# Patient Record
Sex: FEMALE | Race: BLACK OR AFRICAN AMERICAN | NOT HISPANIC OR LATINO | Employment: OTHER | URBAN - METROPOLITAN AREA
[De-identification: names, ages, dates, MRNs, and addresses within clinical notes are randomized per-mention and may not be internally consistent; named-entity substitution may affect disease eponyms.]

---

## 2023-06-07 DIAGNOSIS — M25.562 PAIN IN BOTH KNEES, UNSPECIFIED CHRONICITY: Primary | ICD-10-CM

## 2023-06-07 DIAGNOSIS — M25.561 PAIN IN BOTH KNEES, UNSPECIFIED CHRONICITY: Primary | ICD-10-CM

## 2023-06-12 ENCOUNTER — OFFICE VISIT (OUTPATIENT)
Dept: SPORTS MEDICINE | Facility: CLINIC | Age: 78
End: 2023-06-12
Payer: COMMERCIAL

## 2023-06-12 ENCOUNTER — HOSPITAL ENCOUNTER (OUTPATIENT)
Dept: RADIOLOGY | Facility: HOSPITAL | Age: 78
Discharge: HOME | End: 2023-06-12
Attending: PHYSICIAN ASSISTANT
Payer: COMMERCIAL

## 2023-06-12 VITALS
BODY MASS INDEX: 34.78 KG/M2 | WEIGHT: 184.06 LBS | SYSTOLIC BLOOD PRESSURE: 176 MMHG | DIASTOLIC BLOOD PRESSURE: 81 MMHG | HEART RATE: 57 BPM

## 2023-06-12 DIAGNOSIS — M25.561 CHRONIC PAIN OF BOTH KNEES: Primary | ICD-10-CM

## 2023-06-12 DIAGNOSIS — M25.562 CHRONIC PAIN OF BOTH KNEES: Primary | ICD-10-CM

## 2023-06-12 DIAGNOSIS — M17.12 PRIMARY OSTEOARTHRITIS OF LEFT KNEE: ICD-10-CM

## 2023-06-12 DIAGNOSIS — M17.11 PRIMARY OSTEOARTHRITIS OF RIGHT KNEE: ICD-10-CM

## 2023-06-12 DIAGNOSIS — G89.29 CHRONIC PAIN OF BOTH KNEES: Primary | ICD-10-CM

## 2023-06-12 DIAGNOSIS — M25.562 PAIN IN BOTH KNEES, UNSPECIFIED CHRONICITY: ICD-10-CM

## 2023-06-12 DIAGNOSIS — M25.561 PAIN IN BOTH KNEES, UNSPECIFIED CHRONICITY: ICD-10-CM

## 2023-06-12 PROCEDURE — 3079F DIAST BP 80-89 MM HG: CPT | Mod: CPTII,S$GLB,, | Performed by: PHYSICIAN ASSISTANT

## 2023-06-12 PROCEDURE — 1126F PR PAIN SEVERITY QUANTIFIED, NO PAIN PRESENT: ICD-10-PCS | Mod: CPTII,S$GLB,, | Performed by: PHYSICIAN ASSISTANT

## 2023-06-12 PROCEDURE — 3288F PR FALLS RISK ASSESSMENT DOCUMENTED: ICD-10-PCS | Mod: CPTII,S$GLB,, | Performed by: PHYSICIAN ASSISTANT

## 2023-06-12 PROCEDURE — 99999 PR PBB SHADOW E&M-EST. PATIENT-LVL III: CPT | Mod: PBBFAC,,, | Performed by: PHYSICIAN ASSISTANT

## 2023-06-12 PROCEDURE — 20610 LARGE JOINT ASPIRATION/INJECTION: BILATERAL KNEE: ICD-10-PCS | Mod: 50,S$GLB,, | Performed by: PHYSICIAN ASSISTANT

## 2023-06-12 PROCEDURE — 1101F PR PT FALLS ASSESS DOC 0-1 FALLS W/OUT INJ PAST YR: ICD-10-PCS | Mod: CPTII,S$GLB,, | Performed by: PHYSICIAN ASSISTANT

## 2023-06-12 PROCEDURE — 99204 PR OFFICE/OUTPT VISIT, NEW, LEVL IV, 45-59 MIN: ICD-10-PCS | Mod: 25,S$GLB,, | Performed by: PHYSICIAN ASSISTANT

## 2023-06-12 PROCEDURE — 1159F PR MEDICATION LIST DOCUMENTED IN MEDICAL RECORD: ICD-10-PCS | Mod: CPTII,S$GLB,, | Performed by: PHYSICIAN ASSISTANT

## 2023-06-12 PROCEDURE — 99204 OFFICE O/P NEW MOD 45 MIN: CPT | Mod: 25,S$GLB,, | Performed by: PHYSICIAN ASSISTANT

## 2023-06-12 PROCEDURE — 20610 DRAIN/INJ JOINT/BURSA W/O US: CPT | Mod: 50,S$GLB,, | Performed by: PHYSICIAN ASSISTANT

## 2023-06-12 PROCEDURE — 1159F MED LIST DOCD IN RCRD: CPT | Mod: CPTII,S$GLB,, | Performed by: PHYSICIAN ASSISTANT

## 2023-06-12 PROCEDURE — 3079F PR MOST RECENT DIASTOLIC BLOOD PRESSURE 80-89 MM HG: ICD-10-PCS | Mod: CPTII,S$GLB,, | Performed by: PHYSICIAN ASSISTANT

## 2023-06-12 PROCEDURE — 3077F PR MOST RECENT SYSTOLIC BLOOD PRESSURE >= 140 MM HG: ICD-10-PCS | Mod: CPTII,S$GLB,, | Performed by: PHYSICIAN ASSISTANT

## 2023-06-12 PROCEDURE — 99999 PR PBB SHADOW E&M-EST. PATIENT-LVL III: ICD-10-PCS | Mod: PBBFAC,,, | Performed by: PHYSICIAN ASSISTANT

## 2023-06-12 PROCEDURE — 3288F FALL RISK ASSESSMENT DOCD: CPT | Mod: CPTII,S$GLB,, | Performed by: PHYSICIAN ASSISTANT

## 2023-06-12 PROCEDURE — 73564 X-RAY EXAM KNEE 4 OR MORE: CPT | Mod: TC,50,PN

## 2023-06-12 PROCEDURE — 1101F PT FALLS ASSESS-DOCD LE1/YR: CPT | Mod: CPTII,S$GLB,, | Performed by: PHYSICIAN ASSISTANT

## 2023-06-12 PROCEDURE — 1126F AMNT PAIN NOTED NONE PRSNT: CPT | Mod: CPTII,S$GLB,, | Performed by: PHYSICIAN ASSISTANT

## 2023-06-12 PROCEDURE — 73564 XR KNEE ORTHO BILAT WITH FLEXION: ICD-10-PCS | Mod: 26,,, | Performed by: RADIOLOGY

## 2023-06-12 PROCEDURE — 73564 X-RAY EXAM KNEE 4 OR MORE: CPT | Mod: 26,,, | Performed by: RADIOLOGY

## 2023-06-12 PROCEDURE — 3077F SYST BP >= 140 MM HG: CPT | Mod: CPTII,S$GLB,, | Performed by: PHYSICIAN ASSISTANT

## 2023-06-12 RX ORDER — CARVEDILOL 6.25 MG/1
6.25 TABLET ORAL 2 TIMES DAILY WITH MEALS
COMMUNITY

## 2023-06-12 RX ORDER — TRIAMCINOLONE ACETONIDE 40 MG/ML
40 INJECTION, SUSPENSION INTRA-ARTICULAR; INTRAMUSCULAR
Status: DISCONTINUED | OUTPATIENT
Start: 2023-06-12 | End: 2023-06-12 | Stop reason: HOSPADM

## 2023-06-12 RX ORDER — LISINOPRIL 40 MG/1
40 TABLET ORAL DAILY
COMMUNITY

## 2023-06-12 RX ORDER — ATORVASTATIN CALCIUM 40 MG/1
40 TABLET, FILM COATED ORAL DAILY
COMMUNITY

## 2023-06-12 RX ADMIN — TRIAMCINOLONE ACETONIDE 40 MG: 40 INJECTION, SUSPENSION INTRA-ARTICULAR; INTRAMUSCULAR at 09:06

## 2023-06-12 NOTE — PROGRESS NOTES
CC: Bilateral knee pain    Patient is a 77-year-old female who presents today for initial evaluation of bilateral knee pain.    *** mechanical symptoms, *** instability    Is affecting ADLs.  Pain is ***/10 at it's worst.    ***pain for >50 years, + pain medial, stairs, pop/grind, occasional cane use, rest helps. ICE, heat, biofreeze, tylenol    REVIEW OF SYSTEMS:   Constitution: Negative. Negative for chills, fever and night sweats.   HENT: Negative for congestion and headaches.    Eyes: Negative for blurred vision, left vision loss and right vision loss.   Cardiovascular: Negative for chest pain and syncope.   Respiratory: Negative for cough and shortness of breath.    Endocrine: Negative for polydipsia, polyphagia and polyuria.   Hematologic/Lymphatic: Negative for bleeding problem. Does not bruise/bleed easily.   Skin: Negative for dry skin, itching and rash.   Musculoskeletal: Negative for falls. Positive for bilateral knee pain and  muscle weakness.   Gastrointestinal: Negative for abdominal pain and bowel incontinence.   Genitourinary: Negative for bladder incontinence and nocturia.   Neurological: Negative for disturbances in coordination, loss of balance and seizures.   Psychiatric/Behavioral: Negative for depression. The patient does not have insomnia.    Allergic/Immunologic: Negative for hives and persistent infections.     PAST MEDICAL HISTORY:   Past Medical History:   Diagnosis Date    Diabetes mellitus     Hyperlipidemia     Hypertension        PAST SURGICAL HISTORY:   Past Surgical History:   Procedure Laterality Date     SECTION         FAMILY HISTORY:   No family history on file.    SOCIAL HISTORY:   Social History     Socioeconomic History    Marital status: Single   Tobacco Use    Smoking status: Every Day     Types: Cigarettes   Substance and Sexual Activity    Alcohol use: Yes     Comment: rarely    Drug use: No       MEDICATIONS:     Current Outpatient Medications:     aspirin  (ECOTRIN) 81 MG EC tablet, Take 81 mg by mouth once daily., Disp: , Rfl:     furosemide (LASIX) 20 MG tablet, Take 20 mg by mouth 2 (two) times daily., Disp: , Rfl:     lisinopril-hydrochlorothiazide (PRINZIDE,ZESTORETIC) 20-12.5 mg per tablet, Take 1 tablet by mouth once daily., Disp: , Rfl:     lovastatin (MEVACOR) 20 MG tablet, Take 20 mg by mouth every evening., Disp: , Rfl:     metformin (GLUCOPHAGE) 500 MG tablet, Take 500 mg by mouth 2 (two) times daily with meals., Disp: , Rfl:     permethrin (ELIMITE) 5 % cream, Massage cream into entire body avoiding mouth, eyes and nose.  Wash off after 12 hours., Disp: 60 g, Rfl: 0    ALLERGIES:   Review of patient's allergies indicates:  No Known Allergies    VITAL SIGNS:   There were no vitals taken for this visit.     PHYSICAL EXAMINATION:  General:  The patient is alert and oriented x 3.  Mood is pleasant.  Observation of ears, eyes and nose reveal no gross abnormalities.  No labored breathing observed.    BILATERAL KNEE EXAMINATION     OBSERVATION / INSPECTION   Gait:   Nonantalgic   Alignment:  Neutral   Scars:   None   Muscle atrophy: Mild  Effusion:  None   Warmth:  None   Discoloration:   none     TENDERNESS / CREPITUS (T / C):          T / C      T / C   Patella   - / -   Lateral joint line   - / -   Peripatellar medial  -  Medial joint line    - / -   Peripatellar lateral -  Medial plica   - / -   Patellar tendon -   Popliteal fossa   - / -   Quad tendon   -   Gastrocnemius   -   Prepatellar Bursa - / -   Quadricep   -   Tibial tubercle  -  Thigh/hamstring  -   Pes anserine/HS -  Fibula    -   ITB   - / -  Tibia     -   Tib/fib joint  - / -  LCL    -     MFC   - / -   MCL: Proximal  -    LFC   - / -    Distal   -          ROM: (* = pain)  PASSIVE   ACTIVE    Left :   5 / 0 / 100   5 / 0 / 100    Right :    5 / 0 / 100   5 / 0 / 100    Patellofemoral examination:  See above noted areas of tenderness.   Patella position    Subluxation / dislocation: Laterally  subluxed          Sup. / Inf;   Normal   Crepitus (PF):    +  Patellar Mobility:       Medial-lateral:   Normal    Superior-inferior:  Normal    Inferior tilt   Normal    Patellar tendon:  Normal   Lateral tilt:    Normal   J-sign:     None   Patellofemoral grind:   No pain       MENISCAL SIGNS:     Pain on terminal extension:  -  Pain on terminal flexion:  -  Andreis maneuver:  - (for ***)  Squat     - (for ***)    LIGAMENT EXAMINATION:  ACL / Lachman:  normal (-1 to 2mm)    PCL-Post.  drawer: normal 0 to 2mm  MCL- Valgus:  normal 0 to 2mm  LCL- Varus:  normal 0 to 2mm  Pivot shift: normal (Equal)   Dial Test: difference c/w other side   At 30° flexion: normal (< 5°)    At 90° flexion: normal (< 5°)   Reverse Pivot Shift:   normal (Equal)     STRENGTH: (* = with pain) PAINFUL SIDE   Quadricep   5/5   Hamstrin/5    EXTREMITY NEURO-VASCULAR EXAMINATION:   Sensation:  Grossly intact to light touch all dermatomal regions.   Motor Function:  Fully intact motor function at hip, knee, foot and ankle    DTRs;  quadriceps and  achilles 2+.  No clonus and downgoing Babinski.    Vascular status:  DP and PT pulses 2+, brisk capillary refill, symmetric.     OTHER FINDINGS:  ***    X-rays bilateral knees (2023):  FINDINGS:  Right knee:     No acute fracture.  Severe narrowing of medial compartment without significant genu varum.  Preserved lateral compartment.  Significant narrowing of the patellofemoral compartment, particularly laterally and question mild lateral patella tilt.  Periarticular spurring about tibiofemoral and patellofemoral articulations.  No definite suprapatellar bursal effusion or prepatellar soft tissue swelling.     Left knee:     No acute fracture.  Severe narrowing of medial compartment without significant genu varum.  Preserved lateral compartment.  Significant narrowing of the patellofemoral compartment, particularly laterally and question mild lateral patella tilt.  Periarticular  spurring about tibiofemoral and patellofemoral compartments.  No suprapatellar bursal effusion or prepatellar soft tissue swelling.        ASSESSMENT:    Bilateral knee pain    PLAN:     I made the decision to obtain old records of the patient including previous notes and imaging. New imaging was ordered today of the extremity or extremities evaluated. I independently reviewed and interpreted the radiographs and/or MRIs today as well as prior imaging, if available.    We discussed at length different treatment options including conservative vs surgical management. These include anti-inflammatories, acetaminophen, rest, ice, heat, formal physical therapy including strengthening and stretching exercises, home exercise programs, dry needling, corticosteroid versus visco supplementation injections, and finally surgical intervention.      ***      All questions were answered, pt will contact us for questions or concerns in the interim.      Medical Dictation software was used during the dictation of portions or the entirety of this medical record.  Phonetic or grammatic errors may exist due to the use of this software. For clarification, refer to the author of the document.

## 2023-06-12 NOTE — PROCEDURES
Large Joint Aspiration/Injection: bilateral knee    Date/Time: 6/12/2023 9:00 AM  Performed by: Cholo Campbell PA-C  Authorized by: Cholo Campbell PA-C     Consent Done?:  Yes (Verbal)  Indications:  Pain and arthritis  Site marked: the procedure site was marked    Timeout: prior to procedure the correct patient, procedure, and site was verified    Prep: patient was prepped and draped in usual sterile fashion    Local anesthesia used?: No      Details:  Needle Size:  22 G  Ultrasonic Guidance for needle placement?: No    Approach:  Superior  Location:  Knee  Laterality:  Bilateral  Site:  Bilateral knee  Medications (Right):  40 mg triamcinolone acetonide 40 mg/mL  Medications (Left):  40 mg triamcinolone acetonide 40 mg/mL  Patient tolerance:  Patient tolerated the procedure well with no immediate complications    Injection Procedure  A time out was performed, including verification of patient ID, procedure, site and side, availability of information and equipment, review of safety issues, and agreement with consent, the procedure site was marked.    After time out was performed, the patient was prepped aseptically with povidone-iodine swabsticks. A diagnostic and therapeutic injection of 1:3cc Kenalog/Marcaine was given under sterile technique using a 22g x 1.5 needle from the Superolateral  aspect of the bilateral Knee Joint in the supine position.      Bhavna Lim had no adverse reactions to the medication. Pain decreased. She was instructed to apply ice to the joint for 20 minutes and avoid strenuous activities for 24-36 hours following the injection. She was warned of possible blood sugar and/or blood pressure changes during that time. Following that time, she can resume regular activities.    She was reminded to call the clinic immediately for any adverse side effects as explained in clinic today.

## 2023-06-12 NOTE — PROGRESS NOTES
"CC: Bilateral knee pain    Patient is a 77-year-old female who presents today for initial evaluation of bilateral knee pain. She reports medial joint space pain that has been presents for >50 years. She states the pain occurs when walking up and down stairs, and standing for prolonged periods of time. She has been using ice, heat, biofreeze, and tylenol with only mild temporary relief. She states that she can hear and feel grinding, popping, and clicking with movement, and the occasional episode of knee unsteadiness and feelings of "giving out". She uses a cane to walk. She states the left knee is slightly worse than the right knee.  She denies any recent falls, injuries, or trauma to the right or left knee.      + mechanical symptoms, - instability    Is affecting ADLs.  Pain is 3/10 at it's worst.        REVIEW OF SYSTEMS:   Constitution: Negative. Negative for chills, fever and night sweats.   HENT: Negative for congestion and headaches.    Eyes: Negative for blurred vision, left vision loss and right vision loss.   Cardiovascular: Negative for chest pain and syncope.   Respiratory: Negative for cough and shortness of breath.    Endocrine: Negative for polydipsia, polyphagia and polyuria.   Hematologic/Lymphatic: Negative for bleeding problem. Does not bruise/bleed easily.   Skin: Negative for dry skin, itching and rash.   Musculoskeletal: Negative for falls. Positive for bilateral knee pain and  muscle weakness.   Gastrointestinal: Negative for abdominal pain and bowel incontinence.   Genitourinary: Negative for bladder incontinence and nocturia.   Neurological: Negative for disturbances in coordination, loss of balance and seizures.   Psychiatric/Behavioral: Negative for depression. The patient does not have insomnia.    Allergic/Immunologic: Negative for hives and persistent infections.     PAST MEDICAL HISTORY:   Past Medical History:   Diagnosis Date    Diabetes mellitus     Hyperlipidemia     Hypertension  "       PAST SURGICAL HISTORY:   Past Surgical History:   Procedure Laterality Date     SECTION         FAMILY HISTORY:   History reviewed. No pertinent family history.    SOCIAL HISTORY:   Social History     Socioeconomic History    Marital status: Single   Tobacco Use    Smoking status: Every Day     Types: Cigarettes   Substance and Sexual Activity    Alcohol use: Yes     Comment: rarely    Drug use: No       MEDICATIONS:     Current Outpatient Medications:     aspirin (ECOTRIN) 81 MG EC tablet, Take 81 mg by mouth once daily., Disp: , Rfl:     atorvastatin (LIPITOR) 40 MG tablet, Take 40 mg by mouth once daily., Disp: , Rfl:     carvediloL (COREG) 6.25 MG tablet, Take 6.25 mg by mouth 2 (two) times daily with meals., Disp: , Rfl:     furosemide (LASIX) 20 MG tablet, Take 20 mg by mouth 2 (two) times daily., Disp: , Rfl:     lisinopriL (PRINIVIL,ZESTRIL) 40 MG tablet, Take 40 mg by mouth once daily., Disp: , Rfl:     lovastatin (MEVACOR) 20 MG tablet, Take 20 mg by mouth every evening., Disp: , Rfl:     metformin (GLUCOPHAGE) 500 MG tablet, Take 500 mg by mouth 2 (two) times daily with meals., Disp: , Rfl:     lisinopril-hydrochlorothiazide (PRINZIDE,ZESTORETIC) 20-12.5 mg per tablet, Take 1 tablet by mouth once daily., Disp: , Rfl:     permethrin (ELIMITE) 5 % cream, Massage cream into entire body avoiding mouth, eyes and nose.  Wash off after 12 hours. (Patient not taking: Reported on 2023), Disp: 60 g, Rfl: 0    ALLERGIES:   Review of patient's allergies indicates:  No Known Allergies    VITAL SIGNS:   BP (!) 176/81   Pulse (!) 57   Wt 83.5 kg (184 lb 1.4 oz)   BMI 34.78 kg/m²      PHYSICAL EXAMINATION:  General:  The patient is alert and oriented x 3.  Mood is pleasant.  Observation of ears, eyes and nose reveal no gross abnormalities.  No labored breathing observed.    BILATERAL KNEE EXAMINATION     OBSERVATION / INSPECTION   Gait:   Nonantalgic   Alignment:  Neutral   Scars:   None   Muscle  atrophy: Mild  Effusion:  None   Warmth:  None   Discoloration:   none     TENDERNESS / CREPITUS (T / C):          T / C      T / C   Patella   + / -   Lateral joint line   +/ -   Peripatellar medial  -  Medial joint line    + / -   Peripatellar lateral -  Medial plica   - / -   Patellar tendon -   Popliteal fossa   - / -   Quad tendon   -   Gastrocnemius   -   Prepatellar Bursa - / -   Quadricep   -   Tibial tubercle  -  Thigh/hamstring  -   Pes anserine/HS -  Fibula    -   ITB   - / -  Tibia     -   Tib/fib joint  - / -  LCL    -     MFC   - / -   MCL: Proximal  -    LFC   - / -    Distal   -          ROM: (* = pain)  PASSIVE   ACTIVE    Left :   5 / 0 / 100*   5 / 0 / 100*    Right :    5 / 0 / 100*   5 / 0 / 100*    Patellofemoral examination:  See above noted areas of tenderness.   Patella position    Subluxation / dislocation: Laterally subluxed          Sup. / Inf;   Normal   Crepitus (PF):    +  Patellar Mobility:       Medial-lateral:   Normal    Superior-inferior:  Normal    Inferior tilt   Normal    Patellar tendon:  Normal   Lateral tilt:    Normal   J-sign:     None   Patellofemoral grind:   No pain       MENISCAL SIGNS:     Pain on terminal extension:  -  Pain on terminal flexion:  +  Andreis maneuver:  + (for pain)  Squat     deferred    LIGAMENT EXAMINATION:  ACL / Lachman:  normal (-1 to 2mm)    PCL-Post.  drawer: normal 0 to 2mm  MCL- Valgus:  normal 0 to 2mm  LCL- Varus:  normal 0 to 2mm  Pivot shift: normal (Equal)   Dial Test: difference c/w other side   At 30° flexion: normal (< 5°)    At 90° flexion: normal (< 5°)   Reverse Pivot Shift:   normal (Equal)     STRENGTH: (* = with pain) PAINFUL SIDE   Quadricep   5/5   Hamstrin/5    EXTREMITY NEURO-VASCULAR EXAMINATION:   Sensation:  Grossly intact to light touch all dermatomal regions.   Motor Function:  Fully intact motor function at hip, knee, foot and ankle    DTRs;  quadriceps and  achilles 2+.  No clonus and downgoing Babinski.     Vascular status:  DP and PT pulses 2+, brisk capillary refill, symmetric.     OTHER FINDINGS:  N/A    X-rays bilateral knees (6/12/2023):  FINDINGS:  Right knee:     No acute fracture.  Severe narrowing of medial compartment without significant genu varum.  Preserved lateral compartment.  Significant narrowing of the patellofemoral compartment, particularly laterally and question mild lateral patella tilt.  Periarticular spurring about tibiofemoral and patellofemoral articulations.  No definite suprapatellar bursal effusion or prepatellar soft tissue swelling.     Left knee:     No acute fracture.  Severe narrowing of medial compartment without significant genu varum.  Preserved lateral compartment.  Significant narrowing of the patellofemoral compartment, particularly laterally and question mild lateral patella tilt.  Periarticular spurring about tibiofemoral and patellofemoral compartments.  No suprapatellar bursal effusion or prepatellar soft tissue swelling.        ASSESSMENT:    Bilateral knee pain  Primary osteoarthritis of both knees    PLAN:     I made the decision to obtain old records of the patient including previous notes and imaging. New imaging was ordered today of the extremity or extremities evaluated. I independently reviewed and interpreted the radiographs and/or MRIs today as well as prior imaging, if available.    We discussed at length different treatment options including conservative vs surgical management. These include anti-inflammatories, acetaminophen, rest, ice, heat, formal physical therapy including strengthening and stretching exercises, home exercise programs, dry needling, corticosteroid versus visco supplementation injections, and finally surgical intervention.      Bilateral knee corticosteroid injections performed today.  See procedure note for details.    Recommend she continue to rest, ice, and elevate the bilateral knees and take over-the-counter acetaminophen and use  topical analgesics as needed for pain.    Follow-up as needed.      All questions were answered, pt will contact us for questions or concerns in the interim.      Medical Dictation software was used during the dictation of portions or the entirety of this medical record.  Phonetic or grammatic errors may exist due to the use of this software. For clarification, refer to the author of the document.

## 2024-11-18 NOTE — PROGRESS NOTES
Gastroenterology Clinic Consultation Note    Reason for Visit:  The primary encounter diagnosis was Unintentional weight loss. Diagnoses of Early satiety, Benign essential hypertension, and Hyperlipidemia, unspecified hyperlipidemia type were also pertinent to this visit.    PCP:   Suzi Green   No address on file      Initial HPI   This is a 79 y.o. female presenting for unintentional weight loss and early satiety. Symptoms started in August where her appetite was decreased and started noticing the weight loss. OCtober and November, her symptoms have gotten severe where she is barely able to eat anything. Can only tolerate very small amounts of food. Only can eat avocado and brocolli. She did have and episode of vomiting when she tried to eat soup and reports that she overate. Denies constipation, abdominal pain, odynophagia, dysphagia. She has lost ~60lbs in the last 4 months. Denies changes in her bowel movements. They are smaller in size, but she attributes that to her decreased PO intake.      Patient saw Dr. Katerin Rosales for these symptoms and scopes were ordered, however, it was discovered that she was out of network for her insurance.     Patient denies FH of colon cancer or other GI malignancy. She has never had a colonoscopy or done noninvasive colon cancer screening. She is a current smoker as she reports that she started back smoking recently. Has had cardiac workup in the past through her cardiologist at [facility], however, due to cost, she was not able to complete some of the recommended tests. She denies any recent chest pain, shortness of breath.     ROS:  Review of Systems   Constitutional:  Positive for weight loss. Negative for chills, fever and malaise/fatigue.   HENT:  Negative for sore throat.    Eyes:  Negative for redness.   Gastrointestinal:  Negative for abdominal pain, blood in stool, constipation, diarrhea,  "heartburn, melena, nausea and vomiting.   Skin:  Negative for rash.   Neurological:  Positive for weakness. Negative for dizziness and loss of consciousness.        Medical History:  has a past medical history of Diabetes mellitus, Hyperlipidemia, and Hypertension.    Surgical History:  has a past surgical history that includes  section.    Family History: family history is not on file..       Review of patient's allergies indicates:  No Known Allergies    Current Outpatient Medications on File Prior to Visit   Medication Sig Dispense Refill    aspirin (ECOTRIN) 81 MG EC tablet Take 81 mg by mouth once daily.      atorvastatin (LIPITOR) 40 MG tablet Take 40 mg by mouth once daily.      lisinopriL (PRINIVIL,ZESTRIL) 40 MG tablet Take 40 mg by mouth once daily.      NIFEdipine (ADALAT CC) 60 MG TbSR Take 30 mg by mouth once daily.      carvediloL (COREG) 6.25 MG tablet Take 6.25 mg by mouth 2 (two) times daily with meals.      furosemide (LASIX) 20 MG tablet Take 20 mg by mouth 2 (two) times daily.      lisinopriL (PRINIVIL,ZESTRIL) 40 MG tablet Take 40 mg by mouth once daily.      lisinopril-hydrochlorothiazide (PRINZIDE,ZESTORETIC) 20-12.5 mg per tablet Take 1 tablet by mouth once daily.      lovastatin (MEVACOR) 20 MG tablet Take 20 mg by mouth every evening.      metformin (GLUCOPHAGE) 500 MG tablet Take 500 mg by mouth 2 (two) times daily with meals.      permethrin (ELIMITE) 5 % cream Massage cream into entire body avoiding mouth, eyes and nose.  Wash off after 12 hours. 60 g 0     No current facility-administered medications on file prior to visit.         Objective Findings:    Vital Signs:  /67   Pulse (!) 59   Ht 5' 1" (1.549 m)   Wt 60.5 kg (133 lb 6.1 oz)   BMI 25.20 kg/m²   Body mass index is 25.2 kg/m².    Physical Exam:  Physical Exam  Vitals and nursing note reviewed.   Constitutional:       Appearance: She is not ill-appearing.   HENT:      Mouth/Throat:      Mouth: Mucous membranes " are moist.      Pharynx: Oropharynx is clear.   Eyes:      General: No scleral icterus.  Abdominal:      General: Abdomen is flat. Bowel sounds are normal. There is no distension.      Palpations: Abdomen is soft. There is no mass.      Tenderness: There is no abdominal tenderness.      Hernia: No hernia is present.   Skin:     General: Skin is warm and dry.      Capillary Refill: Capillary refill takes less than 2 seconds.      Coloration: Skin is not jaundiced or pale.   Neurological:      Mental Status: She is alert and oriented to person, place, and time. Mental status is at baseline.             Labs:  Lab Results   Component Value Date    WBC 0-5 06/15/2022    CHOL 120 10/11/2024    TRIG 110 10/11/2024    HDL 41 10/11/2024    LIPASE 12 06/15/2022     (H) 06/17/2022     (H) 06/17/2022     06/17/2022    K 3.7 06/17/2022    CREATININE 0.84 06/17/2022    BUN 15.0 06/17/2022    CO2 28 06/17/2022    INR 1.0 06/15/2022    HGBA1C 5.5 10/11/2024       Imaging reviewed: No pertinent imaging available for review      Endoscopy reviewed: No prior endoscopy performed.       Assessment:  1. Unintentional weight loss    2. Early satiety    3. Benign essential hypertension    4. Hyperlipidemia, unspecified hyperlipidemia type      Orders Placed This Encounter    CT Abdomen Pelvis W Wo Contrast    COMPREHENSIVE METABOLIC PANEL    CBC Auto Differential    E-Consult to General Cardiology         Plan:  Referral for CT A/P for further evaluation.  URGENT EGD/Colonoscopy for further evaluation on GI issues to her early satiety and rapid weight loss. Pending results, can consider GI dietician if warranted for nutritional support pending findings of above tests. Labwork today as well.  E-consult for cardiology for surgical clearance and to evaluate for additional workup necessary.   Above  RTC post procedure for followup if indicated.    Thank you for allowing me to participate in this patient's  care.    Sincerely,     Jocelin Torres NP  Gastroenterology Department  [facility]

## 2024-11-19 ENCOUNTER — OFFICE VISIT (OUTPATIENT)
Dept: GASTROENTEROLOGY | Facility: CLINIC | Age: 79
End: 2024-11-19
Payer: COMMERCIAL

## 2024-11-19 ENCOUNTER — TELEPHONE (OUTPATIENT)
Dept: ENDOSCOPY | Facility: HOSPITAL | Age: 79
End: 2024-11-19
Payer: COMMERCIAL

## 2024-11-19 ENCOUNTER — LAB VISIT (OUTPATIENT)
Dept: LAB | Facility: HOSPITAL | Age: 79
End: 2024-11-19
Payer: COMMERCIAL

## 2024-11-19 ENCOUNTER — E-CONSULT (OUTPATIENT)
Dept: CARDIOLOGY | Facility: CLINIC | Age: 79
End: 2024-11-19
Payer: COMMERCIAL

## 2024-11-19 VITALS
HEART RATE: 59 BPM | HEIGHT: 61 IN | BODY MASS INDEX: 25.18 KG/M2 | SYSTOLIC BLOOD PRESSURE: 100 MMHG | DIASTOLIC BLOOD PRESSURE: 67 MMHG | WEIGHT: 133.38 LBS

## 2024-11-19 DIAGNOSIS — Z01.818 PREOPERATIVE EVALUATION OF A MEDICAL CONDITION TO RULE OUT SURGICAL CONTRAINDICATIONS (TAR REQUIRED): Primary | ICD-10-CM

## 2024-11-19 DIAGNOSIS — R68.81 EARLY SATIETY: ICD-10-CM

## 2024-11-19 DIAGNOSIS — R63.4 WEIGHT LOSS: Primary | ICD-10-CM

## 2024-11-19 DIAGNOSIS — I10 BENIGN ESSENTIAL HYPERTENSION: ICD-10-CM

## 2024-11-19 DIAGNOSIS — R63.4 UNINTENTIONAL WEIGHT LOSS: Primary | ICD-10-CM

## 2024-11-19 DIAGNOSIS — E78.5 HYPERLIPIDEMIA, UNSPECIFIED HYPERLIPIDEMIA TYPE: Chronic | ICD-10-CM

## 2024-11-19 PROBLEM — R07.9 CHEST PAIN: Status: ACTIVE | Noted: 2022-06-16

## 2024-11-19 PROBLEM — H26.9 CATARACT: Status: ACTIVE | Noted: 2024-11-19

## 2024-11-19 LAB
ALBUMIN SERPL BCP-MCNC: 2.8 G/DL (ref 3.5–5.2)
ALP SERPL-CCNC: 148 U/L (ref 40–150)
ALT SERPL W/O P-5'-P-CCNC: 18 U/L (ref 10–44)
ANION GAP SERPL CALC-SCNC: 8 MMOL/L (ref 8–16)
AST SERPL-CCNC: 25 U/L (ref 10–40)
BASOPHILS # BLD AUTO: 0.08 K/UL (ref 0–0.2)
BASOPHILS NFR BLD: 0.7 % (ref 0–1.9)
BILIRUB SERPL-MCNC: 0.9 MG/DL (ref 0.1–1)
BUN SERPL-MCNC: 11 MG/DL (ref 8–23)
CALCIUM SERPL-MCNC: 9.2 MG/DL (ref 8.7–10.5)
CHLORIDE SERPL-SCNC: 104 MMOL/L (ref 95–110)
CO2 SERPL-SCNC: 28 MMOL/L (ref 23–29)
CREAT SERPL-MCNC: 1 MG/DL (ref 0.5–1.4)
DIFFERENTIAL METHOD BLD: ABNORMAL
EOSINOPHIL # BLD AUTO: 0.2 K/UL (ref 0–0.5)
EOSINOPHIL NFR BLD: 1.5 % (ref 0–8)
ERYTHROCYTE [DISTWIDTH] IN BLOOD BY AUTOMATED COUNT: 18.4 % (ref 11.5–14.5)
EST. GFR  (NO RACE VARIABLE): 58 ML/MIN/1.73 M^2
GLUCOSE SERPL-MCNC: 110 MG/DL (ref 70–110)
HCT VFR BLD AUTO: 44.5 % (ref 37–48.5)
HGB BLD-MCNC: 14.7 G/DL (ref 12–16)
IMM GRANULOCYTES # BLD AUTO: 0.04 K/UL (ref 0–0.04)
IMM GRANULOCYTES NFR BLD AUTO: 0.3 % (ref 0–0.5)
LYMPHOCYTES # BLD AUTO: 2 K/UL (ref 1–4.8)
LYMPHOCYTES NFR BLD: 16.2 % (ref 18–48)
MCH RBC QN AUTO: 28.4 PG (ref 27–31)
MCHC RBC AUTO-ENTMCNC: 33 G/DL (ref 32–36)
MCV RBC AUTO: 86 FL (ref 82–98)
MONOCYTES # BLD AUTO: 0.9 K/UL (ref 0.3–1)
MONOCYTES NFR BLD: 7.3 % (ref 4–15)
NEUTROPHILS # BLD AUTO: 9 K/UL (ref 1.8–7.7)
NEUTROPHILS NFR BLD: 74 % (ref 38–73)
NRBC BLD-RTO: 0 /100 WBC
PLATELET # BLD AUTO: 339 K/UL (ref 150–450)
PMV BLD AUTO: 11.4 FL (ref 9.2–12.9)
POTASSIUM SERPL-SCNC: 3.6 MMOL/L (ref 3.5–5.1)
PROT SERPL-MCNC: 7 G/DL (ref 6–8.4)
RBC # BLD AUTO: 5.17 M/UL (ref 4–5.4)
SODIUM SERPL-SCNC: 140 MMOL/L (ref 136–145)
WBC # BLD AUTO: 12.13 K/UL (ref 3.9–12.7)

## 2024-11-19 PROCEDURE — 3078F DIAST BP <80 MM HG: CPT | Mod: CPTII,S$GLB,,

## 2024-11-19 PROCEDURE — 99214 OFFICE O/P EST MOD 30 MIN: CPT | Mod: S$GLB,,,

## 2024-11-19 PROCEDURE — 1159F MED LIST DOCD IN RCRD: CPT | Mod: CPTII,S$GLB,,

## 2024-11-19 PROCEDURE — 3074F SYST BP LT 130 MM HG: CPT | Mod: CPTII,S$GLB,,

## 2024-11-19 PROCEDURE — 1101F PT FALLS ASSESS-DOCD LE1/YR: CPT | Mod: CPTII,S$GLB,,

## 2024-11-19 PROCEDURE — 85025 COMPLETE CBC W/AUTO DIFF WBC: CPT

## 2024-11-19 PROCEDURE — 36415 COLL VENOUS BLD VENIPUNCTURE: CPT

## 2024-11-19 PROCEDURE — 99999 PR PBB SHADOW E&M-EST. PATIENT-LVL III: CPT | Mod: PBBFAC,,,

## 2024-11-19 PROCEDURE — 80053 COMPREHEN METABOLIC PANEL: CPT

## 2024-11-19 PROCEDURE — 1126F AMNT PAIN NOTED NONE PRSNT: CPT | Mod: CPTII,S$GLB,,

## 2024-11-19 PROCEDURE — 3288F FALL RISK ASSESSMENT DOCD: CPT | Mod: CPTII,S$GLB,,

## 2024-11-19 RX ORDER — LISINOPRIL 40 MG/1
40 TABLET ORAL DAILY
COMMUNITY

## 2024-11-19 RX ORDER — NIFEDIPINE 60 MG/1
30 TABLET, EXTENDED RELEASE ORAL DAILY
COMMUNITY

## 2024-11-19 NOTE — CONSULTS
[facility] ([facility])  Response for E-Consult     Patient Name: Bhavna Lim  MRN: [id number]  Primary Care Provider: Suzi Green MD   Requesting Provider: Jocelin Torres NP  Consults    Recommendation:  79-year-old female with a history of nonobstructive CAD, hypertension, hyperlipidemia, tobacco use, diabetes, obesity with anticipated EGD/colonoscopy for abdominal symptoms and unintentional weight loss.    On records review she had a normal ECG in September 2024 and had a normal troponin and BNP in October 2024.    TTE and cardiac catheterization from 2022 at [facility] reports reviewed.  Last cardiology note from September 2024 at [facility] reviewed.    GI notes suggest an urgent GI issue requiring endoscopy.  I see no symptoms in the chart at this time that suggest a cardiovascular issue.  If the clinical provider feels that her symptoms are GI in nature, I see no reason for further cardiovascular workup prior to anticipated endoscopy.    Contingency that warrants a repeat eConsult or referral:  Cardiovascular symptoms.    Total time of Consultation: 5 minute    I did not speak to the requesting provider verbally about this.     *This eConsult is based on the clinical data available to me and is furnished without benefit of a physical examination. The eConsult will need to be interpreted in light of any clinical issues or changes in patient status not available to me at the time of filing this eConsults. Significant changes in patient condition or level of acuity should result in immediate formal consultation and reevaluation. Please alert me if you have further questions.    Thank you for this eConsult referral.     Rc Pena MD  [facility] ([facility])

## 2024-11-19 NOTE — TELEPHONE ENCOUNTER
"Jocelin Torres NP  [facility] Endoscopist Clinic Patients  Caller: Unspecified (Today,  9:24 AM)  Procedure: EGD/Colonoscopy    Diagnosis: Screening colonoscopy, Weight loss, and early satiety    Procedure Timing: Within 4 weeks (Urgent)    *If within 4 weeks selected, please anthony as high priority*    *If greater than 12 weeks, please select "5-12 weeks" and delay sending until 3 months prior to requested date*    Location: Hospital Based ([address], [facility])    Additional Scheduling Information: No scheduling concerns    Prep Specifications:Standard prep    Is the patient taking a GLP-1 Agonist:no    Have you attached a patient to this message: yes  "

## 2024-11-19 NOTE — TELEPHONE ENCOUNTER
Referral for procedure from Telephone call - direct access patient      Spoke to patient to schedule procedure(s) Colonoscopy/EGD       Physician to perform procedure(s) Dr. BACILIO Montalvo  Date of Procedure (s) 12/17/24  Arrival Time 12:00 PM  Time of Procedure(s) 13:00 PM   Location of Procedure(s) [facility]   Type of Rx Prep sent to patient: Suprep  Instructions provided to patient via Copy in hand    Patient was informed on the following information and verbalized understanding. Screening questionnaire reviewed with patient and complete. If procedure requires anesthesia, a responsible adult needs to be present to accompany the patient home, patient cannot drive after receiving anesthesia. Appointment details are tentative, especially check-in time. Patient will receive a prep-op call 7 days prior to confirm check-in time for procedure. If applicable the patient should contact their pharmacy to verify Rx for procedure prep is ready for pick-up. Patient was advised to call the scheduling department at [phone] if pharmacy states no Rx is available. Patient was advised to call the endoscopy scheduling department if any questions or concerns arise.       Endoscopy Scheduling Department    Charlene Patel, RN  P [facility] Endoscopy Scheduling Anticoag/Clearances  The patient is currently under an internal cardiologist Dr. Rc metz and requires a clearance Cardiology for their upcoming scheduled Colonoscopy/EGD on 12/17/24.

## 2024-11-20 ENCOUNTER — TELEPHONE (OUTPATIENT)
Dept: ENDOSCOPY | Facility: HOSPITAL | Age: 79
End: 2024-11-20
Payer: COMMERCIAL

## 2024-11-20 NOTE — TELEPHONE ENCOUNTER
----- Message from SHYLA Chang sent at 2024 10:53 AM CST -----  Regardin/17 CL  The patient is currently under an internal cardiologist Dr. Rc metz and requires a clearance Cardiology for their upcoming scheduled Colonoscopy/EGD on 24.

## 2024-11-21 ENCOUNTER — PATIENT MESSAGE (OUTPATIENT)
Dept: GASTROENTEROLOGY | Facility: CLINIC | Age: 79
End: 2024-11-21
Payer: COMMERCIAL

## 2024-11-22 ENCOUNTER — HOSPITAL ENCOUNTER (OUTPATIENT)
Dept: RADIOLOGY | Facility: HOSPITAL | Age: 79
Discharge: HOME | End: 2024-11-22
Payer: COMMERCIAL

## 2024-11-22 DIAGNOSIS — R63.4 UNINTENTIONAL WEIGHT LOSS: ICD-10-CM

## 2024-11-22 DIAGNOSIS — R68.81 EARLY SATIETY: ICD-10-CM

## 2024-11-22 PROCEDURE — 74177 CT ABD & PELVIS W/CONTRAST: CPT | Mod: TC

## 2024-11-22 PROCEDURE — 74177 CT ABD & PELVIS W/CONTRAST: CPT | Mod: 26,,, | Performed by: RADIOLOGY

## 2024-11-22 PROCEDURE — 25500020 PHARM REV CODE 255

## 2024-11-22 PROCEDURE — A9698 NON-RAD CONTRAST MATERIALNOC: HCPCS

## 2024-11-22 RX ADMIN — IOHEXOL 75 ML: 350 INJECTION, SOLUTION INTRAVENOUS at 05:11

## 2024-11-22 RX ADMIN — BARIUM SULFATE 450 ML: 20 SUSPENSION ORAL at 05:11

## 2024-11-28 ENCOUNTER — PATIENT MESSAGE (OUTPATIENT)
Dept: GASTROENTEROLOGY | Facility: CLINIC | Age: 79
End: 2024-11-28
Payer: COMMERCIAL

## 2024-11-28 DIAGNOSIS — K80.20 CALCULUS OF GALLBLADDER WITHOUT CHOLECYSTITIS WITHOUT OBSTRUCTION: Primary | ICD-10-CM

## 2024-12-04 ENCOUNTER — HOSPITAL ENCOUNTER (OUTPATIENT)
Dept: RADIOLOGY | Facility: HOSPITAL | Age: 79
Discharge: HOME | End: 2024-12-04
Payer: COMMERCIAL

## 2024-12-04 ENCOUNTER — TELEPHONE (OUTPATIENT)
Dept: ENDOSCOPY | Facility: HOSPITAL | Age: 79
End: 2024-12-04
Payer: COMMERCIAL

## 2024-12-04 DIAGNOSIS — K80.20 CALCULUS OF GALLBLADDER WITHOUT CHOLECYSTITIS WITHOUT OBSTRUCTION: ICD-10-CM

## 2024-12-04 PROCEDURE — 25500020 PHARM REV CODE 255

## 2024-12-04 PROCEDURE — A9585 GADOBUTROL INJECTION: HCPCS

## 2024-12-04 PROCEDURE — 74183 MRI ABD W/O CNTR FLWD CNTR: CPT | Mod: TC

## 2024-12-04 RX ORDER — GADOBUTROL 604.72 MG/ML
10 INJECTION INTRAVENOUS
Status: COMPLETED | OUTPATIENT
Start: 2024-12-04 | End: 2024-12-04

## 2024-12-04 RX ADMIN — GADOBUTROL 10 ML: 604.72 INJECTION INTRAVENOUS at 11:12

## 2024-12-04 NOTE — TELEPHONE ENCOUNTER
Return call and spoke with patient. Inform patient time of arrival for her egd/colonoscopy procedure on 12/17/2024 is for 12 AM. Patient ask if she is able to take her Blood pressure medication.  inform patient to take her blood pressure medication as scheduled. Patient verbalized understanding.

## 2024-12-04 NOTE — TELEPHONE ENCOUNTER
"----- Message from Tami sent at 12/4/2024  8:11 AM CST -----  Regarding: FW: pt would like to know her arrival time for her proc on 12/17  Contact: [phone]    ----- Message -----  From: Kristi Martin MA  Sent: 12/3/2024   2:14 PM CST  To: [facility] Endoscopist Clinic Patients  Subject: pt would like to know her arrival time for h#      ----- Message -----  From: Annia Rojas  Sent: 12/3/2024   1:54 PM CST  To: [facility] Staff  Subject: pt adivce                                        .Name Of Caller: Self     Contact Preference?:[phone]     What is the nature of the call?: in reference to needing time for procedure 12/17/24, psl call      Additional Notes:  "Thank you for all that you do for our patients"  "

## 2024-12-12 ENCOUNTER — TELEPHONE (OUTPATIENT)
Dept: ENDOSCOPY | Facility: HOSPITAL | Age: 79
End: 2024-12-12
Payer: COMMERCIAL

## 2024-12-12 NOTE — TELEPHONE ENCOUNTER
Returned call to patient's sister, Noemy.  Confirmed that arrival time for EGD/colonoscopy on 12/17/24 has not been changed, and is 12:00 PM.

## 2024-12-12 NOTE — TELEPHONE ENCOUNTER
----- Message from Tami sent at 12/12/2024  1:23 PM CST -----  Regarding: FW: Arrival Time Questions    ----- Message -----  From: Zelda Zapata  Sent: 12/12/2024  11:02 AM CST  To: [facility] Endoscopy Schedulers  Subject: Arrival Time Questions                                 Name Of Caller:   Noemy (Pt's Sister)      Contact Preference:   [phone]      Nature of call:   Ms. Salguero states someone offered the pt in a text msg an earlier arrival time for their upcoming procedure at 8 am. She would like to decline offer and keep original time at 12 pm.

## 2024-12-16 ENCOUNTER — ANESTHESIA EVENT (OUTPATIENT)
Dept: ENDOSCOPY | Facility: HOSPITAL | Age: 79
End: 2024-12-16
Payer: COMMERCIAL

## 2024-12-16 RX ORDER — SODIUM CHLORIDE 9 MG/ML
INJECTION, SOLUTION INTRAVENOUS CONTINUOUS
OUTPATIENT
Start: 2024-12-16

## 2024-12-16 RX ORDER — LIDOCAINE HYDROCHLORIDE 10 MG/ML
1 INJECTION, SOLUTION EPIDURAL; INFILTRATION; INTRACAUDAL; PERINEURAL ONCE
OUTPATIENT
Start: 2024-12-16 | End: 2024-12-16

## 2024-12-17 ENCOUNTER — HOSPITAL ENCOUNTER (OUTPATIENT)
Facility: HOSPITAL | Age: 79
Discharge: HOME | End: 2024-12-17
Attending: STUDENT IN AN ORGANIZED HEALTH CARE EDUCATION/TRAINING PROGRAM | Admitting: STUDENT IN AN ORGANIZED HEALTH CARE EDUCATION/TRAINING PROGRAM
Payer: COMMERCIAL

## 2024-12-17 ENCOUNTER — TELEPHONE (OUTPATIENT)
Dept: GASTROENTEROLOGY | Facility: CLINIC | Age: 79
End: 2024-12-17
Payer: COMMERCIAL

## 2024-12-17 ENCOUNTER — TELEPHONE (OUTPATIENT)
Dept: SURGERY | Facility: CLINIC | Age: 79
End: 2024-12-17
Payer: COMMERCIAL

## 2024-12-17 ENCOUNTER — ANESTHESIA (OUTPATIENT)
Dept: ENDOSCOPY | Facility: HOSPITAL | Age: 79
End: 2024-12-17
Payer: COMMERCIAL

## 2024-12-17 VITALS
RESPIRATION RATE: 17 BRPM | TEMPERATURE: 97 F | DIASTOLIC BLOOD PRESSURE: 56 MMHG | HEART RATE: 74 BPM | SYSTOLIC BLOOD PRESSURE: 112 MMHG | OXYGEN SATURATION: 98 %

## 2024-12-17 DIAGNOSIS — R63.4 WEIGHT LOSS: ICD-10-CM

## 2024-12-17 PROCEDURE — 27201089 HC SNARE, DISP (ANY): Performed by: STUDENT IN AN ORGANIZED HEALTH CARE EDUCATION/TRAINING PROGRAM

## 2024-12-17 PROCEDURE — 45385 COLONOSCOPY W/LESION REMOVAL: CPT | Mod: ,,, | Performed by: STUDENT IN AN ORGANIZED HEALTH CARE EDUCATION/TRAINING PROGRAM

## 2024-12-17 PROCEDURE — 88305 TISSUE EXAM BY PATHOLOGIST: CPT | Performed by: PATHOLOGY

## 2024-12-17 PROCEDURE — 63600175 PHARM REV CODE 636 W HCPCS: Performed by: STUDENT IN AN ORGANIZED HEALTH CARE EDUCATION/TRAINING PROGRAM

## 2024-12-17 PROCEDURE — 37000008 HC ANESTHESIA 1ST 15 MINUTES: Performed by: STUDENT IN AN ORGANIZED HEALTH CARE EDUCATION/TRAINING PROGRAM

## 2024-12-17 PROCEDURE — 27201012 HC FORCEPS, HOT/COLD, DISP: Performed by: STUDENT IN AN ORGANIZED HEALTH CARE EDUCATION/TRAINING PROGRAM

## 2024-12-17 PROCEDURE — 37000009 HC ANESTHESIA EA ADD 15 MINS: Performed by: STUDENT IN AN ORGANIZED HEALTH CARE EDUCATION/TRAINING PROGRAM

## 2024-12-17 PROCEDURE — 45380 COLONOSCOPY AND BIOPSY: CPT | Performed by: STUDENT IN AN ORGANIZED HEALTH CARE EDUCATION/TRAINING PROGRAM

## 2024-12-17 PROCEDURE — 43239 EGD BIOPSY SINGLE/MULTIPLE: CPT | Performed by: STUDENT IN AN ORGANIZED HEALTH CARE EDUCATION/TRAINING PROGRAM

## 2024-12-17 PROCEDURE — 43239 EGD BIOPSY SINGLE/MULTIPLE: CPT | Mod: ,,, | Performed by: STUDENT IN AN ORGANIZED HEALTH CARE EDUCATION/TRAINING PROGRAM

## 2024-12-17 PROCEDURE — 88305 TISSUE EXAM BY PATHOLOGIST: CPT | Mod: 26,,, | Performed by: PATHOLOGY

## 2024-12-17 PROCEDURE — 25000003 PHARM REV CODE 250: Performed by: STUDENT IN AN ORGANIZED HEALTH CARE EDUCATION/TRAINING PROGRAM

## 2024-12-17 RX ORDER — PROPOFOL 10 MG/ML
VIAL (ML) INTRAVENOUS
Status: DISCONTINUED
Start: 2024-12-17 | End: 2024-12-17 | Stop reason: HOSPADM

## 2024-12-17 RX ORDER — LIDOCAINE HYDROCHLORIDE 20 MG/ML
INJECTION, SOLUTION EPIDURAL; INFILTRATION; INTRACAUDAL; PERINEURAL
Status: DISCONTINUED
Start: 2024-12-17 | End: 2024-12-17 | Stop reason: HOSPADM

## 2024-12-17 RX ORDER — PHENYLEPHRINE HYDROCHLORIDE 10 MG/ML
INJECTION INTRAVENOUS
Status: DISCONTINUED | OUTPATIENT
Start: 2024-12-17 | End: 2024-12-17

## 2024-12-17 RX ORDER — PHENYLEPHRINE HCL IN 0.9% NACL 1 MG/10 ML
SYRINGE (ML) INTRAVENOUS
Status: DISCONTINUED
Start: 2024-12-17 | End: 2024-12-17 | Stop reason: HOSPADM

## 2024-12-17 RX ORDER — GLYCOPYRROLATE 0.2 MG/ML
INJECTION INTRAMUSCULAR; INTRAVENOUS
Status: DISCONTINUED
Start: 2024-12-17 | End: 2024-12-17 | Stop reason: HOSPADM

## 2024-12-17 RX ORDER — LIDOCAINE HYDROCHLORIDE 20 MG/ML
INJECTION INTRAVENOUS
Status: DISCONTINUED | OUTPATIENT
Start: 2024-12-17 | End: 2024-12-17

## 2024-12-17 RX ORDER — PROPOFOL 10 MG/ML
VIAL (ML) INTRAVENOUS
Status: DISCONTINUED | OUTPATIENT
Start: 2024-12-17 | End: 2024-12-17

## 2024-12-17 RX ADMIN — PROPOFOL 30 MG: 10 INJECTION, EMULSION INTRAVENOUS at 01:12

## 2024-12-17 RX ADMIN — PHENYLEPHRINE HYDROCHLORIDE 100 MCG: 10 INJECTION INTRAVENOUS at 01:12

## 2024-12-17 RX ADMIN — PROPOFOL 20 MG: 10 INJECTION, EMULSION INTRAVENOUS at 01:12

## 2024-12-17 RX ADMIN — LIDOCAINE HYDROCHLORIDE 100 MG: 20 INJECTION, SOLUTION INTRAVENOUS at 01:12

## 2024-12-17 RX ADMIN — PHENYLEPHRINE HYDROCHLORIDE 150 MCG: 10 INJECTION INTRAVENOUS at 01:12

## 2024-12-17 RX ADMIN — PROPOFOL 10 MG: 10 INJECTION, EMULSION INTRAVENOUS at 01:12

## 2024-12-17 RX ADMIN — SODIUM CHLORIDE: 0.9 INJECTION, SOLUTION INTRAVENOUS at 12:12

## 2024-12-17 RX ADMIN — PHENYLEPHRINE HYDROCHLORIDE 50 MCG: 10 INJECTION INTRAVENOUS at 01:12

## 2024-12-17 RX ADMIN — PROPOFOL 70 MG: 10 INJECTION, EMULSION INTRAVENOUS at 01:12

## 2024-12-17 RX ADMIN — GLYCOPYRROLATE 0.2 MG: 0.2 INJECTION, SOLUTION INTRAMUSCULAR; INTRAVITREAL at 12:12

## 2024-12-17 NOTE — PROVATION PATIENT INSTRUCTIONS
Discharge Summary/Instructions after an Endoscopic Procedure  Patient Name: Bhavna Lim  Patient MRN: [id number]  Patient YOB: 1945 Tuesday, December 17, 2024  Arturo Montalvo MD  Dear patient,  As a result of recent federal legislation (The Federal Cures Act), you may   receive lab or pathology results from your procedure in your [facility]   account before your physician is able to contact you. Your physician or   their representative will relay the results to you with their   recommendations at their soonest availability.  Thank you,  RESTRICTIONS:  During your procedure today, you received medications for sedation.  These   medications may affect your judgment, balance and coordination.  Therefore,   for 24 hours, you have the following restrictions:   - DO NOT drive a car, operate machinery, make legal/financial decisions,   sign important papers or drink alcohol.    ACTIVITY:  Today: no heavy lifting, straining or running due to procedural   sedation/anesthesia.  The following day: return to full activity including work.  DIET:  Eat and drink normally unless instructed otherwise.     TREATMENT FOR COMMON SIDE EFFECTS:  - Mild abdominal pain, nausea, belching, bloating or excessive gas:  rest,   eat lightly and use a heating pad.  - Sore Throat: treat with throat lozenges and/or gargle with warm salt   water.  - Because air was used during the procedure, expelling large amounts of air   from your rectum or belching is normal.  - If a bowel prep was taken, you may not have a bowel movement for 1-3 days.    This is normal.  SYMPTOMS TO WATCH FOR AND REPORT TO YOUR PHYSICIAN:  1. Abdominal pain or bloating, other than gas cramps.  2. Chest pain.  3. Back pain.  4. Signs of infection such as: chills or fever occurring within 24 hours   after the procedure.  5. Rectal bleeding, which would show as bright red, maroon, or black stools.   (A tablespoon of blood from the rectum is not serious, especially if    hemorrhoids are present.)  6. Vomiting.  7. Weakness or dizziness.  GO DIRECTLY TO THE NEAREST EMERGENCY ROOM IF YOU HAVE ANY OF THE FOLLOWING:      Difficulty breathing              Chills and/or fever over 101 F   Persistent vomiting and/or vomiting blood   Severe abdominal pain   Severe chest pain   Black, tarry stools   Bleeding- more than one tablespoon   Any other symptom or condition that you feel may need urgent attention  Your doctor recommends these additional instructions:  If any biopsies were taken, your doctors clinic will contact you in 1 to 2   weeks with any results.  - Discharge patient to home (ambulatory).   - Patient has a contact number available for emergencies.  The signs and   symptoms of potential delayed complications were discussed with the   patient.  Return to normal activities tomorrow.  Written discharge   instructions were provided to the patient.   - Resume previous diet.   - Continue present medications.   - Return to primary care physician as previously scheduled.   - Repeat colonoscopy for surveillance based on pathology results.  For questions, problems or results please call your physician - Arturo Montalvo MD at Work:  [phone].  [facility] can be reached at [phone]     IF A COMPLICATION OR EMERGENCY SITUATION ARISES AND YOU ARE UNABLE TO REACH   YOUR PHYSICIAN - GO DIRECTLY TO THE EMERGENCY ROOM.  MD Arturo Ceja MD  12/17/2024 2:00:50 PM  This report has been verified and signed electronically.  Dear patient,  As a result of recent federal legislation (The Federal Cures Act), you may   receive lab or pathology results from your procedure in your [facility]   account before your physician is able to contact you. Your physician or   their representative will relay the results to you with their   recommendations at their soonest availability.  Thank you,  PROVATION

## 2024-12-17 NOTE — TELEPHONE ENCOUNTER
----- Message from Perez sent at 12/17/2024  9:25 AM CST -----  Contact: [phone]  Type:  Sooner Apoointment Request    Caller is requesting a sooner appointment.  Caller declined first available appointment listed below.  Caller will not accept being placed on the waitlist and is requesting a message be sent to doctor.    Name of Caller:Pt sister    When is the first available appointment?01/2025    Symptoms:Pt not eating losing  weight     Would the patient rather a call back or a response via [facility]? Call back     Best Call Back Number:[phone]    Additional Information: PT sister asking for a call back as soon as possible to get her sister seen

## 2024-12-17 NOTE — ANESTHESIA POSTPROCEDURE EVALUATION
Anesthesia Post Evaluation    Patient: Bhavna Lim    Procedure(s) Performed: Procedure(s) (LRB):  EGD (ESOPHAGOGASTRODUODENOSCOPY) (N/A)  COLONOSCOPY (N/A)    Final Anesthesia Type: general      Patient location during evaluation: GI PACU  Patient participation: Yes- Able to Participate  Level of consciousness: awake and alert  Post-procedure vital signs: reviewed and stable  Airway patency: patent    PONV status at discharge: No PONV  Anesthetic complications: no      Cardiovascular status: blood pressure returned to baseline and hemodynamically stable  Respiratory status: unassisted, spontaneous ventilation and room air  Hydration status: euvolemic  Follow-up not needed.              Vitals Value Taken Time   /62 12/17/24 1353   Temp 36.2 °C (97.1 °F) 12/17/24 1353   Pulse 76 12/17/24 1353   Resp 11 12/17/24 1353   SpO2 97 % 12/17/24 1353         No case tracking events are documented in the log.      Pain/Lionel Score: Lionel Score: 9 (12/17/2024  1:53 PM)

## 2024-12-17 NOTE — H&P
Short Stay Endoscopy History and Physical    PCP - Suzi Green MD    Procedure - Colonoscopy + EGD  ASA - per anesthesia  Mallampati - per anesthesia  History of Anesthesia problems - no  Family history Anesthesia problems -  no   Plan of anesthesia - General    HPI:  This is a 79 y.o. female here for evaluation of : Weight loss, early satiety      Medical History:  has a past medical history of Diabetes mellitus, Hyperlipidemia, and Hypertension.    Surgical History:  has a past surgical history that includes  section.    Family History: family history is not on file.. Otherwise no colon cancer, inflammatory bowel disease, or GI malignancies.    Social History:  reports that she has been smoking cigarettes. She does not have any smokeless tobacco history on file. She reports current alcohol use. She reports that she does not use drugs.    Review of patient's allergies indicates:  No Known Allergies    Medications:   Medications Prior to Admission   Medication Sig Dispense Refill Last Dose/Taking    atorvastatin (LIPITOR) 40 MG tablet Take 40 mg by mouth once daily.   2024    lisinopriL (PRINIVIL,ZESTRIL) 40 MG tablet Take 40 mg by mouth once daily.   2024    NIFEdipine (ADALAT CC) 60 MG TbSR Take 30 mg by mouth once daily.   2024    aspirin (ECOTRIN) 81 MG EC tablet Take 81 mg by mouth once daily.   Unknown         Physical Exam:    Vital Signs:   Vitals:    24 1229   BP: 125/60   Pulse: 62   Resp: 18   Temp: 97.6 °F (36.4 °C)       General Appearance: Well appearing in no acute distress  Head: Normocephalic, without obvious abnormality   Lungs: Non-labored breathing  Abdomen: Soft, non tender, non distended     Labs:  Lab Results   Component Value Date    WBC 12.13 2024    HGB 14.7 2024    HCT 44.5 2024     2024    CHOL 120 10/11/2024    TRIG 110 10/11/2024    HDL 41 10/11/2024    ALT 18 2024    AST 25 2024     2024     K 3.6 11/19/2024     11/19/2024    CREATININE 1.0 11/19/2024    BUN 11 11/19/2024    CO2 28 11/19/2024    INR 1.0 06/15/2022    HGBA1C 5.5 10/11/2024       I have explained the risks and benefits of endoscopy procedures to the patient including but not limited to bleeding, perforation, infection, and death.  The patient was asked if they understand and allowed to ask any further questions to their satisfaction.    Arturo Montalvo MD

## 2024-12-17 NOTE — PROVATION PATIENT INSTRUCTIONS
Discharge Summary/Instructions after an Endoscopic Procedure  Patient Name: Bhavna Lim  Patient MRN: [id number]  Patient YOB: 1945 Tuesday, December 17, 2024  Arturo Montalvo MD  Dear patient,  As a result of recent federal legislation (The Federal Cures Act), you may   receive lab or pathology results from your procedure in your [facility]   account before your physician is able to contact you. Your physician or   their representative will relay the results to you with their   recommendations at their soonest availability.  Thank you,  RESTRICTIONS:  During your procedure today, you received medications for sedation.  These   medications may affect your judgment, balance and coordination.  Therefore,   for 24 hours, you have the following restrictions:   - DO NOT drive a car, operate machinery, make legal/financial decisions,   sign important papers or drink alcohol.    ACTIVITY:  Today: no heavy lifting, straining or running due to procedural   sedation/anesthesia.  The following day: return to full activity including work.  DIET:  Eat and drink normally unless instructed otherwise.     TREATMENT FOR COMMON SIDE EFFECTS:  - Mild abdominal pain, nausea, belching, bloating or excessive gas:  rest,   eat lightly and use a heating pad.  - Sore Throat: treat with throat lozenges and/or gargle with warm salt   water.  - Because air was used during the procedure, expelling large amounts of air   from your rectum or belching is normal.  - If a bowel prep was taken, you may not have a bowel movement for 1-3 days.    This is normal.  SYMPTOMS TO WATCH FOR AND REPORT TO YOUR PHYSICIAN:  1. Abdominal pain or bloating, other than gas cramps.  2. Chest pain.  3. Back pain.  4. Signs of infection such as: chills or fever occurring within 24 hours   after the procedure.  5. Rectal bleeding, which would show as bright red, maroon, or black stools.   (A tablespoon of blood from the rectum is not serious, especially if    hemorrhoids are present.)  6. Vomiting.  7. Weakness or dizziness.  GO DIRECTLY TO THE NEAREST EMERGENCY ROOM IF YOU HAVE ANY OF THE FOLLOWING:      Difficulty breathing              Chills and/or fever over 101 F   Persistent vomiting and/or vomiting blood   Severe abdominal pain   Severe chest pain   Black, tarry stools   Bleeding- more than one tablespoon   Any other symptom or condition that you feel may need urgent attention  Your doctor recommends these additional instructions:  If any biopsies were taken, your doctors clinic will contact you in 1 to 2   weeks with any results.  - Await pathology results.   - Perform a colonoscopy today.  For questions, problems or results please call your physician - Arturo Montalvo MD at Work:  [phone].  [facility] can be reached at [phone]     IF A COMPLICATION OR EMERGENCY SITUATION ARISES AND YOU ARE UNABLE TO REACH   YOUR PHYSICIAN - GO DIRECTLY TO THE EMERGENCY ROOM.  MD Arturo Ceja MD  12/17/2024 1:55:48 PM  This report has been verified and signed electronically.  Dear patient,  As a result of recent federal legislation (The Federal Cures Act), you may   receive lab or pathology results from your procedure in your [facility]   account before your physician is able to contact you. Your physician or   their representative will relay the results to you with their   recommendations at their soonest availability.  Thank you,  PROVATION

## 2024-12-17 NOTE — TRANSFER OF CARE
Anesthesia Transfer of Care Note    Patient: Bhavna Lim    Procedure(s) Performed: Procedure(s) (LRB):  EGD (ESOPHAGOGASTRODUODENOSCOPY) (N/A)  COLONOSCOPY (N/A)    Patient location: GI    Anesthesia Type: general    Transport from OR: Transported from OR on room air with adequate spontaneous ventilation    Post pain: adequate analgesia    Post assessment: no apparent anesthetic complications and tolerated procedure well    Post vital signs: stable    Level of consciousness: sedated and responds to stimulation    Nausea/Vomiting: no nausea/vomiting    Complications: none    Transfer of care protocol was followed      Last vitals: Visit Vitals  /62 (BP Location: Left arm, Patient Position: Lying)   Pulse 76   Temp 36.2 °C (97.1 °F)   Resp 11   SpO2 97%   Breastfeeding No

## 2024-12-17 NOTE — TELEPHONE ENCOUNTER
I called and left a phone message for the Patient.  I was calling in response to an In Basket request for an earlier appointment.  I explained that we can see her tomorrow at 10:15.  I left a direct phone number for a return phone call.

## 2024-12-17 NOTE — PLAN OF CARE
Procedure and recovery complete. Pt awake and alert. MD and sister at bedside, procedure findings and suggestions discussed. Discharge instructions given, pt verbalized understanding of instruction. Gait steady, able to ambulate without assistance. Pt wheeled out accompanied by sister.

## 2024-12-18 ENCOUNTER — OFFICE VISIT (OUTPATIENT)
Dept: SURGERY | Facility: CLINIC | Age: 79
End: 2024-12-18
Payer: COMMERCIAL

## 2024-12-18 ENCOUNTER — TELEPHONE (OUTPATIENT)
Dept: SURGERY | Facility: CLINIC | Age: 79
End: 2024-12-18
Payer: COMMERCIAL

## 2024-12-18 VITALS
BODY MASS INDEX: 22.84 KG/M2 | SYSTOLIC BLOOD PRESSURE: 136 MMHG | DIASTOLIC BLOOD PRESSURE: 80 MMHG | HEART RATE: 61 BPM | HEIGHT: 61 IN | WEIGHT: 121 LBS

## 2024-12-18 DIAGNOSIS — R68.81 EARLY SATIETY: ICD-10-CM

## 2024-12-18 DIAGNOSIS — R63.4 UNINTENTIONAL WEIGHT LOSS: Primary | ICD-10-CM

## 2024-12-18 DIAGNOSIS — K80.20 CALCULUS OF GALLBLADDER WITHOUT CHOLECYSTITIS WITHOUT OBSTRUCTION: ICD-10-CM

## 2024-12-18 PROBLEM — E66.9 OBESITY (BMI 30-39.9): Status: RESOLVED | Noted: 2024-12-18 | Resolved: 2024-12-18

## 2024-12-18 PROCEDURE — 1101F PT FALLS ASSESS-DOCD LE1/YR: CPT | Mod: CPTII,S$GLB,, | Performed by: SURGERY

## 2024-12-18 PROCEDURE — 99205 OFFICE O/P NEW HI 60 MIN: CPT | Mod: S$GLB,,, | Performed by: SURGERY

## 2024-12-18 PROCEDURE — 1160F RVW MEDS BY RX/DR IN RCRD: CPT | Mod: CPTII,S$GLB,, | Performed by: SURGERY

## 2024-12-18 PROCEDURE — 1159F MED LIST DOCD IN RCRD: CPT | Mod: CPTII,S$GLB,, | Performed by: SURGERY

## 2024-12-18 PROCEDURE — 99999 PR PBB SHADOW E&M-EST. PATIENT-LVL IV: CPT | Mod: PBBFAC,,, | Performed by: SURGERY

## 2024-12-18 PROCEDURE — 1126F AMNT PAIN NOTED NONE PRSNT: CPT | Mod: CPTII,S$GLB,, | Performed by: SURGERY

## 2024-12-18 PROCEDURE — 3079F DIAST BP 80-89 MM HG: CPT | Mod: CPTII,S$GLB,, | Performed by: SURGERY

## 2024-12-18 PROCEDURE — 3288F FALL RISK ASSESSMENT DOCD: CPT | Mod: CPTII,S$GLB,, | Performed by: SURGERY

## 2024-12-18 PROCEDURE — 3075F SYST BP GE 130 - 139MM HG: CPT | Mod: CPTII,S$GLB,, | Performed by: SURGERY

## 2024-12-18 RX ORDER — FUROSEMIDE 40 MG/1
40 TABLET ORAL DAILY
Status: ON HOLD | COMMUNITY

## 2024-12-18 RX ORDER — TRIAMCINOLONE ACETONIDE 1 MG/G
1 CREAM TOPICAL 2 TIMES DAILY
Status: ON HOLD | COMMUNITY
Start: 2024-03-13

## 2024-12-18 NOTE — PROGRESS NOTES
General Surgery Office Visit   History and Physical    Patient Name: Bhavna Lim  YOB: 1945 (79 y.o.)  MRN: [id number]  Today's Date: 2024    Referring Md:   Self, Aaareferral  No address on file    SUBJECTIVE:     Chief Complaint: cholelithiasis    History of Present Illness:  Bhavna Lim is a 79 y.o. female with PMHx of HTN, HLD who presents to the clinic today for cholelithiasis evaluation. She reports recent weight loss and early satiety for which a CT abdomen/pelvis was obtained-- this showed cholelithiasis along with intra- and extrahepatic biliary ductal dilation. Follow up MRCP obtained redemonstrated the same intra- and extrahepatic ductal dilation with no filling defect or obstructing mass. Patient denies fever, chills, n/v/d, hematochezia, dysuria, hematuria, CP, SOB; endorses unintentional weight loss and constipation. She has 1 bowel movement every 1-2 weeks. She is here in a wheelchair today accompanied by her sister.    Patient denies personal history of MI, CVA, lung disease, DM  Previous abdominal surgeries include: C section  Denies alcohol, and elicit drug use; current cigarette smoker, has smoker all her life per sister  Not currently on any anticoagulants    Current Outpatient Medications   Medication Sig Dispense Refill    aspirin (ECOTRIN) 81 MG EC tablet Take 81 mg by mouth once daily.      atorvastatin (LIPITOR) 40 MG tablet Take 40 mg by mouth once daily.      lisinopriL (PRINIVIL,ZESTRIL) 40 MG tablet Take 40 mg by mouth once daily.      NIFEdipine (ADALAT CC) 60 MG TbSR Take 30 mg by mouth once daily.       No current facility-administered medications for this visit.     Review of patient's allergies indicates:  No Known Allergies  Past Medical History:   Diagnosis Date    Diabetes mellitus     Hyperlipidemia     Hypertension      Past Surgical History:   Procedure Laterality Date     SECTION       No family history on file.  Social History  "    Tobacco Use    Smoking status: Some Days     Types: Cigarettes   Substance Use Topics    Alcohol use: Yes     Comment: rarely    Drug use: No        Review of Systems:  ROS    OBJECTIVE:     Vital Signs (Most Recent)  /80   Pulse 61   Ht 5' 1" (1.549 m)   Wt 54.9 kg (121 lb)   BMI 22.86 kg/m²     Physical Exam      Labs:     Lab Results   Component Value Date    WBC 12.13 11/19/2024    HGB 14.7 11/19/2024    HCT 44.5 11/19/2024    MCV 86 11/19/2024     11/19/2024         CMP  Sodium   Date Value Ref Range Status   11/19/2024 140 136 - 145 mmol/L Final     Potassium   Date Value Ref Range Status   11/19/2024 3.6 3.5 - 5.1 mmol/L Final     Chloride   Date Value Ref Range Status   11/19/2024 104 95 - 110 mmol/L Final     CO2   Date Value Ref Range Status   11/19/2024 28 23 - 29 mmol/L Final     Glucose   Date Value Ref Range Status   11/19/2024 110 70 - 110 mg/dL Final     BUN   Date Value Ref Range Status   11/19/2024 11 8 - 23 mg/dL Final     Creatinine   Date Value Ref Range Status   11/19/2024 1.0 0.5 - 1.4 mg/dL Final     Calcium   Date Value Ref Range Status   11/19/2024 9.2 8.7 - 10.5 mg/dL Final     Total Protein   Date Value Ref Range Status   11/19/2024 7.0 6.0 - 8.4 g/dL Final     Albumin   Date Value Ref Range Status   11/19/2024 2.8 (L) 3.5 - 5.2 g/dL Final     Total Bilirubin   Date Value Ref Range Status   11/19/2024 0.9 0.1 - 1.0 mg/dL Final     Comment:     For infants and newborns, interpretation of results should be based  on gestational age, weight and in agreement with clinical  observations.    Premature Infant recommended reference ranges:  Up to 24 hours.............<8.0 mg/dL  Up to 48 hours............<12.0 mg/dL  3-5 days..................<15.0 mg/dL  6-29 days.................<15.0 mg/dL       Alkaline Phosphatase   Date Value Ref Range Status   11/19/2024 148 40 - 150 U/L Final     AST   Date Value Ref Range Status   11/19/2024 25 10 - 40 U/L Final     ALT   Date Value " Ref Range Status   11/19/2024 18 10 - 44 U/L Final     Anion Gap   Date Value Ref Range Status   11/19/2024 8 8 - 16 mmol/L Final     eGFR    Date Value Ref Range Status   06/17/2022 79 (L) >89 mL/min Final         Imaging: see HPI      ASSESSMENT/PLAN:     There are no diagnoses linked to this encounter.    Bhavna Lim is a 79 y.o. female with PMHx of HTN, HLD here with cholelithiasis and early satiety with significant weight loss in last few months. Her symptoms do not seem to be related to gallstones, low likelihood of cholecystectomy to improve her symptoms.      - Obtain gastric emptying study  - Consider EUS with GI to evaluate for occult malignancy  - Follow up with PCP to consider appetite stimulant  - Patient instructed to call clinic with any questions, concerns, or new symptoms  - Patient understands and in agreement with plan; all questions answered        Maged Finch M.D.  General Surgery PGY-III  [facility]

## 2024-12-18 NOTE — TELEPHONE ENCOUNTER
I received a return phone call from the Patient's Sister - Karla.  Stated that she is concerned because her Sister is not able to eat and has lost 30 pounds over the past year.  Denies any difficulty swallowing.  Stated that she gets full after a few bites.  Is able to drink liquids.  She has had several tests completed.  Her Sister stated that no one is telling them a plan.  She is asking to be seen today.  I scheduled an appointment for 2:15 today with Dr. Mamie Leija and cancelled the appointment with Dr. CONSTANTINE Mcleod tomorrow.  Directions given to the Clinic.  She did not have any questions at present.

## 2024-12-19 LAB
FINAL PATHOLOGIC DIAGNOSIS: NORMAL
GROSS: NORMAL
Lab: NORMAL

## 2024-12-26 ENCOUNTER — HOSPITAL ENCOUNTER (INPATIENT)
Facility: HOSPITAL | Age: 79
LOS: 46 days | Discharge: HOSPICE | DRG: 640 | End: 2025-02-13
Attending: EMERGENCY MEDICINE | Admitting: STUDENT IN AN ORGANIZED HEALTH CARE EDUCATION/TRAINING PROGRAM
Payer: COMMERCIAL

## 2024-12-26 DIAGNOSIS — Z71.89 ACP (ADVANCE CARE PLANNING): ICD-10-CM

## 2024-12-26 DIAGNOSIS — R53.1 WEAKNESS: Primary | ICD-10-CM

## 2024-12-26 DIAGNOSIS — R94.31 QT PROLONGATION: ICD-10-CM

## 2024-12-26 DIAGNOSIS — R53.81 DEBILITY: ICD-10-CM

## 2024-12-26 DIAGNOSIS — E44.1 MALNUTRITION OF MILD DEGREE: ICD-10-CM

## 2024-12-26 DIAGNOSIS — R00.1 BRADYCARDIA: ICD-10-CM

## 2024-12-26 DIAGNOSIS — R07.9 CHEST PAIN: ICD-10-CM

## 2024-12-26 DIAGNOSIS — Z51.5 PALLIATIVE CARE ENCOUNTER: ICD-10-CM

## 2024-12-26 DIAGNOSIS — Z91.89 AT RISK FOR LONG QT SYNDROME: ICD-10-CM

## 2024-12-26 DIAGNOSIS — E87.6 HYPOKALEMIA: ICD-10-CM

## 2024-12-26 PROBLEM — R62.7 FAILURE TO THRIVE IN ADULT: Status: ACTIVE | Noted: 2024-12-26

## 2024-12-26 LAB
ALBUMIN SERPL BCP-MCNC: 2.4 G/DL (ref 3.5–5.2)
ALP SERPL-CCNC: 95 U/L (ref 40–150)
ALT SERPL W/O P-5'-P-CCNC: 50 U/L (ref 10–44)
ANION GAP SERPL CALC-SCNC: 14 MMOL/L (ref 8–16)
AST SERPL-CCNC: 40 U/L (ref 10–40)
BASOPHILS # BLD AUTO: 0.06 K/UL (ref 0–0.2)
BASOPHILS NFR BLD: 0.6 % (ref 0–1.9)
BILIRUB SERPL-MCNC: 1 MG/DL (ref 0.1–1)
BUN SERPL-MCNC: 12 MG/DL (ref 8–23)
CALCIUM SERPL-MCNC: 8.6 MG/DL (ref 8.7–10.5)
CHLORIDE SERPL-SCNC: 106 MMOL/L (ref 95–110)
CO2 SERPL-SCNC: 20 MMOL/L (ref 23–29)
CREAT SERPL-MCNC: 0.7 MG/DL (ref 0.5–1.4)
DIFFERENTIAL METHOD BLD: ABNORMAL
EOSINOPHIL # BLD AUTO: 0.1 K/UL (ref 0–0.5)
EOSINOPHIL NFR BLD: 1.1 % (ref 0–8)
ERYTHROCYTE [DISTWIDTH] IN BLOOD BY AUTOMATED COUNT: 19.9 % (ref 11.5–14.5)
EST. GFR  (NO RACE VARIABLE): >60 ML/MIN/1.73 M^2
GLUCOSE SERPL-MCNC: 71 MG/DL (ref 70–110)
HCT VFR BLD AUTO: 41.1 % (ref 37–48.5)
HCV AB SERPL QL IA: NORMAL
HGB BLD-MCNC: 14.2 G/DL (ref 12–16)
HIV 1+2 AB+HIV1 P24 AG SERPL QL IA: NORMAL
IMM GRANULOCYTES # BLD AUTO: 0.05 K/UL (ref 0–0.04)
IMM GRANULOCYTES NFR BLD AUTO: 0.5 % (ref 0–0.5)
LIPASE SERPL-CCNC: 5 U/L (ref 4–60)
LYMPHOCYTES # BLD AUTO: 1.4 K/UL (ref 1–4.8)
LYMPHOCYTES NFR BLD: 13.9 % (ref 18–48)
MAGNESIUM SERPL-MCNC: 1.7 MG/DL (ref 1.6–2.6)
MCH RBC QN AUTO: 28.6 PG (ref 27–31)
MCHC RBC AUTO-ENTMCNC: 34.5 G/DL (ref 32–36)
MCV RBC AUTO: 83 FL (ref 82–98)
MONOCYTES # BLD AUTO: 0.7 K/UL (ref 0.3–1)
MONOCYTES NFR BLD: 6.8 % (ref 4–15)
NEUTROPHILS # BLD AUTO: 7.8 K/UL (ref 1.8–7.7)
NEUTROPHILS NFR BLD: 77.1 % (ref 38–73)
NRBC BLD-RTO: 0 /100 WBC
PLATELET # BLD AUTO: 293 K/UL (ref 150–450)
PMV BLD AUTO: 11.8 FL (ref 9.2–12.9)
POCT GLUCOSE: 79 MG/DL (ref 70–110)
POTASSIUM SERPL-SCNC: 3 MMOL/L (ref 3.5–5.1)
PROT SERPL-MCNC: 5.8 G/DL (ref 6–8.4)
RBC # BLD AUTO: 4.97 M/UL (ref 4–5.4)
SODIUM SERPL-SCNC: 140 MMOL/L (ref 136–145)
WBC # BLD AUTO: 10.09 K/UL (ref 3.9–12.7)

## 2024-12-26 PROCEDURE — 80053 COMPREHEN METABOLIC PANEL: CPT | Performed by: EMERGENCY MEDICINE

## 2024-12-26 PROCEDURE — 83690 ASSAY OF LIPASE: CPT | Performed by: EMERGENCY MEDICINE

## 2024-12-26 PROCEDURE — 87389 HIV-1 AG W/HIV-1&-2 AB AG IA: CPT | Performed by: PHYSICIAN ASSISTANT

## 2024-12-26 PROCEDURE — 85025 COMPLETE CBC W/AUTO DIFF WBC: CPT | Performed by: NURSE PRACTITIONER

## 2024-12-26 PROCEDURE — 99285 EMERGENCY DEPT VISIT HI MDM: CPT | Mod: 25

## 2024-12-26 PROCEDURE — G0378 HOSPITAL OBSERVATION PER HR: HCPCS

## 2024-12-26 PROCEDURE — 96372 THER/PROPH/DIAG INJ SC/IM: CPT | Performed by: STUDENT IN AN ORGANIZED HEALTH CARE EDUCATION/TRAINING PROGRAM

## 2024-12-26 PROCEDURE — 82962 GLUCOSE BLOOD TEST: CPT

## 2024-12-26 PROCEDURE — 25000003 PHARM REV CODE 250: Performed by: EMERGENCY MEDICINE

## 2024-12-26 PROCEDURE — 63600175 PHARM REV CODE 636 W HCPCS: Performed by: STUDENT IN AN ORGANIZED HEALTH CARE EDUCATION/TRAINING PROGRAM

## 2024-12-26 PROCEDURE — 83735 ASSAY OF MAGNESIUM: CPT | Performed by: EMERGENCY MEDICINE

## 2024-12-26 PROCEDURE — 86803 HEPATITIS C AB TEST: CPT | Performed by: PHYSICIAN ASSISTANT

## 2024-12-26 RX ORDER — ONDANSETRON HYDROCHLORIDE 2 MG/ML
4 INJECTION, SOLUTION INTRAVENOUS EVERY 8 HOURS PRN
Status: DISCONTINUED | OUTPATIENT
Start: 2024-12-26 | End: 2025-02-13 | Stop reason: HOSPADM

## 2024-12-26 RX ORDER — ACETAMINOPHEN 325 MG/1
650 TABLET ORAL EVERY 4 HOURS PRN
Status: DISCONTINUED | OUTPATIENT
Start: 2024-12-26 | End: 2025-01-07

## 2024-12-26 RX ORDER — ENOXAPARIN SODIUM 100 MG/ML
40 INJECTION SUBCUTANEOUS EVERY 24 HOURS
Status: DISCONTINUED | OUTPATIENT
Start: 2024-12-26 | End: 2025-01-31

## 2024-12-26 RX ORDER — MIRTAZAPINE 7.5 MG/1
7.5 TABLET, FILM COATED ORAL NIGHTLY
Status: DISCONTINUED | OUTPATIENT
Start: 2024-12-26 | End: 2024-12-29

## 2024-12-26 RX ORDER — TALC
6 POWDER (GRAM) TOPICAL NIGHTLY PRN
Status: DISCONTINUED | OUTPATIENT
Start: 2024-12-26 | End: 2025-01-20

## 2024-12-26 RX ORDER — ENOXAPARIN SODIUM 100 MG/ML
30 INJECTION SUBCUTANEOUS EVERY 24 HOURS
Status: DISCONTINUED | OUTPATIENT
Start: 2024-12-26 | End: 2024-12-26

## 2024-12-26 RX ORDER — PROMETHAZINE HYDROCHLORIDE 12.5 MG/1
25 TABLET ORAL EVERY 6 HOURS PRN
Status: DISCONTINUED | OUTPATIENT
Start: 2024-12-26 | End: 2025-02-13 | Stop reason: HOSPADM

## 2024-12-26 RX ORDER — NIFEDIPINE 30 MG/1
30 TABLET, EXTENDED RELEASE ORAL DAILY
Status: DISCONTINUED | OUTPATIENT
Start: 2024-12-27 | End: 2025-02-02

## 2024-12-26 RX ORDER — GLUCAGON 1 MG
1 KIT INJECTION
Status: DISCONTINUED | OUTPATIENT
Start: 2024-12-26 | End: 2025-02-13 | Stop reason: HOSPADM

## 2024-12-26 RX ORDER — ATORVASTATIN CALCIUM 40 MG/1
40 TABLET, FILM COATED ORAL DAILY
Status: DISCONTINUED | OUTPATIENT
Start: 2024-12-27 | End: 2025-01-31

## 2024-12-26 RX ORDER — ASPIRIN 81 MG/1
81 TABLET ORAL DAILY
Status: DISCONTINUED | OUTPATIENT
Start: 2024-12-27 | End: 2025-02-02

## 2024-12-26 RX ORDER — SODIUM CHLORIDE 0.9 % (FLUSH) 0.9 %
10 SYRINGE (ML) INJECTION EVERY 12 HOURS PRN
Status: DISCONTINUED | OUTPATIENT
Start: 2024-12-26 | End: 2025-02-13 | Stop reason: HOSPADM

## 2024-12-26 RX ORDER — LISINOPRIL 10 MG/1
10 TABLET ORAL DAILY
Status: DISCONTINUED | OUTPATIENT
Start: 2024-12-27 | End: 2025-02-02

## 2024-12-26 RX ORDER — IBUPROFEN 200 MG
16 TABLET ORAL
Status: DISCONTINUED | OUTPATIENT
Start: 2024-12-26 | End: 2025-02-13 | Stop reason: HOSPADM

## 2024-12-26 RX ORDER — ALUMINUM HYDROXIDE, MAGNESIUM HYDROXIDE, AND SIMETHICONE 1200; 120; 1200 MG/30ML; MG/30ML; MG/30ML
30 SUSPENSION ORAL 4 TIMES DAILY PRN
Status: DISCONTINUED | OUTPATIENT
Start: 2024-12-26 | End: 2025-02-13 | Stop reason: HOSPADM

## 2024-12-26 RX ORDER — ACETAMINOPHEN 325 MG/1
650 TABLET ORAL EVERY 8 HOURS PRN
Status: DISCONTINUED | OUTPATIENT
Start: 2024-12-26 | End: 2025-02-13 | Stop reason: HOSPADM

## 2024-12-26 RX ORDER — SIMETHICONE 80 MG
1 TABLET,CHEWABLE ORAL 4 TIMES DAILY PRN
Status: DISCONTINUED | OUTPATIENT
Start: 2024-12-26 | End: 2025-02-13 | Stop reason: HOSPADM

## 2024-12-26 RX ORDER — IBUPROFEN 200 MG
24 TABLET ORAL
Status: DISCONTINUED | OUTPATIENT
Start: 2024-12-26 | End: 2025-02-13 | Stop reason: HOSPADM

## 2024-12-26 RX ORDER — NALOXONE HCL 0.4 MG/ML
0.02 VIAL (ML) INJECTION
Status: DISCONTINUED | OUTPATIENT
Start: 2024-12-26 | End: 2025-02-13 | Stop reason: HOSPADM

## 2024-12-26 RX ADMIN — POTASSIUM BICARBONATE 25 MEQ: 978 TABLET, EFFERVESCENT ORAL at 05:12

## 2024-12-26 RX ADMIN — ENOXAPARIN SODIUM 40 MG: 40 INJECTION SUBCUTANEOUS at 07:12

## 2024-12-26 NOTE — ASSESSMENT & PLAN NOTE
Patient's most recent potassium results are listed below.   Recent Labs     12/26/24  1441   K 3.0*     Plan  - Replete potassium per protocol  - Monitor potassium Daily  - Patient's hypokalemia is stable

## 2024-12-26 NOTE — ASSESSMENT & PLAN NOTE
Nutrition consulted. Most recent weight and BMI monitored-     Measurements:  Wt Readings from Last 1 Encounters:   12/26/24 54.9 kg (121 lb)   Body mass index is 22.86 kg/m².    RD consultation   Extensive outpatient evaluation obtained by GI and General surgery   Will add supplementation to meals

## 2024-12-26 NOTE — PROGRESS NOTES
[facility] - Emergency Dept  [facility] Medicine  Progress Note    Patient Name: Bhavna Lim  MRN: [id number]  Patient Class: OP- Observation   Admission Date: 12/26/2024  Length of Stay: 0 days  Attending Physician: Yarely Walters MD  Primary Care Provider: Suzi Green MD        Subjective     Principal Problem:Failure to thrive in adult        HPI:  Mrs. Bhavna Lim is a 79 year old F with a history of HTN and HLD who presents to the ED for weakness and fatigue. She has been having weight loss and poor po intake over the last few months. She has been seen by GI and evaluated with EGD and Colonoscopy as well as MRCP which showed cholelithiasis with gallbladder distention and intrahepatic and extrahepatic biliary dilation. No significant abnormalities on EGD and coloscopy. She was referred to surgery who recommended NM study and possible EUS with GI. She has been unable to tolerate any solid foods. She sometimes takes a little bit of soup and has tried protein shakes. She feels full immediately. She does endorse some pain in her epigastric region ever since having her EGD performed. She occasionally has vomiting. Denies regurgitation of food. She continues to have worsening weakness and falls at home. She had two falls yesterday due to weakness. She denies significant dizziness. She is unable to walk due to her weakness and sister is concerned this is related to her poor nutrition.      In the ED VSS. Labs notable for Potassium of 3.0 which was repleted in the ED. No focal signs concerning for stroke. She was admitted to medicine for further management.      Overview/Hospital Course:  No notes on file    Past Medical History:   Diagnosis Date    Diabetes mellitus     Hyperlipidemia     Hypertension     Ingrown nail 01/09/2015    Bilateral great toe nail. No edema; tenderness or drainage.      Nail fungus 01/09/2015    To bilateral toenails X 10.      Obesity (BMI 30-39.9) 12/18/2024    Open wound  of knee, leg, and ankle 2015       Past Surgical History:   Procedure Laterality Date     SECTION      COLONOSCOPY N/A 2024    Procedure: COLONOSCOPY;  Surgeon: Arturo Montalvo MD;  Location: [facility] ENDO;  Service: Endoscopy;  Laterality: N/A;   ref by Jocelin Torres NP, [place], instructions given to pt in ofc and portal, cardiac clearance sent. edmund    ESOPHAGOGASTRODUODENOSCOPY N/A 2024    Procedure: EGD (ESOPHAGOGASTRODUODENOSCOPY);  Surgeon: Arturo Montalvo MD;  Location: [facility] ENDO;  Service: Endoscopy;  Laterality: N/A;  cleared by cardiology Dr Pena-teodora E consult dated 24-GT  12/10-[facility] for pre call-tb       Review of patient's allergies indicates:  No Known Allergies    No current facility-administered medications on file prior to encounter.     Current Outpatient Medications on File Prior to Encounter   Medication Sig    aspirin (ECOTRIN) 81 MG EC tablet Take 81 mg by mouth once daily.    atorvastatin (LIPITOR) 40 MG tablet Take 40 mg by mouth once daily.    furosemide (LASIX) 40 MG tablet Take 40 mg by mouth once daily.    lisinopriL (PRINIVIL,ZESTRIL) 40 MG tablet Take 40 mg by mouth once daily.    NIFEdipine (ADALAT CC) 60 MG TbSR Take 30 mg by mouth once daily.    triamcinolone acetonide 0.1% (KENALOG) 0.1 % cream Apply 1 Application topically 2 (two) times daily.     Family History    None       Tobacco Use    Smoking status: Some Days     Types: Cigarettes    Smokeless tobacco: Not on file   Substance and Sexual Activity    Alcohol use: Yes     Comment: rarely    Drug use: No    Sexual activity: Not on file     Objective:     Vital Signs (Most Recent):  Temp: 97.5 °F (36.4 °C) (24 1415)  Pulse: 75 (24 1400)  Resp: 19 (24 1400)  BP: 117/65 (24 1400)  SpO2: 98 % (24 1400) Vital Signs (24h Range):  Temp:  [97.5 °F (36.4 °C)-98.3 °F (36.8 °C)] 97.5 °F (36.4 °C)  Pulse:  [75-78] 75  Resp:  [18-19] 19  SpO2:  [97 %-98 %] 98 %  BP: (117-132)/(65-78)  117/65     Weight: 54.9 kg (121 lb)  Body mass index is 22.86 kg/m².     Physical Exam  Constitutional:       Appearance: Normal appearance.   HENT:      Head: Normocephalic and atraumatic.      Nose: Nose normal.      Mouth/Throat:      Mouth: Mucous membranes are moist.   Eyes:      Extraocular Movements: Extraocular movements intact.      Pupils: Pupils are equal, round, and reactive to light.   Cardiovascular:      Rate and Rhythm: Normal rate and regular rhythm.      Heart sounds: No murmur heard.     No gallop.   Pulmonary:      Effort: Pulmonary effort is normal.      Breath sounds: Normal breath sounds. No wheezing or rales.   Abdominal:      General: Abdomen is flat. Bowel sounds are normal. There is no distension.      Palpations: Abdomen is soft.      Tenderness: There is no abdominal tenderness.   Musculoskeletal:         General: No swelling or tenderness. Normal range of motion.      Cervical back: Normal range of motion and neck supple.      Right lower leg: No edema.      Left lower leg: No edema.   Skin:     General: Skin is warm and dry.      Capillary Refill: Capillary refill takes less than 2 seconds.   Neurological:      General: No focal deficit present.      Mental Status: She is alert. Mental status is at baseline.      Motor: Weakness present.   Psychiatric:         Mood and Affect: Mood normal.              CRANIAL NERVES     CN III, IV, VI   Pupils are equal, round, and reactive to light.       Significant Labs: All pertinent labs within the past 24 hours have been reviewed.    Significant Imaging: I have reviewed all pertinent imaging results/findings within the past 24 hours.      Assessment and Plan     * Failure to thrive in adult  -history of weight loss and poor appetite  -worked up outpatient by GI team with MRCP, EGD, Colonoscopy and General Surgery follow up   -MRCP with intra and extrahepatic ductal dilation with no filling defect or obstructing mass  -planning for NM gastric  emptying studying outpatient and possible EUS with GI   -given progressive weakness and falls will consult PT/OT   -nutrition consult   -will trial appetite stimulation medication   -GI consult pending clinical course, possibly benefit from upper GI vs ordering gastric emptying study inpatient             Unintentional weight loss  Nutrition consulted. Most recent weight and BMI monitored-     Measurements:  Wt Readings from Last 1 Encounters:   12/26/24 54.9 kg (121 lb)   Body mass index is 22.86 kg/m².    RD consultation   Extensive outpatient evaluation obtained by GI and General surgery   Will add supplementation to meals              Debility  Patient with Acute debility due to age-related physical debility and other reduced mobility. The patient's latest AMPAC (Activity Measure for Post Acute Care) Score is listed below.    AM-PAC Score - How much help does the patient need for each activity listed       Plan  - Progressive mobility protocol initated  - PT/OT consulted  - Fall precautions in place        Diabetes mellitus  Patient's FSGs are controlled on current medication regimen.  Last A1c reviewed-   Lab Results   Component Value Date    HGBA1C 5.5 10/11/2024     Most recent fingerstick glucose reviewed-   Recent Labs   Lab 12/26/24  1259   POCTGLUCOSE 79     Current correctional scale   hold on correction scale give A1C is 5.5  Antihyperglycemics (From admission, onward)      None          Hold Oral hypoglycemics while patient is in the hospital.    Benign essential hypertension  Patient's blood pressure range in the last 24 hours was: BP  Min: 117/65  Max: 132/78.The patient's inpatient anti-hypertensive regimen is listed below:  Current Antihypertensives       Plan  - BP is controlled, no changes needed to their regimen  - will continue home regimen, decrease lisinopril dose     Hypokalemia  Patient's most recent potassium results are listed below.   Recent Labs     12/26/24  1441   K 3.0*     Plan  -  Replete potassium per protocol  - Monitor potassium Daily  - Patient's hypokalemia is stable      VTE Risk Mitigation (From admission, onward)           Ordered     enoxaparin injection 40 mg  Daily         12/26/24 1700     IP VTE HIGH RISK PATIENT  Once         12/26/24 1547     Place sequential compression device  Until discontinued         12/26/24 1547                    Discharge Planning   RITA:      Code Status: Full Code   Medical Readiness for Discharge Date:                   Yarely Walters MD  Department of Hospital Medicine   [facility] - Emergency Dept

## 2024-12-26 NOTE — ASSESSMENT & PLAN NOTE
Patient with Acute debility due to age-related physical debility and other reduced mobility. The patient's latest AMPAC (Activity Measure for Post Acute Care) Score is listed below.    AM-PAC Score - How much help does the patient need for each activity listed       Plan  - Progressive mobility protocol initated  - PT/OT consulted  - Fall precautions in place

## 2024-12-26 NOTE — FIRST PROVIDER EVALUATION
Emergency Department TeleTriage Encounter Note      CHIEF COMPLAINT    Chief Complaint   Patient presents with    Fatigue     Increased weakness/fatigue and loss of appetite x2 months. No longer able to ambulate/frequent falls.       VITAL SIGNS   Initial Vitals [12/26/24 1122]   BP Pulse Resp Temp SpO2   132/78 78 18 98.3 °F (36.8 °C) 97 %      MAP       --            ALLERGIES    Review of patient's allergies indicates:  No Known Allergies    PROVIDER TRIAGE NOTE  79 year old female presents ot the ER with complaints of worsening generalized weakness and fatigue x 1-2 months. Has seen her GI doctor multiple times over the past 1 month. Reports this week her appetite decreased and only able to tollerate small bites of pureed food andsmall sips of liquid. Yesterday legs were too weak and she slid down the wall to the ground. Denies unilateral weakness. Denies hitting head or injuring her body during these two falls. Also reports nausea and intermittent vomiting since onset of sx.     AAOx3, respirations even and non- labored, stable vitals, normal coloration of skin, sitting upright in triage chair, appears in no acute distress.          ORDERS  Labs Reviewed   HEPATITIS C ANTIBODY   HIV 1 / 2 ANTIBODY       ED Orders (720h ago, onward)      Start Ordered     Status Ordering Provider    12/26/24 1123 12/26/24 1123  Hepatitis C Antibody  STAT         Ordered DEEPALI REYNOSO    12/26/24 1123 12/26/24 1123  HIV 1/2 Ag/Ab (4th Gen)  STAT         Ordered DEEPALI REYNOSO              Virtual Visit Note: The provider triage portion of this emergency department evaluation and documentation was performed via [employer], a HIPAA-compliant telemedicine application, in concert with a tele-presenter in the room. A face to face patient evaluation with one of my colleagues will occur once the patient is placed in an emergency department room.      DISCLAIMER: This note was prepared with M*Modal voice recognition  transcription software. Garbled syntax, mangled pronouns, and other bizarre constructions may be attributed to that software system.

## 2024-12-26 NOTE — PROVIDER PROGRESS NOTES - EMERGENCY DEPT.
Encounter Date: 12/26/2024    ED Physician Progress Notes        Physician Note:   Received patient at sign-out  CMP with hypokalemia of 3.0.  Oral replacement ordered.  Will proceed with obs placement as planned.

## 2024-12-26 NOTE — ASSESSMENT & PLAN NOTE
Patient's FSGs are controlled on current medication regimen.  Last A1c reviewed-   Lab Results   Component Value Date    HGBA1C 5.5 10/11/2024     Most recent fingerstick glucose reviewed-   Recent Labs   Lab 12/26/24  1259   POCTGLUCOSE 79     Current correctional scale   hold on correction scale give A1C is 5.5  Antihyperglycemics (From admission, onward)      None          Hold Oral hypoglycemics while patient is in the hospital.

## 2024-12-26 NOTE — ASSESSMENT & PLAN NOTE
Patient's blood pressure range in the last 24 hours was: BP  Min: 117/65  Max: 132/78.The patient's inpatient anti-hypertensive regimen is listed below:  Current Antihypertensives       Plan  - BP is controlled, no changes needed to their regimen  - will continue home regimen, decrease lisinopril dose

## 2024-12-26 NOTE — HPI
Mrs. Bhavna Lim is a 79 year old F with a history of HTN and HLD who presents to the ED for weakness and fatigue. She has been having weight loss and poor po intake over the last few months. She has been seen by GI and evaluated with EGD and Colonoscopy as well as MRCP which showed cholelithiasis with gallbladder distention and intrahepatic and extrahepatic biliary dilation. No significant abnormalities on EGD and coloscopy. She was referred to surgery who recommended NM study and possible EUS with GI. She has been unable to tolerate any solid foods. She sometimes takes a little bit of soup and has tried protein shakes. She feels full immediately. She does endorse some pain in her epigastric region ever since having her EGD performed. She occasionally has vomiting. Denies regurgitation of food. She continues to have worsening weakness and falls at home. She had two falls yesterday due to weakness. She denies significant dizziness. She is unable to walk due to her weakness and sister is concerned this is related to her poor nutrition.      In the ED VSS. Labs notable for Potassium of 3.0 which was repleted in the ED. No focal signs concerning for stroke. She was admitted to medicine for further management.

## 2024-12-26 NOTE — ED TRIAGE NOTES
Bhavna Lim, a 79 y.o. female presents to the ED w/ complaint of loss of appetite that started after a colonoscopy on Dec 17     Triage note:  Chief Complaint   Patient presents with    Fatigue     Increased weakness/fatigue and loss of appetite x2 months. No longer able to ambulate/frequent falls.     Review of patient's allergies indicates:  No Known Allergies  Past Medical History:   Diagnosis Date    Diabetes mellitus     Hyperlipidemia     Hypertension     Ingrown nail 01/09/2015    Bilateral great toe nail. No edema; tenderness or drainage.      Nail fungus 01/09/2015    To bilateral toenails X 10.      Obesity (BMI 30-39.9) 12/18/2024    Open wound of knee, leg, and ankle 02/09/2015

## 2024-12-26 NOTE — PROGRESS NOTES
Pharmacist Renal Dose Adjustment Note    Bhavna Lim is a 79 y.o. female being treated with enoxaparin for DVT prophylaxis.     Patient Data:    Vital Signs (Most Recent):  Temp: 97.5 °F (36.4 °C) (12/26/24 1415)  Pulse: 75 (12/26/24 1400)  Resp: 19 (12/26/24 1400)  BP: 117/65 (12/26/24 1400)  SpO2: 98 % (12/26/24 1400) Vital Signs (72h Range):  Temp:  [97.5 °F (36.4 °C)-98.3 °F (36.8 °C)]   Pulse:  [75-78]   Resp:  [18-19]   BP: (117-132)/(65-78)   SpO2:  [97 %-98 %]      Recent Labs   Lab 12/26/24  1441   CREATININE 0.7     Serum creatinine:  0.7 mg/dL 12/26/24 1441  Estimated creatinine clearance:  50.8 mL/min    Enoxaparin 30 mg subcutaneous every 24 hours will be changed to enoxaparin 40 mg subcutaneous every 24 hours.    Pharmacist's Name:  Elif Magana  Pharmacist's Extension:  [phone]

## 2024-12-26 NOTE — SUBJECTIVE & OBJECTIVE
Past Medical History:   Diagnosis Date    Diabetes mellitus     Hyperlipidemia     Hypertension     Ingrown nail 2015    Bilateral great toe nail. No edema; tenderness or drainage.      Nail fungus 2015    To bilateral toenails X 10.      Obesity (BMI 30-39.9) 2024    Open wound of knee, leg, and ankle 2015       Past Surgical History:   Procedure Laterality Date     SECTION      COLONOSCOPY N/A 2024    Procedure: COLONOSCOPY;  Surgeon: Arturo Montalvo MD;  Location: [facility] ENDO;  Service: Endoscopy;  Laterality: N/A;   ref by Jocelin Torres NP, Sutab, instructions given to pt in ofc and portal, cardiac clearance sent. edmund    ESOPHAGOGASTRODUODENOSCOPY N/A 2024    Procedure: EGD (ESOPHAGOGASTRODUODENOSCOPY);  Surgeon: Artuor Montalvo MD;  Location: [facility] ENDO;  Service: Endoscopy;  Laterality: N/A;  cleared by cardiology Dr Pena-teodora E consult dated 24-GT  12/10-[facility] for pre call-tb       Review of patient's allergies indicates:  No Known Allergies    No current facility-administered medications on file prior to encounter.     Current Outpatient Medications on File Prior to Encounter   Medication Sig    aspirin (ECOTRIN) 81 MG EC tablet Take 81 mg by mouth once daily.    atorvastatin (LIPITOR) 40 MG tablet Take 40 mg by mouth once daily.    furosemide (LASIX) 40 MG tablet Take 40 mg by mouth once daily.    lisinopriL (PRINIVIL,ZESTRIL) 40 MG tablet Take 40 mg by mouth once daily.    NIFEdipine (ADALAT CC) 60 MG TbSR Take 30 mg by mouth once daily.    triamcinolone acetonide 0.1% (KENALOG) 0.1 % cream Apply 1 Application topically 2 (two) times daily.     Family History    None       Tobacco Use    Smoking status: Some Days     Types: Cigarettes    Smokeless tobacco: Not on file   Substance and Sexual Activity    Alcohol use: Yes     Comment: rarely    Drug use: No    Sexual activity: Not on file     Objective:     Vital Signs (Most Recent):  Temp: 97.5 °F (36.4 °C)  (12/26/24 1415)  Pulse: 75 (12/26/24 1400)  Resp: 19 (12/26/24 1400)  BP: 117/65 (12/26/24 1400)  SpO2: 98 % (12/26/24 1400) Vital Signs (24h Range):  Temp:  [97.5 °F (36.4 °C)-98.3 °F (36.8 °C)] 97.5 °F (36.4 °C)  Pulse:  [75-78] 75  Resp:  [18-19] 19  SpO2:  [97 %-98 %] 98 %  BP: (117-132)/(65-78) 117/65     Weight: 54.9 kg (121 lb)  Body mass index is 22.86 kg/m².     Physical Exam  Constitutional:       Appearance: Normal appearance.   HENT:      Head: Normocephalic and atraumatic.      Nose: Nose normal.      Mouth/Throat:      Mouth: Mucous membranes are moist.   Eyes:      Extraocular Movements: Extraocular movements intact.      Pupils: Pupils are equal, round, and reactive to light.   Cardiovascular:      Rate and Rhythm: Normal rate and regular rhythm.      Heart sounds: No murmur heard.     No gallop.   Pulmonary:      Effort: Pulmonary effort is normal.      Breath sounds: Normal breath sounds. No wheezing or rales.   Abdominal:      General: Abdomen is flat. Bowel sounds are normal. There is no distension.      Palpations: Abdomen is soft.      Tenderness: There is no abdominal tenderness.   Musculoskeletal:         General: No swelling or tenderness. Normal range of motion.      Cervical back: Normal range of motion and neck supple.      Right lower leg: No edema.      Left lower leg: No edema.   Skin:     General: Skin is warm and dry.      Capillary Refill: Capillary refill takes less than 2 seconds.   Neurological:      General: No focal deficit present.      Mental Status: She is alert. Mental status is at baseline.      Motor: Weakness present.   Psychiatric:         Mood and Affect: Mood normal.              CRANIAL NERVES     CN III, IV, VI   Pupils are equal, round, and reactive to light.       Significant Labs: All pertinent labs within the past 24 hours have been reviewed.    Significant Imaging: I have reviewed all pertinent imaging results/findings within the past 24 hours.

## 2024-12-26 NOTE — ASSESSMENT & PLAN NOTE
-history of weight loss and poor appetite  -worked up outpatient by GI team with MRCP, EGD, Colonoscopy and General Surgery follow up   -MRCP with intra and extrahepatic ductal dilation with no filling defect or obstructing mass  -planning for NM gastric emptying studying outpatient and possible EUS with GI   -given progressive weakness and falls will consult PT/OT   -nutrition consult   -will trial appetite stimulation medication   -GI consult pending clinical course, possibly benefit from upper GI vs ordering gastric emptying study inpatient

## 2024-12-26 NOTE — ED PROVIDER NOTES
Encounter Date: 2024       History     Chief Complaint   Patient presents with    Fatigue     Increased weakness/fatigue and loss of appetite x2 months. No longer able to ambulate/frequent falls.     79-year-old female, history of diabetes, hypertension, obesity, brought in by daughter given worsening weakness and debility.  Patient has had significant anorexia with weight loss, early satiety over the last few months with a 50 lb weight loss.  This has been worked up extensively in the outpatient setting including and MRCP, EGD, colonoscopy but no etiology of these symptoms has been identified.  In the meantime she has become progressively more weak.  Patient does live independently but over the last week, daughter says that she is no longer able to ambulate.  Yesterday she actually had 2 falls at home.  Patient did not sustain any injuries from these falls.    The history is provided by the patient and a relative.     Review of patient's allergies indicates:  No Known Allergies  Past Medical History:   Diagnosis Date    Diabetes mellitus     Hyperlipidemia     Hypertension     Ingrown nail 2015    Bilateral great toe nail. No edema; tenderness or drainage.      Nail fungus 2015    To bilateral toenails X 10.      Obesity (BMI 30-39.9) 2024    Open wound of knee, leg, and ankle 2015     Past Surgical History:   Procedure Laterality Date     SECTION      COLONOSCOPY N/A 2024    Procedure: COLONOSCOPY;  Surgeon: Arturo Montalvo MD;  Location: [facility];  Service: Endoscopy;  Laterality: N/A;   ref by Jocelin Torres NP, Sutab, instructions given to pt in ofc and portal, cardiac clearance sent. edmund    ESOPHAGOGASTRODUODENOSCOPY N/A 2024    Procedure: EGD (ESOPHAGOGASTRODUODENOSCOPY);  Surgeon: Arturo Montalvo MD;  Location: [facility] ENDO;  Service: Endoscopy;  Laterality: N/A;  cleared by cardiology Dr Pena-teodora E consult dated 24-GT  12/10-[facility] for pre call-tb     No  family history on file.  Social History     Tobacco Use    Smoking status: Some Days     Types: Cigarettes   Substance Use Topics    Alcohol use: Yes     Comment: rarely    Drug use: No     Review of Systems    Physical Exam     Initial Vitals [12/26/24 1122]   BP Pulse Resp Temp SpO2   132/78 78 18 98.3 °F (36.8 °C) 97 %      MAP       --         Physical Exam    Nursing note and vitals reviewed.  Constitutional: She appears well-developed and well-nourished. She is not diaphoretic. No distress.   Eyes: Conjunctivae are normal.   Neck: Neck supple.   Cardiovascular:  Normal rate.           Pulmonary/Chest: No respiratory distress.   Abdominal: Abdomen is soft. She exhibits no distension. There is no abdominal tenderness. There is no rebound and no guarding.   Musculoskeletal:         General: No tenderness or edema. Normal range of motion.      Cervical back: Neck supple.     Neurological: She is alert and oriented to person, place, and time. No cranial nerve deficit or sensory deficit. GCS score is 15. GCS eye subscore is 4. GCS verbal subscore is 5. GCS motor subscore is 6.   Patient is generally weak.  She is alert though and can follow commands well and moves all of her extremities but She has difficulty lifting her legs off the bed.   Skin: Skin is warm and dry. No pallor.   Psychiatric: She has a normal mood and affect. Thought content normal.         ED Course   Procedures  Labs Reviewed   CBC W/ AUTO DIFFERENTIAL - Abnormal       Result Value    WBC 10.09      RBC 4.97      Hemoglobin 14.2      Hematocrit 41.1      MCV 83      MCH 28.6      MCHC 34.5      RDW 19.9 (*)     Platelets 293      MPV 11.8      Immature Granulocytes 0.5      Gran # (ANC) 7.8 (*)     Immature Grans (Abs) 0.05 (*)     Lymph # 1.4      Mono # 0.7      Eos # 0.1      Baso # 0.06      nRBC 0      Gran % 77.1 (*)     Lymph % 13.9 (*)     Mono % 6.8      Eosinophil % 1.1      Basophil % 0.6      Differential Method Automated       Narrative:     Release to patient->Immediate   COMPREHENSIVE METABOLIC PANEL - Abnormal    Sodium 140      Potassium 3.0 (*)     Chloride 106      CO2 20 (*)     Glucose 71      BUN 12      Creatinine 0.7      Calcium 8.6 (*)     Total Protein 5.8 (*)     Albumin 2.4 (*)     Total Bilirubin 1.0      Alkaline Phosphatase 95      AST 40      ALT 50 (*)     eGFR >60.0      Anion Gap 14     HEPATITIS C ANTIBODY    Hepatitis C Ab Non-reactive      Narrative:     Release to patient->Immediate    add on [id number] per Jocelin Foreman MD  12/26/2024  12:25   Piero   HIV 1 / 2 ANTIBODY    HIV 1/2 Ag/Ab Non-reactive      Narrative:     Release to patient->Immediate    add on [id number] per Jocelin Foreman MD  12/26/2024  12:25   Piero   MAGNESIUM   MAGNESIUM    Magnesium 1.7     LIPASE    Lipase 5     URINALYSIS, REFLEX TO URINE CULTURE   POCT GLUCOSE    POCT Glucose 79     POCT GLUCOSE MONITORING CONTINUOUS          Imaging Results    None          Medications   sodium chloride 0.9% flush 10 mL (has no administration in time range)   naloxone 0.4 mg/mL injection 0.02 mg (has no administration in time range)   glucose chewable tablet 16 g (16 g Oral Given 12/27/24 0733)   glucose chewable tablet 24 g (has no administration in time range)   dextrose 50% injection 12.5 g (has no administration in time range)   dextrose 50% injection 25 g (has no administration in time range)   glucagon (human recombinant) injection 1 mg (has no administration in time range)   acetaminophen tablet 650 mg (has no administration in time range)   ondansetron injection 4 mg (has no administration in time range)   promethazine tablet 25 mg (has no administration in time range)   acetaminophen tablet 650 mg (has no administration in time range)   melatonin tablet 6 mg (has no administration in time range)   simethicone chewable tablet 80 mg (has no administration in time range)   aluminum-magnesium hydroxide-simethicone 200-200-20 mg/5 mL  suspension 30 mL (has no administration in time range)   lisinopriL tablet 10 mg (10 mg Oral Not Given 12/27/24 0900)   NIFEdipine 24 hr tablet 30 mg (30 mg Oral Not Given 12/27/24 0900)   atorvastatin tablet 40 mg (40 mg Oral Not Given 12/27/24 0900)   aspirin EC tablet 81 mg (81 mg Oral Not Given 12/27/24 0900)   enoxaparin injection 40 mg (40 mg Subcutaneous Given 12/26/24 1917)   mirtazapine tablet 7.5 mg (7.5 mg Oral Not Given 12/26/24 2100)   potassium bicarbonate disintegrating tablet 40 mEq (40 mEq Oral Given 12/27/24 1040)   potassium bicarbonate disintegrating tablet 25 mEq (25 mEq Oral Given 12/26/24 1734)   k phos di & mono-sod phos mono 250 mg tablet 1 tablet (1 tablet Oral Given 12/27/24 1040)   metoclopramide HCl tablet 10 mg (10 mg Oral Given 12/27/24 1038)     Medical Decision Making  The DDx for generalized, non-focal weakness would be broad and would include, but not be limited to, such diagnoses as infection, dehydration, electrolyte disturbance, anemia, hypothyroidism, malignancy, polypharmacy, deconditioning, depression and neuromuscular disease.    -The neurologic exam does not reveal any focal deficits that would be suggestive of an acute stroke.  -The patient is hemodynamically stable and clinically well-appearing  -Emergent lab work is indicated at this time to work-up for generalized weakness.  -Labs: CBC, CMP, UA  -Imaging is not indicated today  -Medications/Intravenous fluids: none at this time  -Disposition: admission      Amount and/or Complexity of Data Reviewed  External Data Reviewed: labs, radiology and notes.  Labs: ordered. Decision-making details documented in ED Course.    Risk  Prescription drug management.                                      Clinical Impression:  Final diagnoses:  [R53.1] Weakness (Primary)  [E87.6] Hypokalemia          ED Disposition Condition    Observation Stable                Jcoelin Foreman MD  12/27/24 1420

## 2024-12-27 PROBLEM — E43 SEVERE PROTEIN-CALORIE MALNUTRITION: Status: ACTIVE | Noted: 2024-12-27

## 2024-12-27 LAB
ANION GAP SERPL CALC-SCNC: 12 MMOL/L (ref 8–16)
BASOPHILS # BLD AUTO: 0.05 K/UL (ref 0–0.2)
BASOPHILS NFR BLD: 0.5 % (ref 0–1.9)
BUN SERPL-MCNC: 12 MG/DL (ref 8–23)
CALCIUM SERPL-MCNC: 8.2 MG/DL (ref 8.7–10.5)
CHLORIDE SERPL-SCNC: 105 MMOL/L (ref 95–110)
CO2 SERPL-SCNC: 23 MMOL/L (ref 23–29)
CREAT SERPL-MCNC: 0.6 MG/DL (ref 0.5–1.4)
DIFFERENTIAL METHOD BLD: ABNORMAL
EOSINOPHIL # BLD AUTO: 0.2 K/UL (ref 0–0.5)
EOSINOPHIL NFR BLD: 1.7 % (ref 0–8)
ERYTHROCYTE [DISTWIDTH] IN BLOOD BY AUTOMATED COUNT: 17.7 % (ref 11.5–14.5)
EST. GFR  (NO RACE VARIABLE): >60 ML/MIN/1.73 M^2
GLUCOSE SERPL-MCNC: 55 MG/DL (ref 70–110)
HCT VFR BLD AUTO: 35.4 % (ref 37–48.5)
HGB BLD-MCNC: 12 G/DL (ref 12–16)
IMM GRANULOCYTES # BLD AUTO: 0.05 K/UL (ref 0–0.04)
IMM GRANULOCYTES NFR BLD AUTO: 0.5 % (ref 0–0.5)
LYMPHOCYTES # BLD AUTO: 2.1 K/UL (ref 1–4.8)
LYMPHOCYTES NFR BLD: 20.2 % (ref 18–48)
MAGNESIUM SERPL-MCNC: 1.7 MG/DL (ref 1.6–2.6)
MCH RBC QN AUTO: 27.5 PG (ref 27–31)
MCHC RBC AUTO-ENTMCNC: 33.9 G/DL (ref 32–36)
MCV RBC AUTO: 81 FL (ref 82–98)
MONOCYTES # BLD AUTO: 0.8 K/UL (ref 0.3–1)
MONOCYTES NFR BLD: 7.8 % (ref 4–15)
NEUTROPHILS # BLD AUTO: 7.3 K/UL (ref 1.8–7.7)
NEUTROPHILS NFR BLD: 69.3 % (ref 38–73)
NRBC BLD-RTO: 0 /100 WBC
PHOSPHATE SERPL-MCNC: 2.5 MG/DL (ref 2.7–4.5)
PLATELET # BLD AUTO: 237 K/UL (ref 150–450)
PMV BLD AUTO: 11.8 FL (ref 9.2–12.9)
POCT GLUCOSE: 111 MG/DL (ref 70–110)
POCT GLUCOSE: 133 MG/DL (ref 70–110)
POCT GLUCOSE: 66 MG/DL (ref 70–110)
POTASSIUM SERPL-SCNC: 2.7 MMOL/L (ref 3.5–5.1)
RBC # BLD AUTO: 4.37 M/UL (ref 4–5.4)
SODIUM SERPL-SCNC: 140 MMOL/L (ref 136–145)
WBC # BLD AUTO: 10.46 K/UL (ref 3.9–12.7)

## 2024-12-27 PROCEDURE — 80048 BASIC METABOLIC PNL TOTAL CA: CPT | Performed by: STUDENT IN AN ORGANIZED HEALTH CARE EDUCATION/TRAINING PROGRAM

## 2024-12-27 PROCEDURE — 97165 OT EVAL LOW COMPLEX 30 MIN: CPT

## 2024-12-27 PROCEDURE — 83735 ASSAY OF MAGNESIUM: CPT | Performed by: STUDENT IN AN ORGANIZED HEALTH CARE EDUCATION/TRAINING PROGRAM

## 2024-12-27 PROCEDURE — 84100 ASSAY OF PHOSPHORUS: CPT | Performed by: STUDENT IN AN ORGANIZED HEALTH CARE EDUCATION/TRAINING PROGRAM

## 2024-12-27 PROCEDURE — 85025 COMPLETE CBC W/AUTO DIFF WBC: CPT | Performed by: STUDENT IN AN ORGANIZED HEALTH CARE EDUCATION/TRAINING PROGRAM

## 2024-12-27 PROCEDURE — 63600175 PHARM REV CODE 636 W HCPCS: Performed by: STUDENT IN AN ORGANIZED HEALTH CARE EDUCATION/TRAINING PROGRAM

## 2024-12-27 PROCEDURE — 25000003 PHARM REV CODE 250: Performed by: STUDENT IN AN ORGANIZED HEALTH CARE EDUCATION/TRAINING PROGRAM

## 2024-12-27 PROCEDURE — 97535 SELF CARE MNGMENT TRAINING: CPT

## 2024-12-27 PROCEDURE — 96372 THER/PROPH/DIAG INJ SC/IM: CPT | Performed by: STUDENT IN AN ORGANIZED HEALTH CARE EDUCATION/TRAINING PROGRAM

## 2024-12-27 PROCEDURE — 36415 COLL VENOUS BLD VENIPUNCTURE: CPT | Performed by: STUDENT IN AN ORGANIZED HEALTH CARE EDUCATION/TRAINING PROGRAM

## 2024-12-27 PROCEDURE — G0378 HOSPITAL OBSERVATION PER HR: HCPCS

## 2024-12-27 PROCEDURE — 97162 PT EVAL MOD COMPLEX 30 MIN: CPT

## 2024-12-27 PROCEDURE — 97116 GAIT TRAINING THERAPY: CPT

## 2024-12-27 RX ORDER — METOCLOPRAMIDE 5 MG/1
10 TABLET ORAL ONCE
Status: COMPLETED | OUTPATIENT
Start: 2024-12-27 | End: 2024-12-27

## 2024-12-27 RX ADMIN — DIBASIC SODIUM PHOSPHATE, MONOBASIC POTASSIUM PHOSPHATE AND MONOBASIC SODIUM PHOSPHATE 1 TABLET: 852; 155; 130 TABLET ORAL at 10:12

## 2024-12-27 RX ADMIN — ENOXAPARIN SODIUM 40 MG: 40 INJECTION SUBCUTANEOUS at 06:12

## 2024-12-27 RX ADMIN — POTASSIUM BICARBONATE 40 MEQ: 391 TABLET, EFFERVESCENT ORAL at 02:12

## 2024-12-27 RX ADMIN — METOCLOPRAMIDE 10 MG: 5 TABLET ORAL at 10:12

## 2024-12-27 RX ADMIN — Medication 16 G: at 07:12

## 2024-12-27 RX ADMIN — POTASSIUM BICARBONATE 40 MEQ: 391 TABLET, EFFERVESCENT ORAL at 10:12

## 2024-12-27 RX ADMIN — MIRTAZAPINE 7.5 MG: 7.5 TABLET, FILM COATED ORAL at 08:12

## 2024-12-27 NOTE — CONSULTS
[facility] Internal Medicine Telemetry  Adult Nutrition  Consult Note    SUMMARY     Recommendations  1. Continue texture modified diet- encourage intake as needed   2. Continue Boost supplements   3. RD following    Goals: Meet % of EEN/EPN by RD f/u date  Nutrition Goal Status: goal not met  Communication of RD Recs: POC    Assessment and Plan    Endocrine  Severe protein-calorie malnutrition  Malnutrition Type:  Context: chronic illness  Level: severe    Related to (etiology):   Decreased ability to consume sufficient energy 2/2 poor intake PTA    Signs and Symptoms (as evidenced by):   Malnutrition Characteristic Summary:  Weight Loss (Malnutrition): greater than 5% in 1 month  Energy Intake (Malnutrition): less than 75% for greater than or equal to 1 month  Subcutaneous Fat (Malnutrition): severe depletion  Muscle Mass (Malnutrition): severe depletion    Interventions (treatment strategy):  1. Continue texture modified diet- encourage intake as needed   2. Continue Boost supplements   3. RD following    Nutrition Diagnosis Status:   New          Malnutrition Assessment  Malnutrition Context: chronic illness  Malnutrition Level: severe          Weight Loss (Malnutrition): greater than 5% in 1 month  Energy Intake (Malnutrition): less than 75% for greater than or equal to 1 month  Subcutaneous Fat (Malnutrition): severe depletion  Muscle Mass (Malnutrition): severe depletion   Orbital Region (Subcutaneous Fat Loss): severe depletion  Upper Arm Region (Subcutaneous Fat Loss): severe depletion   Yarsani Region (Muscle Loss): severe depletion  Clavicle Bone Region (Muscle Loss): severe depletion       Reason for Assessment    Reason For Assessment: consult  Diagnosis: other (see comments) (FTT)  General Information Comments: RD consulted for FTT. Pt unable to tolerate solid foods at this time. Poor po intake > 1 month. UBW unknown. Per chart review noted, 9% significant weight loss x 1 month. NFPE complete,  "12/27, pt meets criteria for severe protein calorie malnutrition in the context of chronic illness per ASPEN guidelines. RD following.  Nutrition Discharge Planning: adequate nutritional intake  Nutrition Related Social Determinants of Health: SDOH: Unable to assess at this time.       Nutrition/Diet History    Food Allergies: NKFA    Anthropometrics    Temp: 97.5 °F (36.4 °C)  Height Method: Measured  Height: 5' 1" (154.9 cm)  Height (inches): 61 in  Weight Method: Bed Scale  Weight: 54.9 kg (121 lb 0.5 oz)  Weight (lb): 121.03 lb  Ideal Body Weight (IBW), Female: 105 lb  % Ideal Body Weight, Female (lb): 115.27 %  BMI (Calculated): 22.9  BMI Grade: 18.5-24.9 - normal       Lab/Procedures/Meds    Pertinent Labs Reviewed: reviewed  Pertinent Labs Comments: Potassium 2.7, GFR 68  Pertinent Medications Reviewed: reviewed  Pertinent Medications Comments: mitazipine    Estimated/Assessed Needs    Weight Used For Calorie Calculations: 54.9 kg (121 lb 0.5 oz)  Energy Calorie Requirements (kcal): 1647  Energy Need Method: Kcal/kg (30 kcal/kg)  Protein Requirements: 66-82 g (1.2-1.5 g/kg)  Weight Used For Protein Calculations: 54.9 kg (121 lb 0.5 oz)  RDA Method (mL): 1647       Nutrition Prescription Ordered    Current Diet Order: soft and bite sized diet    Evaluation of Received Nutrient/Fluid Intake    I/O: -  Energy Calories Required: not meeting needs  Protein Required: not meeting needs  Fluid Required: not meeting needs  Total Fluid Intake (mL/kg): 1 ml or fluid per MD  Tolerance: tolerating  % Intake of Estimated Energy Needs: 100%  % Meal Intake: 100%    Nutrition Risk    Level of Risk/Frequency of Follow-up: low ((1-2x/week))     Monitor and Evaluation    Food and Nutrient Intake: energy intake, food and beverage intake  Food and Nutrient Adminstration: diet order  Knowledge/Beliefs/Attitudes: food and nutrition knowledge/skill, beliefs and attitudes  Physical Activity and Function: nutrition-related ADLs and " IADLs, factors affecting access to physical activity  Anthropometric Measurements: height/length, weight, weight change, body mass index, growth pattern indices/percentile ranks  Biochemical Data, Medical Tests and Procedures: electrolyte and renal panel, gastrointestinal profile, glucose/endocrine profile, inflammatory profile, lipid profile  Nutrition-Focused Physical Findings: overall appearance, extremities, muscles and bones, head and eyes, skin       Nutrition Follow-Up    RD Follow-up?: Yes

## 2024-12-27 NOTE — ED NOTES
Assumed care of patient at this time. Patient is resting comfortably in bed in lowest, locked position with bed rails raised x3 in NAD. Pt changed into hospital gown, belongings collected and labeled in patient belongings bag. Call light is within reach of patient. Patient denies further needs at this time.

## 2024-12-27 NOTE — PT/OT/SLP EVAL
Physical Therapy Evaluation  Co-evaluation with OT due to acuity of condition, level of skilled assist needed for assessment of safety with mobility.   Patient Name:  Bhavna Lim   MRN:  [id number]    Recommendations:     Discharge Recommendations: Moderate Intensity Therapy   Discharge Equipment Recommendations: walker, rolling, wheelchair, bedside commode, bath bench   Barriers to discharge: Inaccessible home and Decreased caregiver support    The patient is safe and appropriate to mobilize with RN staff outside of therapy sessions: The patient is safe to ambulate with RN assist using RW, RW and BSC ordered for room. RN and PCT alerted.       Assessment:     Bhavna Lim is a 79 y.o. female admitted with a medical diagnosis of Failure to thrive in adult.  She presents with the following impairments/functional limitations: weakness, impaired endurance, impaired self care skills, impaired functional mobility, gait instability, decreased coordination, decreased upper extremity function, decreased lower extremity function, impaired coordination, decreased safety awareness, impaired balance, impaired cardiopulmonary response to activity. The patient reports new onset of UE numbness with impaired coordination with functional tasks, impaired coordination of LE- ataxic foot placement with gait, poor safety awareness and gait instability- ambulating too far from RW in spite of verbal and manual cues. She required moderate assistance of 2 people for gait 16' using RW to bathroom. After toileting, patient ambulated to chair, she became nauseous and vomited- RN and MD alerted.  Patient with poor awareness of deficits and level of assist she needs for safety with mobility. She is not safe to return home due to fall risk. Patient currently demonstrates a need for moderate intensity therapy on a daily basis post acute secondary to a decline in functional status due to illness     Rehab Prognosis: Good; patient would  "benefit from acute skilled PT services to address these deficits and reach maximum level of function.    Recent Surgery: * No surgery found *      Plan:     During this hospitalization, patient to be seen 4 x/week to address the identified rehab impairments via gait training, therapeutic exercises, therapeutic activities, neuromuscular re-education and progress toward the following goals:    Plan of Care Expires:  01/26/25    Subjective     Chief Complaint: "I can get around, I need to use the bathroom"  Patient/Family Comments/goals: return to PLOF  Pain/Comfort:  Pain Rating 1: 0/10    Patients cultural, spiritual, Jewish conflicts given the current situation: no    Living Environment:  The patient lives alone in [facility], [address] with ASHLY HR, T/S GB. Drives.   Prior to admission, patients level of function was modified independent with SPC.  Equipment used at home: cane, straight, grab bar.  DME owned (not currently used): none.  Upon discharge, patient will have limited assist from family.    Objective:     Communicated with RN prior to session.  Patient found HOB elevated with telemetry, PureWick, bed alarm  upon PT entry to room. Sister at bedside.     General Precautions: Standard, fall  Orthopedic Precautions:N/A   Braces: N/A  Respiratory Status: Room air    Exams:    Cognitive Exam  Patient is A&O x4 and follows 100% of one -step commands    Fine Motor Coordination   -       Impaired UE coordination     Postural Exam Patient presented with the following abnormalities:    -       Rounded shoulders  -       Forward head  -       Kyphosis  -       Posterior pelvic tilt  -       Weight shift posterior   Sensation    -       Light touch intact ASHLY LE   Skin Integrity/Edema     -       Skin integrity: visibly intact  -       Edema: NA   R LE ROM WFL   R LE Strength 2/5 hip flexion, 3-/5 knee ext/flex, and ankle DF/PF   L LE ROM WFL   L LE Strength  2+/5 hip flexion,3+/5 knee ext/flex, and ankle DF/PF       Balance "   Static Sitting stand by assistance    Dynamic Sitting contact guard assist    Static Standing minimum assistance with RW   Dynamic Standing       moderate assistance x2 RW        Functional Mobility:    Bed Mobility  Supine to Sit on the R side:  stand by assistance      Transfers Sit to Stand:  minimum assistance x2 with RW from bed, moderate assistance x2 with RW from toilet   Gait  Gait Distance: 16 + 6 ft with RW  Assistance Level: moderate assistance x2  Description: ambulating outside RW CITLALLI, difficulty maintaining UE on RW handles, impaired weight shifting, ataxic uneven steps, short shuffling steps, difficulty progressing RW and maintaining straight path          AM-PAC 6 CLICK MOBILITY  Total Score:14       Treatment & Education:  Patient and sister educated on:  -role of therapy  -goals of session  -PT POC  -benefits of out of bed mobility and consequences of immobility  -calling for staff assist to mobilize safely  Patient agreeable to mobilize with therapy.      Gait training: facilitation for weight shift, assist for RW management, cued to maintain hands of RW handles, cued for upright posture, reciprocal strides, cued to ambulate inside RW CITLALLI, pacing for energy conservation     Patient encouraged to ambulate, sit up in chair 3x/day to prevent deconditioning during hospitalization. Patient verbalized understanding and agreement to mobilize only with RN assist for safety.     Patient encouraged to ambulate, sit up in chair 3x/day to prevent deconditioning during hospitalization. Patient verbalized understanding and agreement to mobilize only with RN assist for safety.     Patient left up in chair with all lines intact, call button in reach, RN and PCT notified, and sister present and supervising patient.     GOALS:   Multidisciplinary Problems       Physical Therapy Goals          Problem: Physical Therapy    Goal Priority Disciplines Outcome Interventions   Physical Therapy Goal     PT, PT/OT  Progressing    Description: Goals to be met by: 1/10     Patient will increase functional independence with mobility by performin. Supine to sit with Modified [place]  2. Sit to supine with Modified [place]  3. Sit to stand transfer with Stand-by Assistance  4. Gait  x 100 feet with Contact Guard Assistance using LRAD.   5. Ascend/descend 6 stair with bilateral Handrails Minimal Assistance using LRAD.     DME Justifcation  Patient demonstrates a mobility limitation that significantly impairs their ability to participate in one or more mobility related activities of daily living. Patient's mobility limitation cannot be sufficiently resolved with the use of a cane, but can be sufficiently resolved with the use of a rolling walker.The use of a rolling walker will considerably improve their ability to participate in MRADLs. Patient will use the walker on a regular basis at home.     Patient has a mobility limitation that significantly impairs their ability to participate in one or more mobility related activities of daily living in customary locations in the home. The mobility limitation cannot be sufficiently resolved by the use of a cane or walker. The use of a manual wheelchair will greatly improve the patient's ability to participate in MRADLs. The patient will use the wheelchair on a regular basis at home. They have expressed their willingness to use a manual wheelchair in the home, and have a caregiver who is available and willing to assist with the wheelchair if needed.                         History:     Past Medical History:   Diagnosis Date    Diabetes mellitus     Hyperlipidemia     Hypertension     Ingrown nail 2015    Bilateral great toe nail. No edema; tenderness or drainage.      Nail fungus 2015    To bilateral toenails X 10.      Obesity (BMI 30-39.9) 2024    Open wound of knee, leg, and ankle 2015       Past Surgical History:   Procedure Laterality Date      SECTION      COLONOSCOPY N/A 2024    Procedure: COLONOSCOPY;  Surgeon: Arturo Montalvo MD;  Location: [facility];  Service: Endoscopy;  Laterality: N/A;   ref by Jocelin Torres NP, Sutab, instructions given to pt in ofc and portal, cardiac clearance sent. edmund    ESOPHAGOGASTRODUODENOSCOPY N/A 2024    Procedure: EGD (ESOPHAGOGASTRODUODENOSCOPY);  Surgeon: Arturo Montalvo MD;  Location: [facility];  Service: Endoscopy;  Laterality: N/A;  cleared by cardiology Dr Shepard E consult dated 24-GT  12/10-[facility] for pre call-tb       Time Tracking:     PT Received On: 24  PT Start Time: 0954     PT Stop Time: 1020  PT Total Time (min): 26 min     Billable Minutes: Evaluation 11 and Gait Training 15      2024

## 2024-12-27 NOTE — PLAN OF CARE
[facility] - Emergency Dept  Initial Discharge Assessment       Primary Care Provider: Suzi Green MD    Admission Diagnosis: Weakness [R53.1]    Admission Date: 12/26/2024  Expected Discharge Date: 12/30/2024    Transition of Care Barriers: (P) Does not adhere to care plan    Payor: BLUE CROSS BLUE SHIELD / Plan: BLUE CONNECT / Product Type: HMO /     Extended Emergency Contact Information  Primary Emergency Contact: Janet Barrera  Mobile Phone: [phone]  Relation: Sister   needed? No  Secondary Emergency Contact: Bruno Ricardo   [facility]  Home Phone: [phone]  Relation: Son    Discharge Plan A: (P) Home Health  Discharge Plan B: (P) Rehab      [facility] [address] [place] [address] [address] [facility]  [address] [facility] [address]  Phone: [phone] Fax: [phone]      Initial Assessment (most recent)       Adult Discharge Assessment - 12/26/24 1859          Discharge Assessment    Assessment Type Discharge Planning Assessment (P)      Confirmed/corrected address, phone number and insurance Yes (P)      Confirmed Demographics Correct on Facesheet (P)      Source of Information patient;family;health record (P)      Does patient/caregiver understand observation status Yes (P)      Communicated RITA with patient/caregiver Yes (P)      People in Home alone (P)      Do you expect to return to your current living situation? Other (see comments) (P)    on going    Prior to hospitilization cognitive status: Alert/Oriented (P)      Current cognitive status: Alert/Oriented (P)      Equipment Currently Used at Home cane, straight (P)      Readmission within 30 days? No (P)      Patient currently being followed by outpatient case management? No (P)      Do you currently have service(s) that help you manage your care at home? No (P)      Do you take prescription medications? Yes (P)      Do you have prescription coverage? Yes (P)      Do you have any  problems affording any of your prescribed medications? No (P)      Is the patient taking medications as prescribed? yes (P)      How do you get to doctors appointments? family or friend will provide (P)      Are you on dialysis? No (P)      Discharge Plan A Home Health (P)      Discharge Plan B Rehab (P)      DME Needed Upon Discharge  none (P)      Discharge Plan discussed with: Sibling;Patient (P)      Transition of Care Barriers Does not adhere to care plan (P)         Physical Activity    On average, how many days per week do you engage in moderate to strenuous exercise (like a brisk walk)? 0 days (P)         Financial Resource Strain    How hard is it for you to pay for the very basics like food, housing, medical care, and heating? Somewhat hard (P)         Housing Stability    In the last 12 months, was there a time when you were not able to pay the mortgage or rent on time? No (P)         Transportation Needs    Has the lack of transportation kept you from medical appointments, meetings, work or from getting things needed for daily living? No (P)         Food Insecurity    Within the past 12 months, you worried that your food would run out before you got the money to buy more. Never true (P)         Stress    Do you feel stress - tense, restless, nervous, or anxious, or unable to sleep at night because your mind is troubled all the time - these days? To some extent (P)         Social Isolation    How often do you feel lonely or isolated from those around you?  Sometimes (P)         Alcohol Use    Q1: How often do you have a drink containing alcohol? Patient declined (P)         Utilities    In the past 12 months has the electric, gas, oil, or water company threatened to shut off services in your home? No (P)         Health Literacy    How often do you need to have someone help you when you read instructions, pamphlets, or other written material from your doctor or pharmacy? Rarely (P)

## 2024-12-27 NOTE — ASSESSMENT & PLAN NOTE
Nutrition consulted. Most recent weight and BMI monitored-     Measurements:  Wt Readings from Last 1 Encounters:   12/27/24 54.9 kg (121 lb 0.5 oz)   Body mass index is 22.87 kg/m².    RD consultation   Extensive outpatient evaluation obtained by GI and General surgery   Will add supplementation to meals       1. Continue texture modified diet- encourage intake as needed 2. Continue Boost supplements 3. RD following

## 2024-12-27 NOTE — NURSING
Blood Glucose re-check 133.  Day nurse notified.   When discharged, take only medications prescribed as instructed by your hospital provider    Do not stop or change any medications until you discuss changes with your own prescriber    Do not take any other medications, including left over medications from before your admission, over the counter medications or herbal supplements, unless you discuss with your own provider

## 2024-12-27 NOTE — PROGRESS NOTES
[facility] Internal Medicine [facility]  Progress Note    Patient Name: Bhavna Lim  MRN: [id number]  Patient Class: OP- Observation   Admission Date: 12/26/2024  Length of Stay: 0 days  Attending Physician: Yarely Walters MD  Primary Care Provider: Suzi Green MD        Subjective     Principal Problem:Failure to thrive in adult        HPI:  Mrs. Bhavna Lim is a 79 year old F with a history of HTN and HLD who presents to the ED for weakness and fatigue. She has been having weight loss and poor po intake over the last few months. She has been seen by GI and evaluated with EGD and Colonoscopy as well as MRCP which showed cholelithiasis with gallbladder distention and intrahepatic and extrahepatic biliary dilation. No significant abnormalities on EGD and coloscopy. She was referred to surgery who recommended NM study and possible EUS with GI. She has been unable to tolerate any solid foods. She sometimes takes a little bit of soup and has tried protein shakes. She feels full immediately. She does endorse some pain in her epigastric region ever since having her EGD performed. She occasionally has vomiting. Denies regurgitation of food. She continues to have worsening weakness and falls at home. She had two falls yesterday due to weakness. She denies significant dizziness. She is unable to walk due to her weakness and sister is concerned this is related to her poor nutrition.      In the ED VSS. Labs notable for Potassium of 3.0 which was repleted in the ED. No focal signs concerning for stroke. She was admitted to medicine for further management.      Overview/Hospital Course:  No notes on file    Interval History: Pt seen and examined this morning on william. NESTOR. She was able to drink her ensure but had vomiting after working with PT/OT. She has significant weakness. Discussed plan of care with family.        Objective:     Vital Signs (Most Recent):  Temp: 97.7 °F (36.5 °C)  (12/27/24 1100)  Pulse: (!) 59 (12/27/24 1100)  Resp: 17 (12/27/24 1100)  BP: 93/61 (12/27/24 1100)  SpO2: 100 % (12/27/24 1100) Vital Signs (24h Range):  Temp:  [97.5 °F (36.4 °C)-97.8 °F (36.6 °C)] 97.7 °F (36.5 °C)  Pulse:  [] 59  Resp:  [17-19] 17  SpO2:  [93 %-100 %] 100 %  BP: ()/(55-72) 93/61     Weight: 54.9 kg (121 lb 0.5 oz)  Body mass index is 22.87 kg/m².    Intake/Output Summary (Last 24 hours) at 12/27/2024 1343  Last data filed at 12/27/2024 0541  Gross per 24 hour   Intake 120 ml   Output --   Net 120 ml         Physical Exam  Constitutional:       Appearance: Normal appearance.   HENT:      Head: Normocephalic and atraumatic.      Nose: Nose normal.      Mouth/Throat:      Mouth: Mucous membranes are moist.   Eyes:      Extraocular Movements: Extraocular movements intact.      Pupils: Pupils are equal, round, and reactive to light.   Cardiovascular:      Rate and Rhythm: Normal rate and regular rhythm.      Heart sounds: No murmur heard.     No gallop.   Pulmonary:      Effort: Pulmonary effort is normal.      Breath sounds: Normal breath sounds. No wheezing or rales.   Abdominal:      General: Abdomen is flat. Bowel sounds are normal. There is no distension.      Palpations: Abdomen is soft.      Tenderness: There is no abdominal tenderness.   Musculoskeletal:         General: No swelling or tenderness. Normal range of motion.      Cervical back: Normal range of motion and neck supple.      Right lower leg: No edema.      Left lower leg: No edema.   Skin:     General: Skin is warm and dry.      Capillary Refill: Capillary refill takes less than 2 seconds.   Neurological:      General: No focal deficit present.      Mental Status: She is alert. Mental status is at baseline.      Motor: Weakness present.   Psychiatric:         Mood and Affect: Mood normal.             Significant Labs: All pertinent labs within the past 24 hours have been reviewed.    Significant Imaging: I have reviewed  all pertinent imaging results/findings within the past 24 hours.    Assessment and Plan     * Failure to thrive in adult  -history of weight loss and poor appetite  -worked up outpatient by GI team with MRCP, EGD, Colonoscopy and General Surgery follow up   -MRCP with intra and extrahepatic ductal dilation with no filling defect or obstructing mass  -planning for NM gastric emptying studying outpatient and possible EUS with GI   -given progressive weakness and falls, PT/OT consulted. Significant weakness for which she is not safe for dispo home.   -nutrition consult   -will trial appetite stimulation medication   -discussed with GI limited inpatient studies for evaluation, would proceed with outpatient follow up and imaging scheduled   -can trial reglan prior to meal to see if this helps if possible component of gastroparesis given emesis after eating             Unintentional weight loss  Nutrition consulted. Most recent weight and BMI monitored-     Measurements:  Wt Readings from Last 1 Encounters:   12/27/24 54.9 kg (121 lb 0.5 oz)   Body mass index is 22.87 kg/m².    RD consultation   Extensive outpatient evaluation obtained by GI and General surgery   Will add supplementation to meals       1. Continue texture modified diet- encourage intake as needed 2. Continue Boost supplements 3. RD following      Debility  Patient with Acute debility due to age-related physical debility and other reduced mobility. The patient's latest AMPAC (Activity Measure for Post Acute Care) Score is listed below.    AM-PAC Score - How much help does the patient need for each activity listed  Basic Mobility Total Score: 14  Turning over in bed (including adjusting bedclothes, sheets and blankets)?: A little  Sitting down on and standing up from a chair with arms (e.g., wheelchair, bedside commode, etc.): A little  Moving from lying on back to sitting on the side of the bed?: A little  Moving to and from a bed to a chair (including a  wheelchair)?: A lot  Need to walk in hospital room?: A lot  Climbing 3-5 steps with a railing?: Unable    Plan  - Progressive mobility protocol initated  - PT/OT consulted  - Fall precautions in place  -pt would benefit from SNF prior to discharge home       Diabetes mellitus  Patient's FSGs are controlled on current medication regimen.  Last A1c reviewed-   Lab Results   Component Value Date    HGBA1C 5.5 10/11/2024     Most recent fingerstick glucose reviewed-   Recent Labs   Lab 12/27/24  0726 12/27/24  0823   POCTGLUCOSE 66* 133*     Current correctional scale   hold on correction scale give A1C is 5.5  Antihyperglycemics (From admission, onward)      None          Hold Oral hypoglycemics while patient is in the hospital.    Benign essential hypertension  Patient's blood pressure range in the last 24 hours was: BP  Min: 117/65  Max: 132/78.The patient's inpatient anti-hypertensive regimen is listed below:  Current Antihypertensives       Plan  - BP is controlled, no changes needed to their regimen  - will continue home regimen, decrease lisinopril dose     Severe protein-calorie malnutrition  Nutrition consulted. Most recent weight and BMI monitored-     Measurements:  Wt Readings from Last 1 Encounters:   12/27/24 54.9 kg (121 lb 0.5 oz)   Body mass index is 22.87 kg/m².    Patient has been screened and assessed by RD.    Malnutrition Type:  Context: chronic illness  Level: severe    Malnutrition Characteristic Summary:  Weight Loss (Malnutrition): greater than 5% in 1 month  Energy Intake (Malnutrition): less than 75% for greater than or equal to 1 month  Subcutaneous Fat (Malnutrition): severe depletion  Muscle Mass (Malnutrition): severe depletion    Interventions/Recommendations (treatment strategy):  1. Continue texture modified diet- encourage intake as needed 2. Continue Boost supplements 3. RD following      Hypokalemia  Patient's most recent potassium results are listed below.   Recent Labs      12/26/24  1441 12/27/24  0249   K 3.0* 2.7*       Plan  - Replete potassium per protocol  - Monitor potassium Daily  - Patient's hypokalemia is worsening, continue with repletion       VTE Risk Mitigation (From admission, onward)           Ordered     enoxaparin injection 40 mg  Daily         12/26/24 1700     IP VTE HIGH RISK PATIENT  Once         12/26/24 1547     Place sequential compression device  Until discontinued         12/26/24 1547                    Discharge Planning   RITA: 12/30/2024     Code Status: Full Code   Medical Readiness for Discharge Date:   Discharge Plan A: Home Health                Please place Justification for DME        Yarely Walters MD  Department of Hospital Medicine   [facility] - Internal Medicine Telemetry

## 2024-12-27 NOTE — ASSESSMENT & PLAN NOTE
Patient's most recent potassium results are listed below.   Recent Labs     12/26/24  1441 12/27/24  0249   K 3.0* 2.7*       Plan  - Replete potassium per protocol  - Monitor potassium Daily  - Patient's hypokalemia is worsening, continue with repletion

## 2024-12-27 NOTE — NURSING
2.7 critical potassium reported to on call.  Dr. Walters ordered replacement.  Also ordered Accu-Checks as  Blood Glucose was 66 this am.  Tablets and orange juice given this morning.  Found patient taking her own home meds this morning.  Team notified.   Patient instructed to NOT take her own home meds.  (Reported to day nurse also.)

## 2024-12-27 NOTE — PT/OT/SLP EVAL
Occupational Therapy   Co-Evaluation  Co-treatment performed due to patient's multiple deficits requiring two skilled therapists to appropriately and safely assess patient's strength and endurance while facilitating functional tasks in addition to accommodating for patient's activity tolerance.      Name: Bhavna Lim  MRN: [id number]  Admitting Diagnosis: Failure to thrive in adult  Recent Surgery: * No surgery found *      Recommendations:     Discharge Recommendations: Moderate Intensity Therapy  Discharge Equipment Recommendations:  walker, rolling  Barriers to discharge:  Decreased caregiver support, Other (Comment) (increased skilled (A )required)    Assessment:     Bhavna Lim is a 79 y.o. female with a medical diagnosis of Failure to thrive in adult.  She presents with the following performance deficits affecting function: weakness, impaired endurance, impaired sensation, impaired self care skills, impaired functional mobility, gait instability, impaired balance, decreased coordination, decreased upper extremity function, decreased lower extremity function, decreased safety awareness, impaired coordination, decreased ROM, impaired fine motor, impaired cardiopulmonary response to activity. Pt has a history of multiple falls PTA and required significant (A) x2 people for all functional mobility and self care tasks completed this day. x1 episode of N/V, RN notified. Pt would continue to benefit from skilled OT services to maximize functional independence with ADLs and functional mobility, reduce caregiver burden, and facilitate safe discharge in the least restrictive environment.  Patient continues to demonstrate the need for moderate intensity therapy on a daily basis post acute exhibited by decreased independence with self-care and functional mobility     Rehab Prognosis: Good; patient would benefit from acute skilled OT services to address these deficits and reach maximum level of function.        Plan:     Patient to be seen 4 x/week to address the above listed problems via self-care/home management, therapeutic activities, therapeutic exercises, neuromuscular re-education  Plan of Care Expires: 01/26/25  Plan of Care Reviewed with: patient, sibling    Subjective     Chief Complaint: decreased sensation in (B) hands  Patient/Family Comments/goals: to walk to the bathroom    Occupational Profile:  Living Environment: Pt lives alone in a H with no steps. T/s combo with grab bar  Previous level of function: Mod (I) with ADLs and using a SPC for fx'l mobility  Roles and Routines: (+) drives; hx of 3 falls PTA  Equipment Used at Home: cane, straight, grab bar  Assistance upon Discharge: limited assistance from family    Pain/Comfort:  Pain Rating 1: 0/10  Pain Rating Post-Intervention 1: 0/10    Patients cultural, spiritual, Anabaptism conflicts given the current situation: no    Objective:   Additional staff present:  JUSTICE Pabon    Communicated with: RN prior to session.  Patient found HOB elevated with PureWick, telemetry upon OT entry to room.    General Precautions: Standard, fall  Orthopedic Precautions: N/A  Braces: N/A  Respiratory Status: Room air    Occupational Performance:    Bed Mobility:    Patient completed Scooting/Bridging with stand by assistance  Patient completed Supine to Sit with stand by assistance    Functional Mobility/Transfers:  Patient completed Sit <> Stand Transfer with minimum assistance, moderate assistance, and of 2 persons  with  rolling walker   Patient completed Toilet Transfer Step Transfer technique with moderate assistance and of 2 persons with  rolling walker  Functional Mobility: Pt engaged in functional mobility to simulate household/community distances 10 ft + 5 ft with Mod x2 and RW in order to maximize functional endurance and standing balance required for engagement in occupations of choice   No overt LOB, unsteadiness noted throughout  No SOB  Verbal cues for  "upright posture and manual (A) for RW management    Activities of Daily Living:  Grooming: stand by assistance to wash hands seated  Upper Body Dressing: moderate assistance donning hospital gown over back EOB  Toileting: moderate assistance for managing underwear above/below hips in standing; pt completed seated pericare on toilet    Cognitive/Visual Perceptual:  Cognitive/Psychosocial Skills:     -       Oriented to: Person, Place, Time, and Situation   -       Follows Commands/attention:Follows two-step commands  -       Safety awareness/insight to disability: impaired   -       Mood/Affect/Coping skills/emotional control: Cooperative    Physical Exam:  Sensation:    -       Impaired  light/touch with pt reporting "pins and needles" sensation in (B) hands  Upper Extremity Range of Motion:     -       Right Upper Extremity: WFL  -       Left Upper Extremity: WFL  Upper Extremity Strength:    -       Right Upper Extremity: WFL  -       Left Upper Extremity: grossly 4/5  Fine Motor Coordination:    -       Impaired  Left hand, manipulation of objects   and Right hand, manipulation of objects d/t decreased sensation    AMPAC 6 Click ADL:  AMPAC Total Score: 14    Treatment & Education:  -Education on energy conservation and task modification to maximize safety and (I) during ADLs and mobility  -Education on importance of OOB activity to improve overall activity tolerance and promote recovery  -Pt educated to call for assistance and to transfer with hospital staff only  -Provided education regarding role of OT, POC, & discharge recommendations with pt and sister verbalizing understanding.  Pt had no further questions & when asked whether there were any concerns pt reported none.     Patient left up in chair with all lines intact, call button in reach, RN notified, and sister present    GOALS:   Multidisciplinary Problems       Occupational Therapy Goals          Problem: Occupational Therapy    Goal Priority " Disciplines Outcome Interventions   Occupational Therapy Goal     OT, PT/OT Progressing    Description: Goals to be met by: 25     Patient will increase functional independence with ADLs by performing:    UE Dressing with Modified [place].  LE Dressing with Modified [place].  Grooming while standing at sink with Modified [place].  Toileting from toilet/bedside commode with Modified [place] for hygiene and clothing management.   Supine to sit with Modified [place].  Toilet transfer to toilet/bedside commode with Stand-by Assistance.    Patient demonstrates a mobility limitation that significantly impairs their ability to participate in one or more mobility related activities of daily living. Patient's mobility limitation cannot be sufficiently resolved with the use of a cane, but can be sufficiently resolved with the use of a rolling walker.The use of a rolling walker will considerably improve their ability to participate in MRADLs. Patient will use the walker on a regular basis at home.                           History:     Past Medical History:   Diagnosis Date    Diabetes mellitus     Hyperlipidemia     Hypertension     Ingrown nail 2015    Bilateral great toe nail. No edema; tenderness or drainage.      Nail fungus 2015    To bilateral toenails X 10.      Obesity (BMI 30-39.9) 2024    Open wound of knee, leg, and ankle 2015         Past Surgical History:   Procedure Laterality Date     SECTION      COLONOSCOPY N/A 2024    Procedure: COLONOSCOPY;  Surgeon: Arturo Montalvo MD;  Location: [facility];  Service: Endoscopy;  Laterality: N/A;   ref by Jocelin Torres NP, Sut[place], instructions given to pt in ofc and portal, cardiac clearance sent. edmund    ESOPHAGOGASTRODUODENOSCOPY N/A 2024    Procedure: EGD (ESOPHAGOGASTRODUODENOSCOPY);  Surgeon: Arturo Montalvo MD;  Location: [facility];  Service: Endoscopy;  Laterality: N/A;  cleared by cardiology   Becky-see E consult dated 11/19/24-GT  12/10-lvm for pre call-tb       Time Tracking:     OT Date of Treatment: 12/27/24  OT Start Time: 0954  OT Stop Time: 1020  OT Total Time (min): 26 min    Billable Minutes:Evaluation 10  Self Care/Home Management 16    12/27/2024

## 2024-12-27 NOTE — PLAN OF CARE
Discharge Planning Assessment:    Patient admitted on: 12-26-24  Chart reviewed today  Care plan discussed with ER treatment team,   attending Dr Romeo CORBETT at home: cane  Current Dispo: on going  Does not have medicare, only has [facility] medicaid managed care plan.  Rehab vs Snf vs Home Health with therapy  Await work up tomorrow and PT and OT to eval  Case management to follow

## 2024-12-27 NOTE — PLAN OF CARE
OT eval complete. OT POC and goals established.   Problem: Occupational Therapy  Goal: Occupational Therapy Goal  Description: Goals to be met by: 1/26/25     Patient will increase functional independence with ADLs by performing:    UE Dressing with Modified [place].  LE Dressing with Modified [place].  Grooming while standing at sink with Modified [place].  Toileting from toilet/bedside commode with Modified [place] for hygiene and clothing management.   Supine to sit with Modified [place].  Toilet transfer to toilet/bedside commode with Stand-by Assistance.    Patient demonstrates a mobility limitation that significantly impairs their ability to participate in one or more mobility related activities of daily living. Patient's mobility limitation cannot be sufficiently resolved with the use of a cane, but can be sufficiently resolved with the use of a rolling walker.The use of a rolling walker will considerably improve their ability to participate in MRADLs. Patient will use the walker on a regular basis at home.      Outcome: Progressing

## 2024-12-27 NOTE — PLAN OF CARE
Recommendations  1. Continue texture modified diet- encourage intake as needed   2. Continue Boost supplements   3. RD following     Goals: Meet % of EEN/EPN by RD f/u date  Nutrition Goal Status: goal not met  Communication of RD Recs: POC

## 2024-12-27 NOTE — H&P
Patient Name: Bhavna Lim  MRN: [id number]  Patient Class: OP- Observation          Admission Date: 12/26/2024  Length of Stay: 0 days  Attending Physician: Yarely Walters MD  Primary Care Provider: Suzi Green MD              Subjective  Principal Problem:Failure to thrive in adult           HPI:  Mrs. Bhavna Lim is a 79 year old F with a history of HTN and HLD who presents to the ED for weakness and fatigue. She has been having weight loss and poor po intake over the last few months. She has been seen by GI and evaluated with EGD and Colonoscopy as well as MRCP which showed cholelithiasis with gallbladder distention and intrahepatic and extrahepatic biliary dilation. No significant abnormalities on EGD and coloscopy. She was referred to surgery who recommended NM study and possible EUS with GI. She has been unable to tolerate any solid foods. She sometimes takes a little bit of soup and has tried protein shakes. She feels full immediately. She does endorse some pain in her epigastric region ever since having her EGD performed. She occasionally has vomiting. Denies regurgitation of food. She continues to have worsening weakness and falls at home. She had two falls yesterday due to weakness. She denies significant dizziness. She is unable to walk due to her weakness and sister is concerned this is related to her poor nutrition.       In the ED VSS. Labs notable for Potassium of 3.0 which was repleted in the ED. No focal signs concerning for stroke. She was admitted to medicine for further management.       Overview/Hospital Course:  No notes on file          Past Medical History:   Diagnosis Date    Diabetes mellitus      Hyperlipidemia      Hypertension      Ingrown nail 01/09/2015     Bilateral great toe nail. No edema; tenderness or drainage.      Nail fungus 01/09/2015     To bilateral toenails X 10.      Obesity (BMI 30-39.9) 12/18/2024    Open wound of knee, leg, and ankle 02/09/2015                Past Surgical History:   Procedure Laterality Date     SECTION        COLONOSCOPY N/A 2024     Procedure: COLONOSCOPY;  Surgeon: Arturo Montalvo MD;  Location: [facility];  Service: Endoscopy;  Laterality: N/A;   ref by Jocelin Torres NP, [place]ut[place], instructions given to pt in ofc and portal, cardiac clearance sent. edmund    ESOPHAGOGASTRODUODENOSCOPY N/A 2024     Procedure: EGD (ESOPHAGOGASTRODUODENOSCOPY);  Surgeon: Arturo Montalvo MD;  Location: [facility];  Service: Endoscopy;  Laterality: N/A;  cleared by cardiology Dr Pena-teodora E consult dated 24-GT  12/10-[facility] for pre call-tb         Review of patient's allergies indicates:  No Known Allergies     No current facility-administered medications on file prior to encounter.           Current Outpatient Medications on File Prior to Encounter   Medication Sig    aspirin (ECOTRIN) 81 MG EC tablet Take 81 mg by mouth once daily.    atorvastatin (LIPITOR) 40 MG tablet Take 40 mg by mouth once daily.    furosemide (LASIX) 40 MG tablet Take 40 mg by mouth once daily.    lisinopriL (PRINIVIL,ZESTRIL) 40 MG tablet Take 40 mg by mouth once daily.    NIFEdipine (ADALAT CC) 60 MG TbSR Take 30 mg by mouth once daily.    triamcinolone acetonide 0.1% (KENALOG) 0.1 % cream Apply 1 Application topically 2 (two) times daily.      Family History    None               Tobacco Use    Smoking status: Some Days       Types: Cigarettes    Smokeless tobacco: Not on file   Substance and Sexual Activity    Alcohol use: Yes       Comment: rarely    Drug use: No    Sexual activity: Not on file      Objective:      Vital Signs (Most Recent):  Temp: 97.5 °F (36.4 °C) (24 1415)  Pulse: 75 (24 1400)  Resp: 19 (24 1400)  BP: 117/65 (24 1400)  SpO2: 98 % (24 1400) Vital Signs (24h Range):  Temp:  [97.5 °F (36.4 °C)-98.3 °F (36.8 °C)] 97.5 °F (36.4 °C)  Pulse:  [75-78] 75  Resp:  [18-19] 19  SpO2:  [97 %-98 %] 98 %  BP: (117-132)/(65-78)  117/65      Weight: 54.9 kg (121 lb)  Body mass index is 22.86 kg/m².     Physical Exam  Constitutional:       Appearance: Normal appearance.   HENT:      Head: Normocephalic and atraumatic.      Nose: Nose normal.      Mouth/Throat:      Mouth: Mucous membranes are moist.   Eyes:      Extraocular Movements: Extraocular movements intact.      Pupils: Pupils are equal, round, and reactive to light.   Cardiovascular:      Rate and Rhythm: Normal rate and regular rhythm.      Heart sounds: No murmur heard.     No gallop.   Pulmonary:      Effort: Pulmonary effort is normal.      Breath sounds: Normal breath sounds. No wheezing or rales.   Abdominal:      General: Abdomen is flat. Bowel sounds are normal. There is no distension.      Palpations: Abdomen is soft.      Tenderness: There is no abdominal tenderness.   Musculoskeletal:         General: No swelling or tenderness. Normal range of motion.      Cervical back: Normal range of motion and neck supple.      Right lower leg: No edema.      Left lower leg: No edema.   Skin:     General: Skin is warm and dry.      Capillary Refill: Capillary refill takes less than 2 seconds.   Neurological:      General: No focal deficit present.      Mental Status: She is alert. Mental status is at baseline.      Motor: Weakness present.   Psychiatric:         Mood and Affect: Mood normal.                  CRANIAL NERVES      CN III, IV, VI   Pupils are equal, round, and reactive to light.        Significant Labs: All pertinent labs within the past 24 hours have been reviewed.     Significant Imaging: I have reviewed all pertinent imaging results/findings within the past 24 hours.           Assessment and Plan  * Failure to thrive in adult  -history of weight loss and poor appetite  -worked up outpatient by GI team with MRCP, EGD, Colonoscopy and General Surgery follow up   -MRCP with intra and extrahepatic ductal dilation with no filling defect or obstructing mass  -planning for NM  gastric emptying studying outpatient and possible EUS with GI   -given progressive weakness and falls will consult PT/OT   -nutrition consult   -will trial appetite stimulation medication   -GI consult pending clinical course, possibly benefit from upper GI vs ordering gastric emptying study inpatient                  Unintentional weight loss  Nutrition consulted. Most recent weight and BMI monitored-      Measurements:      Wt Readings from Last 1 Encounters:   12/26/24 54.9 kg (121 lb)   Body mass index is 22.86 kg/m².     RD consultation   Extensive outpatient evaluation obtained by GI and General surgery   Will add supplementation to meals                  Debility  Patient with Acute debility due to age-related physical debility and other reduced mobility. The patient's latest AMPAC (Activity Measure for Post Acute Care) Score is listed below.     AM-PAC Score - How much help does the patient need for each activity listed        Plan  - Progressive mobility protocol initated  - PT/OT consulted  - Fall precautions in place           Diabetes mellitus  Patient's FSGs are controlled on current medication regimen.  Last A1c reviewed-         Lab Results   Component Value Date     HGBA1C 5.5 10/11/2024      Most recent fingerstick glucose reviewed-       Recent Labs   Lab 12/26/24  1259   POCTGLUCOSE 79      Current correctional scale   hold on correction scale give A1C is 5.5  Antihyperglycemics (From admission, onward)        None             Hold Oral hypoglycemics while patient is in the hospital.     Benign essential hypertension  Patient's blood pressure range in the last 24 hours was: BP  Min: 117/65  Max: 132/78.The patient's inpatient anti-hypertensive regimen is listed below:  Current Antihypertensives        Plan  - BP is controlled, no changes needed to their regimen  - will continue home regimen, decrease lisinopril dose      Hypokalemia  Patient's most recent potassium results are listed below.        Recent Labs     12/26/24  1441   K 3.0*      Plan  - Replete potassium per protocol  - Monitor potassium Daily  - Patient's hypokalemia is stable        VTE Risk Mitigation (From admission, onward)              Ordered       enoxaparin injection 40 mg  Daily         12/26/24 1700       IP VTE HIGH RISK PATIENT  Once         12/26/24 1547       Place sequential compression device  Until discontinued         12/26/24 1547                          Discharge Planning   RITA:      Code Status: Full Code   Medical Readiness for Discharge Date:                   Yarely Walters MD  Department of Hospital Medicine   [facility] - Emergency Dept

## 2024-12-27 NOTE — ASSESSMENT & PLAN NOTE
Patient's FSGs are controlled on current medication regimen.  Last A1c reviewed-   Lab Results   Component Value Date    HGBA1C 5.5 10/11/2024     Most recent fingerstick glucose reviewed-   Recent Labs   Lab 12/27/24  0726 12/27/24  0823   POCTGLUCOSE 66* 133*     Current correctional scale   hold on correction scale give A1C is 5.5  Antihyperglycemics (From admission, onward)      None          Hold Oral hypoglycemics while patient is in the hospital.

## 2024-12-27 NOTE — ASSESSMENT & PLAN NOTE
-history of weight loss and poor appetite  -worked up outpatient by GI team with MRCP, EGD, Colonoscopy and General Surgery follow up   -MRCP with intra and extrahepatic ductal dilation with no filling defect or obstructing mass  -planning for NM gastric emptying studying outpatient and possible EUS with GI   -given progressive weakness and falls, PT/OT consulted. Significant weakness for which she is not safe for dispo home.   -nutrition consult   -will trial appetite stimulation medication   -discussed with GI limited inpatient studies for evaluation, would proceed with outpatient follow up and imaging scheduled   -can trial reglan prior to meal to see if this helps if possible component of gastroparesis given emesis after eating

## 2024-12-27 NOTE — ASSESSMENT & PLAN NOTE
Malnutrition Type:  Context: chronic illness  Level: severe    Related to (etiology):   Decreased ability to consume sufficient energy 2/2 poor intake PTA    Signs and Symptoms (as evidenced by):   Malnutrition Characteristic Summary:  Weight Loss (Malnutrition): greater than 5% in 1 month  Energy Intake (Malnutrition): less than 75% for greater than or equal to 1 month  Subcutaneous Fat (Malnutrition): severe depletion  Muscle Mass (Malnutrition): severe depletion    Interventions (treatment strategy):  1. Continue texture modified diet- encourage intake as needed   2. Continue Boost supplements   3. RD following    Nutrition Diagnosis Status:   New

## 2024-12-27 NOTE — PLAN OF CARE
Problem: Physical Therapy  Goal: Physical Therapy Goal  Description: Goals to be met by: 1/10     Patient will increase functional independence with mobility by performin. Supine to sit with Modified [place]  2. Sit to supine with Modified [place]  3. Sit to stand transfer with Stand-by Assistance  4. Gait  x 100 feet with Contact Guard Assistance using LRAD.   5. Ascend/descend 6 stair with bilateral Handrails Minimal Assistance using LRAD.     DME Justifcation  Patient demonstrates a mobility limitation that significantly impairs their ability to participate in one or more mobility related activities of daily living. Patient's mobility limitation cannot be sufficiently resolved with the use of a cane, but can be sufficiently resolved with the use of a rolling walker.The use of a rolling walker will considerably improve their ability to participate in MRADLs. Patient will use the walker on a regular basis at home.     Patient has a mobility limitation that significantly impairs their ability to participate in one or more mobility related activities of daily living in customary locations in the home. The mobility limitation cannot be sufficiently resolved by the use of a cane or walker. The use of a manual wheelchair will greatly improve the patient's ability to participate in MRADLs. The patient will use the wheelchair on a regular basis at home. They have expressed their willingness to use a manual wheelchair in the home, and have a caregiver who is available and willing to assist with the wheelchair if needed.    Outcome: Progressing   Evaluation completed, initiated plan of care.   Renae Novoa, PT  2024

## 2024-12-27 NOTE — ASSESSMENT & PLAN NOTE
Patient with Acute debility due to age-related physical debility and other reduced mobility. The patient's latest AMPAC (Activity Measure for Post Acute Care) Score is listed below.    AM-PAC Score - How much help does the patient need for each activity listed  Basic Mobility Total Score: 14  Turning over in bed (including adjusting bedclothes, sheets and blankets)?: A little  Sitting down on and standing up from a chair with arms (e.g., wheelchair, bedside commode, etc.): A little  Moving from lying on back to sitting on the side of the bed?: A little  Moving to and from a bed to a chair (including a wheelchair)?: A lot  Need to walk in hospital room?: A lot  Climbing 3-5 steps with a railing?: Unable    Plan  - Progressive mobility protocol initated  - PT/OT consulted  - Fall precautions in place  -pt would benefit from SNF prior to discharge home

## 2024-12-27 NOTE — SUBJECTIVE & OBJECTIVE
Interval History: Pt seen and examined this morning on raya [facility]AE[facility]. She was able to drink her ensure but had vomiting after working with PT/OT. She has significant weakness. Discussed plan of care with family.        Objective:     Vital Signs (Most Recent):  Temp: 97.7 °F (36.5 °C) (12/27/24 1100)  Pulse: (!) 59 (12/27/24 1100)  Resp: 17 (12/27/24 1100)  BP: 93/61 (12/27/24 1100)  SpO2: 100 % (12/27/24 1100) Vital Signs (24h Range):  Temp:  [97.5 °F (36.4 °C)-97.8 °F (36.6 °C)] 97.7 °F (36.5 °C)  Pulse:  [] 59  Resp:  [17-19] 17  SpO2:  [93 %-100 %] 100 %  BP: ()/(55-72) 93/61     Weight: 54.9 kg (121 lb 0.5 oz)  Body mass index is 22.87 kg/m².    Intake/Output Summary (Last 24 hours) at 12/27/2024 1343  Last data filed at 12/27/2024 0541  Gross per 24 hour   Intake 120 ml   Output --   Net 120 ml         Physical Exam  Constitutional:       Appearance: Normal appearance.   HENT:      Head: Normocephalic and atraumatic.      Nose: Nose normal.      Mouth/Throat:      Mouth: Mucous membranes are moist.   Eyes:      Extraocular Movements: Extraocular movements intact.      Pupils: Pupils are equal, round, and reactive to light.   Cardiovascular:      Rate and Rhythm: Normal rate and regular rhythm.      Heart sounds: No murmur heard.     No gallop.   Pulmonary:      Effort: Pulmonary effort is normal.      Breath sounds: Normal breath sounds. No wheezing or rales.   Abdominal:      General: Abdomen is flat. Bowel sounds are normal. There is no distension.      Palpations: Abdomen is soft.      Tenderness: There is no abdominal tenderness.   Musculoskeletal:         General: No swelling or tenderness. Normal range of motion.      Cervical back: Normal range of motion and neck supple.      Right lower leg: No edema.      Left lower leg: No edema.   Skin:     General: Skin is warm and dry.      Capillary Refill: Capillary refill takes less than 2 seconds.   Neurological:      General: No focal deficit  present.      Mental Status: She is alert. Mental status is at baseline.      Motor: Weakness present.   Psychiatric:         Mood and Affect: Mood normal.             Significant Labs: All pertinent labs within the past 24 hours have been reviewed.    Significant Imaging: I have reviewed all pertinent imaging results/findings within the past 24 hours.

## 2024-12-27 NOTE — ASSESSMENT & PLAN NOTE
Nutrition consulted. Most recent weight and BMI monitored-     Measurements:  Wt Readings from Last 1 Encounters:   12/27/24 54.9 kg (121 lb 0.5 oz)   Body mass index is 22.87 kg/m².    Patient has been screened and assessed by RD.    Malnutrition Type:  Context: chronic illness  Level: severe    Malnutrition Characteristic Summary:  Weight Loss (Malnutrition): greater than 5% in 1 month  Energy Intake (Malnutrition): less than 75% for greater than or equal to 1 month  Subcutaneous Fat (Malnutrition): severe depletion  Muscle Mass (Malnutrition): severe depletion    Interventions/Recommendations (treatment strategy):  1. Continue texture modified diet- encourage intake as needed 2. Continue Boost supplements 3. RD following

## 2024-12-28 LAB
ANION GAP SERPL CALC-SCNC: 9 MMOL/L (ref 8–16)
BASOPHILS # BLD AUTO: 0.03 K/UL (ref 0–0.2)
BASOPHILS NFR BLD: 0.3 % (ref 0–1.9)
BUN SERPL-MCNC: 13 MG/DL (ref 8–23)
CALCIUM SERPL-MCNC: 8.7 MG/DL (ref 8.7–10.5)
CHLORIDE SERPL-SCNC: 105 MMOL/L (ref 95–110)
CO2 SERPL-SCNC: 27 MMOL/L (ref 23–29)
CREAT SERPL-MCNC: 0.7 MG/DL (ref 0.5–1.4)
DIFFERENTIAL METHOD BLD: ABNORMAL
EOSINOPHIL # BLD AUTO: 0.2 K/UL (ref 0–0.5)
EOSINOPHIL NFR BLD: 1.3 % (ref 0–8)
ERYTHROCYTE [DISTWIDTH] IN BLOOD BY AUTOMATED COUNT: 17.7 % (ref 11.5–14.5)
EST. GFR  (NO RACE VARIABLE): >60 ML/MIN/1.73 M^2
GLUCOSE SERPL-MCNC: 93 MG/DL (ref 70–110)
HCT VFR BLD AUTO: 36.5 % (ref 37–48.5)
HGB BLD-MCNC: 12.8 G/DL (ref 12–16)
IMM GRANULOCYTES # BLD AUTO: 0.04 K/UL (ref 0–0.04)
IMM GRANULOCYTES NFR BLD AUTO: 0.3 % (ref 0–0.5)
LYMPHOCYTES # BLD AUTO: 2.1 K/UL (ref 1–4.8)
LYMPHOCYTES NFR BLD: 17.9 % (ref 18–48)
MAGNESIUM SERPL-MCNC: 1.6 MG/DL (ref 1.6–2.6)
MCH RBC QN AUTO: 28.7 PG (ref 27–31)
MCHC RBC AUTO-ENTMCNC: 35.1 G/DL (ref 32–36)
MCV RBC AUTO: 82 FL (ref 82–98)
MONOCYTES # BLD AUTO: 0.9 K/UL (ref 0.3–1)
MONOCYTES NFR BLD: 7.7 % (ref 4–15)
NEUTROPHILS # BLD AUTO: 8.3 K/UL (ref 1.8–7.7)
NEUTROPHILS NFR BLD: 72.5 % (ref 38–73)
NRBC BLD-RTO: 0 /100 WBC
PHOSPHATE SERPL-MCNC: 1.9 MG/DL (ref 2.7–4.5)
PLATELET # BLD AUTO: 220 K/UL (ref 150–450)
PMV BLD AUTO: 10.4 FL (ref 9.2–12.9)
POCT GLUCOSE: 107 MG/DL (ref 70–110)
POCT GLUCOSE: 124 MG/DL (ref 70–110)
POCT GLUCOSE: 133 MG/DL (ref 70–110)
POCT GLUCOSE: 81 MG/DL (ref 70–110)
POTASSIUM SERPL-SCNC: 3.1 MMOL/L (ref 3.5–5.1)
RBC # BLD AUTO: 4.46 M/UL (ref 4–5.4)
SODIUM SERPL-SCNC: 141 MMOL/L (ref 136–145)
WBC # BLD AUTO: 11.48 K/UL (ref 3.9–12.7)

## 2024-12-28 PROCEDURE — 36415 COLL VENOUS BLD VENIPUNCTURE: CPT | Performed by: STUDENT IN AN ORGANIZED HEALTH CARE EDUCATION/TRAINING PROGRAM

## 2024-12-28 PROCEDURE — 83735 ASSAY OF MAGNESIUM: CPT | Performed by: STUDENT IN AN ORGANIZED HEALTH CARE EDUCATION/TRAINING PROGRAM

## 2024-12-28 PROCEDURE — 85025 COMPLETE CBC W/AUTO DIFF WBC: CPT | Performed by: STUDENT IN AN ORGANIZED HEALTH CARE EDUCATION/TRAINING PROGRAM

## 2024-12-28 PROCEDURE — 84100 ASSAY OF PHOSPHORUS: CPT | Performed by: STUDENT IN AN ORGANIZED HEALTH CARE EDUCATION/TRAINING PROGRAM

## 2024-12-28 PROCEDURE — G0378 HOSPITAL OBSERVATION PER HR: HCPCS

## 2024-12-28 PROCEDURE — 80048 BASIC METABOLIC PNL TOTAL CA: CPT | Performed by: STUDENT IN AN ORGANIZED HEALTH CARE EDUCATION/TRAINING PROGRAM

## 2024-12-28 PROCEDURE — 25000003 PHARM REV CODE 250: Performed by: STUDENT IN AN ORGANIZED HEALTH CARE EDUCATION/TRAINING PROGRAM

## 2024-12-28 RX ADMIN — LISINOPRIL 10 MG: 10 TABLET ORAL at 08:12

## 2024-12-28 RX ADMIN — NIFEDIPINE 30 MG: 30 TABLET, FILM COATED, EXTENDED RELEASE ORAL at 08:12

## 2024-12-28 RX ADMIN — SODIUM PHOSPHATE, MONOBASIC, MONOHYDRATE AND SODIUM PHOSPHATE, DIBASIC, ANHYDROUS 30 MMOL: 142; 276 INJECTION, SOLUTION INTRAVENOUS at 11:12

## 2024-12-28 RX ADMIN — Medication 6 MG: at 08:12

## 2024-12-28 RX ADMIN — MIRTAZAPINE 7.5 MG: 7.5 TABLET, FILM COATED ORAL at 08:12

## 2024-12-28 RX ADMIN — ACETAMINOPHEN 650 MG: 325 TABLET ORAL at 08:12

## 2024-12-28 RX ADMIN — ATORVASTATIN CALCIUM 40 MG: 40 TABLET, FILM COATED ORAL at 08:12

## 2024-12-28 RX ADMIN — ASPIRIN 81 MG: 81 TABLET, COATED ORAL at 08:12

## 2024-12-28 RX ADMIN — POTASSIUM BICARBONATE 40 MEQ: 391 TABLET, EFFERVESCENT ORAL at 11:12

## 2024-12-28 NOTE — PROGRESS NOTES
[facility]  Progress Note    Patient Name: Bhavna Lim  MRN: [id number]  Patient Class: OP- Observation   Admission Date: 12/26/2024  Length of Stay: 0 days  Attending Physician: Yarely Walters MD  Primary Care Provider: Suzi Green MD        Subjective     Principal Problem:Failure to thrive in adult        HPI:  Mrs. Bhavna Lim is a 79 year old F with a history of HTN and HLD who presents to the ED for weakness and fatigue. She has been having weight loss and poor po intake over the last few months. She has been seen by GI and evaluated with EGD and Colonoscopy as well as MRCP which showed cholelithiasis with gallbladder distention and intrahepatic and extrahepatic biliary dilation. No significant abnormalities on EGD and coloscopy. She was referred to surgery who recommended NM study and possible EUS with GI. She has been unable to tolerate any solid foods. She sometimes takes a little bit of soup and has tried protein shakes. She feels full immediately. She does endorse some pain in her epigastric region ever since having her EGD performed. She occasionally has vomiting. Denies regurgitation of food. She continues to have worsening weakness and falls at home. She had two falls yesterday due to weakness. She denies significant dizziness. She is unable to walk due to her weakness and sister is concerned this is related to her poor nutrition.      In the ED VSS. Labs notable for Potassium of 3.0 which was repleted in the ED. No focal signs concerning for stroke. She was admitted to medicine for further management.      Overview/Hospital Course:  No notes on file    Interval History:  Pt seen and examined this morning on rounds. Overnight found on the side of the bed smoking cigarrettes. Sleeping this morning.         Objective:     Vital Signs (Most Recent):  Temp: 98.9 °F (37.2 °C) (12/28/24 1133)  Pulse: 73 (12/28/24 1133)  Resp: 17 (12/28/24  1133)  BP: (!) 91/44 (12/28/24 1133)  SpO2: (!) 94 % (12/28/24 1133) Vital Signs (24h Range):  Temp:  [97.4 °F (36.3 °C)-98.9 °F (37.2 °C)] 98.9 °F (37.2 °C)  Pulse:  [52-73] 73  Resp:  [17-18] 17  SpO2:  [94 %-100 %] 94 %  BP: ()/(44-66) 91/44     Weight: 54.9 kg (121 lb 0.5 oz)  Body mass index is 22.87 kg/m².    Intake/Output Summary (Last 24 hours) at 12/28/2024 1214  Last data filed at 12/28/2024 0516  Gross per 24 hour   Intake 360 ml   Output 500 ml   Net -140 ml         Physical Exam  Constitutional:       Appearance: Normal appearance. She is not ill-appearing.   HENT:      Head: Normocephalic and atraumatic.      Nose: Nose normal.      Mouth/Throat:      Mouth: Mucous membranes are moist.   Eyes:      Extraocular Movements: Extraocular movements intact.      Pupils: Pupils are equal, round, and reactive to light.   Cardiovascular:      Rate and Rhythm: Normal rate and regular rhythm.      Heart sounds: No murmur heard.     No gallop.   Pulmonary:      Effort: Pulmonary effort is normal.      Breath sounds: Normal breath sounds. No wheezing or rales.   Abdominal:      General: Abdomen is flat. Bowel sounds are normal. There is no distension.      Palpations: Abdomen is soft.      Tenderness: There is no abdominal tenderness.   Musculoskeletal:         General: No swelling or tenderness. Normal range of motion.      Cervical back: Normal range of motion and neck supple.      Right lower leg: No edema.      Left lower leg: No edema.   Skin:     General: Skin is warm and dry.      Capillary Refill: Capillary refill takes less than 2 seconds.   Neurological:      General: No focal deficit present.   Psychiatric:         Mood and Affect: Mood normal.             Significant Labs: All pertinent labs within the past 24 hours have been reviewed.    Significant Imaging: I have reviewed all pertinent imaging results/findings within the past 24 hours.    Assessment and Plan     * Failure to thrive in  adult  -history of weight loss and poor appetite  -worked up outpatient by GI team with MRCP, EGD, Colonoscopy and General Surgery follow up   -MRCP with intra and extrahepatic ductal dilation with no filling defect or obstructing mass  -planning for NM gastric emptying studying outpatient and possible EUS with GI   -given progressive weakness and falls, PT/OT consulted. Significant weakness for which she is not safe for dispo home.   -nutrition consult   -will trial appetite stimulation medication   -discussed with GI limited inpatient studies for evaluation, would proceed with outpatient follow up and imaging scheduled   -can trial reglan prior to meal to see if this helps if possible component of gastroparesis given emesis after eating   -will continue to monitor PO intake and optimize nausea control             Unintentional weight loss  Nutrition consulted. Most recent weight and BMI monitored-     Measurements:  Wt Readings from Last 1 Encounters:   12/27/24 54.9 kg (121 lb 0.5 oz)   Body mass index is 22.87 kg/m².    RD consultation   Extensive outpatient evaluation obtained by GI and General surgery   Will add supplementation to meals       1. Continue texture modified diet- encourage intake as needed 2. Continue Boost supplements 3. RD following      Debility  Patient with Acute debility due to age-related physical debility and other reduced mobility. The patient's latest AMPAC (Activity Measure for Post Acute Care) Score is listed below.    AM-PAC Score - How much help does the patient need for each activity listed  Basic Mobility Total Score: 14  Turning over in bed (including adjusting bedclothes, sheets and blankets)?: A little  Sitting down on and standing up from a chair with arms (e.g., wheelchair, bedside commode, etc.): A little  Moving from lying on back to sitting on the side of the bed?: A little  Moving to and from a bed to a chair (including a wheelchair)?: A lot  Need to walk in hospital  room?: A lot  Climbing 3-5 steps with a railing?: Unable    Plan  - Progressive mobility protocol initated  - PT/OT consulted  - Fall precautions in place  -pt would benefit from SNF prior to discharge home       Diabetes mellitus  Patient's FSGs are controlled on current medication regimen.  Last A1c reviewed-   Lab Results   Component Value Date    HGBA1C 5.5 10/11/2024     Most recent fingerstick glucose reviewed-   Recent Labs   Lab 12/27/24 2003 12/28/24  0733 12/28/24  1129   POCTGLUCOSE 111* 107 81     Current correctional scale   hold on correction scale give A1C is 5.5  Antihyperglycemics (From admission, onward)      None          Hold Oral hypoglycemics while patient is in the hospital.    Benign essential hypertension  Patient's blood pressure range in the last 24 hours was: BP  Min: 117/65  Max: 132/78.The patient's inpatient anti-hypertensive regimen is listed below:  Current Antihypertensives       Plan  - BP is controlled, no changes needed to their regimen  - will continue home regimen, decrease lisinopril dose     Severe protein-calorie malnutrition  Nutrition consulted. Most recent weight and BMI monitored-     Measurements:  Wt Readings from Last 1 Encounters:   12/27/24 54.9 kg (121 lb 0.5 oz)   Body mass index is 22.87 kg/m².    Patient has been screened and assessed by RD.    Malnutrition Type:  Context: chronic illness  Level: severe    Malnutrition Characteristic Summary:  Weight Loss (Malnutrition): greater than 5% in 1 month  Energy Intake (Malnutrition): less than 75% for greater than or equal to 1 month  Subcutaneous Fat (Malnutrition): severe depletion  Muscle Mass (Malnutrition): severe depletion    Interventions/Recommendations (treatment strategy):  1. Continue texture modified diet- encourage intake as needed 2. Continue Boost supplements 3. RD following      Hypokalemia  Patient's most recent potassium results are listed below.   Recent Labs     12/26/24  1441 12/27/24  0249  12/28/24  0302   K 3.0* 2.7* 3.1*     Plan  - Replete potassium per protocol  - Monitor potassium Daily  - Patient's hypokalemia is stable, continue with repletion       VTE Risk Mitigation (From admission, onward)           Ordered     enoxaparin injection 40 mg  Daily         12/26/24 1700     IP VTE HIGH RISK PATIENT  Once         12/26/24 1547     Place sequential compression device  Until discontinued         12/26/24 1547                    Discharge Planning   RITA: 12/30/2024     Code Status: Full Code   Medical Readiness for Discharge Date:   Discharge Plan A: Home Health                Please place Justification for DME        Yarely Walters MD  Department of Hospital Medicine   [facility] - Internal Medicine Telemetry

## 2024-12-28 NOTE — ASSESSMENT & PLAN NOTE
Patient's most recent potassium results are listed below.   Recent Labs     12/26/24  1441 12/27/24  0249 12/28/24  0302   K 3.0* 2.7* 3.1*     Plan  - Replete potassium per protocol  - Monitor potassium Daily  - Patient's hypokalemia is stable, continue with repletion

## 2024-12-28 NOTE — ASSESSMENT & PLAN NOTE
Patient's FSGs are controlled on current medication regimen.  Last A1c reviewed-   Lab Results   Component Value Date    HGBA1C 5.5 10/11/2024     Most recent fingerstick glucose reviewed-   Recent Labs   Lab 12/27/24 2003 12/28/24  0733 12/28/24  1129   POCTGLUCOSE 111* 107 81     Current correctional scale   hold on correction scale give A1C is 5.5  Antihyperglycemics (From admission, onward)      None          Hold Oral hypoglycemics while patient is in the hospital.

## 2024-12-28 NOTE — SUBJECTIVE & OBJECTIVE
Interval History:  Pt seen and examined this morning on rounds. Overnight found on the side of the bed smoking cigarrettes. Sleeping this morning.         Objective:     Vital Signs (Most Recent):  Temp: 98.9 °F (37.2 °C) (12/28/24 1133)  Pulse: 73 (12/28/24 1133)  Resp: 17 (12/28/24 1133)  BP: (!) 91/44 (12/28/24 1133)  SpO2: (!) 94 % (12/28/24 1133) Vital Signs (24h Range):  Temp:  [97.4 °F (36.3 °C)-98.9 °F (37.2 °C)] 98.9 °F (37.2 °C)  Pulse:  [52-73] 73  Resp:  [17-18] 17  SpO2:  [94 %-100 %] 94 %  BP: ()/(44-66) 91/44     Weight: 54.9 kg (121 lb 0.5 oz)  Body mass index is 22.87 kg/m².    Intake/Output Summary (Last 24 hours) at 12/28/2024 1214  Last data filed at 12/28/2024 0516  Gross per 24 hour   Intake 360 ml   Output 500 ml   Net -140 ml         Physical Exam  Constitutional:       Appearance: Normal appearance. She is not ill-appearing.   HENT:      Head: Normocephalic and atraumatic.      Nose: Nose normal.      Mouth/Throat:      Mouth: Mucous membranes are moist.   Eyes:      Extraocular Movements: Extraocular movements intact.      Pupils: Pupils are equal, round, and reactive to light.   Cardiovascular:      Rate and Rhythm: Normal rate and regular rhythm.      Heart sounds: No murmur heard.     No gallop.   Pulmonary:      Effort: Pulmonary effort is normal.      Breath sounds: Normal breath sounds. No wheezing or rales.   Abdominal:      General: Abdomen is flat. Bowel sounds are normal. There is no distension.      Palpations: Abdomen is soft.      Tenderness: There is no abdominal tenderness.   Musculoskeletal:         General: No swelling or tenderness. Normal range of motion.      Cervical back: Normal range of motion and neck supple.      Right lower leg: No edema.      Left lower leg: No edema.   Skin:     General: Skin is warm and dry.      Capillary Refill: Capillary refill takes less than 2 seconds.   Neurological:      General: No focal deficit present.   Psychiatric:         Mood  and Affect: Mood normal.             Significant Labs: All pertinent labs within the past 24 hours have been reviewed.    Significant Imaging: I have reviewed all pertinent imaging results/findings within the past 24 hours.

## 2024-12-28 NOTE — NURSING
Patient found sitting on floor directly next to the bed sitting on her blanket  from home.  PCT brought nurse to room to see the patient.  Charge nurse and unit support called to assess the situation.  The room smelled like heavy cigarette smoke.  Bed alarm was on  during first rounds at start of shift, but never alarmed during this event.    Patient denied smoking, and said those were her sister's cigarette's.   Patient also stated that she slid herself down the side of the bed to the floor.  When we found her, she was sitting up, but hiding behind the bed.  NO injuries noted.  No c/o pain.  Patient stated , 'I did NOT hit anything--I just slid easy from the side of the bed to the floor.  Security up on unit to confiscate the cigarette's.  Again, patient stated that she did not smoke, and the cigarette's  belong to her sister and  are in that black purse on the sofa.  Report completed by security.  Cigarette's and large white cloth medication bag containing several home bottles of home meds removed from the room and placed in the [facility] office.  Camera placed in room.  Monitoring in progress.  Fall precautions in place, and bed alarm ON.   Instructed patient to use the call light for assistance, and NOT to get out of the bed.

## 2024-12-28 NOTE — ASSESSMENT & PLAN NOTE
-history of weight loss and poor appetite  -worked up outpatient by GI team with MRCP, EGD, Colonoscopy and General Surgery follow up   -MRCP with intra and extrahepatic ductal dilation with no filling defect or obstructing mass  -planning for NM gastric emptying studying outpatient and possible EUS with GI   -given progressive weakness and falls, PT/OT consulted. Significant weakness for which she is not safe for dispo home.   -nutrition consult   -will trial appetite stimulation medication   -discussed with GI limited inpatient studies for evaluation, would proceed with outpatient follow up and imaging scheduled   -can trial reglan prior to meal to see if this helps if possible component of gastroparesis given emesis after eating   -will continue to monitor PO intake and optimize nausea control

## 2024-12-29 LAB
ANION GAP SERPL CALC-SCNC: 10 MMOL/L (ref 8–16)
BUN SERPL-MCNC: 14 MG/DL (ref 8–23)
CALCIUM SERPL-MCNC: 7.8 MG/DL (ref 8.7–10.5)
CHLORIDE SERPL-SCNC: 106 MMOL/L (ref 95–110)
CO2 SERPL-SCNC: 24 MMOL/L (ref 23–29)
CREAT SERPL-MCNC: 0.6 MG/DL (ref 0.5–1.4)
EST. GFR  (NO RACE VARIABLE): >60 ML/MIN/1.73 M^2
GLUCOSE SERPL-MCNC: 67 MG/DL (ref 70–110)
MAGNESIUM SERPL-MCNC: 1.5 MG/DL (ref 1.6–2.6)
PHOSPHATE SERPL-MCNC: 3.4 MG/DL (ref 2.7–4.5)
POCT GLUCOSE: 130 MG/DL (ref 70–110)
POCT GLUCOSE: 82 MG/DL (ref 70–110)
POCT GLUCOSE: 90 MG/DL (ref 70–110)
POCT GLUCOSE: 95 MG/DL (ref 70–110)
POTASSIUM SERPL-SCNC: 2.8 MMOL/L (ref 3.5–5.1)
SODIUM SERPL-SCNC: 140 MMOL/L (ref 136–145)

## 2024-12-29 PROCEDURE — 36415 COLL VENOUS BLD VENIPUNCTURE: CPT | Performed by: STUDENT IN AN ORGANIZED HEALTH CARE EDUCATION/TRAINING PROGRAM

## 2024-12-29 PROCEDURE — 25000003 PHARM REV CODE 250: Performed by: STUDENT IN AN ORGANIZED HEALTH CARE EDUCATION/TRAINING PROGRAM

## 2024-12-29 PROCEDURE — 84100 ASSAY OF PHOSPHORUS: CPT | Performed by: STUDENT IN AN ORGANIZED HEALTH CARE EDUCATION/TRAINING PROGRAM

## 2024-12-29 PROCEDURE — 80048 BASIC METABOLIC PNL TOTAL CA: CPT | Performed by: STUDENT IN AN ORGANIZED HEALTH CARE EDUCATION/TRAINING PROGRAM

## 2024-12-29 PROCEDURE — 94761 N-INVAS EAR/PLS OXIMETRY MLT: CPT

## 2024-12-29 PROCEDURE — 83735 ASSAY OF MAGNESIUM: CPT | Performed by: STUDENT IN AN ORGANIZED HEALTH CARE EDUCATION/TRAINING PROGRAM

## 2024-12-29 PROCEDURE — 11000001 HC ACUTE MED/SURG PRIVATE ROOM

## 2024-12-29 PROCEDURE — 63600175 PHARM REV CODE 636 W HCPCS: Performed by: STUDENT IN AN ORGANIZED HEALTH CARE EDUCATION/TRAINING PROGRAM

## 2024-12-29 RX ORDER — MAGNESIUM SULFATE HEPTAHYDRATE 40 MG/ML
4 INJECTION, SOLUTION INTRAVENOUS ONCE
Status: DISCONTINUED | OUTPATIENT
Start: 2024-12-29 | End: 2024-12-29

## 2024-12-29 RX ORDER — MAGNESIUM SULFATE HEPTAHYDRATE 40 MG/ML
2 INJECTION, SOLUTION INTRAVENOUS
Status: DISPENSED | OUTPATIENT
Start: 2024-12-29 | End: 2024-12-29

## 2024-12-29 RX ADMIN — ATORVASTATIN CALCIUM 40 MG: 40 TABLET, FILM COATED ORAL at 11:12

## 2024-12-29 RX ADMIN — Medication 6 MG: at 11:12

## 2024-12-29 RX ADMIN — POTASSIUM BICARBONATE 40 MEQ: 391 TABLET, EFFERVESCENT ORAL at 11:12

## 2024-12-29 RX ADMIN — MAGNESIUM SULFATE HEPTAHYDRATE 2 G: 40 INJECTION, SOLUTION INTRAVENOUS at 01:12

## 2024-12-29 RX ADMIN — ACETAMINOPHEN 650 MG: 325 TABLET ORAL at 11:12

## 2024-12-29 RX ADMIN — ENOXAPARIN SODIUM 40 MG: 40 INJECTION SUBCUTANEOUS at 05:12

## 2024-12-29 RX ADMIN — ASPIRIN 81 MG: 81 TABLET, COATED ORAL at 11:12

## 2024-12-29 NOTE — PLAN OF CARE
Problem: Adult Inpatient Plan of Care  Goal: Plan of Care Review  Outcome: Not Progressing  Goal: Patient-Specific Goal (Individualized)  Outcome: Not Progressing  Goal: Absence of Hospital-Acquired Illness or Injury  Outcome: Not Progressing  Goal: Optimal Comfort and Wellbeing  Outcome: Not Progressing  Goal: Readiness for Transition of Care  Outcome: Not Progressing     Problem: Diabetes Comorbidity  Goal: Blood Glucose Level Within Targeted Range  Outcome: Not Progressing     Problem: Skin Injury Risk Increased  Goal: Skin Health and Integrity  Outcome: Not Progressing   Pt Alert and able to express needs.  No C/o pain .   However, patient was found lying on her stomach with her head resting on her left arm stretched straight out.  This happened during shift change.   No bleeding noted.  Reported to on call.  Charge nurse and  at the bedside to assist with care.  SOS completed #[id number].  Fall precautions in place.  Camera # [phone] monitoring patient.  Bed alarm on.  Instructed patient to remain in bed and call for assistance.   CT head and spine ordered and completed last night.   Safety maintained.  Bed in low position,  call  light in reach.

## 2024-12-29 NOTE — PROGRESS NOTES
[facility] - Internal Medicine [facility] Medicine  Progress Note    Patient Name: Bhavna Lim  MRN: [id number]  Patient Class: IP- Inpatient   Admission Date: 12/26/2024  Length of Stay: 0 days  Attending Physician: Yarely Walters MD  Primary Care Provider: Suzi Green MD        Subjective     Principal Problem:Failure to thrive in adult        HPI:  Mrs. Bhavna Lim is a 79 year old F with a history of HTN and HLD who presents to the ED for weakness and fatigue. She has been having weight loss and poor po intake over the last few months. She has been seen by GI and evaluated with EGD and Colonoscopy as well as MRCP which showed cholelithiasis with gallbladder distention and intrahepatic and extrahepatic biliary dilation. No significant abnormalities on EGD and coloscopy. She was referred to surgery who recommended NM study and possible EUS with GI. She has been unable to tolerate any solid foods. She sometimes takes a little bit of soup and has tried protein shakes. She feels full immediately. She does endorse some pain in her epigastric region ever since having her EGD performed. She occasionally has vomiting. Denies regurgitation of food. She continues to have worsening weakness and falls at home. She had two falls yesterday due to weakness. She denies significant dizziness. She is unable to walk due to her weakness and sister is concerned this is related to her poor nutrition.      In the ED VSS. Labs notable for Potassium of 3.0 which was repleted in the ED. No focal signs concerning for stroke. She was admitted to medicine for further management.      Overview/Hospital Course:  No notes on file    Interval History:  NAEO. Still optimizing nutrition and weakness. Found lying on her stomach with head resting on her left arm stretched out.       Objective:     Vital Signs (Most Recent):  Temp: 97.6 °F (36.4 °C) (12/29/24 1136)  Pulse: 76 (12/29/24 1136)  Resp: 17 (12/29/24 1136)  BP:  96/60 (12/29/24 1136)  SpO2: (!) 94 % (12/29/24 1136) Vital Signs (24h Range):  Temp:  [97.4 °F (36.3 °C)-98.1 °F (36.7 °C)] 97.6 °F (36.4 °C)  Pulse:  [59-88] 76  Resp:  [17-18] 17  SpO2:  [94 %-98 %] 94 %  BP: ()/(59-72) 96/60     Weight: 54.9 kg (121 lb 0.5 oz)  Body mass index is 22.87 kg/m².    Intake/Output Summary (Last 24 hours) at 12/29/2024 1434  Last data filed at 12/29/2024 0509  Gross per 24 hour   Intake 120 ml   Output 550 ml   Net -430 ml         Physical Exam  Constitutional:       Appearance: Normal appearance. She is not ill-appearing.   HENT:      Head: Normocephalic and atraumatic.      Nose: Nose normal.      Mouth/Throat:      Mouth: Mucous membranes are moist.   Eyes:      Extraocular Movements: Extraocular movements intact.      Pupils: Pupils are equal, round, and reactive to light.   Cardiovascular:      Rate and Rhythm: Normal rate and regular rhythm.      Heart sounds: No murmur heard.     No gallop.   Pulmonary:      Effort: Pulmonary effort is normal.      Breath sounds: Normal breath sounds. No wheezing or rales.   Abdominal:      General: Abdomen is flat. Bowel sounds are normal. There is no distension.      Palpations: Abdomen is soft.      Tenderness: There is no abdominal tenderness.   Musculoskeletal:         General: No swelling or tenderness. Normal range of motion.      Cervical back: Normal range of motion and neck supple.      Right lower leg: No edema.      Left lower leg: No edema.   Skin:     General: Skin is warm and dry.      Capillary Refill: Capillary refill takes less than 2 seconds.   Neurological:      General: No focal deficit present.   Psychiatric:         Mood and Affect: Mood normal.             Significant Labs: All pertinent labs within the past 24 hours have been reviewed.    Significant Imaging: I have reviewed all pertinent imaging results/findings within the past 24 hours.    Assessment and Plan     * Failure to thrive in adult  -history of weight  loss and poor appetite  -worked up outpatient by GI team with MRCP, EGD, Colonoscopy and General Surgery follow up   -MRCP with intra and extrahepatic ductal dilation with no filling defect or obstructing mass  -planning for NM gastric emptying studying outpatient and possible EUS with GI   -given progressive weakness and falls, PT/OT consulted. Significant weakness for which she is not safe for dispo home.   -nutrition consult   -will trial appetite stimulation medication   -discussed with GI limited inpatient studies for evaluation, would proceed with outpatient follow up and imaging scheduled   -patient at times confused as well, found trying to smoke in the hospital possible cognitive impairment which could be contributing to this   -will continue to monitor PO intake and optimize nausea control             Unintentional weight loss  Nutrition consulted. Most recent weight and BMI monitored-     Measurements:  Wt Readings from Last 1 Encounters:   12/27/24 54.9 kg (121 lb 0.5 oz)   Body mass index is 22.87 kg/m².    RD consultation   Extensive outpatient evaluation obtained by GI and General surgery   Will add supplementation to meals       1. Continue texture modified diet- encourage intake as needed 2. Continue Boost supplements 3. RD following      Debility  Patient with Acute debility due to age-related physical debility and other reduced mobility. The patient's latest AMPAC (Activity Measure for Post Acute Care) Score is listed below.    AM-PAC Score - How much help does the patient need for each activity listed  Basic Mobility Total Score: 14  Turning over in bed (including adjusting bedclothes, sheets and blankets)?: A little  Sitting down on and standing up from a chair with arms (e.g., wheelchair, bedside commode, etc.): A little  Moving from lying on back to sitting on the side of the bed?: A little  Moving to and from a bed to a chair (including a wheelchair)?: A lot  Need to walk in hospital room?: A  lot  Climbing 3-5 steps with a railing?: Unable    Plan  - Progressive mobility protocol initated  - PT/OT consulted  - Fall precautions in place  -pt would benefit from SNF prior to discharge home       Diabetes mellitus  Patient's FSGs are controlled on current medication regimen.  Last A1c reviewed-   Lab Results   Component Value Date    HGBA1C 5.5 10/11/2024     Most recent fingerstick glucose reviewed-   Recent Labs   Lab 12/28/24  0733 12/28/24  1129 12/28/24  1609 12/28/24 1957   POCTGLUCOSE 107 81 124* 133*     Current correctional scale   hold on correction scale give A1C is 5.5  Antihyperglycemics (From admission, onward)      None          Hold Oral hypoglycemics while patient is in the hospital.    Benign essential hypertension  Patient's blood pressure range in the last 24 hours was: BP  Min: 91/44  Max: 111/67.The patient's inpatient anti-hypertensive regimen is listed below:  Current Antihypertensives  lisinopriL tablet 10 mg, Daily, Oral  NIFEdipine 24 hr tablet 30 mg, Daily, Oral    Plan  - BP is controlled, no changes needed to their regimen  - will continue home regimen, decrease lisinopril dose     Severe protein-calorie malnutrition  Nutrition consulted. Most recent weight and BMI monitored-     Measurements:  Wt Readings from Last 1 Encounters:   12/27/24 54.9 kg (121 lb 0.5 oz)   Body mass index is 22.87 kg/m².    Patient has been screened and assessed by RD.    Malnutrition Type:  Context: chronic illness  Level: severe    Malnutrition Characteristic Summary:  Weight Loss (Malnutrition): greater than 5% in 1 month  Energy Intake (Malnutrition): less than 75% for greater than or equal to 1 month  Subcutaneous Fat (Malnutrition): severe depletion  Muscle Mass (Malnutrition): severe depletion    Interventions/Recommendations (treatment strategy):  1. Continue texture modified diet- encourage intake as needed 2. Continue Boost supplements 3. RD following      Hypokalemia  Patient's most recent  potassium results are listed below.   Recent Labs     12/26/24  1441 12/27/24  0249 12/28/24  0302   K 3.0* 2.7* 3.1*     Plan  - Replete potassium per protocol  - Monitor potassium Daily  - Patient's hypokalemia is stable, continue with repletion       VTE Risk Mitigation (From admission, onward)           Ordered     enoxaparin injection 40 mg  Daily         12/26/24 1700     IP VTE HIGH RISK PATIENT  Once         12/26/24 1547     Place sequential compression device  Until discontinued         12/26/24 1547                    Discharge Planning   RITA: 12/30/2024     Code Status: Full Code   Medical Readiness for Discharge Date:   Discharge Plan A: Home Health                Please place Justification for DME        Yarely Walters MD  Department of Hospital Medicine   [facility] - Internal Medicine Telemetry

## 2024-12-29 NOTE — SUBJECTIVE & OBJECTIVE
Interval History: NAEO. Still optimizing nutrition and weakness. Found attempting to get out of bed.      Objective:     Vital Signs (Most Recent):  Temp: 98 °F (36.7 °C) (12/29/24 0404)  Pulse: (!) 59 (12/29/24 0404)  Resp: 18 (12/29/24 0404)  BP: 111/67 (12/29/24 0404)  SpO2: 98 % (12/29/24 0404) Vital Signs (24h Range):  Temp:  [97.4 °F (36.3 °C)-98.9 °F (37.2 °C)] 98 °F (36.7 °C)  Pulse:  [59-88] 59  Resp:  [17-18] 18  SpO2:  [94 %-98 %] 98 %  BP: ()/(44-72) 111/67     Weight: 54.9 kg (121 lb 0.5 oz)  Body mass index is 22.87 kg/m².    Intake/Output Summary (Last 24 hours) at 12/29/2024 0634  Last data filed at 12/29/2024 0509  Gross per 24 hour   Intake --   Output 250 ml   Net -250 ml         Physical Exam  Constitutional:       Appearance: Normal appearance. She is not ill-appearing.   HENT:      Head: Normocephalic and atraumatic.      Nose: Nose normal.      Mouth/Throat:      Mouth: Mucous membranes are moist.   Eyes:      Extraocular Movements: Extraocular movements intact.      Pupils: Pupils are equal, round, and reactive to light.   Cardiovascular:      Rate and Rhythm: Normal rate and regular rhythm.      Heart sounds: No murmur heard.     No gallop.   Pulmonary:      Effort: Pulmonary effort is normal.      Breath sounds: Normal breath sounds. No wheezing or rales.   Abdominal:      General: Abdomen is flat. Bowel sounds are normal. There is no distension.      Palpations: Abdomen is soft.      Tenderness: There is no abdominal tenderness.   Musculoskeletal:         General: No swelling or tenderness. Normal range of motion.      Cervical back: Normal range of motion and neck supple.      Right lower leg: No edema.      Left lower leg: No edema.   Skin:     General: Skin is warm and dry.      Capillary Refill: Capillary refill takes less than 2 seconds.   Neurological:      General: No focal deficit present.   Psychiatric:         Mood and Affect: Mood normal.             Significant Labs: All  pertinent labs within the past 24 hours have been reviewed.    Significant Imaging: I have reviewed all pertinent imaging results/findings within the past 24 hours.

## 2024-12-29 NOTE — SUBJECTIVE & OBJECTIVE
Interval History:  NAEO. She is still having numbness and abnormal coordination of her hands. She is drinking boost but does not like the food.       Objective:     Vital Signs (Most Recent):  Temp: 97.6 °F (36.4 °C) (12/29/24 1136)  Pulse: 76 (12/29/24 1136)  Resp: 17 (12/29/24 1136)  BP: 96/60 (12/29/24 1136)  SpO2: (!) 94 % (12/29/24 1136) Vital Signs (24h Range):  Temp:  [97.4 °F (36.3 °C)-98.1 °F (36.7 °C)] 97.6 °F (36.4 °C)  Pulse:  [59-88] 76  Resp:  [17-18] 17  SpO2:  [94 %-98 %] 94 %  BP: ()/(59-72) 96/60     Weight: 54.9 kg (121 lb 0.5 oz)  Body mass index is 22.87 kg/m².    Intake/Output Summary (Last 24 hours) at 12/29/2024 1434  Last data filed at 12/29/2024 0509  Gross per 24 hour   Intake 120 ml   Output 550 ml   Net -430 ml         Physical Exam  Constitutional:       Appearance: Normal appearance. She is not ill-appearing.   HENT:      Head: Normocephalic and atraumatic.      Nose: Nose normal.      Mouth/Throat:      Mouth: Mucous membranes are moist.   Eyes:      Extraocular Movements: Extraocular movements intact.      Pupils: Pupils are equal, round, and reactive to light.   Cardiovascular:      Rate and Rhythm: Normal rate and regular rhythm.      Heart sounds: No murmur heard.     No gallop.   Pulmonary:      Effort: Pulmonary effort is normal.      Breath sounds: Normal breath sounds. No wheezing or rales.   Abdominal:      General: Abdomen is flat. Bowel sounds are normal. There is no distension.      Palpations: Abdomen is soft.      Tenderness: There is no abdominal tenderness.   Musculoskeletal:         General: No swelling or tenderness. Normal range of motion.      Cervical back: Normal range of motion and neck supple.      Right lower leg: No edema.      Left lower leg: No edema.   Skin:     General: Skin is warm and dry.      Capillary Refill: Capillary refill takes less than 2 seconds.   Neurological:      General: No focal deficit present.      Motor: Weakness present.       Comments: Abnormal hand coordination    strength in tact     Psychiatric:         Mood and Affect: Mood normal.             Significant Labs: All pertinent labs within the past 24 hours have been reviewed.    Significant Imaging: I have reviewed all pertinent imaging results/findings within the past 24 hours.

## 2024-12-29 NOTE — ASSESSMENT & PLAN NOTE
-history of weight loss and poor appetite  -worked up outpatient by GI team with MRCP, EGD, Colonoscopy and General Surgery follow up   -MRCP with intra and extrahepatic ductal dilation with no filling defect or obstructing mass  -planning for NM gastric emptying studying outpatient and possible EUS with GI   -given progressive weakness and falls, PT/OT consulted. Significant weakness for which she is not safe for dispo home.   -nutrition consult   -will trial appetite stimulation medication   -discussed with GI limited inpatient studies for evaluation, would proceed with outpatient follow up and imaging scheduled   -patient at times confused as well, found trying to smoke in the hospital possible cognitive impairment which could be contributing to this   -will continue to monitor PO intake and optimize nausea control

## 2024-12-29 NOTE — NURSING
Pct notified this nurse she found pt attempting to get out of bed unassisted when doing rounds, pt stated she was trying to go to the restroom. This nurse attempted to ambulate to restroom when standing pt became unsteady and unable to stand up. Pt placed back in bed and connected to external catheter, pt educated on the risk of falling when getting up unassisted, pt verbalizes understanding and states she will not try to get up alone. Safety maintained, bed alarm set call light within reach will continue plan of care.

## 2024-12-29 NOTE — PLAN OF CARE
Problem: Adult Inpatient Plan of Care  Goal: Plan of Care Review  Outcome: Progressing  Goal: Patient-Specific Goal (Individualized)  Outcome: Progressing  Goal: Absence of Hospital-Acquired Illness or Injury  Outcome: Progressing  Goal: Optimal Comfort and Wellbeing  Outcome: Progressing  Goal: Readiness for Transition of Care  Outcome: Progressing  AAOX4,VSS,NAD noted. Pt tolerated all medication well, no adverse events noted throughout shift. Pt educated on safety and calling for assistance. Pt verbalized understanding of fall prevention education. Pt denies needs or any concerns. Safety maintained, bed alarm set call light within reach.

## 2024-12-29 NOTE — ASSESSMENT & PLAN NOTE
Patient's FSGs are controlled on current medication regimen.  Last A1c reviewed-   Lab Results   Component Value Date    HGBA1C 5.5 10/11/2024     Most recent fingerstick glucose reviewed-   Recent Labs   Lab 12/28/24  0733 12/28/24  1129 12/28/24  1609 12/28/24 1957   POCTGLUCOSE 107 81 124* 133*     Current correctional scale   hold on correction scale give A1C is 5.5  Antihyperglycemics (From admission, onward)      None          Hold Oral hypoglycemics while patient is in the hospital.

## 2024-12-29 NOTE — ASSESSMENT & PLAN NOTE
Patient's blood pressure range in the last 24 hours was: BP  Min: 91/44  Max: 111/67.The patient's inpatient anti-hypertensive regimen is listed below:  Current Antihypertensives  lisinopriL tablet 10 mg, Daily, Oral  NIFEdipine 24 hr tablet 30 mg, Daily, Oral    Plan  - BP is controlled, no changes needed to their regimen  - will continue home regimen, decrease lisinopril dose

## 2024-12-30 PROBLEM — R20.2 NUMBNESS AND TINGLING IN BOTH HANDS: Status: ACTIVE | Noted: 2024-12-30

## 2024-12-30 PROBLEM — R20.0 NUMBNESS AND TINGLING IN BOTH HANDS: Status: ACTIVE | Noted: 2024-12-30

## 2024-12-30 LAB
ANION GAP SERPL CALC-SCNC: 10 MMOL/L (ref 8–16)
BUN SERPL-MCNC: 15 MG/DL (ref 8–23)
CALCIUM SERPL-MCNC: 7.8 MG/DL (ref 8.7–10.5)
CHLORIDE SERPL-SCNC: 106 MMOL/L (ref 95–110)
CO2 SERPL-SCNC: 22 MMOL/L (ref 23–29)
CREAT SERPL-MCNC: 0.6 MG/DL (ref 0.5–1.4)
EST. GFR  (NO RACE VARIABLE): >60 ML/MIN/1.73 M^2
GLUCOSE SERPL-MCNC: 57 MG/DL (ref 70–110)
MAGNESIUM SERPL-MCNC: 2.2 MG/DL (ref 1.6–2.6)
PHOSPHATE SERPL-MCNC: 2.6 MG/DL (ref 2.7–4.5)
POCT GLUCOSE: 102 MG/DL (ref 70–110)
POCT GLUCOSE: 111 MG/DL (ref 70–110)
POCT GLUCOSE: 73 MG/DL (ref 70–110)
POCT GLUCOSE: 76 MG/DL (ref 70–110)
POCT GLUCOSE: 89 MG/DL (ref 70–110)
POTASSIUM SERPL-SCNC: 3 MMOL/L (ref 3.5–5.1)
SODIUM SERPL-SCNC: 138 MMOL/L (ref 136–145)

## 2024-12-30 PROCEDURE — 83735 ASSAY OF MAGNESIUM: CPT | Performed by: STUDENT IN AN ORGANIZED HEALTH CARE EDUCATION/TRAINING PROGRAM

## 2024-12-30 PROCEDURE — 36415 COLL VENOUS BLD VENIPUNCTURE: CPT | Performed by: STUDENT IN AN ORGANIZED HEALTH CARE EDUCATION/TRAINING PROGRAM

## 2024-12-30 PROCEDURE — 63600175 PHARM REV CODE 636 W HCPCS: Performed by: STUDENT IN AN ORGANIZED HEALTH CARE EDUCATION/TRAINING PROGRAM

## 2024-12-30 PROCEDURE — 97530 THERAPEUTIC ACTIVITIES: CPT

## 2024-12-30 PROCEDURE — 84100 ASSAY OF PHOSPHORUS: CPT | Performed by: STUDENT IN AN ORGANIZED HEALTH CARE EDUCATION/TRAINING PROGRAM

## 2024-12-30 PROCEDURE — 80048 BASIC METABOLIC PNL TOTAL CA: CPT | Performed by: STUDENT IN AN ORGANIZED HEALTH CARE EDUCATION/TRAINING PROGRAM

## 2024-12-30 PROCEDURE — 25000003 PHARM REV CODE 250: Performed by: STUDENT IN AN ORGANIZED HEALTH CARE EDUCATION/TRAINING PROGRAM

## 2024-12-30 PROCEDURE — 11000001 HC ACUTE MED/SURG PRIVATE ROOM

## 2024-12-30 RX ORDER — SODIUM,POTASSIUM PHOSPHATES 280-250MG
2 POWDER IN PACKET (EA) ORAL EVERY 4 HOURS
Status: COMPLETED | OUTPATIENT
Start: 2024-12-30 | End: 2024-12-30

## 2024-12-30 RX ORDER — NICOTINE 7MG/24HR
1 PATCH, TRANSDERMAL 24 HOURS TRANSDERMAL DAILY
Status: DISCONTINUED | OUTPATIENT
Start: 2024-12-31 | End: 2025-02-13 | Stop reason: HOSPADM

## 2024-12-30 RX ADMIN — POTASSIUM & SODIUM PHOSPHATES POWDER PACK 280-160-250 MG 2 PACKET: 280-160-250 PACK at 06:12

## 2024-12-30 RX ADMIN — ENOXAPARIN SODIUM 40 MG: 40 INJECTION SUBCUTANEOUS at 06:12

## 2024-12-30 RX ADMIN — ATORVASTATIN CALCIUM 40 MG: 40 TABLET, FILM COATED ORAL at 09:12

## 2024-12-30 RX ADMIN — POTASSIUM BICARBONATE 40 MEQ: 391 TABLET, EFFERVESCENT ORAL at 09:12

## 2024-12-30 RX ADMIN — POTASSIUM & SODIUM PHOSPHATES POWDER PACK 280-160-250 MG 2 PACKET: 280-160-250 PACK at 02:12

## 2024-12-30 RX ADMIN — ASPIRIN 81 MG: 81 TABLET, COATED ORAL at 09:12

## 2024-12-30 NOTE — ASSESSMENT & PLAN NOTE
Patient's most recent potassium results are listed below.   Recent Labs     12/28/24  0302 12/29/24  0657 12/30/24  0541   K 3.1* 2.8* 3.0*     Plan  - Replete potassium per protocol  - Monitor potassium Daily  - Patient's hypokalemia is stable, continue with repletion

## 2024-12-30 NOTE — PROGRESS NOTES
CM phoned rigoberto Carr at [phone] to discuss recommendation for SNF.  No answer and unable to leave  a message.

## 2024-12-30 NOTE — PROGRESS NOTES
CM spoke to sister Janet Barrera [phone] at bedside and son Bruno Lim [phone] via aunt's phone regarding SNF rec. (Will correct their information in demographics). No preference voiced for local facility.  Son also entertaining the option of facilities close to him in [facility] where he can be more involved in pt's care.  ([facility] [phone]).  Family's request for nicotine patch and staff feeding relayed to team as well as pt's hx of right knee pain.  CM will phone sister in the morning for additional [place] facilities should son provide them to her.

## 2024-12-30 NOTE — ASSESSMENT & PLAN NOTE
Patient's blood pressure range in the last 24 hours was: BP  Min: 80/50  Max: 112/72.The patient's inpatient anti-hypertensive regimen is listed below:  Current Antihypertensives  lisinopriL tablet 10 mg, Daily, Oral  NIFEdipine 24 hr tablet 30 mg, Daily, Oral    Plan  - BP is controlled, no changes needed to their regimen  - will continue home regimen, decrease lisinopril dose

## 2024-12-30 NOTE — PROGRESS NOTES
"CM spoke to pt at bedside regarding SNF recommendation.  Pt initially hesitant to the idea of discharge to a facility however changed her mind once she could not control a soda can and it spilled.  "I don't know why my hands have gotten so weak".  Blast SNF referral sent. Will discuss recommendation with family as well.    "

## 2024-12-30 NOTE — PT/OT/SLP PROGRESS
"Occupational Therapy   Treatment    Name: Bhavna Lim  MRN: [id number]  Admitting Diagnosis:  Failure to thrive in adult       Recommendations:     Discharge Recommendations: Moderate Intensity Therapy  Discharge Equipment Recommendations:  walker, rolling  Barriers to discharge:       Assessment:     Bhavna Lim is a 79 y.o. female with a medical diagnosis of Failure to thrive in adult.  She presents with increased coordination deficits in her BUEs and hands demonstrating both gross and fine motor ataxia bilaterally. Pt reports that this began Friday or Saturday. MDs aware per nurse. Pt sat EOB briefly with minimal ability to hold herself midline or steady as well as unable to hold her head up so she was returned to supine for safety  purposes. Performance deficits affecting function are weakness, impaired endurance, decreased coordination, impaired sensation, impaired self care skills, decreased upper extremity function, decreased lower extremity function, impaired functional mobility, gait instability, decreased safety awareness, impaired balance, impaired fine motor, decreased ROM, abnormal tone.     Rehab Prognosis:  Good; patient would benefit from acute skilled OT services to address these deficits and reach maximum level of function.       Plan:     Patient to be seen 4 x/week to address the above listed problems via self-care/home management, therapeutic activities, therapeutic exercises, neuromuscular re-education  Plan of Care Expires: 01/26/25  Plan of Care Reviewed with: patient    Subjective     "My arms ain't right."    Pain/Comfort:  Pain Rating 1: 0/10    Objective:     Communicated with: rn prior to session.  Patient found supine with telemetry upon OT entry to room.    General Precautions: Standard, fall    Orthopedic Precautions:N/A  Braces: N/A  Respiratory Status: Room air     Occupational Performance:     Bed Mobility:    Patient completed Supine to Sit with maximal " "assistance  Patient completed Sit to Supine with maximal assistance     Functional Mobility/Transfers:  Deferred due to safety issues with one person.    Activities of Daily Living:  Grooming: stand by assistance to wipe face with rag but with poor control/quality basically making "slapping motions" onto her face.    [facility] 6 Click ADL: 10    Treatment & Education:  EOB balance = Poor  Performed UE coordination tests with poor performance. Able to hold arms at 90* briefly before them slamming down onto lap due to weakness.  Discussed OT POC.    Patient left supine with all lines intact, call button in reach, and bed alarm on    GOALS:   Multidisciplinary Problems       Occupational Therapy Goals          Problem: Occupational Therapy    Goal Priority Disciplines Outcome Interventions   Occupational Therapy Goal     OT, PT/OT Progressing    Description: Goals to be met by: 1/26/25     Patient will increase functional independence with ADLs by performing:    UE Dressing with Modified [place].  LE Dressing with Modified [place].  Grooming while standing at sink with Modified [place].  Toileting from toilet/bedside commode with Modified [place] for hygiene and clothing management.   Supine to sit with Modified [place].  Toilet transfer to toilet/bedside commode with Stand-by Assistance.    Patient demonstrates a mobility limitation that significantly impairs their ability to participate in one or more mobility related activities of daily living. Patient's mobility limitation cannot be sufficiently resolved with the use of a cane, but can be sufficiently resolved with the use of a rolling walker.The use of a rolling walker will considerably improve their ability to participate in MRADLs. Patient will use the walker on a regular basis at home.                           Time Tracking:     OT Date of Treatment: 12/30/24  OT Start Time: 1021  OT Stop Time: 1039  OT Total Time (min): 18 min    Billable " Minutes:Therapeutic Activity 18    OT/ABDULLAHI: OT          12/30/2024

## 2024-12-30 NOTE — ASSESSMENT & PLAN NOTE
Patient's FSGs are controlled on current medication regimen.  Last A1c reviewed-   Lab Results   Component Value Date    HGBA1C 5.5 10/11/2024     Most recent fingerstick glucose reviewed-   Recent Labs   Lab 12/29/24  1930 12/30/24  0440 12/30/24  0753 12/30/24  1137   POCTGLUCOSE 130* 111* 73 89     Current correctional scale   hold on correction scale give A1C is 5.5  Antihyperglycemics (From admission, onward)      None          Hold Oral hypoglycemics while patient is in the hospital.

## 2024-12-30 NOTE — PLAN OF CARE
"  Problem: Adult Inpatient Plan of Care  Goal: Plan of Care Review  Outcome: Not Progressing  Goal: Patient-Specific Goal (Individualized)  Outcome: Not Progressing  Goal: Absence of Hospital-Acquired Illness or Injury  Outcome: Not Progressing  Goal: Optimal Comfort and Wellbeing  Outcome: Not Progressing  Goal: Readiness for Transition of Care  Outcome: Not Progressing     Problem: Diabetes Comorbidity  Goal: Blood Glucose Level Within Targeted Range  Outcome: Not Progressing     Problem: Skin Injury Risk Increased  Goal: Skin Health and Integrity  Outcome: Not Progressing   Pt AAO X 4; able to express needs.  Patient had 2 falls on the same day.  Remeron discontinued at night (last night).  Uncontrolled hand movement. Patient describes hands as feeling "numb" & currently unable to hold fork or spoon to feed herself.   Right arm flops down with no control.  Camera 36 being monitored.  Bed alarm on .   Safety maintained.  Bed in low position,  call  light in reach.   "

## 2024-12-30 NOTE — PROGRESS NOTES
[facility] - Internal Medicine [facility] Medicine  Progress Note    Patient Name: Bhavna Lim  MRN: [id number]  Patient Class: IP- Inpatient   Admission Date: 12/26/2024  Length of Stay: 1 days  Attending Physician: Yarely Walters MD  Primary Care Provider: Suzi Green MD        Subjective     Principal Problem:Failure to thrive in adult        HPI:  Mrs. Bhavna Lim is a 79 year old F with a history of HTN and HLD who presents to the ED for weakness and fatigue. She has been having weight loss and poor po intake over the last few months. She has been seen by GI and evaluated with EGD and Colonoscopy as well as MRCP which showed cholelithiasis with gallbladder distention and intrahepatic and extrahepatic biliary dilation. No significant abnormalities on EGD and coloscopy. She was referred to surgery who recommended NM study and possible EUS with GI. She has been unable to tolerate any solid foods. She sometimes takes a little bit of soup and has tried protein shakes. She feels full immediately. She does endorse some pain in her epigastric region ever since having her EGD performed. She occasionally has vomiting. Denies regurgitation of food. She continues to have worsening weakness and falls at home. She had two falls yesterday due to weakness. She denies significant dizziness. She is unable to walk due to her weakness and sister is concerned this is related to her poor nutrition.      In the ED VSS. Labs notable for Potassium of 3.0 which was repleted in the ED. No focal signs concerning for stroke. She was admitted to medicine for further management.      Overview/Hospital Course:  No notes on file    Interval History:  NAEO. She is still having numbness and abnormal coordination of her hands. She is drinking boost but does not like the food.       Objective:     Vital Signs (Most Recent):  Temp: 97.6 °F (36.4 °C) (12/29/24 1136)  Pulse: 76 (12/29/24 1136)  Resp: 17 (12/29/24  1136)  BP: 96/60 (12/29/24 1136)  SpO2: (!) 94 % (12/29/24 1136) Vital Signs (24h Range):  Temp:  [97.4 °F (36.3 °C)-98.1 °F (36.7 °C)] 97.6 °F (36.4 °C)  Pulse:  [59-88] 76  Resp:  [17-18] 17  SpO2:  [94 %-98 %] 94 %  BP: ()/(59-72) 96/60     Weight: 54.9 kg (121 lb 0.5 oz)  Body mass index is 22.87 kg/m².    Intake/Output Summary (Last 24 hours) at 12/29/2024 1434  Last data filed at 12/29/2024 0509  Gross per 24 hour   Intake 120 ml   Output 550 ml   Net -430 ml         Physical Exam  Constitutional:       Appearance: Normal appearance. She is not ill-appearing.   HENT:      Head: Normocephalic and atraumatic.      Nose: Nose normal.      Mouth/Throat:      Mouth: Mucous membranes are moist.   Eyes:      Extraocular Movements: Extraocular movements intact.      Pupils: Pupils are equal, round, and reactive to light.   Cardiovascular:      Rate and Rhythm: Normal rate and regular rhythm.      Heart sounds: No murmur heard.     No gallop.   Pulmonary:      Effort: Pulmonary effort is normal.      Breath sounds: Normal breath sounds. No wheezing or rales.   Abdominal:      General: Abdomen is flat. Bowel sounds are normal. There is no distension.      Palpations: Abdomen is soft.      Tenderness: There is no abdominal tenderness.   Musculoskeletal:         General: No swelling or tenderness. Normal range of motion.      Cervical back: Normal range of motion and neck supple.      Right lower leg: No edema.      Left lower leg: No edema.   Skin:     General: Skin is warm and dry.      Capillary Refill: Capillary refill takes less than 2 seconds.   Neurological:      General: No focal deficit present.      Motor: Weakness present.      Comments: Abnormal hand coordination    strength in tact     Psychiatric:         Mood and Affect: Mood normal.             Significant Labs: All pertinent labs within the past 24 hours have been reviewed.    Significant Imaging: I have reviewed all pertinent imaging  results/findings within the past 24 hours.    Assessment and Plan     * Failure to thrive in adult  -history of weight loss and poor appetite  -worked up outpatient by GI team with MRCP, EGD, Colonoscopy and General Surgery follow up   -MRCP with intra and extrahepatic ductal dilation with no filling defect or obstructing mass  -planning for NM gastric emptying studying outpatient and possible EUS with GI   -given progressive weakness and falls, PT/OT consulted. Significant weakness for which she is not safe for dispo home.   -nutrition consult   -will trial appetite stimulation medication   -discussed with GI limited inpatient studies for evaluation, would proceed with outpatient follow up and imaging scheduled   -patient at times confused as well, found trying to smoke in the hospital possible cognitive impairment which could be contributing to this   -will continue to monitor PO intake and optimize nausea control             Unintentional weight loss  Nutrition consulted. Most recent weight and BMI monitored-     Measurements:  Wt Readings from Last 1 Encounters:   12/27/24 54.9 kg (121 lb 0.5 oz)   Body mass index is 22.87 kg/m².    RD consultation   Extensive outpatient evaluation obtained by GI and General surgery   Will add supplementation to meals       1. Continue texture modified diet- encourage intake as needed 2. Continue Boost supplements 3. RD following      Debility  Patient with Acute debility due to age-related physical debility and other reduced mobility. The patient's latest AMPAC (Activity Measure for Post Acute Care) Score is listed below.    AM-PAC Score - How much help does the patient need for each activity listed  Basic Mobility Total Score: 14  Turning over in bed (including adjusting bedclothes, sheets and blankets)?: A little  Sitting down on and standing up from a chair with arms (e.g., wheelchair, bedside commode, etc.): A little  Moving from lying on back to sitting on the side of the  bed?: A little  Moving to and from a bed to a chair (including a wheelchair)?: A lot  Need to walk in hospital room?: A lot  Climbing 3-5 steps with a railing?: Unable    Plan  - Progressive mobility protocol initated  - PT/OT consulted  - Fall precautions in place  -pt would benefit from SNF prior to discharge home       Diabetes mellitus  Patient's FSGs are controlled on current medication regimen.  Last A1c reviewed-   Lab Results   Component Value Date    HGBA1C 5.5 10/11/2024     Most recent fingerstick glucose reviewed-   Recent Labs   Lab 12/29/24  1930 12/30/24  0440 12/30/24  0753 12/30/24  1137   POCTGLUCOSE 130* 111* 73 89     Current correctional scale   hold on correction scale give A1C is 5.5  Antihyperglycemics (From admission, onward)      None          Hold Oral hypoglycemics while patient is in the hospital.    Benign essential hypertension  Patient's blood pressure range in the last 24 hours was: BP  Min: 80/50  Max: 112/72.The patient's inpatient anti-hypertensive regimen is listed below:  Current Antihypertensives  lisinopriL tablet 10 mg, Daily, Oral  NIFEdipine 24 hr tablet 30 mg, Daily, Oral    Plan  - BP is controlled, no changes needed to their regimen  - will continue home regimen, decrease lisinopril dose     Numbness and tingling in both hands  -noted by patient before admission with abnormal finger to nose and weakness   -CT head and spine reviewed. There is significant stenosis/narrowing of C6 and C7 which could be contributing to symptoms. Findings appear chronic and not acute.    -CT head notable for remote lacunar infarcts, pt denies history of stroke currently on ASA   -continue PT/OT       Severe protein-calorie malnutrition  Nutrition consulted. Most recent weight and BMI monitored-     Measurements:  Wt Readings from Last 1 Encounters:   12/27/24 54.9 kg (121 lb 0.5 oz)   Body mass index is 22.87 kg/m².    Patient has been screened and assessed by ROBERT    Malnutrition  Type:  Context: chronic illness  Level: severe    Malnutrition Characteristic Summary:  Weight Loss (Malnutrition): greater than 5% in 1 month  Energy Intake (Malnutrition): less than 75% for greater than or equal to 1 month  Subcutaneous Fat (Malnutrition): severe depletion  Muscle Mass (Malnutrition): severe depletion    Interventions/Recommendations (treatment strategy):  1. Continue texture modified diet- encourage intake as needed 2. Continue Boost supplements 3. RD following      Hypokalemia  Patient's most recent potassium results are listed below.   Recent Labs     12/28/24  0302 12/29/24  0657 12/30/24  0541   K 3.1* 2.8* 3.0*     Plan  - Replete potassium per protocol  - Monitor potassium Daily  - Patient's hypokalemia is stable, continue with repletion       VTE Risk Mitigation (From admission, onward)           Ordered     enoxaparin injection 40 mg  Daily         12/26/24 1700     IP VTE HIGH RISK PATIENT  Once         12/26/24 1547     Place sequential compression device  Until discontinued         12/26/24 1547                    Discharge Planning   RITA: 1/2/2025     Code Status: Full Code   Medical Readiness for Discharge Date:   Discharge Plan A: Home Health              Yarely Walters MD  Department of Hospital Medicine   [facility] - Internal Medicine Telemetry

## 2024-12-31 LAB
ANION GAP SERPL CALC-SCNC: 10 MMOL/L (ref 8–16)
BUN SERPL-MCNC: 15 MG/DL (ref 8–23)
CALCIUM SERPL-MCNC: 8.2 MG/DL (ref 8.7–10.5)
CHLORIDE SERPL-SCNC: 104 MMOL/L (ref 95–110)
CO2 SERPL-SCNC: 25 MMOL/L (ref 23–29)
CREAT SERPL-MCNC: 0.6 MG/DL (ref 0.5–1.4)
EST. GFR  (NO RACE VARIABLE): >60 ML/MIN/1.73 M^2
GLUCOSE SERPL-MCNC: 59 MG/DL (ref 70–110)
MAGNESIUM SERPL-MCNC: 2.1 MG/DL (ref 1.6–2.6)
POCT GLUCOSE: 176 MG/DL (ref 70–110)
POCT GLUCOSE: 57 MG/DL (ref 70–110)
POCT GLUCOSE: 61 MG/DL (ref 70–110)
POCT GLUCOSE: 66 MG/DL (ref 70–110)
POCT GLUCOSE: 77 MG/DL (ref 70–110)
POCT GLUCOSE: 78 MG/DL (ref 70–110)
POCT GLUCOSE: 81 MG/DL (ref 70–110)
POCT GLUCOSE: 97 MG/DL (ref 70–110)
POTASSIUM SERPL-SCNC: 4.9 MMOL/L (ref 3.5–5.1)
SODIUM SERPL-SCNC: 139 MMOL/L (ref 136–145)

## 2024-12-31 PROCEDURE — S4991 NICOTINE PATCH NONLEGEND: HCPCS | Performed by: STUDENT IN AN ORGANIZED HEALTH CARE EDUCATION/TRAINING PROGRAM

## 2024-12-31 PROCEDURE — 25500020 PHARM REV CODE 255: Performed by: STUDENT IN AN ORGANIZED HEALTH CARE EDUCATION/TRAINING PROGRAM

## 2024-12-31 PROCEDURE — 63600175 PHARM REV CODE 636 W HCPCS: Mod: JZ,JG | Performed by: STUDENT IN AN ORGANIZED HEALTH CARE EDUCATION/TRAINING PROGRAM

## 2024-12-31 PROCEDURE — 86593 SYPHILIS TEST NON-TREP QUANT: CPT | Performed by: STUDENT IN AN ORGANIZED HEALTH CARE EDUCATION/TRAINING PROGRAM

## 2024-12-31 PROCEDURE — 83735 ASSAY OF MAGNESIUM: CPT | Performed by: STUDENT IN AN ORGANIZED HEALTH CARE EDUCATION/TRAINING PROGRAM

## 2024-12-31 PROCEDURE — 80048 BASIC METABOLIC PNL TOTAL CA: CPT | Performed by: STUDENT IN AN ORGANIZED HEALTH CARE EDUCATION/TRAINING PROGRAM

## 2024-12-31 PROCEDURE — 36415 COLL VENOUS BLD VENIPUNCTURE: CPT | Performed by: STUDENT IN AN ORGANIZED HEALTH CARE EDUCATION/TRAINING PROGRAM

## 2024-12-31 PROCEDURE — 25000003 PHARM REV CODE 250: Performed by: STUDENT IN AN ORGANIZED HEALTH CARE EDUCATION/TRAINING PROGRAM

## 2024-12-31 PROCEDURE — A9585 GADOBUTROL INJECTION: HCPCS | Performed by: STUDENT IN AN ORGANIZED HEALTH CARE EDUCATION/TRAINING PROGRAM

## 2024-12-31 PROCEDURE — 97530 THERAPEUTIC ACTIVITIES: CPT | Mod: CO

## 2024-12-31 PROCEDURE — 11000001 HC ACUTE MED/SURG PRIVATE ROOM

## 2024-12-31 PROCEDURE — 97112 NEUROMUSCULAR REEDUCATION: CPT

## 2024-12-31 PROCEDURE — 97164 PT RE-EVAL EST PLAN CARE: CPT

## 2024-12-31 RX ORDER — GADOBUTROL 604.72 MG/ML
6 INJECTION INTRAVENOUS
Status: COMPLETED | OUTPATIENT
Start: 2024-12-31 | End: 2024-12-31

## 2024-12-31 RX ORDER — SODIUM CHLORIDE, SODIUM LACTATE, POTASSIUM CHLORIDE, CALCIUM CHLORIDE 600; 310; 30; 20 MG/100ML; MG/100ML; MG/100ML; MG/100ML
INJECTION, SOLUTION INTRAVENOUS CONTINUOUS
Status: ACTIVE | OUTPATIENT
Start: 2024-12-31 | End: 2025-01-01

## 2024-12-31 RX ADMIN — ATORVASTATIN CALCIUM 40 MG: 40 TABLET, FILM COATED ORAL at 10:12

## 2024-12-31 RX ADMIN — ENOXAPARIN SODIUM 40 MG: 40 INJECTION SUBCUTANEOUS at 06:12

## 2024-12-31 RX ADMIN — GLUCAGON 1 MG: 1 INJECTION, POWDER, LYOPHILIZED, FOR SOLUTION INTRAMUSCULAR; INTRAVENOUS at 08:12

## 2024-12-31 RX ADMIN — NICOTINE 1 PATCH: 7 PATCH, EXTENDED RELEASE TRANSDERMAL at 10:12

## 2024-12-31 RX ADMIN — GADOBUTROL 6 ML: 604.72 INJECTION INTRAVENOUS at 02:12

## 2024-12-31 RX ADMIN — ASPIRIN 81 MG: 81 TABLET, COATED ORAL at 10:12

## 2024-12-31 NOTE — PROGRESS NOTES
[facility] - Internal Medicine [facility] Medicine  Progress Note    Patient Name: Bhavna Lim  MRN: [id number]  Patient Class: IP- Inpatient   Admission Date: 12/26/2024  Length of Stay: 2 days  Attending Physician: Yarely Walters MD  Primary Care Provider: Suzi Green MD        Subjective     Principal Problem:Failure to thrive in adult        HPI:  Mrs. Bhavna Lim is a 79 year old F with a history of HTN and HLD who presents to the ED for weakness and fatigue. She has been having weight loss and poor po intake over the last few months. She has been seen by GI and evaluated with EGD and Colonoscopy as well as MRCP which showed cholelithiasis with gallbladder distention and intrahepatic and extrahepatic biliary dilation. No significant abnormalities on EGD and coloscopy. She was referred to surgery who recommended NM study and possible EUS with GI. She has been unable to tolerate any solid foods. She sometimes takes a little bit of soup and has tried protein shakes. She feels full immediately. She does endorse some pain in her epigastric region ever since having her EGD performed. She occasionally has vomiting. Denies regurgitation of food. She continues to have worsening weakness and falls at home. She had two falls yesterday due to weakness. She denies significant dizziness. She is unable to walk due to her weakness and sister is concerned this is related to her poor nutrition.      In the ED VSS. Labs notable for Potassium of 3.0 which was repleted in the ED. No focal signs concerning for stroke. She was admitted to medicine for further management.      Overview/Hospital Course:  No notes on file    Interval History: Pt see and examined. Long discussion with family and son over the phone. She continues to refuse to eat. She is confused today. Still having abnormal hand movements/coordination.       Objective:     Vital Signs (Most Recent):  Temp: 98 °F (36.7 °C) (12/31/24  1107)  Pulse: 71 (12/31/24 1107)  Resp: 18 (12/31/24 1107)  BP: 130/85 (12/31/24 1107)  SpO2: 96 % (12/31/24 1107) Vital Signs (24h Range):  Temp:  [97.5 °F (36.4 °C)-98 °F (36.7 °C)] 98 °F (36.7 °C)  Pulse:  [65-80] 71  Resp:  [17-18] 18  SpO2:  [94 %-100 %] 96 %  BP: (101-131)/(65-85) 130/85     Weight: 54.9 kg (121 lb 0.5 oz)  Body mass index is 22.87 kg/m².    Intake/Output Summary (Last 24 hours) at 12/31/2024 1331  Last data filed at 12/31/2024 0641  Gross per 24 hour   Intake --   Output 350 ml   Net -350 ml         Physical Exam  Constitutional:       Appearance: Normal appearance. She is not ill-appearing.   HENT:      Head: Normocephalic and atraumatic.      Nose: Nose normal.      Mouth/Throat:      Mouth: Mucous membranes are moist.   Eyes:      Extraocular Movements: Extraocular movements intact.      Pupils: Pupils are equal, round, and reactive to light.   Cardiovascular:      Rate and Rhythm: Normal rate and regular rhythm.      Heart sounds: No murmur heard.     No gallop.   Pulmonary:      Effort: Pulmonary effort is normal.      Breath sounds: Normal breath sounds. No wheezing or rales.   Abdominal:      General: Abdomen is flat. Bowel sounds are normal. There is no distension.      Palpations: Abdomen is soft.      Tenderness: There is no abdominal tenderness.   Musculoskeletal:         General: No swelling or tenderness. Normal range of motion.      Cervical back: Normal range of motion and neck supple.      Right lower leg: No edema.      Left lower leg: No edema.   Skin:     General: Skin is warm and dry.      Capillary Refill: Capillary refill takes less than 2 seconds.   Neurological:      General: No focal deficit present.      Motor: Weakness present.      Comments: Abnormal hand coordination    strength in tact     Psychiatric:         Mood and Affect: Mood normal.             Significant Labs: All pertinent labs within the past 24 hours have been reviewed.    Significant Imaging: I  have reviewed all pertinent imaging results/findings within the past 24 hours.    Assessment and Plan     * Failure to thrive in adult  -history of weight loss and poor appetite  -worked up outpatient by GI team with MRCP, EGD, Colonoscopy and General Surgery follow up   -MRCP with intra and extrahepatic ductal dilation with no filling defect or obstructing mass  -planning for NM gastric emptying studying outpatient and possible EUS with GI   -given progressive weakness and falls, PT/OT consulted. Significant weakness for which she is not safe for dispo home.   -nutrition consult   -discussed with GI limited inpatient studies for evaluation, would proceed with outpatient follow up and imaging scheduled   -patient at times confused as well, found trying to smoke in the hospital possible cognitive impairment which could be contributing to this   -will continue to monitor PO intake and optimize nausea control, will assist with meals as able             Unintentional weight loss  Nutrition consulted. Most recent weight and BMI monitored-     Measurements:  Wt Readings from Last 1 Encounters:   12/27/24 54.9 kg (121 lb 0.5 oz)   Body mass index is 22.87 kg/m².    RD consultation   Extensive outpatient evaluation obtained by GI and General surgery   Will add supplementation to meals       1. Continue texture modified diet- encourage intake as needed 2. Continue Boost supplements 3. RD following      Debility  Patient with Acute debility due to age-related physical debility and other reduced mobility. The patient's latest AMPAC (Activity Measure for Post Acute Care) Score is listed below.    AM-PAC Score - How much help does the patient need for each activity listed  Basic Mobility Total Score: 14  Turning over in bed (including adjusting bedclothes, sheets and blankets)?: A little  Sitting down on and standing up from a chair with arms (e.g., wheelchair, bedside commode, etc.): A little  Moving from lying on back to  sitting on the side of the bed?: A little  Moving to and from a bed to a chair (including a wheelchair)?: A lot  Need to walk in hospital room?: A lot  Climbing 3-5 steps with a railing?: Unable    Plan  - Progressive mobility protocol initated  - PT/OT consulted  - Fall precautions in place  -pt would benefit from SNF prior to discharge home       Diabetes mellitus  Patient's FSGs are controlled on current medication regimen.  Last A1c reviewed-   Lab Results   Component Value Date    HGBA1C 5.5 10/11/2024     Most recent fingerstick glucose reviewed-   Recent Labs   Lab 12/29/24  1930 12/30/24  0440 12/30/24  0753 12/30/24  1137   POCTGLUCOSE 130* 111* 73 89     Current correctional scale   hold on correction scale give A1C is 5.5  Antihyperglycemics (From admission, onward)      None          Hold Oral hypoglycemics while patient is in the hospital.    Benign essential hypertension  Patient's blood pressure range in the last 24 hours was: BP  Min: 101/66  Max: 131/79.The patient's inpatient anti-hypertensive regimen is listed below:  Current Antihypertensives  lisinopriL tablet 10 mg, Daily, Oral  NIFEdipine 24 hr tablet 30 mg, Daily, Oral    Plan  - BP is controlled, no changes needed to their regimen  - will continue home regimen, decrease lisinopril dose     Numbness and tingling in both hands  -noted by patient before admission with abnormal finger to nose and weakness   -CT head and spine reviewed. There is significant stenosis/narrowing of C6 and C7 which could be contributing to symptoms. Findings appear chronic and not acute. MRI ordered   -CT head notable for remote lacunar infarcts, hx of stroke in 2005 currently on ASA   -continue PT/OT         Severe protein-calorie malnutrition  Nutrition consulted. Most recent weight and BMI monitored-     Measurements:  Wt Readings from Last 1 Encounters:   12/27/24 54.9 kg (121 lb 0.5 oz)   Body mass index is 22.87 kg/m².    Patient has been screened and assessed  by RD.    Malnutrition Type:  Context: chronic illness  Level: severe    Malnutrition Characteristic Summary:  Weight Loss (Malnutrition): greater than 5% in 1 month  Energy Intake (Malnutrition): less than 75% for greater than or equal to 1 month  Subcutaneous Fat (Malnutrition): severe depletion  Muscle Mass (Malnutrition): severe depletion    Interventions/Recommendations (treatment strategy):  1. Continue texture modified diet- encourage intake as needed 2. Continue Boost supplements 3. RD following      Hypokalemia  Patient's most recent potassium results are listed below.   Recent Labs     12/29/24  0657 12/30/24  0541 12/31/24  0540   K 2.8* 3.0* 4.9     Plan  - Replete potassium per protocol  - Monitor potassium Daily  - Patient's hypokalemia is improving      VTE Risk Mitigation (From admission, onward)           Ordered     enoxaparin injection 40 mg  Daily         12/26/24 1700     IP VTE HIGH RISK PATIENT  Once         12/26/24 1547     Place sequential compression device  Until discontinued         12/26/24 1547                    Discharge Planning   RITA: 1/3/2025     Code Status: Full Code   Medical Readiness for Discharge Date:   Discharge Plan A: Home Health          Yarely Walters MD  Department of Hospital Medicine   [facility] Medicine Telemetry

## 2024-12-31 NOTE — ASSESSMENT & PLAN NOTE
Patient's most recent potassium results are listed below.   Recent Labs     12/29/24  0657 12/30/24  0541 12/31/24  0540   K 2.8* 3.0* 4.9     Plan  - Replete potassium per protocol  - Monitor potassium Daily  - Patient's hypokalemia is improving

## 2024-12-31 NOTE — ASSESSMENT & PLAN NOTE
-history of weight loss and poor appetite  -worked up outpatient by GI team with MRCP, EGD, Colonoscopy and General Surgery follow up   -MRCP with intra and extrahepatic ductal dilation with no filling defect or obstructing mass  -planning for NM gastric emptying studying outpatient and possible EUS with GI   -given progressive weakness and falls, PT/OT consulted. Significant weakness for which she is not safe for dispo home.   -nutrition consult   -discussed with GI limited inpatient studies for evaluation, would proceed with outpatient follow up and imaging scheduled   -patient at times confused as well, found trying to smoke in the hospital possible cognitive impairment which could be contributing to this   -will continue to monitor PO intake and optimize nausea control, will assist with meals as able

## 2024-12-31 NOTE — ASSESSMENT & PLAN NOTE
-noted by patient before admission with abnormal finger to nose and weakness   -CT head and spine reviewed. There is significant stenosis/narrowing of C6 and C7 which could be contributing to symptoms. Findings appear chronic and not acute. MRI ordered   -CT head notable for remote lacunar infarcts, hx of stroke in 2005 currently on ASA   -continue PT/OT

## 2024-12-31 NOTE — PLAN OF CARE
Problem: Physical Therapy  Goal: Physical Therapy Goal  Description: Goals to be met by:      Patient will increase functional independence with mobility by performin. Supine to sit with Modified Bibb  2. Sit to supine with Modified Bibb  3. Sit to stand transfer with Stand-by Assistance  4. Gait  x 100 feet with Contact Guard Assistance using LRAD.   5. Ascend/descend 6 stair with bilateral Handrails Minimal Assistance using LRAD.     DME Justifcation  Patient demonstrates a mobility limitation that significantly impairs their ability to participate in one or more mobility related activities of daily living. Patient's mobility limitation cannot be sufficiently resolved with the use of a cane, but can be sufficiently resolved with the use of a rolling walker.The use of a rolling walker will considerably improve their ability to participate in MRADLs. Patient will use the walker on a regular basis at home.     Patient has a mobility limitation that significantly impairs their ability to participate in one or more mobility related activities of daily living in customary locations in the home. The mobility limitation cannot be sufficiently resolved by the use of a cane or walker. The use of a manual wheelchair will greatly improve the patient's ability to participate in MRADLs. The patient will use the wheelchair on a regular basis at home. They have expressed their willingness to use a manual wheelchair in the home, and have a caregiver who is available and willing to assist with the wheelchair if needed.    Goals remain appropriate at [facility]

## 2024-12-31 NOTE — ASSESSMENT & PLAN NOTE
Patient's blood pressure range in the last 24 hours was: BP  Min: 101/66  Max: 131/79.The patient's inpatient anti-hypertensive regimen is listed below:  Current Antihypertensives  lisinopriL tablet 10 mg, Daily, Oral  NIFEdipine 24 hr tablet 30 mg, Daily, Oral    Plan  - BP is controlled, no changes needed to their regimen  - will continue home regimen, decrease lisinopril dose

## 2024-12-31 NOTE — PLAN OF CARE
Problem: Adult Inpatient Plan of Care  Goal: Plan of Care Review  Outcome: Progressing  Goal: Patient-Specific Goal (Individualized)  Outcome: Progressing  Goal: Absence of Hospital-Acquired Illness or Injury  Outcome: Progressing  Goal: Optimal Comfort and Wellbeing  Outcome: Progressing  Goal: Readiness for Transition of Care  Outcome: Progressing   AAOX4,VSS,NAD noted. No adverse events noted during this shift. Plan of care and safety education provided to pt, pt verbalizes understanding and voice no concerns. Pt denies any further needs or discomfort. Safety maintained, bed alarm set. Fall risk camera in place, call light within reach.

## 2024-12-31 NOTE — SUBJECTIVE & OBJECTIVE
Interval History: Pt see and examined. Long discussion with family and son over the phone. She continues to refuse to eat. She is confused today. Still having abnormal hand movements/coordination.       Objective:     Vital Signs (Most Recent):  Temp: 98 °F (36.7 °C) (12/31/24 1107)  Pulse: 71 (12/31/24 1107)  Resp: 18 (12/31/24 1107)  BP: 130/85 (12/31/24 1107)  SpO2: 96 % (12/31/24 1107) Vital Signs (24h Range):  Temp:  [97.5 °F (36.4 °C)-98 °F (36.7 °C)] 98 °F (36.7 °C)  Pulse:  [65-80] 71  Resp:  [17-18] 18  SpO2:  [94 %-100 %] 96 %  BP: (101-131)/(65-85) 130/85     Weight: 54.9 kg (121 lb 0.5 oz)  Body mass index is 22.87 kg/m².    Intake/Output Summary (Last 24 hours) at 12/31/2024 1331  Last data filed at 12/31/2024 0641  Gross per 24 hour   Intake --   Output 350 ml   Net -350 ml         Physical Exam  Constitutional:       Appearance: Normal appearance. She is not ill-appearing.   HENT:      Head: Normocephalic and atraumatic.      Nose: Nose normal.      Mouth/Throat:      Mouth: Mucous membranes are moist.   Eyes:      Extraocular Movements: Extraocular movements intact.      Pupils: Pupils are equal, round, and reactive to light.   Cardiovascular:      Rate and Rhythm: Normal rate and regular rhythm.      Heart sounds: No murmur heard.     No gallop.   Pulmonary:      Effort: Pulmonary effort is normal.      Breath sounds: Normal breath sounds. No wheezing or rales.   Abdominal:      General: Abdomen is flat. Bowel sounds are normal. There is no distension.      Palpations: Abdomen is soft.      Tenderness: There is no abdominal tenderness.   Musculoskeletal:         General: No swelling or tenderness. Normal range of motion.      Cervical back: Normal range of motion and neck supple.      Right lower leg: No edema.      Left lower leg: No edema.   Skin:     General: Skin is warm and dry.      Capillary Refill: Capillary refill takes less than 2 seconds.   Neurological:      General: No focal deficit  present.      Motor: Weakness present.      Comments: Abnormal hand coordination    strength in tact     Psychiatric:         Mood and Affect: Mood normal.             Significant Labs: All pertinent labs within the past 24 hours have been reviewed.    Significant Imaging: I have reviewed all pertinent imaging results/findings within the past 24 hours.

## 2025-01-01 PROBLEM — E16.2 HYPOGLYCEMIA: Status: ACTIVE | Noted: 2025-01-01

## 2025-01-01 PROBLEM — R41.89 COGNITIVE IMPAIRMENT: Status: ACTIVE | Noted: 2025-01-01

## 2025-01-01 LAB
ALBUMIN SERPL BCP-MCNC: 2 G/DL (ref 3.5–5.2)
ALP SERPL-CCNC: 240 U/L (ref 40–150)
ALT SERPL W/O P-5'-P-CCNC: 78 U/L (ref 10–44)
ANION GAP SERPL CALC-SCNC: 13 MMOL/L (ref 8–16)
AST SERPL-CCNC: 46 U/L (ref 10–40)
BILIRUB SERPL-MCNC: 1.4 MG/DL (ref 0.1–1)
BUN SERPL-MCNC: 15 MG/DL (ref 8–23)
CALCIUM SERPL-MCNC: 8.2 MG/DL (ref 8.7–10.5)
CHLORIDE SERPL-SCNC: 104 MMOL/L (ref 95–110)
CO2 SERPL-SCNC: 23 MMOL/L (ref 23–29)
CREAT SERPL-MCNC: 0.6 MG/DL (ref 0.5–1.4)
ERYTHROCYTE [DISTWIDTH] IN BLOOD BY AUTOMATED COUNT: 17.2 % (ref 11.5–14.5)
EST. GFR  (NO RACE VARIABLE): >60 ML/MIN/1.73 M^2
FOLATE SERPL-MCNC: <2.2 NG/ML (ref 4–24)
GLUCOSE SERPL-MCNC: 39 MG/DL (ref 70–110)
HCT VFR BLD AUTO: 34.5 % (ref 37–48.5)
HGB BLD-MCNC: 11.8 G/DL (ref 12–16)
HIV 1+2 AB+HIV1 P24 AG SERPL QL IA: NORMAL
MAGNESIUM SERPL-MCNC: 2 MG/DL (ref 1.6–2.6)
MCH RBC QN AUTO: 28.2 PG (ref 27–31)
MCHC RBC AUTO-ENTMCNC: 34.2 G/DL (ref 32–36)
MCV RBC AUTO: 82 FL (ref 82–98)
PHOSPHATE SERPL-MCNC: 2.4 MG/DL (ref 2.7–4.5)
PLATELET # BLD AUTO: 104 K/UL (ref 150–450)
PMV BLD AUTO: ABNORMAL FL (ref 9.2–12.9)
POCT GLUCOSE: 104 MG/DL (ref 70–110)
POCT GLUCOSE: 132 MG/DL (ref 70–110)
POCT GLUCOSE: 221 MG/DL (ref 70–110)
POCT GLUCOSE: 238 MG/DL (ref 70–110)
POCT GLUCOSE: 47 MG/DL (ref 70–110)
POTASSIUM SERPL-SCNC: 3 MMOL/L (ref 3.5–5.1)
PROT SERPL-MCNC: 5.5 G/DL (ref 6–8.4)
RBC # BLD AUTO: 4.19 M/UL (ref 4–5.4)
SODIUM SERPL-SCNC: 140 MMOL/L (ref 136–145)
TREPONEMA PALLIDUM IGG+IGM AB [PRESENCE] IN SERUM OR PLASMA BY IMMUNOASSAY: NONREACTIVE
TSH SERPL DL<=0.005 MIU/L-ACNC: 1.03 UIU/ML (ref 0.4–4)
VIT B12 SERPL-MCNC: 1106 PG/ML (ref 210–950)
WBC # BLD AUTO: 8.03 K/UL (ref 3.9–12.7)

## 2025-01-01 PROCEDURE — 82607 VITAMIN B-12: CPT | Performed by: STUDENT IN AN ORGANIZED HEALTH CARE EDUCATION/TRAINING PROGRAM

## 2025-01-01 PROCEDURE — S4991 NICOTINE PATCH NONLEGEND: HCPCS | Performed by: STUDENT IN AN ORGANIZED HEALTH CARE EDUCATION/TRAINING PROGRAM

## 2025-01-01 PROCEDURE — 85027 COMPLETE CBC AUTOMATED: CPT | Performed by: STUDENT IN AN ORGANIZED HEALTH CARE EDUCATION/TRAINING PROGRAM

## 2025-01-01 PROCEDURE — 87389 HIV-1 AG W/HIV-1&-2 AB AG IA: CPT | Performed by: STUDENT IN AN ORGANIZED HEALTH CARE EDUCATION/TRAINING PROGRAM

## 2025-01-01 PROCEDURE — 36415 COLL VENOUS BLD VENIPUNCTURE: CPT | Performed by: STUDENT IN AN ORGANIZED HEALTH CARE EDUCATION/TRAINING PROGRAM

## 2025-01-01 PROCEDURE — 83735 ASSAY OF MAGNESIUM: CPT | Performed by: STUDENT IN AN ORGANIZED HEALTH CARE EDUCATION/TRAINING PROGRAM

## 2025-01-01 PROCEDURE — 84443 ASSAY THYROID STIM HORMONE: CPT | Performed by: STUDENT IN AN ORGANIZED HEALTH CARE EDUCATION/TRAINING PROGRAM

## 2025-01-01 PROCEDURE — 84100 ASSAY OF PHOSPHORUS: CPT | Performed by: STUDENT IN AN ORGANIZED HEALTH CARE EDUCATION/TRAINING PROGRAM

## 2025-01-01 PROCEDURE — 80053 COMPREHEN METABOLIC PANEL: CPT | Performed by: STUDENT IN AN ORGANIZED HEALTH CARE EDUCATION/TRAINING PROGRAM

## 2025-01-01 PROCEDURE — 63600175 PHARM REV CODE 636 W HCPCS: Performed by: STUDENT IN AN ORGANIZED HEALTH CARE EDUCATION/TRAINING PROGRAM

## 2025-01-01 PROCEDURE — 82746 ASSAY OF FOLIC ACID SERUM: CPT | Performed by: STUDENT IN AN ORGANIZED HEALTH CARE EDUCATION/TRAINING PROGRAM

## 2025-01-01 PROCEDURE — 11000001 HC ACUTE MED/SURG PRIVATE ROOM

## 2025-01-01 PROCEDURE — 94761 N-INVAS EAR/PLS OXIMETRY MLT: CPT

## 2025-01-01 PROCEDURE — 25000003 PHARM REV CODE 250: Performed by: STUDENT IN AN ORGANIZED HEALTH CARE EDUCATION/TRAINING PROGRAM

## 2025-01-01 PROCEDURE — 84425 ASSAY OF VITAMIN B-1: CPT | Performed by: STUDENT IN AN ORGANIZED HEALTH CARE EDUCATION/TRAINING PROGRAM

## 2025-01-01 RX ORDER — THIAMINE HCL 100 MG
100 TABLET ORAL DAILY
Status: DISCONTINUED | OUTPATIENT
Start: 2025-01-02 | End: 2025-01-31

## 2025-01-01 RX ORDER — DEXTROSE MONOHYDRATE 50 MG/ML
INJECTION, SOLUTION INTRAVENOUS CONTINUOUS
Status: DISCONTINUED | OUTPATIENT
Start: 2025-01-01 | End: 2025-01-01

## 2025-01-01 RX ORDER — DEXTROSE MONOHYDRATE 100 MG/ML
INJECTION, SOLUTION INTRAVENOUS CONTINUOUS
Status: DISCONTINUED | OUTPATIENT
Start: 2025-01-01 | End: 2025-01-01

## 2025-01-01 RX ORDER — FOLIC ACID 5 MG/ML
1 INJECTION, SOLUTION INTRAMUSCULAR; INTRAVENOUS; SUBCUTANEOUS DAILY
Status: DISCONTINUED | OUTPATIENT
Start: 2025-01-02 | End: 2025-01-01

## 2025-01-01 RX ORDER — FOLIC ACID 1 MG/1
1 TABLET ORAL DAILY
Status: DISCONTINUED | OUTPATIENT
Start: 2025-01-01 | End: 2025-01-01

## 2025-01-01 RX ORDER — DEXTROSE MONOHYDRATE AND SODIUM CHLORIDE 5; .9 G/100ML; G/100ML
INJECTION, SOLUTION INTRAVENOUS CONTINUOUS
Status: DISCONTINUED | OUTPATIENT
Start: 2025-01-01 | End: 2025-01-06

## 2025-01-01 RX ORDER — SODIUM CHLORIDE, SODIUM LACTATE, POTASSIUM CHLORIDE, CALCIUM CHLORIDE 600; 310; 30; 20 MG/100ML; MG/100ML; MG/100ML; MG/100ML
INJECTION, SOLUTION INTRAVENOUS CONTINUOUS
Status: DISCONTINUED | OUTPATIENT
Start: 2025-01-01 | End: 2025-01-01

## 2025-01-01 RX ORDER — FOLIC ACID 1 MG/1
1 TABLET ORAL DAILY
Status: DISCONTINUED | OUTPATIENT
Start: 2025-01-02 | End: 2025-01-31

## 2025-01-01 RX ADMIN — FOLIC ACID 1 MG: 5 INJECTION, SOLUTION INTRAMUSCULAR; INTRAVENOUS; SUBCUTANEOUS at 09:01

## 2025-01-01 RX ADMIN — FOLIC ACID 1 MG: 1 TABLET ORAL at 10:01

## 2025-01-01 RX ADMIN — ATORVASTATIN CALCIUM 40 MG: 40 TABLET, FILM COATED ORAL at 10:01

## 2025-01-01 RX ADMIN — SODIUM CHLORIDE, POTASSIUM CHLORIDE, SODIUM LACTATE AND CALCIUM CHLORIDE: 600; 310; 30; 20 INJECTION, SOLUTION INTRAVENOUS at 02:01

## 2025-01-01 RX ADMIN — NICOTINE 1 PATCH: 7 PATCH, EXTENDED RELEASE TRANSDERMAL at 10:01

## 2025-01-01 RX ADMIN — ASPIRIN 81 MG: 81 TABLET, COATED ORAL at 10:01

## 2025-01-01 RX ADMIN — LISINOPRIL 10 MG: 10 TABLET ORAL at 10:01

## 2025-01-01 RX ADMIN — DEXTROSE MONOHYDRATE 25 G: 25 INJECTION, SOLUTION INTRAVENOUS at 07:01

## 2025-01-01 RX ADMIN — NIFEDIPINE 30 MG: 30 TABLET, FILM COATED, EXTENDED RELEASE ORAL at 10:01

## 2025-01-01 RX ADMIN — DEXTROSE AND SODIUM CHLORIDE: 5; 900 INJECTION, SOLUTION INTRAVENOUS at 05:01

## 2025-01-01 RX ADMIN — Medication 1 TABLET: at 10:01

## 2025-01-01 RX ADMIN — ENOXAPARIN SODIUM 40 MG: 40 INJECTION SUBCUTANEOUS at 05:01

## 2025-01-01 RX ADMIN — DEXTROSE MONOHYDRATE: 100 INJECTION, SOLUTION INTRAVENOUS at 09:01

## 2025-01-01 RX ADMIN — Medication 1 TABLET: at 09:01

## 2025-01-01 NOTE — PLAN OF CARE
Recommendations  1. Continue texture modified diet- encourage intake as needed  2. Continue Boost supplements   3. RD following     Goals: Meet % of EEN/EPN by RD f/u date  Nutrition Goal Status: progressing  Communication of RD Recs: POC

## 2025-01-01 NOTE — ASSESSMENT & PLAN NOTE
-history of weight loss and poor appetite  -worked up outpatient by GI team with MRCP, EGD, Colonoscopy and General Surgery follow up   -MRCP with intra and extrahepatic ductal dilation with no filling defect or obstructing mass  -planning for NM gastric emptying studying outpatient and possible EUS with GI   -given progressive weakness and falls, PT/OT consulted. Significant weakness for which she is not safe for dispo home.   -nutrition consult   -discussed with GI limited inpatient studies for evaluation, would proceed with outpatient follow up and imaging scheduled   -patient at times confused as well, found trying to smoke in the hospital possible cognitive impairment which could be contributing to this   -will continue to monitor PO intake and optimize nausea control, will assist with meals as able   -repeat abdominal US given elevated liver enzymes this Ams  -labs are consistent with chronically poor nutrition, patient refusing to eat at times.

## 2025-01-01 NOTE — PT/OT/SLP PROGRESS
Occupational Therapy   Treatment    Name: Bhavna Lim  MRN: [id number]  Admitting Diagnosis:  Failure to thrive in adult       Recommendations:     Discharge Recommendations: Moderate Intensity Therapy  Discharge Equipment Recommendations:  walker, rolling, wheelchair, bedside commode, bath bench  Barriers to discharge:  Decreased caregiver support, Other (Comment) (patient requires significant assistance)    Assessment:     Bhavna Lim is a 79 y.o. female with a medical diagnosis of Failure to thrive in adult.  She presents with the fallowing performance deficits affecting function are weakness, impaired endurance, impaired self care skills, impaired functional mobility, gait instability, impaired balance, pain, decreased safety awareness, decreased upper extremity function, decreased lower extremity function, decreased coordination. Patient agreeable to tx session, today session focus on sitting balance and attempted to perform a sit to stand transfer from EOB, however; patient noticed with significant posterior/lateral lean, poor coordination,poor perception, poor trunk control, patient exhibits UE ataxia. Patient requires significant assistance with functional transfers, bed mobility, and self-care task, however; patient continues to have limited activity tolerance due to pain and weakness from recent hospitalization.  Patient would benefit from Moderate intensity therapy intervention to address over all functional decline with mobility task, endurance, and ADLs in order to return to PLOF.     Rehab Prognosis:  Good; patient would benefit from acute skilled OT services to address these deficits and reach maximum level of function.       Plan:     Patient to be seen 4 x/week to address the above listed problems via self-care/home management, therapeutic activities, therapeutic exercises, neuromuscular re-education  Plan of Care Expires: 01/26/25  Plan of Care Reviewed with: patient,  "sibling    Subjective     Chief Complaint: " I am tired"   Patient/Family Comments/goals: to return PLOF   Pain/Comfort:  Pain Rating 1: 0/10    Objective:     Communicated with: Nurse prior to session.  Patient found HOB elevated with telemetry, bed alarm, PureWick upon OT entry to room.  A client care conference was completed by the OTR and the COATES prior to treatment by the COATES to discuss the patient's POC and current status.   Co-treated with PT due to patient complexity deficits requiring two skilled therapist to appropriately and safely mobilized patient while facilitating functional task in addition to accommodating for patient's activity tolerance.    General Precautions: Standard, fall    Orthopedic Precautions:N/A  Braces: N/A  Respiratory Status: Room air     Occupational Performance:     Bed Mobility:    Patient completed Rolling/Turning to Right with maximal assistance  Patient completed Scooting to EOB with maximal assistance   Patient required total assistance for scooting to HOB via draw-sheet   Patient completed Supine to Sit with maximal assistance  Patient completed Sit to Supine with total assistance   Patient requires Max A for sitting balance due to posterior and lateral lean     Functional Mobility/Transfers:  Patient completed Sit <> Stand Transfer from EOB  with total assistance  with  no assistive device and hand-held assist   Patient unable to perform a full upright stand due to with significant B knee buckling and patient sliding off EOB     Activities of Daily Living:  Politely decline task       [facility] 6 Click ADL: 10    Treatment & Education:  Discussed OT POC and progress  Educated patient on the importance to continue to perform exercises in order to reduce stiffness and promote joint mobility and blood flow  Addressed patient's questions and concerns within COATES scope of practice      Patient left HOB elevated with all lines intact, call button in reach, bed alarm on, nurse " notified, and sister present    GOALS:   Multidisciplinary Problems       Occupational Therapy Goals          Problem: Occupational Therapy    Goal Priority Disciplines Outcome Interventions   Occupational Therapy Goal     OT, PT/OT Progressing    Description: Goals to be met by: 1/26/25     Patient will increase functional independence with ADLs by performing:    UE Dressing with Modified [place].  LE Dressing with Modified [place].  Grooming while standing at sink with Modified [place].  Toileting from toilet/bedside commode with Modified [place] for hygiene and clothing management.   Supine to sit with Modified [place].  Toilet transfer to toilet/bedside commode with Stand-by Assistance.    Patient demonstrates a mobility limitation that significantly impairs their ability to participate in one or more mobility related activities of daily living. Patient's mobility limitation cannot be sufficiently resolved with the use of a cane, but can be sufficiently resolved with the use of a rolling walker.The use of a rolling walker will considerably improve their ability to participate in MRADLs. Patient will use the walker on a regular basis at home.                           Time Tracking:     OT Date of Treatment: 12/31/24  OT Start Time: 1553  OT Stop Time: 1611  OT Total Time (min): 18 min    Billable Minutes:Therapeutic Activity 18    OT/ABDULLAHI: ABDULLAHI     Number of ABDULLAHI visits since last OT visit: 1    12/31/2024

## 2025-01-01 NOTE — PROGRESS NOTES
[facility] - Internal Medicine Telemetry  Nurses Note - Hypoglycemia Documentation    Patient Name: Bhavna Lim  MRN: [id number]  Attending Provider:  Yarely Walters MD    Bhavna Lim is a 79 y.o. female who had a hypoglycemic event for which Sarai MANSFIELD and Yarely Walters MD was notified.    Trended Lab Data:  Recent Labs   Lab 12/30/24  0541 12/31/24  0540 01/01/25  0352   GLU 57* 59* 39*       Patient's signs and symptoms included: none.    Intervention(s) performed:      []  Glucose tabs (16 Gm)  []  Glucose tabs (24 Gm)  []  Dextrose 50% IVP (12.5 Gm)  [x]  Dextrose 50% IVP (25 Gm)  []  Glucagon IM (1 mg)  []  gave juice  []  gave snack  []  other:         Repeat Blood Glucose reading was:  47  mg/dl at 07:20.    Teaching provided to patient/family.    Providers response/recommendations/orders:  Pending  .    01/01/2025   7:25 AM

## 2025-01-01 NOTE — PT/OT/SLP RE-EVAL
Physical Therapy Reevaluation and Co-Treatment  Co-treatment with OT for maximal pt participation, safety, and activity tolerance    Patient Name:  Bhavna Lim   MRN:  [id number]  Admit Date: 12/26/2024  Admitting Diagnosis:  Failure to thrive in adult   Length of Stay: 2 days  Recent Surgery: * No surgery found *      Hospital Course:  12/26: Admit date  12/27: PT initial evaluation    Please refer to PT initial evaluation for PLOF and social history.  Recommendations:     Discharge Recommendations:  Moderate Intensity Therapy  Discharge Equipment Recommendations: walker, rolling, wheelchair, bedside commode, bath bench   Justification for Equipment: The mobility limitation cannot be sufficiently resolved by the use of a cane. Patient's functional mobility deficit can be sufficiently resolved with the use of a Rolling Walker. Patient's mobility limitation significantly impairs their ability to participate in one of more activities of daily living. The use of a Rolling Walker will significantly improve the patient's ability to participate in MRADLS and the patient will use it on regular basis in the home.     Patient has a mobility limitation that significantly impairs their ability to participate in one or more mobility related activities of daily living in customary locations in the home. The mobility limitation cannot be sufficiently resolved by the use of a cane or walker. The use of a manual wheelchair will greatly improve the patient's ability to participate in MRADLs. The patient will use the wheelchair on a regular basis at home. They have expressed their willingness to use a manual wheelchair in the home, and have a caregiver who is available and willing to assist with the wheelchair if needed.  Barriers to discharge: Inaccessible home environment, Decreased Caregiver support, and Evolving Clinical Presentation    Assessment:     Bhavna Lim is a 79 y.o. female admitted with a medical diagnosis  of Failure to thrive in adult.     Pt agreeable to therapy despite reporting she is very tired. She wants to go home despite significant deficits remaining. Pt requiring significant assistance for all mobility and balance, very poor coordination noted at EOB. Stand attempted but pt with very poor LE and trunk control and very high fall risk, unable to stand.     Problem List: weakness, impaired endurance, impaired self care skills, impaired functional mobility, gait instability, impaired balance, decreased safety awareness, decreased lower extremity function, decreased upper extremity function, decreased coordination, impaired cognition, impaired fine motor  Rehab Prognosis: Fair     GOALS:   Multidisciplinary Problems       Physical Therapy Goals          Problem: Physical Therapy    Goal Priority Disciplines Outcome Interventions   Physical Therapy Goal     PT, PT/OT Progressing    Description: Goals to be met by: 1/10     Patient will increase functional independence with mobility by performin. Supine to sit with Modified [place]  2. Sit to supine with Modified [place]  3. Sit to stand transfer with Stand-by Assistance  4. Gait  x 100 feet with Contact Guard Assistance using LRAD.   5. Ascend/descend 6 stair with bilateral Handrails Minimal Assistance using LRAD.     DME Justifcation  Patient demonstrates a mobility limitation that significantly impairs their ability to participate in one or more mobility related activities of daily living. Patient's mobility limitation cannot be sufficiently resolved with the use of a cane, but can be sufficiently resolved with the use of a rolling walker.The use of a rolling walker will considerably improve their ability to participate in MRADLs. Patient will use the walker on a regular basis at home.     Patient has a mobility limitation that significantly impairs their ability to participate in one or more mobility related activities of daily living in  "customary locations in the home. The mobility limitation cannot be sufficiently resolved by the use of a cane or walker. The use of a manual wheelchair will greatly improve the patient's ability to participate in MRADLs. The patient will use the wheelchair on a regular basis at home. They have expressed their willingness to use a manual wheelchair in the home, and have a caregiver who is available and willing to assist with the wheelchair if needed.                         Treatment Tolerated: Fair    Highest level of mobility achieved this visit: max A EOB x 10 min    Activity with RN/PCT: bed mobility only with one person assist  Plan:     During this hospitalization, patient to be seen 4 x/week to address the above listed problems via gait training, therapeutic activities, therapeutic exercises, neuromuscular re-education  Plan of Care Expires:  01/31/25  Plan of Care Reviewed with: patient, sibling    Subjective     RN Missy notified prior to session. Pt's sister present upon PT entrance into room.    Chief Complaint: fatigue  Patient/Family Comments/goals: "I just want to go home and sleep in my bed."  Pain/Comfort:  Pain Rating 1: 0/10    Objective:     Patient found HOB elevated with: telemetry     General Precautions: Standard, fall   Orthopedic Precautions:N/A   Braces: N/A   Respiratory Status: Room Air    Vitals: /69   Pulse 69   Temp 98.2 °F (36.8 °C) (Oral)   Resp 18   Ht 5' 1" (1.549 m)   Wt 54.9 kg (121 lb 0.5 oz)   SpO2 96%   Breastfeeding No   BMI 22.87 kg/m²      Exam:  Cognition:   Alert  Command following: Follows one-step verbal commands  Fluency: clear/fluent  Hearing: Intact  Vision:  Intact  Skin Integrity: Visible skin intact  Physical Exam:   Edema: None noted  ROM - ASHLY LEs WFL  Strength - ASHLY hips 3/5, ASHLY knees and ankles 3+/5  Sensation - Intact to light touch  Coordination - Severely Impaired finger to nose ASHLY    Outcome Measures:  AM-PAC 6 CLICK MOBILITY  Turning over " in bed (including adjusting bedclothes, sheets and blankets)?: 2  Sitting down on and standing up from a chair with arms (e.g., wheelchair, bedside commode, etc.): 1  Moving from lying on back to sitting on the side of the bed?: 2  Moving to and from a bed to a chair (including a wheelchair)?: 1  Need to walk in hospital room?: 1  Climbing 3-5 steps with a railing?: 1  Basic Mobility Total Score: 8     Functional Mobility:  Additional staff present: AMINATA Pamcourtney Rodriguez Mobility:   Scooting to HOB via Drawsheet transfer: total assistance of 2 persons  Supine to Sit: maximal assistance of 2 persons; from R side of bed  Scooting anteriorly to EOB to have both feet planted on floor: maximal assistance  Sit to Supine: total assistance of 2 persons; to L side of bed    Sitting Balance at Edge of Bed:  Assistance Level Required: Maximum Assistance  Time: 10 min  Postural deviations noted: slouched posture, poor midline awareness, and trunk deviated right  Encouraged: upright sitting  Comments: difficulty coordinating arms to assist in sitting balance    Transfers:   Sit > Stand Transfer: total assistance with no assistive device   Stand > Sit Transfer: total assistance with no assistive device   x1 trials from EOB  Unable to achieve stand, hips slide to edge of bed and ASHLY knees buckle    Therapeutic Exercises:   Static and dynamic sitting balance at EOB  Postural correction, Midline alignment, upright posture, and sitting tolerance    Education:  Time provided for education, counseling and discussion of health disposition in regards to patient's current status  All questions answered within PT scope of practice and to patient's satisfaction  PT role in POC to address current functional deficits  Pt educated on proper body mechanics, safety techniques, and energy conservation with PT facilitation and cueing throughout session    Patient left HOB elevated with all lines intact, call button in reach, SHYLA Louis notified, and  sister present.    Time Tracking:     PT Received On: 12/31/24  PT Start Time: 1553     PT Stop Time: 1611  PT Total Time (min): 18 min     Billable Minutes: Re-eval 1 procedure and Neuromuscular Re-education 9 min    Thiago Tello, PT, DPT  12/31/2024

## 2025-01-01 NOTE — PLAN OF CARE
Problem: Adult Inpatient Plan of Care  Goal: Plan of Care Review  Outcome: Progressing  Goal: Patient-Specific Goal (Individualized)  Outcome: Progressing  Goal: Absence of Hospital-Acquired Illness or Injury  Outcome: Progressing  Goal: Optimal Comfort and Wellbeing  Outcome: Progressing  Goal: Readiness for Transition of Care  Outcome: Progressing     Problem: Diabetes Comorbidity  Goal: Blood Glucose Level Within Targeted Range  Outcome: Progressing     Problem: Skin Injury Risk Increased  Goal: Skin Health and Integrity  Outcome: Progressing     Problem: Confusion Acute  Goal: Optimal Cognitive Function  Outcome: Progressing

## 2025-01-01 NOTE — PLAN OF CARE
Problem: Adult Inpatient Plan of Care  Goal: Plan of Care Review  Outcome: Progressing  Goal: Patient-Specific Goal (Individualized)  Outcome: Progressing  Goal: Absence of Hospital-Acquired Illness or Injury  Outcome: Progressing  Goal: Optimal Comfort and Wellbeing  Outcome: Progressing  Goal: Readiness for Transition of Care  Outcome: Progressing   AAOX4,VSS.NAD noted. Pt tolerated all morning medication well. Pt educated on plan of care and safety, pt verbalizes understanding and voice no further questions or concerns.  No adverse events noted throughout this shift. Pt denies any pain or discomfort.

## 2025-01-01 NOTE — ASSESSMENT & PLAN NOTE
-history of confusion and forgetfulness prior to admission  -appears to be more confused inpatient as well   -given impairment and abnormal coordination MRI and C spine ordered  -consider Neurology consult if failure to improve in these symptoms   -HIV/RPR negative. Folate low, repleting. B12 high. Thiamine pending. Possibly 2/2 to dementia with poor nutritional intake.

## 2025-01-01 NOTE — PROGRESS NOTES
[facility] - Internal Medicine Telemetry  Adult Nutrition  Progress Note    SUMMARY       Recommendations  1. Continue texture modified diet- encourage intake as needed  2. Continue Boost supplements   3. RD following    Goals: Meet % of EEN/EPN by RD f/u date  Nutrition Goal Status: progressing  Communication of RD Recs: POC    Assessment and Plan    Endocrine  Severe protein-calorie malnutrition  Malnutrition Type:  Context: chronic illness  Level: severe    Related to (etiology):   Decreased ability to consume sufficient energy 2/2 poor intake PTA    Signs and Symptoms (as evidenced by):   Malnutrition Characteristic Summary:  Weight Loss (Malnutrition): greater than 5% in 1 month  Energy Intake (Malnutrition): less than 75% for greater than or equal to 1 month  Subcutaneous Fat (Malnutrition): severe depletion  Muscle Mass (Malnutrition): severe depletion    Interventions (treatment strategy):  1. Continue texture modified diet- encourage intake as needed   2. Continue Boost supplements   3. RD following    Nutrition Diagnosis Status:   New          Malnutrition Assessment  Malnutrition Context: chronic illness  Malnutrition Level: severe          Weight Loss (Malnutrition): greater than 5% in 1 month  Energy Intake (Malnutrition): less than 75% for greater than or equal to 1 month  Subcutaneous Fat (Malnutrition): severe depletion  Muscle Mass (Malnutrition): severe depletion   Orbital Region (Subcutaneous Fat Loss): severe depletion  Upper Arm Region (Subcutaneous Fat Loss): severe depletion   [place] Region (Muscle Loss): severe depletion  Clavicle Bone Region (Muscle Loss): severe depletion                 Reason for Assessment    Reason For Assessment: RD follow-up  Diagnosis: other (see comments) (FTT)  General Information Comments: RD follow-up: Pt is on a soft and bite sized diet tolerating % of meals provided. No n/v. Pt continues w/ malnutrition- see PES statement for details. RD  "following.  Nutrition Discharge Planning: adequate nutritional intake  Nutrition Related Social Determinants of Health: SDOH: Unable to assess at this time.        Nutrition/Diet History    Spiritual, Cultural Beliefs, Moravian Practices, Values that Affect Care: no  Food Allergies: NKFA    Anthropometrics    Temp: 97.9 °F (36.6 °C)  Height Method: Measured  Height: 5' 1" (154.9 cm)  Height (inches): 61 in  Weight Method: Bed Scale  Weight: 54.9 kg (121 lb 0.5 oz)  Weight (lb): 121.03 lb  Ideal Body Weight (IBW), Female: 105 lb  % Ideal Body Weight, Female (lb): 115.27 %  BMI (Calculated): 22.9  BMI Grade: 18.5-24.9 - normal       Lab/Procedures/Meds    Pertinent Labs Reviewed: reviewed  Pertinent Labs Comments: Potassium 2.7, GFR 68  Pertinent Medications Reviewed: reviewed  Pertinent Medications Comments: mitazipine    Estimated/Assessed Needs    Weight Used For Calorie Calculations: 54.9 kg (121 lb 0.5 oz)  Energy Calorie Requirements (kcal): 1647  Energy Need Method: Kcal/kg (30 kcal/kg)  Protein Requirements: 66-82 g (1.2-1.5 g/kg)  Weight Used For Protein Calculations: 54.9 kg (121 lb 0.5 oz)  RDA Method (mL): 1647     Nutrition Prescription Ordered    Current Diet Order: soft and bite sized diet    Evaluation of Received Nutrient/Fluid Intake    I/O: -  Energy Calories Required: not meeting needs  Protein Required: not meeting needs  Fluid Required: not meeting needs  Total Fluid Intake (mL/kg): 1 ml or fluid per MD  Tolerance: tolerating  % Intake of Estimated Energy Needs: 50%  % Meal Intake: 50%    Nutrition Risk    Level of Risk/Frequency of Follow-up: low ((1-2x/week))     Monitor and Evaluation    Food and Nutrient Intake: energy intake, food and beverage intake  Food and Nutrient Adminstration: diet order  Knowledge/Beliefs/Attitudes: food and nutrition knowledge/skill, beliefs and attitudes  Physical Activity and Function: nutrition-related ADLs and IADLs, factors affecting access to physical " activity  Anthropometric Measurements: height/length, weight, weight change, body mass index, growth pattern indices/percentile ranks  Biochemical Data, Medical Tests and Procedures: electrolyte and renal panel, gastrointestinal profile, glucose/endocrine profile, inflammatory profile, lipid profile  Nutrition-Focused Physical Findings: overall appearance, extremities, muscles and bones, head and eyes, skin     Nutrition Follow-Up    RD Follow-up?: Yes

## 2025-01-01 NOTE — ASSESSMENT & PLAN NOTE
Sliding scale held, accuchecks for hypoglycemia     Patient's FSGs are controlled on current medication regimen.  Last A1c reviewed-   Lab Results   Component Value Date    HGBA1C 5.5 10/11/2024     Most recent fingerstick glucose reviewed-   Recent Labs   Lab 12/31/24 2120 12/31/24 2202 01/01/25  0720 01/01/25  0754   POCTGLUCOSE 77 176* 47* 221*       Current correctional scale   hold on correction scale give A1C is 5.5  Antihyperglycemics (From admission, onward)      None          Hold Oral hypoglycemics while patient is in the hospital.

## 2025-01-01 NOTE — ASSESSMENT & PLAN NOTE
-noted by patient before admission with abnormal finger to nose and weakness   -CT head and spine reviewed. There is significant stenosis/narrowing of C6 and C7 which could be contributing to symptoms. Findings appear chronic and not acute. MRI ordered and notable for cervical stenosis and lacunar infarcts. Limited visiblity due to movement   -CT head notable for remote lacunar infarcts, hx of stroke in 2005 currently on ASA   -continue PT/OT

## 2025-01-01 NOTE — PLAN OF CARE
Problem: Adult Inpatient Plan of Care  Goal: Plan of Care Review  Outcome: Progressing  Goal: Patient-Specific Goal (Individualized)  Outcome: Progressing  Goal: Absence of Hospital-Acquired Illness or Injury  Outcome: Progressing  Goal: Optimal Comfort and Wellbeing  Outcome: Progressing  Goal: Readiness for Transition of Care  Outcome: Progressing     Problem: Diabetes Comorbidity  Goal: Blood Glucose Level Within Targeted Range  Outcome: Progressing     Problem: Skin Injury Risk Increased  Goal: Skin Health and Integrity  Outcome: Progressing     Problem: Confusion Acute  Goal: Optimal Cognitive Function  Outcome: Progressing     Problem: Wound  Goal: Optimal Coping  Outcome: Progressing  Goal: Optimal Functional Ability  Outcome: Progressing  Goal: Absence of Infection Signs and Symptoms  Outcome: Progressing  Goal: Improved Oral Intake  Outcome: Progressing  Goal: Optimal Pain Control and Function  Outcome: Progressing  Goal: Skin Health and Integrity  Outcome: Progressing  Goal: Optimal Wound Healing  Outcome: Progressing

## 2025-01-01 NOTE — NURSING
Attempted to feed patient lunch at this time. Patient voices she does not want to eat any food right now.

## 2025-01-01 NOTE — PROGRESS NOTES
Peripheral IV access attempted 1.  Attempt successful  Yes  Escalated to the N/A- successful cannulation

## 2025-01-01 NOTE — ASSESSMENT & PLAN NOTE
Patient's most recent potassium results are listed below.   Recent Labs     12/30/24  0541 12/31/24  0540 01/01/25  0352   K 3.0* 4.9 3.0*       Plan  - Replete potassium per protocol  - Monitor potassium Daily  - Patient's hypokalemia is improving

## 2025-01-01 NOTE — SUBJECTIVE & OBJECTIVE
Interval History: Patient seen and examined this AM. Overnight episodes of low glucoses.     Objective:     Vital Signs (Most Recent):  Temp: 97.9 °F (36.6 °C) (01/01/25 0753)  Pulse: 67 (01/01/25 0753)  Resp: 17 (01/01/25 0753)  BP: 114/79 (01/01/25 0753)  SpO2: 97 % (01/01/25 0753) Vital Signs (24h Range):  Temp:  [97.6 °F (36.4 °C)-98.2 °F (36.8 °C)] 97.9 °F (36.6 °C)  Pulse:  [60-77] 67  Resp:  [17-18] 17  SpO2:  [95 %-98 %] 97 %  BP: (109-137)/(69-85) 114/79     Weight: 54.9 kg (121 lb 0.5 oz)  Body mass index is 22.87 kg/m².    Intake/Output Summary (Last 24 hours) at 1/1/2025 0825  Last data filed at 12/31/2024 2045  Gross per 24 hour   Intake 480 ml   Output 200 ml   Net 280 ml         Physical Exam  Constitutional:       Appearance: Normal appearance. She is not ill-appearing.   HENT:      Head: Normocephalic and atraumatic.      Nose: Nose normal.      Mouth/Throat:      Mouth: Mucous membranes are moist.   Eyes:      Extraocular Movements: Extraocular movements intact.      Pupils: Pupils are equal, round, and reactive to light.   Cardiovascular:      Rate and Rhythm: Normal rate and regular rhythm.      Heart sounds: No murmur heard.     No gallop.   Pulmonary:      Effort: Pulmonary effort is normal.      Breath sounds: Normal breath sounds. No wheezing or rales.   Abdominal:      General: Abdomen is flat. Bowel sounds are normal. There is no distension.      Palpations: Abdomen is soft.      Tenderness: There is no abdominal tenderness.   Musculoskeletal:         General: No swelling or tenderness. Normal range of motion.      Cervical back: Normal range of motion and neck supple.      Right lower leg: No edema.      Left lower leg: No edema.   Skin:     General: Skin is warm and dry.      Capillary Refill: Capillary refill takes less than 2 seconds.   Neurological:      General: No focal deficit present.      Motor: Weakness present.      Comments: Abnormal hand coordination    strength in tact      Psychiatric:         Mood and Affect: Mood normal.             Significant Labs: All pertinent labs within the past 24 hours have been reviewed.    Significant Imaging: I have reviewed all pertinent imaging results/findings within the past 24 hours.

## 2025-01-01 NOTE — PROGRESS NOTES
[facility] - Internal Medicine [facility] Medicine  Progress Note    Patient Name: Bhavna Lim  MRN: [id number]  Patient Class: IP- Inpatient   Admission Date: 12/26/2024  Length of Stay: 3 days  Attending Physician: Yarely Walters MD  Primary Care Provider: Suzi Green MD        Subjective     Principal Problem:Failure to thrive in adult        HPI:  Mrs. Bhavna Lim is a 79 year old F with a history of HTN and HLD who presents to the ED for weakness and fatigue. She has been having weight loss and poor po intake over the last few months. She has been seen by GI and evaluated with EGD and Colonoscopy as well as MRCP which showed cholelithiasis with gallbladder distention and intrahepatic and extrahepatic biliary dilation. No significant abnormalities on EGD and coloscopy. She was referred to surgery who recommended NM study and possible EUS with GI. She has been unable to tolerate any solid foods. She sometimes takes a little bit of soup and has tried protein shakes. She feels full immediately. She does endorse some pain in her epigastric region ever since having her EGD performed. She occasionally has vomiting. Denies regurgitation of food. She continues to have worsening weakness and falls at home. She had two falls yesterday due to weakness. She denies significant dizziness. She is unable to walk due to her weakness and sister is concerned this is related to her poor nutrition.      In the ED VSS. Labs notable for Potassium of 3.0 which was repleted in the ED. No focal signs concerning for stroke. She was admitted to medicine for further management.      Overview/Hospital Course:  No notes on file    Interval History: Patient seen and examined this AM. Overnight episodes of low glucoses.     Objective:     Vital Signs (Most Recent):  Temp: 97.9 °F (36.6 °C) (01/01/25 0753)  Pulse: 67 (01/01/25 0753)  Resp: 17 (01/01/25 0753)  BP: 114/79 (01/01/25 0753)  SpO2: 97 % (01/01/25 0753)  Vital Signs (24h Range):  Temp:  [97.6 °F (36.4 °C)-98.2 °F (36.8 °C)] 97.9 °F (36.6 °C)  Pulse:  [60-77] 67  Resp:  [17-18] 17  SpO2:  [95 %-98 %] 97 %  BP: (109-137)/(69-85) 114/79     Weight: 54.9 kg (121 lb 0.5 oz)  Body mass index is 22.87 kg/m².    Intake/Output Summary (Last 24 hours) at 1/1/2025 0825  Last data filed at 12/31/2024 2045  Gross per 24 hour   Intake 480 ml   Output 200 ml   Net 280 ml         Physical Exam  Constitutional:       Appearance: Normal appearance. She is not ill-appearing.   HENT:      Head: Normocephalic and atraumatic.      Nose: Nose normal.      Mouth/Throat:      Mouth: Mucous membranes are moist.   Eyes:      Extraocular Movements: Extraocular movements intact.      Pupils: Pupils are equal, round, and reactive to light.   Cardiovascular:      Rate and Rhythm: Normal rate and regular rhythm.      Heart sounds: No murmur heard.     No gallop.   Pulmonary:      Effort: Pulmonary effort is normal.      Breath sounds: Normal breath sounds. No wheezing or rales.   Abdominal:      General: Abdomen is flat. Bowel sounds are normal. There is no distension.      Palpations: Abdomen is soft.      Tenderness: There is no abdominal tenderness.   Musculoskeletal:         General: No swelling or tenderness. Normal range of motion.      Cervical back: Normal range of motion and neck supple.      Right lower leg: No edema.      Left lower leg: No edema.   Skin:     General: Skin is warm and dry.      Capillary Refill: Capillary refill takes less than 2 seconds.   Neurological:      General: No focal deficit present.      Motor: Weakness present.      Comments: Abnormal hand coordination    strength in tact     Psychiatric:         Mood and Affect: Mood normal.             Significant Labs: All pertinent labs within the past 24 hours have been reviewed.    Significant Imaging: I have reviewed all pertinent imaging results/findings within the past 24 hours.    Assessment and Plan     *  Failure to thrive in adult  -history of weight loss and poor appetite  -worked up outpatient by GI team with MRCP, EGD, Colonoscopy and General Surgery follow up   -MRCP with intra and extrahepatic ductal dilation with no filling defect or obstructing mass  -planning for NM gastric emptying studying outpatient and possible EUS with GI   -given progressive weakness and falls, PT/OT consulted. Significant weakness for which she is not safe for dispo home.   -nutrition consult   -discussed with GI limited inpatient studies for evaluation, would proceed with outpatient follow up and imaging scheduled   -patient at times confused as well, found trying to smoke in the hospital possible cognitive impairment which could be contributing to this   -will continue to monitor PO intake and optimize nausea control, will assist with meals as able   -repeat abdominal US given elevated liver enzymes this Ams  -labs are consistent with chronically poor nutrition, patient refusing to eat at times.             Unintentional weight loss  Nutrition consulted. Most recent weight and BMI monitored-     Measurements:  Wt Readings from Last 1 Encounters:   12/27/24 54.9 kg (121 lb 0.5 oz)   Body mass index is 22.87 kg/m².    RD consultation   Extensive outpatient evaluation obtained by GI and General surgery   Will add supplementation to meals       1. Continue texture modified diet- encourage intake as needed 2. Continue Boost supplements 3. RD following      Hypoglycemia  -episodes of hypoglycemia overnight  -given amp of D50, started on IV D10W   -monitor BG closely  -likely related to poor PO intake       Debility  Patient with Acute debility due to age-related physical debility and other reduced mobility. The patient's latest AMPAC (Activity Measure for Post Acute Care) Score is listed below.    AM-PAC Score - How much help does the patient need for each activity listed  Basic Mobility Total Score: 14  Turning over in bed (including  adjusting bedclothes, sheets and blankets)?: A little  Sitting down on and standing up from a chair with arms (e.g., wheelchair, bedside commode, etc.): A little  Moving from lying on back to sitting on the side of the bed?: A little  Moving to and from a bed to a chair (including a wheelchair)?: A lot  Need to walk in hospital room?: A lot  Climbing 3-5 steps with a railing?: Unable    Plan  - Progressive mobility protocol initated  - PT/OT consulted  - Fall precautions in place  -pt would benefit from SNF prior to discharge home       Diabetes mellitus  Sliding scale held, accuchecks for hypoglycemia     Patient's FSGs are controlled on current medication regimen.  Last A1c reviewed-   Lab Results   Component Value Date    HGBA1C 5.5 10/11/2024     Most recent fingerstick glucose reviewed-   Recent Labs   Lab 12/31/24  2120 12/31/24  2202 01/01/25  0720 01/01/25  0754   POCTGLUCOSE 77 176* 47* 221*       Current correctional scale   hold on correction scale give A1C is 5.5  Antihyperglycemics (From admission, onward)      None          Hold Oral hypoglycemics while patient is in the hospital.    Benign essential hypertension  Patient's blood pressure range in the last 24 hours was: BP  Min: 101/66  Max: 131/79.The patient's inpatient anti-hypertensive regimen is listed below:  Current Antihypertensives  lisinopriL tablet 10 mg, Daily, Oral  NIFEdipine 24 hr tablet 30 mg, Daily, Oral    Plan  - BP is controlled, no changes needed to their regimen  - will continue home regimen, decrease lisinopril dose     Cognitive impairment  -history of confusion and forgetfulness prior to admission  -appears to be more confused inpatient as well   -given impairment and abnormal coordination MRI and C spine ordered  -consider Neurology consult if failure to improve in these symptoms   -HIV/RPR negative. Folate low, repleting. B12 high. Thiamine pending. Possibly 2/2 to dementia with poor nutritional intake.     Numbness and  tingling in both hands  -noted by patient before admission with abnormal finger to nose and weakness   -CT head and spine reviewed. There is significant stenosis/narrowing of C6 and C7 which could be contributing to symptoms. Findings appear chronic and not acute. MRI ordered and notable for cervical stenosis and lacunar infarcts. Limited visiblity due to movement   -CT head notable for remote lacunar infarcts, hx of stroke in 2005 currently on ASA   -continue PT/OT         Severe protein-calorie malnutrition  Nutrition consulted. Most recent weight and BMI monitored-     Measurements:  Wt Readings from Last 1 Encounters:   12/27/24 54.9 kg (121 lb 0.5 oz)   Body mass index is 22.87 kg/m².    Patient has been screened and assessed by RD.    Malnutrition Type:  Context: chronic illness  Level: severe    Malnutrition Characteristic Summary:  Weight Loss (Malnutrition): greater than 5% in 1 month  Energy Intake (Malnutrition): less than 75% for greater than or equal to 1 month  Subcutaneous Fat (Malnutrition): severe depletion  Muscle Mass (Malnutrition): severe depletion    Interventions/Recommendations (treatment strategy):  1. Continue texture modified diet- encourage intake as needed 2. Continue Boost supplements 3. RD following      Hypokalemia  Patient's most recent potassium results are listed below.   Recent Labs     12/30/24  0541 12/31/24  0540 01/01/25  0352   K 3.0* 4.9 3.0*       Plan  - Replete potassium per protocol  - Monitor potassium Daily  - Patient's hypokalemia is improving      VTE Risk Mitigation (From admission, onward)           Ordered     enoxaparin injection 40 mg  Daily         12/26/24 1700     IP VTE HIGH RISK PATIENT  Once         12/26/24 1547     Place sequential compression device  Until discontinued         12/26/24 1547                    Discharge Planning   RITA: 1/3/2025     Code Status: Full Code   Medical Readiness for Discharge Date:   Discharge Plan A: Home Health                 Please place Justification for DME        Yarely Walters MD  Department of Hospital Medicine   [facility] - Internal Medicine Telemetry

## 2025-01-01 NOTE — ASSESSMENT & PLAN NOTE
-episodes of hypoglycemia overnight  -given amp of D50, started on IV D10W   -monitor BG closely  -likely related to poor PO intake

## 2025-01-01 NOTE — PROGRESS NOTES
Patient hypoglycemic, treated x 2. Hospitalist and charge nurse made aware. Patient currently has no access at this time, contacted ICU for help if they are able.     Patient appears asymptomatic, hard to decipher as her baseline orientation has been altered on this admission.

## 2025-01-01 NOTE — PROGRESS NOTES
[facility] - Internal Medicine Telemetry  Nurses Note - Hypoglycemia Documentation    Patient Name: Bhavna Lim  MRN: [id number]  Attending Provider:  Yarely Walters MD    Bhavna Lim is a 79 y.o. female who had a hypoglycemic event for which RN was notified.    Trended Lab Data:  FSBS 66 ---> 4oz juice given    Patient's signs and symptoms included: none.    Intervention(s) performed:      []  Glucose tabs (16 Gm)  []  Glucose tabs (24 Gm)  []  Dextrose 50% IVP (12.5 Gm)  []  Dextrose 50% IVP (25 Gm)  [x]  Glucagon IM (1 mg)  [x]  gave juice  []  gave snack  []  other:         Repeat Blood Glucose reading was:  57  mg/dl at 20:46.    Glucagon administered.     Teaching provided to patient, no evidence of learning         12/31/2024   8:56 PM

## 2025-01-01 NOTE — CONSULTS
NIAS consulted for IV access for PVA.    Unable to see patient due to high patient volume.    If unable to obtain IV access overnight, re consult NIAS in the am.

## 2025-01-01 NOTE — NURSING
PIV placement unsuccessful by this nurse, another floor nurse attempted and was unable to get IV access. Picc consult placed for PIV placement, MD made aware of IV access loss. Pm nurse updated.

## 2025-01-02 LAB
ALBUMIN SERPL BCP-MCNC: 2.1 G/DL (ref 3.5–5.2)
ALP SERPL-CCNC: 245 U/L (ref 40–150)
ALT SERPL W/O P-5'-P-CCNC: 75 U/L (ref 10–44)
AMORPH CRY UR QL COMP ASSIST: NORMAL
ANION GAP SERPL CALC-SCNC: 11 MMOL/L (ref 8–16)
AST SERPL-CCNC: 52 U/L (ref 10–40)
BACTERIA #/AREA URNS AUTO: NORMAL /HPF
BILIRUB SERPL-MCNC: 1.4 MG/DL (ref 0.1–1)
BILIRUB UR QL STRIP: NEGATIVE
BUN SERPL-MCNC: 12 MG/DL (ref 8–23)
CALCIUM SERPL-MCNC: 7.9 MG/DL (ref 8.7–10.5)
CHLORIDE SERPL-SCNC: 105 MMOL/L (ref 95–110)
CLARITY UR REFRACT.AUTO: ABNORMAL
CO2 SERPL-SCNC: 24 MMOL/L (ref 23–29)
COLOR UR AUTO: ABNORMAL
CREAT SERPL-MCNC: 0.6 MG/DL (ref 0.5–1.4)
ERYTHROCYTE [DISTWIDTH] IN BLOOD BY AUTOMATED COUNT: 17.5 % (ref 11.5–14.5)
ERYTHROCYTE [DISTWIDTH] IN BLOOD BY AUTOMATED COUNT: 17.5 % (ref 11.5–14.5)
EST. GFR  (NO RACE VARIABLE): >60 ML/MIN/1.73 M^2
GLUCOSE SERPL-MCNC: 122 MG/DL (ref 70–110)
GLUCOSE UR QL STRIP: ABNORMAL
HCT VFR BLD AUTO: 34 % (ref 37–48.5)
HCT VFR BLD AUTO: 34 % (ref 37–48.5)
HGB BLD-MCNC: 12 G/DL (ref 12–16)
HGB BLD-MCNC: 12 G/DL (ref 12–16)
HGB UR QL STRIP: NEGATIVE
HYALINE CASTS UR QL AUTO: 0 /LPF
KETONES UR QL STRIP: NEGATIVE
LEUKOCYTE ESTERASE UR QL STRIP: NEGATIVE
MAGNESIUM SERPL-MCNC: 1.9 MG/DL (ref 1.6–2.6)
MCH RBC QN AUTO: 28 PG (ref 27–31)
MCH RBC QN AUTO: 28 PG (ref 27–31)
MCHC RBC AUTO-ENTMCNC: 35.3 G/DL (ref 32–36)
MCHC RBC AUTO-ENTMCNC: 35.3 G/DL (ref 32–36)
MCV RBC AUTO: 79 FL (ref 82–98)
MCV RBC AUTO: 79 FL (ref 82–98)
MICROSCOPIC COMMENT: NORMAL
NITRITE UR QL STRIP: NEGATIVE
PH UR STRIP: 8 [PH] (ref 5–8)
PHOSPHATE SERPL-MCNC: 1.9 MG/DL (ref 2.7–4.5)
PLATELET # BLD AUTO: 130 K/UL (ref 150–450)
PLATELET # BLD AUTO: 130 K/UL (ref 150–450)
PMV BLD AUTO: ABNORMAL FL (ref 9.2–12.9)
PMV BLD AUTO: ABNORMAL FL (ref 9.2–12.9)
POCT GLUCOSE: 130 MG/DL (ref 70–110)
POCT GLUCOSE: 132 MG/DL (ref 70–110)
POCT GLUCOSE: 150 MG/DL (ref 70–110)
POCT GLUCOSE: 152 MG/DL (ref 70–110)
POTASSIUM SERPL-SCNC: 2.5 MMOL/L (ref 3.5–5.1)
PROT SERPL-MCNC: 5.9 G/DL (ref 6–8.4)
PROT UR QL STRIP: ABNORMAL
RBC # BLD AUTO: 4.29 M/UL (ref 4–5.4)
RBC # BLD AUTO: 4.29 M/UL (ref 4–5.4)
RBC #/AREA URNS AUTO: 2 /HPF (ref 0–4)
SODIUM SERPL-SCNC: 140 MMOL/L (ref 136–145)
SP GR UR STRIP: 1.02 (ref 1–1.03)
SQUAMOUS #/AREA URNS AUTO: 0 /HPF
URN SPEC COLLECT METH UR: ABNORMAL
WBC # BLD AUTO: 9.65 K/UL (ref 3.9–12.7)
WBC # BLD AUTO: 9.65 K/UL (ref 3.9–12.7)
WBC #/AREA URNS AUTO: 3 /HPF (ref 0–5)

## 2025-01-02 PROCEDURE — 11000001 HC ACUTE MED/SURG PRIVATE ROOM

## 2025-01-02 PROCEDURE — 85027 COMPLETE CBC AUTOMATED: CPT | Performed by: STUDENT IN AN ORGANIZED HEALTH CARE EDUCATION/TRAINING PROGRAM

## 2025-01-02 PROCEDURE — 36415 COLL VENOUS BLD VENIPUNCTURE: CPT | Performed by: STUDENT IN AN ORGANIZED HEALTH CARE EDUCATION/TRAINING PROGRAM

## 2025-01-02 PROCEDURE — 94761 N-INVAS EAR/PLS OXIMETRY MLT: CPT

## 2025-01-02 PROCEDURE — 25000003 PHARM REV CODE 250: Performed by: INTERNAL MEDICINE

## 2025-01-02 PROCEDURE — 81001 URINALYSIS AUTO W/SCOPE: CPT | Performed by: NURSE PRACTITIONER

## 2025-01-02 PROCEDURE — 63600175 PHARM REV CODE 636 W HCPCS: Performed by: STUDENT IN AN ORGANIZED HEALTH CARE EDUCATION/TRAINING PROGRAM

## 2025-01-02 PROCEDURE — 83735 ASSAY OF MAGNESIUM: CPT | Performed by: STUDENT IN AN ORGANIZED HEALTH CARE EDUCATION/TRAINING PROGRAM

## 2025-01-02 PROCEDURE — 25000003 PHARM REV CODE 250: Performed by: STUDENT IN AN ORGANIZED HEALTH CARE EDUCATION/TRAINING PROGRAM

## 2025-01-02 PROCEDURE — S4991 NICOTINE PATCH NONLEGEND: HCPCS | Performed by: STUDENT IN AN ORGANIZED HEALTH CARE EDUCATION/TRAINING PROGRAM

## 2025-01-02 PROCEDURE — 97112 NEUROMUSCULAR REEDUCATION: CPT

## 2025-01-02 PROCEDURE — 80053 COMPREHEN METABOLIC PANEL: CPT | Performed by: STUDENT IN AN ORGANIZED HEALTH CARE EDUCATION/TRAINING PROGRAM

## 2025-01-02 PROCEDURE — 97530 THERAPEUTIC ACTIVITIES: CPT | Mod: CO

## 2025-01-02 PROCEDURE — 25000003 PHARM REV CODE 250

## 2025-01-02 PROCEDURE — 84100 ASSAY OF PHOSPHORUS: CPT | Performed by: STUDENT IN AN ORGANIZED HEALTH CARE EDUCATION/TRAINING PROGRAM

## 2025-01-02 PROCEDURE — 63600175 PHARM REV CODE 636 W HCPCS: Performed by: INTERNAL MEDICINE

## 2025-01-02 RX ORDER — LANOLIN ALCOHOL/MO/W.PET/CERES
1 CREAM (GRAM) TOPICAL DAILY
Status: DISCONTINUED | OUTPATIENT
Start: 2025-01-03 | End: 2025-01-31

## 2025-01-02 RX ORDER — POTASSIUM CHLORIDE 20 MEQ/1
40 TABLET, EXTENDED RELEASE ORAL
Status: DISCONTINUED | OUTPATIENT
Start: 2025-01-02 | End: 2025-01-02

## 2025-01-02 RX ORDER — MUPIROCIN 20 MG/G
OINTMENT TOPICAL 2 TIMES DAILY
Status: DISPENSED | OUTPATIENT
Start: 2025-01-02 | End: 2025-01-07

## 2025-01-02 RX ORDER — CEFTRIAXONE 1 G/1
1 INJECTION, POWDER, FOR SOLUTION INTRAMUSCULAR; INTRAVENOUS
Status: DISCONTINUED | OUTPATIENT
Start: 2025-01-02 | End: 2025-01-07

## 2025-01-02 RX ADMIN — CEFTRIAXONE SODIUM 1 G: 1 INJECTION, POWDER, FOR SOLUTION INTRAMUSCULAR; INTRAVENOUS at 05:01

## 2025-01-02 RX ADMIN — NICOTINE 1 PATCH: 7 PATCH, EXTENDED RELEASE TRANSDERMAL at 09:01

## 2025-01-02 RX ADMIN — Medication 100 MG: at 09:01

## 2025-01-02 RX ADMIN — ENOXAPARIN SODIUM 40 MG: 40 INJECTION SUBCUTANEOUS at 04:01

## 2025-01-02 RX ADMIN — NIFEDIPINE 30 MG: 30 TABLET, FILM COATED, EXTENDED RELEASE ORAL at 09:01

## 2025-01-02 RX ADMIN — LISINOPRIL 10 MG: 10 TABLET ORAL at 09:01

## 2025-01-02 RX ADMIN — ATORVASTATIN CALCIUM 40 MG: 40 TABLET, FILM COATED ORAL at 09:01

## 2025-01-02 RX ADMIN — MUPIROCIN: 20 OINTMENT TOPICAL at 09:01

## 2025-01-02 RX ADMIN — POTASSIUM BICARBONATE 25 MEQ: 978 TABLET, EFFERVESCENT ORAL at 09:01

## 2025-01-02 RX ADMIN — Medication 1 TABLET: at 09:01

## 2025-01-02 RX ADMIN — DEXTROSE AND SODIUM CHLORIDE: 5; 900 INJECTION, SOLUTION INTRAVENOUS at 05:01

## 2025-01-02 RX ADMIN — ASPIRIN 81 MG: 81 TABLET, COATED ORAL at 09:01

## 2025-01-02 RX ADMIN — FOLIC ACID 1 MG: 1 TABLET ORAL at 09:01

## 2025-01-02 RX ADMIN — POTASSIUM BICARBONATE 25 MEQ: 978 TABLET, EFFERVESCENT ORAL at 03:01

## 2025-01-02 RX ADMIN — Medication 6 MG: at 09:01

## 2025-01-02 NOTE — PT/OT/SLP PROGRESS
"Physical Therapy Co-Treatment    Patient Name:  Bhavna Lim   MRN:  [id number]    Co-evaluation and co-treatment performed for this visit due to suspected patient need for two skilled therapists to ensure patient and staff safety and to accommodate for patient activity tolerance/pain management     Recommendations:     Discharge Recommendations: Moderate Intensity Therapy  Discharge Equipment Recommendations: hospital bed, lift device, wheelchair  Barriers to discharge: None    Assessment:     Bhavna Lim is a 79 y.o. female admitted with a medical diagnosis of Failure to thrive in adult.  She presents with the following impairments/functional limitations: weakness, impaired endurance, impaired self care skills, impaired functional mobility, gait instability, impaired balance, impaired cognition, decreased coordination, decreased upper extremity function, decreased lower extremity function, decreased safety awareness Pt. cooperative and tolerated treatment fairly well, but treatment session limited due to active bleeding from IV site. Nursing notified. Pt. Remains Total A for all mobility due to (B) UE/LE weakness and slumped posture in sitting.    Rehab Prognosis: Fair; patient would benefit from acute skilled PT services to address these deficits and reach maximum level of function.    Recent Surgery: * No surgery found *      Plan:     During this hospitalization, patient to be seen 4 x/week to address the identified rehab impairments via gait training, therapeutic activities, therapeutic exercises, neuromuscular re-education and progress toward the following goals:    Plan of Care Expires:  01/31/25    Subjective     Chief Complaint: "I'm tired. I just want to sleep"  Patient/Family Comments/goals: pt. Agreeable to PT  Pain/Comfort:  Pain Rating 1: 0/10  Pain Rating Post-Intervention 1: 0/10      Objective:     Communicated with nursing prior to session.  Patient found supine with PureWick, peripheral " IV, bed alarm upon PT entry to room.     General Precautions: Standard, fall  Orthopedic Precautions: N/A  Braces: N/A  Respiratory Status: Room air     Functional Mobility:  Bed Mobility:     Rolling Right: total assistance  Scooting: total assistance  Supine to Sit: total assistance and of 2 persons  Sit to Supine: total assistance and of 2 persons  Balance: poor sitting      AM-PAC 6 CLICK MOBILITY  Turning over in bed (including adjusting bedclothes, sheets and blankets)?: 2  Sitting down on and standing up from a chair with arms (e.g., wheelchair, bedside commode, etc.): 1  Moving from lying on back to sitting on the side of the bed?: 2  Moving to and from a bed to a chair (including a wheelchair)?: 1  Need to walk in hospital room?: 1  Climbing 3-5 steps with a railing?: 1  Basic Mobility Total Score: 8       Treatment & Education:  Discussed pt.'s progress, goals, and POC. Pt. Performed sitting balance/tolerance/postural control on EOB with Max-Total A for >5 min.    Patient left supine with all lines intact, call button in reach, and nursing present..    GOALS:   Multidisciplinary Problems       Physical Therapy Goals          Problem: Physical Therapy    Goal Priority Disciplines Outcome Interventions   Physical Therapy Goal     PT, PT/OT Progressing    Description: Goals to be met by: 1/10     Patient will increase functional independence with mobility by performin. Supine to sit with Modified [place]  2. Sit to supine with Modified [place]  3. Sit to stand transfer with Stand-by Assistance  4. Gait  x 100 feet with Contact Guard Assistance using LRAD.   5. Ascend/descend 6 stair with bilateral Handrails Minimal Assistance using LRAD.     DME Justifcation  Patient demonstrates a mobility limitation that significantly impairs their ability to participate in one or more mobility related activities of daily living. Patient's mobility limitation cannot be sufficiently resolved with the use of  a cane, but can be sufficiently resolved with the use of a rolling walker.The use of a rolling walker will considerably improve their ability to participate in MRADLs. Patient will use the walker on a regular basis at home.     Patient has a mobility limitation that significantly impairs their ability to participate in one or more mobility related activities of daily living in customary locations in the home. The mobility limitation cannot be sufficiently resolved by the use of a cane or walker. The use of a manual wheelchair will greatly improve the patient's ability to participate in MRADLs. The patient will use the wheelchair on a regular basis at home. They have expressed their willingness to use a manual wheelchair in the home, and have a caregiver who is available and willing to assist with the wheelchair if needed.                         Time Tracking:     PT Received On: 01/02/25  PT Start Time: 1111     PT Stop Time: 1127  PT Total Time (min): 16 min     Billable Minutes: Neuromuscular Re-education 16    Treatment Type: Treatment  PT/PTA: PT     Number of PTA visits since last PT visit: 0     01/02/2025

## 2025-01-02 NOTE — PROGRESS NOTES
[facility] Internal Medicine [facility] Medicine  Progress Note    Patient Name: Bhavna Lim  MRN: [id number]  Patient Class: IP- Inpatient   Admission Date: 12/26/2024  Length of Stay: 4 days  Attending Physician: Tate Rubio MD  Primary Care Provider: Suzi Green MD        Subjective     Principal Problem:Failure to thrive in adult        HPI:  Mrs. Bhavna Lim is a 79 year old F with a history of HTN and HLD who presents to the ED for weakness and fatigue. She has been having weight loss and poor po intake over the last few months. She has been seen by GI and evaluated with EGD and Colonoscopy as well as MRCP which showed cholelithiasis with gallbladder distention and intrahepatic and extrahepatic biliary dilation. No significant abnormalities on EGD and coloscopy. She was referred to surgery who recommended NM study and possible EUS with GI. She has been unable to tolerate any solid foods. She sometimes takes a little bit of soup and has tried protein shakes. She feels full immediately. She does endorse some pain in her epigastric region ever since having her EGD performed. She occasionally has vomiting. Denies regurgitation of food. She continues to have worsening weakness and falls at home. She had two falls yesterday due to weakness. She denies significant dizziness. She is unable to walk due to her weakness and sister is concerned this is related to her poor nutrition.      In the ED VSS. Labs notable for Potassium of 3.0 which was repleted in the ED. No focal signs concerning for stroke. She was admitted to medicine for further management.      Overview/Hospital Course:  While on the floor PT/OT were consulted and recommended SNF. Nutrition consulted. Discussed with GI outpatient workup and no indication for further inpatient workup. She continued to be altered while in the hospital. CT spine/head ordered for concern of fall showing ischemic changes and signficant stenosis  and narrowing. She developed worsening hand weakness and numbness/tinglign with abnormal coordiation. Some of this was prior to admission but weakness significantly worsened. MRI ordered and showed old infarcts. She continued to refuse food and did not have abdominal complaints just would not eat. Started on IVF due to hypoglycemia. Acute encephalopathy workup initiated and negative aside from low folate. UA ordered but not performed. At this point if nutrition becomes suboptimal will have to consider other means of nutrition. Son with concern of worsening memory and possible dementia wanting placement in [place]. Started on supplements for poor nutrition including thiamine and folate to see if this will help with mental status     Interval History: Looks well, is verbal but confused.    Review of Systems  Objective:     Vital Signs (Most Recent):  Temp: 98.8 °F (37.1 °C) (01/01/25 1603)  Pulse: (!) 58 (01/01/25 1603)  Resp: 17 (01/01/25 1603)  BP: (!) 142/82 (01/01/25 1603)  SpO2: 96 % (01/01/25 1802) Vital Signs (24h Range):  Temp:  [97.6 °F (36.4 °C)-98.8 °F (37.1 °C)] 98.8 °F (37.1 °C)  Pulse:  [55-77] 58  Resp:  [17-18] 17  SpO2:  [95 %-98 %] 96 %  BP: (109-142)/(73-82) 142/82     Weight: 54.9 kg (121 lb 0.5 oz)  Body mass index is 22.87 kg/m².    Intake/Output Summary (Last 24 hours) at 1/1/2025 1849  Last data filed at 1/1/2025 1720  Gross per 24 hour   Intake 440 ml   Output 150 ml   Net 290 ml         Physical Exam  Constitutional:       Appearance: Normal appearance. She is not ill-appearing.   HENT:      Head: Normocephalic and atraumatic.      Nose: Nose normal.      Mouth/Throat:      Mouth: Mucous membranes are moist.   Eyes:      Extraocular Movements: Extraocular movements intact.      Pupils: Pupils are equal, round, and reactive to light.   Cardiovascular:      Rate and Rhythm: Normal rate and regular rhythm.      Heart sounds: No murmur heard.     No gallop.   Pulmonary:      Effort: Pulmonary  effort is normal.      Breath sounds: Normal breath sounds. No wheezing or rales.   Abdominal:      General: Abdomen is flat. Bowel sounds are normal. There is no distension.      Palpations: Abdomen is soft.      Tenderness: There is no abdominal tenderness.   Musculoskeletal:         General: No swelling or tenderness. Normal range of motion.      Cervical back: Normal range of motion and neck supple.      Right lower leg: No edema.      Left lower leg: No edema.   Skin:     General: Skin is warm and dry.      Capillary Refill: Capillary refill takes less than 2 seconds.   Neurological:      General: No focal deficit present.      Motor: Weakness present.      Comments: Abnormal hand coordination    strength in tact     Psychiatric:         Mood and Affect: Mood normal.             Significant Labs: All pertinent labs within the past 24 hours have been reviewed.  BMP:   Recent Labs   Lab 01/02/25  0227   *      K 2.5*      CO2 24   BUN 12   CREATININE 0.6   CALCIUM 7.9*   MG 1.9       Significant Imaging: I have reviewed all pertinent imaging results/findings within the past 24 hours.    Assessment and Plan     * Failure to thrive in adult  -history of weight loss and poor appetite  -worked up outpatient by GI team with MRCP, EGD, Colonoscopy and General Surgery follow up   -MRCP with intra and extrahepatic ductal dilation with no filling defect or obstructing mass  -planning for NM gastric emptying studying outpatient and possible EUS with GI   -given progressive weakness and falls, PT/OT consulted. Significant weakness for which she is not safe for dispo home.   -nutrition consult   -discussed with GI limited inpatient studies for evaluation, would proceed with outpatient follow up and imaging scheduled   -patient at times confused as well, found trying to smoke in the hospital possible cognitive impairment which could be contributing to this   -will continue to monitor PO intake and  optimize nausea control, will assist with meals as able   -repeat abdominal US given elevated liver enzymes this Ams  -labs are consistent with chronically poor nutrition, patient refusing to eat at times.             Cognitive impairment  -history of confusion and forgetfulness prior to admission  -appears to be more confused inpatient as well   -given impairment and abnormal coordination MRI and C spine ordered  -consider Neurology consult if failure to improve in these symptoms   -HIV/RPR negative. Folate low, repleting. B12 high. Thiamine pending. Possibly 2/2 to dementia with poor nutritional intake.     Hypoglycemia  -episodes of hypoglycemia overnight  -given amp of D50, started on IV D10W   -monitor BG closely  -likely related to poor PO intake       Numbness and tingling in both hands  -noted by patient before admission with abnormal finger to nose and weakness   -CT head and spine reviewed. There is significant stenosis/narrowing of C6 and C7 which could be contributing to symptoms. Findings appear chronic and not acute. MRI ordered and notable for cervical stenosis and lacunar infarcts. Limited visiblity due to movement   -CT head notable for remote lacunar infarcts, hx of stroke in 2005 currently on ASA   -continue PT/OT         Severe protein-calorie malnutrition  Nutrition consulted. Most recent weight and BMI monitored-     Measurements:  Wt Readings from Last 1 Encounters:   12/27/24 54.9 kg (121 lb 0.5 oz)   Body mass index is 22.87 kg/m².    Patient has been screened and assessed by RD.    Malnutrition Type:  Context: chronic illness  Level: severe    Malnutrition Characteristic Summary:  Weight Loss (Malnutrition): greater than 5% in 1 month  Energy Intake (Malnutrition): less than 75% for greater than or equal to 1 month  Subcutaneous Fat (Malnutrition): severe depletion  Muscle Mass (Malnutrition): severe depletion    Interventions/Recommendations (treatment strategy):  1. Continue texture  modified diet- encourage intake as needed 2. Continue Boost supplements 3. RD following      Hypokalemia  Patient's most recent potassium results are listed below.   Recent Labs     12/30/24  0541 12/31/24  0540 01/01/25  0352   K 3.0* 4.9 3.0*       Plan  - Replete potassium per protocol  - Monitor potassium Daily  - Patient's hypokalemia is improving    Debility  Patient with Acute debility due to age-related physical debility and other reduced mobility. The patient's latest AMPAC (Activity Measure for Post Acute Care) Score is listed below.    AM-PAC Score - How much help does the patient need for each activity listed  Basic Mobility Total Score: 14  Turning over in bed (including adjusting bedclothes, sheets and blankets)?: A little  Sitting down on and standing up from a chair with arms (e.g., wheelchair, bedside commode, etc.): A little  Moving from lying on back to sitting on the side of the bed?: A little  Moving to and from a bed to a chair (including a wheelchair)?: A lot  Need to walk in hospital room?: A lot  Climbing 3-5 steps with a railing?: Unable    Plan  - Progressive mobility protocol initated  - PT/OT consulted  - Fall precautions in place  -pt would benefit from SNF prior to discharge home       Diabetes mellitus  Sliding scale held, accuchecks for hypoglycemia     Patient's FSGs are controlled on current medication regimen.  Last A1c reviewed-   Lab Results   Component Value Date    HGBA1C 5.5 10/11/2024     Most recent fingerstick glucose reviewed-   Recent Labs   Lab 12/31/24  2120 12/31/24  2202 01/01/25  0720 01/01/25  0754   POCTGLUCOSE 77 176* 47* 221*       Current correctional scale   hold on correction scale give A1C is 5.5  Antihyperglycemics (From admission, onward)      None          Hold Oral hypoglycemics while patient is in the hospital.    Unintentional weight loss  Nutrition consulted. Most recent weight and BMI monitored-     Measurements:  Wt Readings from Last 1 Encounters:    12/27/24 54.9 kg (121 lb 0.5 oz)   Body mass index is 22.87 kg/m².    RD consultation   Extensive outpatient evaluation obtained by GI and General surgery   Will add supplementation to meals       1. Continue texture modified diet- encourage intake as needed 2. Continue Boost supplements 3. RD following      Benign essential hypertension  Patient's blood pressure range in the last 24 hours was: BP  Min: 101/66  Max: 131/79.The patient's inpatient anti-hypertensive regimen is listed below:  Current Antihypertensives  lisinopriL tablet 10 mg, Daily, Oral  NIFEdipine 24 hr tablet 30 mg, Daily, Oral    Plan  - BP is controlled, no changes needed to their regimen  - will continue home regimen, decrease lisinopril dose       VTE Risk Mitigation (From admission, onward)           Ordered     enoxaparin injection 40 mg  Daily         12/26/24 1700     IP VTE HIGH RISK PATIENT  Once         12/26/24 1547     Place sequential compression device  Until discontinued         12/26/24 1547                    Discharge Planning   RITA: 1/3/2025     Code Status: Full Code   Medical Readiness for Discharge Date:   Discharge Plan A: Home Health                Please place Justification for DME        Tate Rubio MD  Department of Hospital Medicine   [facility] - Internal Medicine Telemetry

## 2025-01-02 NOTE — PROGRESS NOTES
Updated information sent to [facility] and [facility] SNFs via  [employer] portal for their review. CM will follow up to insure receipt.

## 2025-01-02 NOTE — SUBJECTIVE & OBJECTIVE
Interval History: Looks well, is verbal but confused.    Review of Systems  Objective:     Vital Signs (Most Recent):  Temp: 98.8 °F (37.1 °C) (01/01/25 1603)  Pulse: (!) 58 (01/01/25 1603)  Resp: 17 (01/01/25 1603)  BP: (!) 142/82 (01/01/25 1603)  SpO2: 96 % (01/01/25 1802) Vital Signs (24h Range):  Temp:  [97.6 °F (36.4 °C)-98.8 °F (37.1 °C)] 98.8 °F (37.1 °C)  Pulse:  [55-77] 58  Resp:  [17-18] 17  SpO2:  [95 %-98 %] 96 %  BP: (109-142)/(73-82) 142/82     Weight: 54.9 kg (121 lb 0.5 oz)  Body mass index is 22.87 kg/m².    Intake/Output Summary (Last 24 hours) at 1/1/2025 1849  Last data filed at 1/1/2025 1720  Gross per 24 hour   Intake 440 ml   Output 150 ml   Net 290 ml         Physical Exam  Constitutional:       Appearance: Normal appearance. She is not ill-appearing.   HENT:      Head: Normocephalic and atraumatic.      Nose: Nose normal.      Mouth/Throat:      Mouth: Mucous membranes are moist.   Eyes:      Extraocular Movements: Extraocular movements intact.      Pupils: Pupils are equal, round, and reactive to light.   Cardiovascular:      Rate and Rhythm: Normal rate and regular rhythm.      Heart sounds: No murmur heard.     No gallop.   Pulmonary:      Effort: Pulmonary effort is normal.      Breath sounds: Normal breath sounds. No wheezing or rales.   Abdominal:      General: Abdomen is flat. Bowel sounds are normal. There is no distension.      Palpations: Abdomen is soft.      Tenderness: There is no abdominal tenderness.   Musculoskeletal:         General: No swelling or tenderness. Normal range of motion.      Cervical back: Normal range of motion and neck supple.      Right lower leg: No edema.      Left lower leg: No edema.   Skin:     General: Skin is warm and dry.      Capillary Refill: Capillary refill takes less than 2 seconds.   Neurological:      General: No focal deficit present.      Motor: Weakness present.      Comments: Abnormal hand coordination    strength in tact      Psychiatric:         Mood and Affect: Mood normal.             Significant Labs: All pertinent labs within the past 24 hours have been reviewed.  BMP:   Recent Labs   Lab 01/02/25  0227   *      K 2.5*      CO2 24   BUN 12   CREATININE 0.6   CALCIUM 7.9*   MG 1.9       Significant Imaging: I have reviewed all pertinent imaging results/findings within the past 24 hours.

## 2025-01-02 NOTE — PROGRESS NOTES
Outstanding referrals with no decision are [facility] and [facility].  [facility] declined due to bed availability. [facility] declined because pt is too low functioning at this time.

## 2025-01-02 NOTE — HOSPITAL COURSE
While on the floor PT/OT were consulted and recommended SNF. Nutrition consulted. Discussed with GI outpatient workup and no indication for further inpatient workup. She continued to be altered while in the hospital. CT spine/head ordered for concern of fall showing ischemic changes and signficant stenosis and narrowing. She developed worsening hand weakness and numbness/tinglign with abnormal coordiation. Some of this was prior to admission but weakness significantly worsened. MRI ordered and showed old infarcts. She continued to refuse food and did not have abdominal complaints just would not eat. Started on IVF due to hypoglycemia. Acute encephalopathy workup initiated and negative aside from low folate. UA ordered but not performed. At this point if nutrition becomes suboptimal will have to consider other means of nutrition. Son with concern of worsening memory and possible dementia wanting placement in [place]. Started on supplements for poor nutrition including thiamine and folate to see if this will help with mental status. Family had extensive discussion with provider care team and decided to pursue NH with hospice services and sister (lives locally) would pursue mPOA. She has had intermitted problems with hypoglycemia and electrolyte disturbances.     Plan is NH with hospice services. She has been waiting for NH for > 1 week.     Patient remains stable for discharge with hospice services. No aggressive interventions performed. Urine dark but given goals proceeded with increased oral intake as tolerated. Accepted to [facility] awaiting final signatures and finalized paperwork    - patient discharge to facility, to enrol in hospice services at that facility.    Patient discharge in stable condition to facility    PE  No acute distress  Resting in bed, pleasantly confused  Heart RRR  Lungs CTA

## 2025-01-02 NOTE — PT/OT/SLP PROGRESS
Occupational Therapy   Treatment    Name: Bhavna Lim  MRN: [id number]  Admitting Diagnosis:  Failure to thrive in adult       Recommendations:     Discharge Recommendations: Moderate Intensity Therapy  Discharge Equipment Recommendations:  hospital bed, lift device, wheelchair  Barriers to discharge:  Decreased caregiver support    Assessment:     Bhavna Lim is a 79 y.o. female with a medical diagnosis of Failure to thrive in adult.  She presents with the fallowing performance deficits affecting function are weakness, impaired endurance, impaired self care skills, impaired functional mobility, gait instability, impaired balance, pain, decreased safety awareness, decreased lower extremity function, decreased upper extremity function, abnormal tone, decreased ROM, impaired cognition, decreased coordination, impaired fine motor, impaired coordination. Patient agreeable to tx session, patient continues to exhibits moments of confusion, not being self-aware of own self limitations. Patient also continues to  demonstrate significant weakness, poor coordination requiring extensive assistance with functional transfers, bed mobility, and self-care task. Today's session was limited due to patient was actively bleeding on IV site as patient was sitting up at EOB, patient was assisted back to supine position and nurse was notified. Patient would benefit from Moderate intensity therapy intervention to address over all functional decline with mobility task, endurance, and ADLs in order to return to PLOF.     Rehab Prognosis:  Good; patient would benefit from acute skilled OT services to address these deficits and reach maximum level of function.       Plan:     Patient to be seen 4 x/week to address the above listed problems via self-care/home management, therapeutic activities, therapeutic exercises, neuromuscular re-education  Plan of Care Expires: 01/26/25  Plan of Care Reviewed with: patient    Subjective  "    Chief Complaint: " I am tired, I just want to sleep"  Patient/Family Comments/goals: " I am ready to go home"  Pain/Comfort:  Pain Rating 1: 0/10    Objective:     Communicated with: Nurse prior to session.  Patient found HOB elevated with PureWick, peripheral IV, bed alarm upon OT entry to room.  Co-treated with PT due to patient complexity deficits requiring two skilled therapist to appropriately and safely mobilized patient while facilitating functional task in addition to accommodating for patient's activity tolerance.    General Precautions: Standard, fall    Orthopedic Precautions:N/A  Braces: N/A  Respiratory Status: Room air     Occupational Performance:     Bed Mobility:    Patient completed Rolling/Turning to Right with maximal assistance  Patient completed Scooting/Bridging with maximal assistance of 2 persons   Patient completed Supine to Sit with total assistance of 2 persons   Patient completed Sit to Supine with total assistance of 2 persons   Patient only able to sit up at EOB for 8 mins with Max A of 2 persons for dynamic and Max for static sitting balance   Patient noticed with poor trunk control, unable to maintain upright posture with rounded shoulders, cervical flexion, unable to use B UE to support herself.     Functional Mobility/Transfers:  Deferred further mobility task due to safety concerns     Activities of Daily Living:  Lower Body Dressing: total assistance to dave back gown       [facility] 6 Click ADL: 10    Treatment & Education:  Discussed OT POC and progress  Educated patient on the importance to continue to perform exercises in order to reduce stiffness and promote joint mobility and blood flow  Addressed patient's questions and concerns within [facility] scope of practice      Patient left HOB elevated with all lines intact, call button in reach, bed alarm on, and nurse present    GOALS:   Multidisciplinary Problems       Occupational Therapy Goals          Problem: Occupational Therapy "    Goal Priority Disciplines Outcome Interventions   Occupational Therapy Goal     OT, PT/OT Progressing    Description: Goals to be met by: 1/26/25     Patient will increase functional independence with ADLs by performing:    UE Dressing with Modified [place].  LE Dressing with [place].  Grooming while standing at sink with Modified [place].  Toileting from toilet/bedside commode with Modified [place] for hygiene and clothing management.   Supine to sit with Modified [place].  Toilet transfer to toilet/bedside commode with Stand-by Assistance.    Patient demonstrates a mobility limitation that significantly impairs their ability to participate in one or more mobility related activities of daily living. Patient's mobility limitation cannot be sufficiently resolved with the use of a cane, but can be sufficiently resolved with the use of a rolling walker.The use of a rolling walker will considerably improve their ability to participate in MRADLs. Patient will use the walker on a regular basis at home.                           Time Tracking:     OT Date of Treatment: 01/02/25  OT Start Time: 1111  OT Stop Time: 1128  OT Total Time (min): 17 min    Billable Minutes:Therapeutic Activity 17    OT/ABDULLAHI: ABDULLAHI     Number of ABDULLAHI visits since last OT visit: 2    1/2/2025

## 2025-01-02 NOTE — PLAN OF CARE
Problem: Adult Inpatient Plan of Care  Goal: Plan of Care Review  Outcome: Progressing  Goal: Patient-Specific Goal (Individualized)  Outcome: Progressing  Goal: Absence of Hospital-Acquired Illness or Injury  Outcome: Progressing  Goal: Optimal Comfort and Wellbeing  Outcome: Progressing  Goal: Readiness for Transition of Care  Outcome: Progressing     Problem: Diabetes Comorbidity  Goal: Blood Glucose Level Within Targeted Range  Outcome: Progressing     Problem: Skin Injury Risk Increased  Goal: Skin Health and Integrity  Outcome: Progressing     Problem: Confusion Acute  Goal: Optimal Cognitive Function  Outcome: Progressing     Problem: Wound  Goal: Optimal Coping  Outcome: Progressing  Goal: Optimal Functional Ability  Outcome: Progressing  Goal: Absence of Infection Signs and Symptoms  Outcome: Progressing  Goal: Improved Oral Intake  Outcome: Progressing  Goal: Optimal Pain Control and Function  Outcome: Progressing  Goal: Skin Health and Integrity  Outcome: Progressing  Goal: Optimal Wound Healing  Outcome: Progressing     Problem: Infection  Goal: Absence of Infection Signs and Symptoms  Outcome: Progressing

## 2025-01-03 ENCOUNTER — HOSPITAL ENCOUNTER (OUTPATIENT)
Dept: RADIOLOGY | Facility: HOSPITAL | Age: 80
Discharge: HOME | End: 2025-01-03
Attending: SURGERY
Payer: COMMERCIAL

## 2025-01-03 DIAGNOSIS — R63.4 UNINTENTIONAL WEIGHT LOSS: ICD-10-CM

## 2025-01-03 DIAGNOSIS — R68.81 EARLY SATIETY: ICD-10-CM

## 2025-01-03 PROBLEM — R62.7 FAILURE TO THRIVE IN ADULT: Status: RESOLVED | Noted: 2024-12-26 | Resolved: 2025-01-03

## 2025-01-03 PROBLEM — E46 MALNUTRITION: Status: ACTIVE | Noted: 2025-01-03

## 2025-01-03 LAB
ALBUMIN SERPL BCP-MCNC: 1.9 G/DL (ref 3.5–5.2)
ALP SERPL-CCNC: 224 U/L (ref 40–150)
ALT SERPL W/O P-5'-P-CCNC: 58 U/L (ref 10–44)
ANION GAP SERPL CALC-SCNC: 8 MMOL/L (ref 8–16)
AST SERPL-CCNC: 39 U/L (ref 10–40)
BILIRUB SERPL-MCNC: 1 MG/DL (ref 0.1–1)
BUN SERPL-MCNC: 9 MG/DL (ref 8–23)
CALCIUM SERPL-MCNC: 7.7 MG/DL (ref 8.7–10.5)
CHLORIDE SERPL-SCNC: 113 MMOL/L (ref 95–110)
CO2 SERPL-SCNC: 23 MMOL/L (ref 23–29)
CREAT SERPL-MCNC: 0.6 MG/DL (ref 0.5–1.4)
ERYTHROCYTE [DISTWIDTH] IN BLOOD BY AUTOMATED COUNT: 18 % (ref 11.5–14.5)
ERYTHROCYTE [DISTWIDTH] IN BLOOD BY AUTOMATED COUNT: 18 % (ref 11.5–14.5)
EST. GFR  (NO RACE VARIABLE): >60 ML/MIN/1.73 M^2
GLUCOSE SERPL-MCNC: 115 MG/DL (ref 70–110)
HCT VFR BLD AUTO: 31.8 % (ref 37–48.5)
HCT VFR BLD AUTO: 31.8 % (ref 37–48.5)
HGB BLD-MCNC: 10.8 G/DL (ref 12–16)
HGB BLD-MCNC: 10.8 G/DL (ref 12–16)
MAGNESIUM SERPL-MCNC: 1.8 MG/DL (ref 1.6–2.6)
MCH RBC QN AUTO: 28.1 PG (ref 27–31)
MCH RBC QN AUTO: 28.1 PG (ref 27–31)
MCHC RBC AUTO-ENTMCNC: 34 G/DL (ref 32–36)
MCHC RBC AUTO-ENTMCNC: 34 G/DL (ref 32–36)
MCV RBC AUTO: 83 FL (ref 82–98)
MCV RBC AUTO: 83 FL (ref 82–98)
OHS QRS DURATION: 76 MS
OHS QTC CALCULATION: 452 MS
PHOSPHATE SERPL-MCNC: 2 MG/DL (ref 2.7–4.5)
PLATELET # BLD AUTO: 110 K/UL (ref 150–450)
PLATELET # BLD AUTO: 110 K/UL (ref 150–450)
PMV BLD AUTO: ABNORMAL FL (ref 9.2–12.9)
PMV BLD AUTO: ABNORMAL FL (ref 9.2–12.9)
POCT GLUCOSE: 109 MG/DL (ref 70–110)
POCT GLUCOSE: 110 MG/DL (ref 70–110)
POCT GLUCOSE: 120 MG/DL (ref 70–110)
POCT GLUCOSE: 131 MG/DL (ref 70–110)
POTASSIUM SERPL-SCNC: 2.3 MMOL/L (ref 3.5–5.1)
PROT SERPL-MCNC: 5.3 G/DL (ref 6–8.4)
RBC # BLD AUTO: 3.84 M/UL (ref 4–5.4)
RBC # BLD AUTO: 3.84 M/UL (ref 4–5.4)
SODIUM SERPL-SCNC: 144 MMOL/L (ref 136–145)
WBC # BLD AUTO: 11.81 K/UL (ref 3.9–12.7)
WBC # BLD AUTO: 11.81 K/UL (ref 3.9–12.7)

## 2025-01-03 PROCEDURE — 25000003 PHARM REV CODE 250: Performed by: INTERNAL MEDICINE

## 2025-01-03 PROCEDURE — 36415 COLL VENOUS BLD VENIPUNCTURE: CPT | Performed by: STUDENT IN AN ORGANIZED HEALTH CARE EDUCATION/TRAINING PROGRAM

## 2025-01-03 PROCEDURE — 21400001 HC TELEMETRY ROOM

## 2025-01-03 PROCEDURE — 63600175 PHARM REV CODE 636 W HCPCS: Performed by: INTERNAL MEDICINE

## 2025-01-03 PROCEDURE — 84100 ASSAY OF PHOSPHORUS: CPT | Performed by: STUDENT IN AN ORGANIZED HEALTH CARE EDUCATION/TRAINING PROGRAM

## 2025-01-03 PROCEDURE — S4991 NICOTINE PATCH NONLEGEND: HCPCS | Performed by: STUDENT IN AN ORGANIZED HEALTH CARE EDUCATION/TRAINING PROGRAM

## 2025-01-03 PROCEDURE — 25000003 PHARM REV CODE 250: Performed by: STUDENT IN AN ORGANIZED HEALTH CARE EDUCATION/TRAINING PROGRAM

## 2025-01-03 PROCEDURE — 83735 ASSAY OF MAGNESIUM: CPT | Performed by: STUDENT IN AN ORGANIZED HEALTH CARE EDUCATION/TRAINING PROGRAM

## 2025-01-03 PROCEDURE — 85027 COMPLETE CBC AUTOMATED: CPT | Performed by: STUDENT IN AN ORGANIZED HEALTH CARE EDUCATION/TRAINING PROGRAM

## 2025-01-03 PROCEDURE — 80053 COMPREHEN METABOLIC PANEL: CPT | Performed by: STUDENT IN AN ORGANIZED HEALTH CARE EDUCATION/TRAINING PROGRAM

## 2025-01-03 PROCEDURE — 63600175 PHARM REV CODE 636 W HCPCS: Performed by: STUDENT IN AN ORGANIZED HEALTH CARE EDUCATION/TRAINING PROGRAM

## 2025-01-03 PROCEDURE — 93005 ELECTROCARDIOGRAM TRACING: CPT

## 2025-01-03 RX ORDER — POTASSIUM CHLORIDE 7.45 MG/ML
10 INJECTION INTRAVENOUS
Status: COMPLETED | OUTPATIENT
Start: 2025-01-03 | End: 2025-01-03

## 2025-01-03 RX ADMIN — MUPIROCIN: 20 OINTMENT TOPICAL at 09:01

## 2025-01-03 RX ADMIN — POTASSIUM CHLORIDE 10 MEQ: 7.46 INJECTION, SOLUTION INTRAVENOUS at 09:01

## 2025-01-03 RX ADMIN — NICOTINE 1 PATCH: 7 PATCH, EXTENDED RELEASE TRANSDERMAL at 09:01

## 2025-01-03 RX ADMIN — ENOXAPARIN SODIUM 40 MG: 40 INJECTION SUBCUTANEOUS at 05:01

## 2025-01-03 RX ADMIN — POTASSIUM BICARBONATE 20 MEQ: 391 TABLET, EFFERVESCENT ORAL at 09:01

## 2025-01-03 RX ADMIN — DEXTROSE AND SODIUM CHLORIDE: 5; 900 INJECTION, SOLUTION INTRAVENOUS at 05:01

## 2025-01-03 RX ADMIN — MUPIROCIN: 20 OINTMENT TOPICAL at 07:01

## 2025-01-03 RX ADMIN — Medication 1 TABLET: at 07:01

## 2025-01-03 RX ADMIN — POTASSIUM CHLORIDE 10 MEQ: 7.46 INJECTION, SOLUTION INTRAVENOUS at 10:01

## 2025-01-03 RX ADMIN — CEFTRIAXONE SODIUM 1 G: 1 INJECTION, POWDER, FOR SOLUTION INTRAMUSCULAR; INTRAVENOUS at 05:01

## 2025-01-03 NOTE — PLAN OF CARE
Problem: Adult Inpatient Plan of Care  Goal: Plan of Care Review  Outcome: Progressing  Goal: Patient-Specific Goal (Individualized)  Outcome: Progressing  Goal: Absence of Hospital-Acquired Illness or Injury  Outcome: Progressing  Goal: Optimal Comfort and Wellbeing  Outcome: Progressing  Goal: Readiness for Transition of Care  Outcome: Progressing     Problem: Diabetes Comorbidity  Goal: Blood Glucose Level Within Targeted Range  Outcome: Progressing     Problem: Skin Injury Risk Increased  Goal: Skin Health and Integrity  Outcome: Progressing     Problem: Confusion Acute  Goal: Optimal Cognitive Function  Outcome: Progressing     Problem: Wound  Goal: Optimal Coping  Outcome: Progressing  Goal: Optimal Functional Ability  Outcome: Progressing  Goal: Absence of Infection Signs and Symptoms  Outcome: Progressing  Goal: Improved Oral Intake  Outcome: Progressing  Goal: Optimal Pain Control and Function  Outcome: Progressing  Goal: Skin Health and Integrity  Outcome: Progressing  Goal: Optimal Wound Healing  Outcome: Progressing     Problem: Infection  Goal: Absence of Infection Signs and Symptoms  Outcome: Progressing     Problem: Coping Ineffective  Goal: Effective Coping  Outcome: Progressing

## 2025-01-03 NOTE — NURSING
Patient refusing to wake up and take medications. Patient refusing to eat breakfast at this time.    No

## 2025-01-03 NOTE — CONSULTS
"[facility] Medicine Tele[facility]  Gastroenterology  Consult Note    Patient Name: Bhavna Lim  MRN: [id number]  Admission Date: 12/26/2024  Hospital Length of Stay: 5 days  Code Status: Full Code   Attending Provider: Tate Rubio MD   Consulting Provider: Edith Nolan MD  Primary Care Physician: Suzi Green MD  Principal Problem:Malnutrition    Inpatient consult to Gastroenterology  Consult performed by: Edith Nolan MD  Consult ordered by: Tate Rubio MD        Subjective:     HPI:  Mrs. Bhavna Lim is a 79 year old F with a history of HTN and HLD who presents to the ED for weakness and fatigue. She has a history of weight loss and loss of appetite. Patient's nutrition has been suboptimal because the patient continued to refuse food and did not have abdominal complaints just would not eat. Patient had been requiring IVF administration for hypoglycemic episodes during this admission. Underwent extensive gi outpatient workup. Seen by gen surg in dec 2024 as outpatient who recommended gastric emptying study and possible EUS consideration by Gi. During the encounter with the patient today, she was disoriented and very sleepy. Patient was reluctant to cooperate in interview. Was not in any apparent distress but kept mentioning her 'fear of getting hurt if I eat".    Gi procedures:  12/17/24 EGD Normal esophagus.                          - Gastric mucosal atrophy. Biopsied.                          - Normal examined duodenum.   12/17/24 colonoscopy: - One 5 mm polyp in the ascending colon, removed                          with a cold snare. Resected and retrieved.                          - Two 4 to 6 mm polyps in the sigmoid colon,                          removed with a cold snare. Resected and retrieved.                          - Diverticulosis in the sigmoid colon.   MRCP 11/28/2024:  Cholelithiasis with gallbladder distension.  No evidence for acute cholecystitis.  2. " Intrahepatic and extrahepatic biliary dilatation.  No filling defect or evidence for obstructing mass.  3. Mildly complex right hepatic lobe cyst.    Vitals: BP: 112/55  temp 36.8  spo2: 91% pulse 93bpm  Reason for consult: PEG tube insertion    Past Medical History:   Diagnosis Date    Diabetes mellitus     Hyperlipidemia     Hypertension     Ingrown nail 2015    Bilateral great toe nail. No edema; tenderness or drainage.      Nail fungus 2015    To bilateral toenails X 10.      Obesity (BMI 30-39.9) 2024    Open wound of knee, leg, and ankle 2015       Past Surgical History:   Procedure Laterality Date     SECTION      COLONOSCOPY N/A 2024    Procedure: COLONOSCOPY;  Surgeon: Arturo Montalvo MD;  [facility];  Service: Endoscopy;  Laterality: N/A;   ref by Jocelin Torres NP, Sutab, instructions given to pt in ofc and portal, cardiac clearance sent. edmund    ESOPHAGOGASTRODUODENOSCOPY N/A 2024    Procedure: EGD (ESOPHAGOGASTRODUODENOSCOPY);  Surgeon: Arturo Montalvo MD;  Location: [facility];  Service: Endoscopy;  Laterality: N/A;  cleared by cardiology Dr Pena-teodora BARFIELD consult dated 24-GT  12/10-[facility] for pre call-tb       Review of patient's allergies indicates:  No Known Allergies  Family History    None       Tobacco Use    Smoking status: Some Days     Types: Cigarettes    Smokeless tobacco: Not on file   Substance and Sexual Activity    Alcohol use: Yes     Comment: rarely    Drug use: No    Sexual activity: Not on file     Review of Systems  Objective:     Vital Signs (Most Recent):  Temp: 98.3 °F (36.8 °C) (25)  Pulse: 93 (25)  Resp: 18 (25)  BP: (!) 112/55 (25)  SpO2: (!) 91 % (25 08) Vital Signs (24h Range):  Temp:  [97.8 °F (36.6 °C)-98.8 °F (37.1 °C)] 98.3 °F (36.8 °C)  Pulse:  [] 93  Resp:  [18] 18  SpO2:  [91 %-98 %] 91 %  BP: (107-129)/(55-80) 112/55     Weight: 54.9 kg (121 lb 0.5 oz) (24  0544)  Body mass index is 22.87 kg/m².      Intake/Output Summary (Last 24 hours) at 1/3/2025 1016  Last data filed at 1/3/2025 0936  Gross per 24 hour   Intake 100 ml   Output 1200 ml   Net -1100 ml       Lines/Drains/Airways       Drain  Duration                  Urethral Catheter 01/02/25 1214 <1 day              Peripheral Intravenous Line  Duration                  Peripheral IV - Single Lumen 01/02/25 1444 20 G Left Antecubital <1 day                     Physical Exam  Constitutional:       General: She is not in acute distress.     Appearance: She is not ill-appearing, toxic-appearing or diaphoretic.      Comments: Drowsy, sleepy and reluctant to answer questions.   HENT:      Head: Normocephalic and atraumatic.   Eyes:      Pupils: Pupils are equal, round, and reactive to light.   Cardiovascular:      Rate and Rhythm: Normal rate and regular rhythm.      Pulses: Normal pulses.      Heart sounds: Normal heart sounds.   Pulmonary:      Effort: No respiratory distress.      Breath sounds: Normal breath sounds. No wheezing.   Abdominal:      General: Bowel sounds are normal.      Palpations: Abdomen is soft.      Tenderness: There is no abdominal tenderness. There is no guarding.   Musculoskeletal:      Right lower leg: No edema.      Left lower leg: No edema.   Neurological:      Mental Status: She is alert. She is disoriented.      Comments: Disoriented to time, person and place   Psychiatric:      Comments: Patient kept saying she is afraid of being hurt from food intake  Afraid of death also  She mentioned that we are currently in the gym           Significant Labs:  All pertinent lab results from the last 24 hours have been reviewed.    Significant Imaging:  Imaging results within the past 24 hours have been reviewed.  Assessment/Plan:       * Malnutrition  Mrs. Bhavna Lim is a 79 year old F with a history of HTN and HLD who presents to the ED for weakness and fatigue. She has a history of weight loss  and loss of appetite. Patient's nutrition has been suboptimal because the patient continued to refuse food and did not have abdominal complaints just would not eat. Underwent extensive Gi workup- noncontributory aside from cholelithiasis and intrahepatic and extrahepatic biliary dilatation.  During the encounter she was drowsy and very sleepy and displayed disorientation to both time, person, and place. Patient kept complaining of 'being scared of getting hurt from food'    Recommendation:  -- palliative care consult  -- appetite stimulants prior to consideration of PEG insertion.        Thank you for your consult.     Edith Nolan MD  Gastroenterology  [facility]

## 2025-01-03 NOTE — ASSESSMENT & PLAN NOTE
Mrs. Bhavna Lim is a 79 year old F with a history of HTN and HLD who presents to the ED for weakness and fatigue. She has a history of weight loss and loss of appetite. Patient's nutrition has been suboptimal because the patient continued to refuse food and did not have abdominal complaints just would not eat. Underwent extensive Gi workup- noncontributory aside from cholelithiasis and intrahepatic and extrahepatic biliary dilatation.  During the encounter she was drowsy and very sleepy and displayed disorientation to both time, person, and place. Patient kept complaining of 'being scared of getting hurt from food'    Recommendation:  -- palliative care consult  -- appetite stimulants prior to consideration of PEG insertion.

## 2025-01-03 NOTE — SUBJECTIVE & OBJECTIVE
Past Medical History:   Diagnosis Date    Diabetes mellitus     Hyperlipidemia     Hypertension     Ingrown nail 2015    Bilateral great toe nail. No edema; tenderness or drainage.      Nail fungus 2015    To bilateral toenails X 10.      Obesity (BMI 30-39.9) 2024    Open wound of knee, leg, and ankle 2015       Past Surgical History:   Procedure Laterality Date     SECTION      COLONOSCOPY N/A 2024    Procedure: COLONOSCOPY;  Surgeon: Arturo Montalvo MD;  Location: [facility] ENDO;  Service: Endoscopy;  Laterality: N/A;   ref by Jocelin Torres NP, Sut[place], instructions given to pt in ofc and portal, cardiac clearance sent. edmund    ESOPHAGOGASTRODUODENOSCOPY N/A 2024    Procedure: EGD (ESOPHAGOGASTRODUODENOSCOPY);  Surgeon: Arturo Montalvo MD;  Location: [facility] ENDO;  Service: Endoscopy;  Laterality: N/A;  cleared by cardiology Dr Pena-see E consult dated 24-GT  12/10-[facility] for pre call-tb       Review of patient's allergies indicates:  No Known Allergies  Family History    None       Tobacco Use    Smoking status: Some Days     Types: Cigarettes    Smokeless tobacco: Not on file   Substance and Sexual Activity    Alcohol use: Yes     Comment: rarely    Drug use: No    Sexual activity: Not on file     Review of Systems  Objective:     Vital Signs (Most Recent):  Temp: 98.3 °F (36.8 °C) (25)  Pulse: 93 (25)  Resp: 18 (25)  BP: (!) 112/55 (25)  SpO2: (!) 91 % (25 08) Vital Signs (24h Range):  Temp:  [97.8 °F (36.6 °C)-98.8 °F (37.1 °C)] 98.3 °F (36.8 °C)  Pulse:  [] 93  Resp:  [18] 18  SpO2:  [91 %-98 %] 91 %  BP: (107-129)/(55-80) 112/55     Weight: 54.9 kg (121 lb 0.5 oz) (24 0544)  Body mass index is 22.87 kg/m².      Intake/Output Summary (Last 24 hours) at 1/3/2025 1016  Last data filed at 1/3/2025 0936  Gross per 24 hour   Intake 100 ml   Output 1200 ml   Net -1100 ml       Lines/Drains/Airways       Drain   Duration                  Urethral Catheter 01/02/25 1214 <1 day              Peripheral Intravenous Line  Duration                  Peripheral IV - Single Lumen 01/02/25 1444 20 G Left Antecubital <1 day                     Physical Exam  Constitutional:       General: She is not in acute distress.     Appearance: She is not ill-appearing, toxic-appearing or diaphoretic.      Comments: Drowsy, sleepy and reluctant to answer questions.   HENT:      Head: Normocephalic and atraumatic.   Eyes:      Pupils: Pupils are equal, round, and reactive to light.   Cardiovascular:      Rate and Rhythm: Normal rate and regular rhythm.      Pulses: Normal pulses.      Heart sounds: Normal heart sounds.   Pulmonary:      Effort: No respiratory distress.      Breath sounds: Normal breath sounds. No wheezing.   Abdominal:      General: Bowel sounds are normal.      Palpations: Abdomen is soft.      Tenderness: There is no abdominal tenderness. There is no guarding.   Musculoskeletal:      Right lower leg: No edema.      Left lower leg: No edema.   Neurological:      Mental Status: She is alert. She is disoriented.      Comments: Disoriented to time, person and place   Psychiatric:      Comments: Patient kept saying she is afraid of being hurt from food intake  Afraid of death also  She mentioned that we are currently in the gym           Significant Labs:  All pertinent lab results from the last 24 hours have been reviewed.    Significant Imaging:  Imaging results within the past 24 hours have been reviewed.

## 2025-01-03 NOTE — SUBJECTIVE & OBJECTIVE
Interval History: Spoke with family yesterday and they would to proceed with PEG if possible.    Review of Systems  Objective:     Vital Signs (Most Recent):  Temp: 98.3 °F (36.8 °C) (01/03/25 0719)  Pulse: 93 (01/03/25 0719)  Resp: 18 (01/03/25 0719)  BP: (!) 112/55 (01/03/25 0719)  SpO2: (!) 91 % (01/03/25 0839) Vital Signs (24h Range):  Temp:  [97.8 °F (36.6 °C)-98.8 °F (37.1 °C)] 98.3 °F (36.8 °C)  Pulse:  [] 93  Resp:  [18] 18  SpO2:  [91 %-98 %] 91 %  BP: (107-129)/(55-80) 112/55     Weight: 54.9 kg (121 lb 0.5 oz)  Body mass index is 22.87 kg/m².    Intake/Output Summary (Last 24 hours) at 1/3/2025 0905  Last data filed at 1/2/2025 1825  Gross per 24 hour   Intake 200 ml   Output 1050 ml   Net -850 ml         Physical Exam  Constitutional:       Appearance: Normal appearance. She is not ill-appearing.   HENT:      Head: Normocephalic and atraumatic.      Nose: Nose normal.      Mouth/Throat:      Mouth: Mucous membranes are moist.   Eyes:      Extraocular Movements: Extraocular movements intact.      Pupils: Pupils are equal, round, and reactive to light.   Cardiovascular:      Rate and Rhythm: Normal rate and regular rhythm.      Heart sounds: No murmur heard.     No gallop.   Pulmonary:      Effort: Pulmonary effort is normal.      Breath sounds: Normal breath sounds. No wheezing or rales.   Abdominal:      General: Abdomen is flat. Bowel sounds are normal. There is no distension.      Palpations: Abdomen is soft.      Tenderness: There is no abdominal tenderness.   Musculoskeletal:         General: No swelling or tenderness. Normal range of motion.      Cervical back: Normal range of motion and neck supple.      Right lower leg: No edema.      Left lower leg: No edema.   Skin:     General: Skin is warm and dry.      Capillary Refill: Capillary refill takes less than 2 seconds.   Neurological:      General: No focal deficit present.      Motor: Weakness present.      Comments: Abnormal hand  coordination    strength in tact     Psychiatric:         Mood and Affect: Mood normal.             Significant Labs: All pertinent labs within the past 24 hours have been reviewed.  BMP:   Recent Labs   Lab 01/03/25  0628   *      K 2.3*   *   CO2 23   BUN 9   CREATININE 0.6   CALCIUM 7.7*   MG 1.8       Significant Imaging: I have reviewed all pertinent imaging results/findings within the past 24 hours.

## 2025-01-03 NOTE — PROGRESS NOTES
[facility] - Internal Medicine [facility] Medicine  Progress Note    Patient Name: Bhavna Lim  MRN: [id number]  Patient Class: IP- Inpatient   Admission Date: 12/26/2024  Length of Stay: 5 days  Attending Physician: Tate Rubio MD  Primary Care Provider: Suzi Green MD        Subjective     Principal Problem:Failure to thrive in adult        HPI:  Mrs. Bhavna Lim is a 79 year old F with a history of HTN and HLD who presents to the ED for weakness and fatigue. She has been having weight loss and poor po intake over the last few months. She has been seen by GI and evaluated with EGD and Colonoscopy as well as MRCP which showed cholelithiasis with gallbladder distention and intrahepatic and extrahepatic biliary dilation. No significant abnormalities on EGD and coloscopy. She was referred to surgery who recommended NM study and possible EUS with GI. She has been unable to tolerate any solid foods. She sometimes takes a little bit of soup and has tried protein shakes. She feels full immediately. She does endorse some pain in her epigastric region ever since having her EGD performed. She occasionally has vomiting. Denies regurgitation of food. She continues to have worsening weakness and falls at home. She had two falls yesterday due to weakness. She denies significant dizziness. She is unable to walk due to her weakness and sister is concerned this is related to her poor nutrition.      In the ED VSS. Labs notable for Potassium of 3.0 which was repleted in the ED. No focal signs concerning for stroke. She was admitted to medicine for further management.      Overview/Hospital Course:  While on the floor PT/OT were consulted and recommended SNF. Nutrition consulted. Discussed with GI outpatient workup and no indication for further inpatient workup. She continued to be altered while in the hospital. CT spine/head ordered for concern of fall showing ischemic changes and signficant stenosis  and narrowing. She developed worsening hand weakness and numbness/tinglign with abnormal coordiation. Some of this was prior to admission but weakness significantly worsened. MRI ordered and showed old infarcts. She continued to refuse food and did not have abdominal complaints just would not eat. Started on IVF due to hypoglycemia. Acute encephalopathy workup initiated and negative aside from low folate. UA ordered but not performed. At this point if nutrition becomes suboptimal will have to consider other means of nutrition. Son with concern of worsening memory and possible dementia wanting placement in [place]. Started on supplements for poor nutrition including thiamine and folate to see if this will help with mental status     Interval History: Spoke with family yesterday and they would to proceed with PEG if possible.    Review of Systems  Objective:     Vital Signs (Most Recent):  Temp: 98.3 °F (36.8 °C) (01/03/25 0719)  Pulse: 93 (01/03/25 0719)  Resp: 18 (01/03/25 0719)  BP: (!) 112/55 (01/03/25 0719)  SpO2: (!) 91 % (01/03/25 0839) Vital Signs (24h Range):  Temp:  [97.8 °F (36.6 °C)-98.8 °F (37.1 °C)] 98.3 °F (36.8 °C)  Pulse:  [] 93  Resp:  [18] 18  SpO2:  [91 %-98 %] 91 %  BP: (107-129)/(55-80) 112/55     Weight: 54.9 kg (121 lb 0.5 oz)  Body mass index is 22.87 kg/m².    Intake/Output Summary (Last 24 hours) at 1/3/2025 0905  Last data filed at 1/2/2025 1825  Gross per 24 hour   Intake 200 ml   Output 1050 ml   Net -850 ml         Physical Exam  Constitutional:       Appearance: Normal appearance. She is not ill-appearing.   HENT:      Head: Normocephalic and atraumatic.      Nose: Nose normal.      Mouth/Throat:      Mouth: Mucous membranes are moist.   Eyes:      Extraocular Movements: Extraocular movements intact.      Pupils: Pupils are equal, round, and reactive to light.   Cardiovascular:      Rate and Rhythm: Normal rate and regular rhythm.      Heart sounds: No murmur heard.     No  gallop.   Pulmonary:      Effort: Pulmonary effort is normal.      Breath sounds: Normal breath sounds. No wheezing or rales.   Abdominal:      General: Abdomen is flat. Bowel sounds are normal. There is no distension.      Palpations: Abdomen is soft.      Tenderness: There is no abdominal tenderness.   Musculoskeletal:         General: No swelling or tenderness. Normal range of motion.      Cervical back: Normal range of motion and neck supple.      Right lower leg: No edema.      Left lower leg: No edema.   Skin:     General: Skin is warm and dry.      Capillary Refill: Capillary refill takes less than 2 seconds.   Neurological:      General: No focal deficit present.      Motor: Weakness present.      Comments: Abnormal hand coordination    strength in tact     Psychiatric:         Mood and Affect: Mood normal.             Significant Labs: All pertinent labs within the past 24 hours have been reviewed.  BMP:   Recent Labs   Lab 01/03/25  0628   *      K 2.3*   *   CO2 23   BUN 9   CREATININE 0.6   CALCIUM 7.7*   MG 1.8       Significant Imaging: I have reviewed all pertinent imaging results/findings within the past 24 hours.    Assessment and Plan     * Failure to thrive in adult  -history of weight loss and poor appetite  -worked up outpatient by GI team with MRCP, EGD, Colonoscopy and General Surgery follow up   -MRCP with intra and extrahepatic ductal dilation with no filling defect or obstructing mass  -planning for NM gastric emptying studying outpatient and possible EUS with GI   -given progressive weakness and falls, PT/OT consulted. Significant weakness for which she is not safe for dispo home.   -nutrition consult   -discussed with GI limited inpatient studies for evaluation, would proceed with outpatient follow up and imaging scheduled   -patient at times confused as well, found trying to smoke in the hospital possible cognitive impairment which could be contributing to this    -will continue to monitor PO intake and optimize nausea control, will assist with meals as able   -repeat abdominal US given elevated liver enzymes this Ams  -labs are consistent with chronically poor nutrition, patient refusing to eat at times.             Cognitive impairment  -history of confusion and forgetfulness prior to admission  -appears to be more confused inpatient as well   -given impairment and abnormal coordination MRI and C spine ordered  -consider Neurology consult if failure to improve in these symptoms   -HIV/RPR negative. Folate low, repleting. B12 high. Thiamine pending. Possibly 2/2 to dementia with poor nutritional intake.     Hypoglycemia  -episodes of hypoglycemia overnight  -given amp of D50, started on IV D10W   -monitor BG closely  -likely related to poor PO intake       Numbness and tingling in both hands  -noted by patient before admission with abnormal finger to nose and weakness   -CT head and spine reviewed. There is significant stenosis/narrowing of C6 and C7 which could be contributing to symptoms. Findings appear chronic and not acute. MRI ordered and notable for cervical stenosis and lacunar infarcts. Limited visiblity due to movement   -CT head notable for remote lacunar infarcts, hx of stroke in 2005 currently on ASA   -continue PT/OT         Severe protein-calorie malnutrition  Nutrition consulted. Most recent weight and BMI monitored-     Measurements:  Wt Readings from Last 1 Encounters:   12/27/24 54.9 kg (121 lb 0.5 oz)   Body mass index is 22.87 kg/m².    Patient has been screened and assessed by RD.    Malnutrition Type:  Context: chronic illness  Level: severe    Malnutrition Characteristic Summary:  Weight Loss (Malnutrition): greater than 5% in 1 month  Energy Intake (Malnutrition): less than 75% for greater than or equal to 1 month  Subcutaneous Fat (Malnutrition): severe depletion  Muscle Mass (Malnutrition): severe depletion    Interventions/Recommendations  (treatment strategy):  1. Continue texture modified diet- encourage intake as needed 2. Continue Boost supplements 3. RD following      Hypokalemia  Patient's most recent potassium results are listed below.   Recent Labs     12/30/24  0541 12/31/24  0540 01/01/25  0352   K 3.0* 4.9 3.0*       Plan  - Replete potassium per protocol  - Monitor potassium Daily  - Patient's hypokalemia is improving    Debility  Patient with Acute debility due to age-related physical debility and other reduced mobility. The patient's latest AMPAC (Activity Measure for Post Acute Care) Score is listed below.    AM-PAC Score - How much help does the patient need for each activity listed  Basic Mobility Total Score: 14  Turning over in bed (including adjusting bedclothes, sheets and blankets)?: A little  Sitting down on and standing up from a chair with arms (e.g., wheelchair, bedside commode, etc.): A little  Moving from lying on back to sitting on the side of the bed?: A little  Moving to and from a bed to a chair (including a wheelchair)?: A lot  Need to walk in hospital room?: A lot  Climbing 3-5 steps with a railing?: Unable    Plan  - Progressive mobility protocol initated  - PT/OT consulted  - Fall precautions in place  -pt would benefit from SNF prior to discharge home       Diabetes mellitus  Sliding scale held, accuchecks for hypoglycemia     Patient's FSGs are controlled on current medication regimen.  Last A1c reviewed-   Lab Results   Component Value Date    HGBA1C 5.5 10/11/2024     Most recent fingerstick glucose reviewed-   Recent Labs   Lab 12/31/24  2120 12/31/24  2202 01/01/25  0720 01/01/25  0754   POCTGLUCOSE 77 176* 47* 221*       Current correctional scale   hold on correction scale give A1C is 5.5  Antihyperglycemics (From admission, onward)      None          Hold Oral hypoglycemics while patient is in the hospital.    Unintentional weight loss  Nutrition consulted. Most recent weight and BMI monitored-      Measurements:  Wt Readings from Last 1 Encounters:   12/27/24 54.9 kg (121 lb 0.5 oz)   Body mass index is 22.87 kg/m².    RD consultation   Extensive outpatient evaluation obtained by GI and General surgery   Will add supplementation to meals       1. Continue texture modified diet- encourage intake as needed 2. Continue Boost supplements 3. RD following      Benign essential hypertension  Patient's blood pressure range in the last 24 hours was: BP  Min: 101/66  Max: 131/79.The patient's inpatient anti-hypertensive regimen is listed below:  Current Antihypertensives  lisinopriL tablet 10 mg, Daily, Oral  NIFEdipine 24 hr tablet 30 mg, Daily, Oral    Plan  - BP is controlled, no changes needed to their regimen  - will continue home regimen, decrease lisinopril dose       VTE Risk Mitigation (From admission, onward)           Ordered     enoxaparin injection 40 mg  Daily         12/26/24 1700     IP VTE HIGH RISK PATIENT  Once         12/26/24 1547     Place sequential compression device  Until discontinued         12/26/24 1547                    Discharge Planning   RITA: 1/6/2025     Code Status: Full Code   Medical Readiness for Discharge Date:   Discharge Plan A: Home Health                Please place Justification for DME        Tate Rubio MD  Department of Hospital Medicine   [facility] - Internal Medicine Telemetry

## 2025-01-03 NOTE — HPI
"Mrs. Bhavna Lim is a 79 year old F with a history of HTN and HLD who presents to the ED for weakness and fatigue. She has a history of weight loss and loss of appetite. Patient's nutrition has been suboptimal because the patient continued to refuse food and did not have abdominal complaints just would not eat. Patient had been requiring IVF administration for hypoglycemic episodes during this admission. Underwent extensive gi outpatient workup. Seen by gen surg in dec 2024 as outpatient who recommended gastric emptying study and possible EUS consideration by Gi. During the encounter with the patient today, she was disoriented and very sleepy. Patient was reluctant to cooperate in interview. Was not in any apparent distress but kept mentioning her 'fear of getting hurt if I eat".    Gi procedures:  12/17/24 EGD Normal esophagus.                          - Gastric mucosal atrophy. Biopsied.                          - Normal examined duodenum.   12/17/24 colonoscopy: - One 5 mm polyp in the ascending colon, removed                          with a cold snare. Resected and retrieved.                          - Two 4 to 6 mm polyps in the sigmoid colon,                          removed with a cold snare. Resected and retrieved.                          - Diverticulosis in the sigmoid colon.   MRCP 11/28/2024:  Cholelithiasis with gallbladder distension.  No evidence for acute cholecystitis.  2. Intrahepatic and extrahepatic biliary dilatation.  No filling defect or evidence for obstructing mass.  3. Mildly complex right hepatic lobe cyst.    Vitals: BP: 112/55  temp 36.8  spo2: 91% pulse 93bpm  Reason for consult: PEG tube insertion  "

## 2025-01-04 LAB
ANION GAP SERPL CALC-SCNC: 8 MMOL/L (ref 8–16)
BUN SERPL-MCNC: 8 MG/DL (ref 8–23)
CALCIUM SERPL-MCNC: 7.8 MG/DL (ref 8.7–10.5)
CHLORIDE SERPL-SCNC: 117 MMOL/L (ref 95–110)
CO2 SERPL-SCNC: 22 MMOL/L (ref 23–29)
CREAT SERPL-MCNC: 0.6 MG/DL (ref 0.5–1.4)
ERYTHROCYTE [DISTWIDTH] IN BLOOD BY AUTOMATED COUNT: 18 % (ref 11.5–14.5)
EST. GFR  (NO RACE VARIABLE): >60 ML/MIN/1.73 M^2
GLUCOSE SERPL-MCNC: 85 MG/DL (ref 70–110)
HCT VFR BLD AUTO: 28.7 % (ref 37–48.5)
HGB BLD-MCNC: 9.9 G/DL (ref 12–16)
MCH RBC QN AUTO: 27.9 PG (ref 27–31)
MCHC RBC AUTO-ENTMCNC: 34.5 G/DL (ref 32–36)
MCV RBC AUTO: 81 FL (ref 82–98)
PLATELET # BLD AUTO: 110 K/UL (ref 150–450)
PMV BLD AUTO: ABNORMAL FL (ref 9.2–12.9)
POCT GLUCOSE: 102 MG/DL (ref 70–110)
POCT GLUCOSE: 103 MG/DL (ref 70–110)
POCT GLUCOSE: 117 MG/DL (ref 70–110)
POCT GLUCOSE: 88 MG/DL (ref 70–110)
POTASSIUM SERPL-SCNC: 2.4 MMOL/L (ref 3.5–5.1)
RBC # BLD AUTO: 3.55 M/UL (ref 4–5.4)
SODIUM SERPL-SCNC: 147 MMOL/L (ref 136–145)
WBC # BLD AUTO: 12.43 K/UL (ref 3.9–12.7)

## 2025-01-04 PROCEDURE — 63600175 PHARM REV CODE 636 W HCPCS: Performed by: STUDENT IN AN ORGANIZED HEALTH CARE EDUCATION/TRAINING PROGRAM

## 2025-01-04 PROCEDURE — 36415 COLL VENOUS BLD VENIPUNCTURE: CPT | Performed by: INTERNAL MEDICINE

## 2025-01-04 PROCEDURE — 63600175 PHARM REV CODE 636 W HCPCS: Performed by: INTERNAL MEDICINE

## 2025-01-04 PROCEDURE — S4991 NICOTINE PATCH NONLEGEND: HCPCS | Performed by: STUDENT IN AN ORGANIZED HEALTH CARE EDUCATION/TRAINING PROGRAM

## 2025-01-04 PROCEDURE — 36415 COLL VENOUS BLD VENIPUNCTURE: CPT | Performed by: STUDENT IN AN ORGANIZED HEALTH CARE EDUCATION/TRAINING PROGRAM

## 2025-01-04 PROCEDURE — 25000003 PHARM REV CODE 250: Performed by: STUDENT IN AN ORGANIZED HEALTH CARE EDUCATION/TRAINING PROGRAM

## 2025-01-04 PROCEDURE — 25000003 PHARM REV CODE 250: Performed by: INTERNAL MEDICINE

## 2025-01-04 PROCEDURE — 21400001 HC TELEMETRY ROOM

## 2025-01-04 PROCEDURE — 80048 BASIC METABOLIC PNL TOTAL CA: CPT | Performed by: INTERNAL MEDICINE

## 2025-01-04 PROCEDURE — 25000003 PHARM REV CODE 250

## 2025-01-04 PROCEDURE — 85027 COMPLETE CBC AUTOMATED: CPT | Performed by: STUDENT IN AN ORGANIZED HEALTH CARE EDUCATION/TRAINING PROGRAM

## 2025-01-04 RX ORDER — POTASSIUM CHLORIDE 7.45 MG/ML
10 INJECTION INTRAVENOUS ONCE
Status: COMPLETED | OUTPATIENT
Start: 2025-01-04 | End: 2025-01-04

## 2025-01-04 RX ORDER — POTASSIUM CHLORIDE 750 MG/1
30 CAPSULE, EXTENDED RELEASE ORAL ONCE
Status: COMPLETED | OUTPATIENT
Start: 2025-01-04 | End: 2025-01-04

## 2025-01-04 RX ADMIN — NIFEDIPINE 30 MG: 30 TABLET, FILM COATED, EXTENDED RELEASE ORAL at 10:01

## 2025-01-04 RX ADMIN — DEXTROSE AND SODIUM CHLORIDE: 5; 900 INJECTION, SOLUTION INTRAVENOUS at 04:01

## 2025-01-04 RX ADMIN — Medication 1 TABLET: at 10:01

## 2025-01-04 RX ADMIN — FERROUS SULFATE TAB 325 MG (65 MG ELEMENTAL FE) 1 EACH: 325 (65 FE) TAB at 10:01

## 2025-01-04 RX ADMIN — MUPIROCIN: 20 OINTMENT TOPICAL at 11:01

## 2025-01-04 RX ADMIN — POTASSIUM CHLORIDE 30 MEQ: 750 CAPSULE, EXTENDED RELEASE ORAL at 03:01

## 2025-01-04 RX ADMIN — FOLIC ACID 1 MG: 1 TABLET ORAL at 10:01

## 2025-01-04 RX ADMIN — LISINOPRIL 10 MG: 10 TABLET ORAL at 10:01

## 2025-01-04 RX ADMIN — ATORVASTATIN CALCIUM 40 MG: 40 TABLET, FILM COATED ORAL at 10:01

## 2025-01-04 RX ADMIN — NICOTINE 1 PATCH: 7 PATCH, EXTENDED RELEASE TRANSDERMAL at 10:01

## 2025-01-04 RX ADMIN — Medication 100 MG: at 10:01

## 2025-01-04 RX ADMIN — ASPIRIN 81 MG: 81 TABLET, COATED ORAL at 10:01

## 2025-01-04 RX ADMIN — ENOXAPARIN SODIUM 40 MG: 40 INJECTION SUBCUTANEOUS at 05:01

## 2025-01-04 RX ADMIN — CEFTRIAXONE SODIUM 1 G: 1 INJECTION, POWDER, FOR SOLUTION INTRAMUSCULAR; INTRAVENOUS at 05:01

## 2025-01-04 RX ADMIN — Medication 1 TABLET: at 08:01

## 2025-01-04 RX ADMIN — POTASSIUM CHLORIDE 10 MEQ: 7.46 INJECTION, SOLUTION INTRAVENOUS at 03:01

## 2025-01-04 NOTE — PROGRESS NOTES
[facility] - Internal Medicine [facility] Medicine  Progress Note    Patient Name: Bhavna Lim  MRN: [id number]  Patient Class: IP- Inpatient   Admission Date: 12/26/2024  Length of Stay: 6 days  Attending Physician: Tate Rubio MD  Primary Care Provider: Suzi Green MD        Subjective     Principal Problem:Malnutrition        HPI:  Mrs. Bhavna Lim is a 79 year old F with a history of HTN and HLD who presents to the ED for weakness and fatigue. She has been having weight loss and poor po intake over the last few months. She has been seen by GI and evaluated with EGD and Colonoscopy as well as MRCP which showed cholelithiasis with gallbladder distention and intrahepatic and extrahepatic biliary dilation. No significant abnormalities on EGD and coloscopy. She was referred to surgery who recommended NM study and possible EUS with GI. She has been unable to tolerate any solid foods. She sometimes takes a little bit of soup and has tried protein shakes. She feels full immediately. She does endorse some pain in her epigastric region ever since having her EGD performed. She occasionally has vomiting. Denies regurgitation of food. She continues to have worsening weakness and falls at home. She had two falls yesterday due to weakness. She denies significant dizziness. She is unable to walk due to her weakness and sister is concerned this is related to her poor nutrition.      In the ED VSS. Labs notable for Potassium of 3.0 which was repleted in the ED. No focal signs concerning for stroke. She was admitted to medicine for further management.      Overview/Hospital Course:  While on the floor PT/OT were consulted and recommended SNF. Nutrition consulted. Discussed with GI outpatient workup and no indication for further inpatient workup. She continued to be altered while in the hospital. CT spine/head ordered for concern of fall showing ischemic changes and signficant stenosis and narrowing.  She developed worsening hand weakness and numbness/tinglign with abnormal coordiation. Some of this was prior to admission but weakness significantly worsened. MRI ordered and showed old infarcts. She continued to refuse food and did not have abdominal complaints just would not eat. Started on IVF due to hypoglycemia. Acute encephalopathy workup initiated and negative aside from low folate. UA ordered but not performed. At this point if nutrition becomes suboptimal will have to consider other means of nutrition. Son with concern of worsening memory and possible dementia wanting placement in [place]. Started on supplements for poor nutrition including thiamine and folate to see if this will help with mental status     Interval History: Palliative care consulted, somnolent. Needs placement    Review of Systems  Objective:     Vital Signs (Most Recent):  Temp: 98.4 °F (36.9 °C) (01/04/25 0734)  Pulse: 77 (01/04/25 0734)  Resp: 18 (01/04/25 0734)  BP: (!) 144/83 (01/04/25 0734)  SpO2: 95 % (01/04/25 0734) Vital Signs (24h Range):  Temp:  [97.4 °F (36.3 °C)-98.4 °F (36.9 °C)] 98.4 °F (36.9 °C)  Pulse:  [66-95] 77  Resp:  [18] 18  SpO2:  [91 %-100 %] 95 %  BP: (106-144)/(60-85) 144/83     Weight: 54.9 kg (121 lb 0.5 oz)  Body mass index is 22.87 kg/m².    Intake/Output Summary (Last 24 hours) at 1/4/2025 0951  Last data filed at 1/4/2025 0541  Gross per 24 hour   Intake 0 ml   Output 250 ml   Net -250 ml         Physical Exam  Constitutional:       Appearance: Normal appearance. She is not ill-appearing.   HENT:      Head: Normocephalic and atraumatic.      Nose: Nose normal.      Mouth/Throat:      Mouth: Mucous membranes are moist.   Eyes:      Extraocular Movements: Extraocular movements intact.      Pupils: Pupils are equal, round, and reactive to light.   Cardiovascular:      Rate and Rhythm: Normal rate and regular rhythm.      Heart sounds: No murmur heard.     No gallop.   Pulmonary:      Effort: Pulmonary effort  is normal.      Breath sounds: Normal breath sounds. No wheezing or rales.   Abdominal:      General: Abdomen is flat. Bowel sounds are normal. There is no distension.      Palpations: Abdomen is soft.      Tenderness: There is no abdominal tenderness.   Musculoskeletal:         General: No swelling or tenderness. Normal range of motion.      Cervical back: Normal range of motion and neck supple.      Right lower leg: No edema.      Left lower leg: No edema.   Skin:     General: Skin is warm and dry.      Capillary Refill: Capillary refill takes less than 2 seconds.   Neurological:      General: No focal deficit present.      Motor: Weakness present.      Comments: Abnormal hand coordination    strength in tact     Psychiatric:         Mood and Affect: Mood normal.             Significant Labs: All pertinent labs within the past 24 hours have been reviewed.  BMP:   Recent Labs   Lab 01/03/25  0628   *      K 2.3*   *   CO2 23   BUN 9   CREATININE 0.6   CALCIUM 7.7*   MG 1.8       Significant Imaging: I have reviewed all pertinent imaging results/findings within the past 24 hours.    Assessment and Plan     Cognitive impairment  -history of confusion and forgetfulness prior to admission  -appears to be more confused inpatient as well   -given impairment and abnormal coordination MRI and C spine ordered  -consider Neurology consult if failure to improve in these symptoms   -HIV/RPR negative. Folate low, repleting. B12 high. Thiamine pending. Possibly 2/2 to dementia with poor nutritional intake.     Hypoglycemia  -episodes of hypoglycemia overnight  -given amp of D50, started on IV D10W   -monitor BG closely  -likely related to poor PO intake       Numbness and tingling in both hands  -noted by patient before admission with abnormal finger to nose and weakness   -CT head and spine reviewed. There is significant stenosis/narrowing of C6 and C7 which could be contributing to symptoms. Findings  appear chronic and not acute. MRI ordered and notable for cervical stenosis and lacunar infarcts. Limited visiblity due to movement   -CT head notable for remote lacunar infarcts, hx of stroke in 2005 currently on ASA   -continue PT/OT         Severe protein-calorie malnutrition  Nutrition consulted. Most recent weight and BMI monitored-     Measurements:  Wt Readings from Last 1 Encounters:   12/27/24 54.9 kg (121 lb 0.5 oz)   Body mass index is 22.87 kg/m².    Patient has been screened and assessed by RD.    Malnutrition Type:  Context: chronic illness  Level: severe    Malnutrition Characteristic Summary:  Weight Loss (Malnutrition): greater than 5% in 1 month  Energy Intake (Malnutrition): less than 75% for greater than or equal to 1 month  Subcutaneous Fat (Malnutrition): severe depletion  Muscle Mass (Malnutrition): severe depletion    Interventions/Recommendations (treatment strategy):  1. Continue texture modified diet- encourage intake as needed 2. Continue Boost supplements 3. RD following      Hypokalemia  Patient's most recent potassium results are listed below.   Recent Labs     12/30/24  0541 12/31/24  0540 01/01/25  0352   K 3.0* 4.9 3.0*       Plan  - Replete potassium per protocol  - Monitor potassium Daily  - Patient's hypokalemia is improving    Debility  Patient with Acute debility due to age-related physical debility and other reduced mobility. The patient's latest AMPAC (Activity Measure for Post Acute Care) Score is listed below.    AM-PAC Score - How much help does the patient need for each activity listed  Basic Mobility Total Score: 14  Turning over in bed (including adjusting bedclothes, sheets and blankets)?: A little  Sitting down on and standing up from a chair with arms (e.g., wheelchair, bedside commode, etc.): A little  Moving from lying on back to sitting on the side of the bed?: A little  Moving to and from a bed to a chair (including a wheelchair)?: A lot  Need to walk in hospital  room?: A lot  Climbing 3-5 steps with a railing?: Unable    Plan  - Progressive mobility protocol initated  - PT/OT consulted  - Fall precautions in place  -pt would benefit from SNF prior to discharge home       Diabetes mellitus  Sliding scale held, accuchecks for hypoglycemia     Patient's FSGs are controlled on current medication regimen.  Last A1c reviewed-   Lab Results   Component Value Date    HGBA1C 5.5 10/11/2024     Most recent fingerstick glucose reviewed-   Recent Labs   Lab 12/31/24 2120 12/31/24  2202 01/01/25  0720 01/01/25  0754   POCTGLUCOSE 77 176* 47* 221*       Current correctional scale   hold on correction scale give A1C is 5.5  Antihyperglycemics (From admission, onward)      None          Hold Oral hypoglycemics while patient is in the hospital.    Unintentional weight loss  Nutrition consulted. Most recent weight and BMI monitored-     Measurements:  Wt Readings from Last 1 Encounters:   12/27/24 54.9 kg (121 lb 0.5 oz)   Body mass index is 22.87 kg/m².    RD consultation   Extensive outpatient evaluation obtained by GI and General surgery   Will add supplementation to meals       1. Continue texture modified diet- encourage intake as needed 2. Continue Boost supplements 3. RD following      Benign essential hypertension  Patient's blood pressure range in the last 24 hours was: BP  Min: 101/66  Max: 131/79.The patient's inpatient anti-hypertensive regimen is listed below:  Current Antihypertensives  lisinopriL tablet 10 mg, Daily, Oral  NIFEdipine 24 hr tablet 30 mg, Daily, Oral    Plan  - BP is controlled, no changes needed to their regimen  - will continue home regimen, decrease lisinopril dose       VTE Risk Mitigation (From admission, onward)           Ordered     enoxaparin injection 40 mg  Daily         12/26/24 1700     IP VTE HIGH RISK PATIENT  Once         12/26/24 1547     Place sequential compression device  Until discontinued         12/26/24 1547                    Discharge  Planning   RITA: 1/6/2025     Code Status: Full Code   Medical Readiness for Discharge Date:   Discharge Plan A: Home Health                Please place Justification for DME        Tate Rubio MD  Department of Hospital Medicine   [facility] - Internal Medicine Telemetry

## 2025-01-04 NOTE — CONSULTS
[facility] Telemetry    Wound Care     Patient Name:  Bhavna Lim  MRN:  [id number]  Date: 1/4/2025  Diagnosis: Malnutrition     History:  Past Medical History:   Diagnosis Date    Diabetes mellitus     Hyperlipidemia     Hypertension     Ingrown nail 01/09/2015    Bilateral great toe nail. No edema; tenderness or drainage.      Nail fungus 01/09/2015    To bilateral toenails X 10.      Obesity (BMI 30-39.9) 12/18/2024    Open wound of knee, leg, and ankle 02/09/2015     Social History     Socioeconomic History    Marital status: Single   Tobacco Use    Smoking status: Some Days     Types: Cigarettes   Substance and Sexual Activity    Alcohol use: Yes     Comment: rarely    Drug use: No     Social Drivers of Health     Financial Resource Strain: Low Risk  (12/27/2024)    Overall Financial Resource Strain (CARDIA)     Difficulty of Paying Living Expenses: Not hard at all   Recent Concern: Financial Resource Strain - Medium Risk (12/26/2024)    Overall Financial Resource Strain (CARDIA)     Difficulty of Paying Living Expenses: Somewhat hard   Food Insecurity: No Food Insecurity (12/27/2024)    Hunger Vital Sign     Worried About Running Out of Food in the Last Year: Never true     Ran Out of Food in the Last Year: Never true   Transportation Needs: No Transportation Needs (12/27/2024)    TRANSPORTATION NEEDS     Transportation : No   Physical Activity: Unknown (12/26/2024)    Exercise Vital Sign     Days of Exercise per Week: 0 days   Stress: Stress Concern Present (12/27/2024)    [employer] [employer] - Occupational Stress Questionnaire     Feeling of Stress : To some extent   Housing Stability: Low Risk  (12/27/2024)    Housing Stability Vital Sign     Unable to Pay for Housing in the Last Year: No     Homeless in the Last Year: No     Precautions:  Allergies as of 12/26/2024    (No Known Allergies)       WOC Assessment Details / Treatment:    Patient seen for wound care:  New Consult   Chart reviewed for this encounter.   Labs:   WBC (K/uL)   Date Value   01/04/2025 12.43   01/03/2025 11.81   01/03/2025 11.81     Glucose (mg/dL)   Date Value   01/04/2025 85   01/03/2025 115 (H)     Albumin (g/dL)   Date Value   01/03/2025 1.9 (L)   01/02/2025 2.1 (L)     Zac Score: 14  Nutrition sub-score: 2  Chart review reveals pt has urethral catheter in place and is constipated.     Narrative:  Pt seen for WC consultation and agreed to assessment  Chart reviewed for this encounter.   See Flow Sheet for additional documentation and media.    Pt laying in bed, bedside nurse present for assessment / turning assistance.   Covers removed revealing large loose BM, pt reports she had no idea she had a BM. She was able to turn with minimal assistance. Cleansed with purple bath wipes and soap and water. Perineal/perirectal area denuded, moist, and erythremic, two areas noted with partial thickness skin loss, applied Triad. Noted what appears to be a nodule to pt left lateral mid back area, no open wounds, pt has no cx of pain.   Bilateral heels assessed revealing clean, intact, blanchable erythema, no induration, no bogginess, no open wounds noted.    RECOMMENDATIONS:  Bedside nurse assess for acute changes (purulence, increased redness/swelling, increased drainage, malodor, increased pain, pallor, necrosis) please contact physician on any acute changes.    Apply Triad correctly:      Always cleanse wound before applying Triad.  To remove Triad:   Use pH-balanced wound cleanser to soften Triad  Gently wipe without scrubbing  For complete removal, repeat as needed.     Gently spread Triad evenly over the area of application to the thickness of a dime.    Discussed POC with patient and primary nurse.   See EMR for orders & patient education.     Discussed nutrition and the role of protein in wound healing with the patient. Instructed patient to optimize protein for wound healing.     Bedside nursing to  continue care & monitoring.  Bedside nursing to maintain pressure injury prevention interventions, (PIP).     Recommendations made to primary team for above plan via secure chat.     Thank you for the consult. Wound Care will continue to follow.     01/04/25 1030   WOCN Assessment   WOCN Total Time (mins) 45   Visit Date 01/04/25   Visit Time 1030   Consult Type New   WOCN Speciality Wound   Wound skin tear;moisture;At risk for pressure Injury   Intervention chart review;assessed;changed;applied;coordination of care;orders        Wound 01/01/25 1120 Other (comment) Left Back   Date First Assessed/Time First Assessed: 01/01/25 1120   Present on Original Admission: No  Primary Wound Type: (c) Other (comment)  Side: Left  Location: Back   Wound Image         Wound 01/04/25 Incontinence associated dermatitis Perineum   Date First Assessed: 01/04/25   Present on Original Admission: No  Primary Wound Type: Incontinence associated dermatitis  Location: Perineum   Wound Image      Dressing Appearance Open to air   Drainage Amount None   Drainage Characteristics/Odor No odor   Appearance Pink;Red;Moist   Tissue loss description Partial thickness   Red (%), Wound Tissue Color 100 %   Periwound Area Denuded   Care Cleansed with:;Soap and water   Dressing Applied;Other (comment)  (Triad)   Periwound Care Topical treatment applied   Dressing Change Due 01/04/25

## 2025-01-05 LAB
ANION GAP SERPL CALC-SCNC: 6 MMOL/L (ref 8–16)
BUN SERPL-MCNC: 5 MG/DL (ref 8–23)
CALCIUM SERPL-MCNC: 8 MG/DL (ref 8.7–10.5)
CHLORIDE SERPL-SCNC: 115 MMOL/L (ref 95–110)
CO2 SERPL-SCNC: 23 MMOL/L (ref 23–29)
CREAT SERPL-MCNC: 0.5 MG/DL (ref 0.5–1.4)
EST. GFR  (NO RACE VARIABLE): >60 ML/MIN/1.73 M^2
GLUCOSE SERPL-MCNC: 77 MG/DL (ref 70–110)
POCT GLUCOSE: 73 MG/DL (ref 70–110)
POCT GLUCOSE: 74 MG/DL (ref 70–110)
POCT GLUCOSE: 74 MG/DL (ref 70–110)
POCT GLUCOSE: 75 MG/DL (ref 70–110)
POCT GLUCOSE: 92 MG/DL (ref 70–110)
POTASSIUM SERPL-SCNC: 2.9 MMOL/L (ref 3.5–5.1)
SODIUM SERPL-SCNC: 144 MMOL/L (ref 136–145)

## 2025-01-05 PROCEDURE — 63600175 PHARM REV CODE 636 W HCPCS: Performed by: STUDENT IN AN ORGANIZED HEALTH CARE EDUCATION/TRAINING PROGRAM

## 2025-01-05 PROCEDURE — S4991 NICOTINE PATCH NONLEGEND: HCPCS | Performed by: STUDENT IN AN ORGANIZED HEALTH CARE EDUCATION/TRAINING PROGRAM

## 2025-01-05 PROCEDURE — 36415 COLL VENOUS BLD VENIPUNCTURE: CPT | Performed by: INTERNAL MEDICINE

## 2025-01-05 PROCEDURE — 21400001 HC TELEMETRY ROOM

## 2025-01-05 PROCEDURE — 80048 BASIC METABOLIC PNL TOTAL CA: CPT | Performed by: INTERNAL MEDICINE

## 2025-01-05 PROCEDURE — 63600175 PHARM REV CODE 636 W HCPCS: Performed by: INTERNAL MEDICINE

## 2025-01-05 PROCEDURE — 11000001 HC ACUTE MED/SURG PRIVATE ROOM

## 2025-01-05 PROCEDURE — 25000003 PHARM REV CODE 250: Performed by: INTERNAL MEDICINE

## 2025-01-05 PROCEDURE — 25000003 PHARM REV CODE 250: Performed by: STUDENT IN AN ORGANIZED HEALTH CARE EDUCATION/TRAINING PROGRAM

## 2025-01-05 RX ADMIN — ATORVASTATIN CALCIUM 40 MG: 40 TABLET, FILM COATED ORAL at 10:01

## 2025-01-05 RX ADMIN — LISINOPRIL 10 MG: 10 TABLET ORAL at 10:01

## 2025-01-05 RX ADMIN — ASPIRIN 81 MG: 81 TABLET, COATED ORAL at 10:01

## 2025-01-05 RX ADMIN — Medication 1 TABLET: at 09:01

## 2025-01-05 RX ADMIN — NICOTINE 1 PATCH: 7 PATCH, EXTENDED RELEASE TRANSDERMAL at 10:01

## 2025-01-05 RX ADMIN — NIFEDIPINE 30 MG: 30 TABLET, FILM COATED, EXTENDED RELEASE ORAL at 10:01

## 2025-01-05 RX ADMIN — Medication 100 MG: at 10:01

## 2025-01-05 RX ADMIN — MUPIROCIN: 20 OINTMENT TOPICAL at 09:01

## 2025-01-05 RX ADMIN — ENOXAPARIN SODIUM 40 MG: 40 INJECTION SUBCUTANEOUS at 05:01

## 2025-01-05 RX ADMIN — MUPIROCIN: 20 OINTMENT TOPICAL at 10:01

## 2025-01-05 RX ADMIN — FOLIC ACID 1 MG: 1 TABLET ORAL at 10:01

## 2025-01-05 RX ADMIN — Medication 1 TABLET: at 10:01

## 2025-01-05 RX ADMIN — FERROUS SULFATE TAB 325 MG (65 MG ELEMENTAL FE) 1 EACH: 325 (65 FE) TAB at 10:01

## 2025-01-05 NOTE — NURSING
Wound care and springer care performed. Springer intact. Pt placed on waffle mattress. CHG bath given. Fall camera at bedside. Side rails up x 3, bed locked and low, bed alarm set, call light within reach.

## 2025-01-05 NOTE — SUBJECTIVE & OBJECTIVE
Interval History: Still not eating, palliative care to review tomorrow.    Review of Systems  Objective:     Vital Signs (Most Recent):  Temp: 98.6 °F (37 °C) (01/05/25 0735)  Pulse: 88 (01/05/25 1032)  Resp: 16 (01/05/25 0735)  BP: 128/74 (01/05/25 0736)  SpO2: 95 % (01/05/25 0736) Vital Signs (24h Range):  Temp:  [97.6 °F (36.4 °C)-98.6 °F (37 °C)] 98.6 °F (37 °C)  Pulse:  [66-89] 88  Resp:  [16-18] 16  SpO2:  [92 %-98 %] 95 %  BP: (119-134)/(74-81) 128/74     Weight: 54.9 kg (121 lb 0.5 oz)  Body mass index is 22.87 kg/m².    Intake/Output Summary (Last 24 hours) at 1/5/2025 1036  Last data filed at 1/5/2025 0711  Gross per 24 hour   Intake 340 ml   Output 275 ml   Net 65 ml         Physical Exam  Constitutional:       Appearance: Normal appearance. She is not ill-appearing.   HENT:      Head: Normocephalic and atraumatic.      Nose: Nose normal.      Mouth/Throat:      Mouth: Mucous membranes are moist.   Eyes:      Extraocular Movements: Extraocular movements intact.      Pupils: Pupils are equal, round, and reactive to light.   Cardiovascular:      Rate and Rhythm: Normal rate and regular rhythm.      Heart sounds: No murmur heard.     No gallop.   Pulmonary:      Effort: Pulmonary effort is normal.      Breath sounds: Normal breath sounds. No wheezing or rales.   Abdominal:      General: Abdomen is flat. Bowel sounds are normal. There is no distension.      Palpations: Abdomen is soft.      Tenderness: There is no abdominal tenderness.   Musculoskeletal:         General: No swelling or tenderness. Normal range of motion.      Cervical back: Normal range of motion and neck supple.      Right lower leg: No edema.      Left lower leg: No edema.   Skin:     General: Skin is warm and dry.      Capillary Refill: Capillary refill takes less than 2 seconds.   Neurological:      General: No focal deficit present.      Motor: Weakness present.      Comments: Abnormal hand coordination    strength in tact      Psychiatric:         Mood and Affect: Mood normal.             Significant Labs: All pertinent labs within the past 24 hours have been reviewed.  BMP:   Recent Labs   Lab 01/04/25  1256   GLU 85   *   K 2.4*   *   CO2 22*   BUN 8   CREATININE 0.6   CALCIUM 7.8*       Significant Imaging: I have reviewed all pertinent imaging results/findings within the past 24 hours.

## 2025-01-05 NOTE — NURSING
No D5 and 0.9% NS stocked on unit. Reached out to [employer] who stated they do not have any either. On call for med team Q notified.

## 2025-01-06 PROBLEM — Z51.5 PALLIATIVE CARE ENCOUNTER: Status: ACTIVE | Noted: 2025-01-06

## 2025-01-06 PROBLEM — Z71.89 ACP (ADVANCE CARE PLANNING): Status: ACTIVE | Noted: 2025-01-06

## 2025-01-06 PROBLEM — E44.1 MALNUTRITION OF MILD DEGREE: Status: ACTIVE | Noted: 2025-01-06

## 2025-01-06 LAB
ALBUMIN SERPL BCP-MCNC: 1.7 G/DL (ref 3.5–5.2)
ALP SERPL-CCNC: 293 U/L (ref 40–150)
ALT SERPL W/O P-5'-P-CCNC: 63 U/L (ref 10–44)
ANION GAP SERPL CALC-SCNC: 9 MMOL/L (ref 8–16)
AST SERPL-CCNC: 47 U/L (ref 10–40)
BASOPHILS # BLD AUTO: 0.06 K/UL (ref 0–0.2)
BASOPHILS NFR BLD: 0.3 % (ref 0–1.9)
BILIRUB SERPL-MCNC: 1 MG/DL (ref 0.1–1)
BUN SERPL-MCNC: 5 MG/DL (ref 8–23)
CALCIUM SERPL-MCNC: 7.7 MG/DL (ref 8.7–10.5)
CHLORIDE SERPL-SCNC: 112 MMOL/L (ref 95–110)
CO2 SERPL-SCNC: 21 MMOL/L (ref 23–29)
CREAT SERPL-MCNC: 0.5 MG/DL (ref 0.5–1.4)
DIFFERENTIAL METHOD BLD: ABNORMAL
EOSINOPHIL # BLD AUTO: 0.2 K/UL (ref 0–0.5)
EOSINOPHIL NFR BLD: 1 % (ref 0–8)
ERYTHROCYTE [DISTWIDTH] IN BLOOD BY AUTOMATED COUNT: 18.4 % (ref 11.5–14.5)
EST. GFR  (NO RACE VARIABLE): >60 ML/MIN/1.73 M^2
GLUCOSE SERPL-MCNC: 91 MG/DL (ref 70–110)
HCT VFR BLD AUTO: 29.6 % (ref 37–48.5)
HGB BLD-MCNC: 10.1 G/DL (ref 12–16)
IMM GRANULOCYTES # BLD AUTO: 0.19 K/UL (ref 0–0.04)
IMM GRANULOCYTES NFR BLD AUTO: 0.8 % (ref 0–0.5)
LYMPHOCYTES # BLD AUTO: 1.4 K/UL (ref 1–4.8)
LYMPHOCYTES NFR BLD: 6.4 % (ref 18–48)
MCH RBC QN AUTO: 27.9 PG (ref 27–31)
MCHC RBC AUTO-ENTMCNC: 34.1 G/DL (ref 32–36)
MCV RBC AUTO: 82 FL (ref 82–98)
MONOCYTES # BLD AUTO: 0.9 K/UL (ref 0.3–1)
MONOCYTES NFR BLD: 4.2 % (ref 4–15)
NEUTROPHILS # BLD AUTO: 19.7 K/UL (ref 1.8–7.7)
NEUTROPHILS NFR BLD: 87.3 % (ref 38–73)
NRBC BLD-RTO: 0 /100 WBC
PLATELET # BLD AUTO: 198 K/UL (ref 150–450)
PMV BLD AUTO: 10.8 FL (ref 9.2–12.9)
POCT GLUCOSE: 109 MG/DL (ref 70–110)
POCT GLUCOSE: 111 MG/DL (ref 70–110)
POCT GLUCOSE: 118 MG/DL (ref 70–110)
POCT GLUCOSE: 65 MG/DL (ref 70–110)
POCT GLUCOSE: 83 MG/DL (ref 70–110)
POTASSIUM SERPL-SCNC: 2.8 MMOL/L (ref 3.5–5.1)
PROT SERPL-MCNC: 5.2 G/DL (ref 6–8.4)
RBC # BLD AUTO: 3.62 M/UL (ref 4–5.4)
SODIUM SERPL-SCNC: 142 MMOL/L (ref 136–145)
WBC # BLD AUTO: 22.5 K/UL (ref 3.9–12.7)

## 2025-01-06 PROCEDURE — 63600175 PHARM REV CODE 636 W HCPCS: Performed by: INTERNAL MEDICINE

## 2025-01-06 PROCEDURE — 36415 COLL VENOUS BLD VENIPUNCTURE: CPT

## 2025-01-06 PROCEDURE — 25000003 PHARM REV CODE 250: Performed by: INTERNAL MEDICINE

## 2025-01-06 PROCEDURE — 97112 NEUROMUSCULAR REEDUCATION: CPT

## 2025-01-06 PROCEDURE — S4991 NICOTINE PATCH NONLEGEND: HCPCS | Performed by: STUDENT IN AN ORGANIZED HEALTH CARE EDUCATION/TRAINING PROGRAM

## 2025-01-06 PROCEDURE — 80053 COMPREHEN METABOLIC PANEL: CPT

## 2025-01-06 PROCEDURE — 97535 SELF CARE MNGMENT TRAINING: CPT | Mod: CO

## 2025-01-06 PROCEDURE — S5010 5% DEXTROSE AND 0.45% SALINE: HCPCS | Performed by: INTERNAL MEDICINE

## 2025-01-06 PROCEDURE — 63600175 PHARM REV CODE 636 W HCPCS: Performed by: STUDENT IN AN ORGANIZED HEALTH CARE EDUCATION/TRAINING PROGRAM

## 2025-01-06 PROCEDURE — 21400001 HC TELEMETRY ROOM

## 2025-01-06 PROCEDURE — 85025 COMPLETE CBC W/AUTO DIFF WBC: CPT

## 2025-01-06 PROCEDURE — 97530 THERAPEUTIC ACTIVITIES: CPT | Mod: CO

## 2025-01-06 PROCEDURE — 25000003 PHARM REV CODE 250: Performed by: STUDENT IN AN ORGANIZED HEALTH CARE EDUCATION/TRAINING PROGRAM

## 2025-01-06 PROCEDURE — 11000001 HC ACUTE MED/SURG PRIVATE ROOM

## 2025-01-06 RX ORDER — DEXTROSE MONOHYDRATE AND SODIUM CHLORIDE 5; .45 G/100ML; G/100ML
INJECTION, SOLUTION INTRAVENOUS CONTINUOUS
Status: DISCONTINUED | OUTPATIENT
Start: 2025-01-06 | End: 2025-01-10

## 2025-01-06 RX ADMIN — MUPIROCIN: 20 OINTMENT TOPICAL at 08:01

## 2025-01-06 RX ADMIN — DEXTROSE MONOHYDRATE 12.5 G: 25 INJECTION, SOLUTION INTRAVENOUS at 08:01

## 2025-01-06 RX ADMIN — Medication 1 TABLET: at 08:01

## 2025-01-06 RX ADMIN — NICOTINE 1 PATCH: 7 PATCH, EXTENDED RELEASE TRANSDERMAL at 08:01

## 2025-01-06 RX ADMIN — POTASSIUM BICARBONATE 40 MEQ: 391 TABLET, EFFERVESCENT ORAL at 04:01

## 2025-01-06 RX ADMIN — ASPIRIN 81 MG: 81 TABLET, COATED ORAL at 08:01

## 2025-01-06 RX ADMIN — DEXTROSE AND SODIUM CHLORIDE: 5; 450 INJECTION, SOLUTION INTRAVENOUS at 02:01

## 2025-01-06 RX ADMIN — LISINOPRIL 10 MG: 10 TABLET ORAL at 08:01

## 2025-01-06 RX ADMIN — FOLIC ACID 1 MG: 1 TABLET ORAL at 08:01

## 2025-01-06 RX ADMIN — FERROUS SULFATE TAB 325 MG (65 MG ELEMENTAL FE) 1 EACH: 325 (65 FE) TAB at 08:01

## 2025-01-06 RX ADMIN — POTASSIUM BICARBONATE 40 MEQ: 391 TABLET, EFFERVESCENT ORAL at 06:01

## 2025-01-06 RX ADMIN — Medication 100 MG: at 08:01

## 2025-01-06 RX ADMIN — ENOXAPARIN SODIUM 40 MG: 40 INJECTION SUBCUTANEOUS at 06:01

## 2025-01-06 RX ADMIN — ATORVASTATIN CALCIUM 40 MG: 40 TABLET, FILM COATED ORAL at 08:01

## 2025-01-06 RX ADMIN — POTASSIUM BICARBONATE 40 MEQ: 391 TABLET, EFFERVESCENT ORAL at 03:01

## 2025-01-06 RX ADMIN — NIFEDIPINE 30 MG: 30 TABLET, FILM COATED, EXTENDED RELEASE ORAL at 08:01

## 2025-01-06 RX ADMIN — CEFTRIAXONE SODIUM 1 G: 1 INJECTION, POWDER, FOR SOLUTION INTRAMUSCULAR; INTRAVENOUS at 08:01

## 2025-01-06 NOTE — PT/OT/SLP PROGRESS
Occupational Therapy   Treatment    Name: Bhavna Lim  MRN: [id number]  Admitting Diagnosis:  Malnutrition       Recommendations:     Discharge Recommendations: Moderate Intensity Therapy  Discharge Equipment Recommendations:  hospital bed, lift device, wheelchair  Barriers to discharge:  Decreased caregiver support, Other (Comment) (patient requires increased level of assistance)    Assessment:     Bhavna Lim is a 79 y.o. female with a medical diagnosis of Malnutrition.  She presents with the fallowing performance deficits affecting function are weakness, impaired endurance, impaired self care skills, impaired functional mobility, gait instability, impaired balance, decreased safety awareness, decreased lower extremity function, decreased upper extremity function, impaired cognition, impaired coordination, decreased coordination, pain, abnormal tone, impaired fine motor. Patient agreeable to tx session, today session focused on tolerance to upright positioning for postural musculature to performed dynamic sitting to prepare for functional UB/LB dressing task sitting up at EOB. Patient initially required Total A of 2 persons for static sitting  and B UE support, progressing to Max A with brief moment of Min to CGA, however when B UE support removed patient with significant posterior and lateral lean, patient required Max A to total A to return and maintain midline and  required both tactile and verbal cues. Patient also required Total A of 2 persons for sit to stand transfer from EOB with verbal cues, tactile cues, and visual target to assist with maintaining upright posture and balance. Patient noticed with posterior pelvic tilt kyphosis,cervical flexion, rounded shoulders, and difficultly performing weight shift. Patient also continues to exhibit poor coordination, poor perception, and with intermittent confusion. Patient is still well below baseline function and is not safe to return to home at this  time. Patient continue to demonstrate that need for Moderate intensity therapy intervention on daily basis post acute setting.      Rehab Prognosis:  Good and Fair; patient would benefit from acute skilled OT services to address these deficits and reach maximum level of function.       Plan:     Patient to be seen 4 x/week to address the above listed problems via self-care/home management, therapeutic activities, therapeutic exercises, neuromuscular re-education  Plan of Care Expires: 01/26/25  Plan of Care Reviewed with: patient    Subjective     Chief Complaint: pain  Patient/Family Comments/goals: to return to PLOF  Pain/Comfort:  Pain Rating 1:  (patient did not rate)  Location - Side 1: Bilateral  Location - Orientation 1: generalized  Location 1: knee  Pain Addressed 1: Reposition, Distraction  Pain Rating Post-Intervention 1: 0/10    Objective:     Communicated with: Nurse prior to session.  Patient found HOB elevated with bed alarm, springer catheter upon OT entry to room.    General Precautions: Standard, fall    Orthopedic Precautions:N/A  Braces: N/A  Respiratory Status: Room air     Occupational Performance:     Bed Mobility:    Patient completed Rolling/Turning to Left with  maximal assistance  Patient completed Scooting to EOB and HOB with maximal assistance of 2 persons   Patient completed Supine to Sit with maximal assistance of 2 persons   Patient completed Sit to Supine with total assistance of 2 persons     Functional Mobility/Transfers:  Patient completed Sit <> Stand Transfers x2 trials with total assistance of 2 persons  with  no assistive device with hand-held assist   Functional Mobility: Deferred mobility task due to safety concerns     Activities of Daily Living:  Grooming: maximal assistance to wash face sitting up at EOB    [facility] 6 Click ADL: 10    Treatment & Education:  Discussed OT POC and progress  Educated patient on the importance to continue to perform exercises in order to reduce  stiffness and promote joint mobility and blood flow  Addressed patient's questions and concerns within [facility] scope of practice      Patient left HOB elevated with all lines intact, call button in reach, bed alarm on, family present, and tele-sitter    GOALS:   Multidisciplinary Problems       Occupational Therapy Goals          Problem: Occupational Therapy    Goal Priority Disciplines Outcome Interventions   Occupational Therapy Goal     OT, PT/OT Progressing    Description: Goals to be met by: 1/26/25     Patient will increase functional independence with ADLs by performing:    UE Dressing with Modified [place].  LE Dressing with Modified [place].  Grooming while standing at sink with Modified [place].  Toileting from toilet/bedside commode with Modified [place] for hygiene and clothing management.   Supine to sit with Modified [place].  Toilet transfer to toilet/bedside commode with Stand-by Assistance.    Patient demonstrates a mobility limitation that significantly impairs their ability to participate in one or more mobility related activities of daily living. Patient's mobility limitation cannot be sufficiently resolved with the use of a cane, but can be sufficiently resolved with the use of a rolling walker.The use of a rolling walker will considerably improve their ability to participate in MRADLs. Patient will use the walker on a regular basis at home.                           Time Tracking:     OT Date of Treatment: 01/06/25  OT Start Time: 1218  OT Stop Time: 1244  OT Total Time (min): 26 min    Billable Minutes:Self Care/Home Management 10  Therapeutic Activity 16    OT/ABDULLAHI: ABDULLAHI     Number of ABDULLAHI visits since last OT visit: 1 1/6/2025

## 2025-01-06 NOTE — PROGRESS NOTES
[facility] - Internal Medicine [facility] Medicine  Progress Note    Patient Name: Bhavna Lim  MRN: [id number]  Patient Class: IP- Inpatient   Admission Date: 12/26/2024  Length of Stay: 8 days  Attending Physician: Tate Rubio MD  Primary Care Provider: Suzi Green MD        Subjective     Principal Problem:Malnutrition        HPI:  Mrs. Bhavna Lim is a 79 year old F with a history of HTN and HLD who presents to the ED for weakness and fatigue. She has been having weight loss and poor po intake over the last few months. She has been seen by GI and evaluated with EGD and Colonoscopy as well as MRCP which showed cholelithiasis with gallbladder distention and intrahepatic and extrahepatic biliary dilation. No significant abnormalities on EGD and coloscopy. She was referred to surgery who recommended NM study and possible EUS with GI. She has been unable to tolerate any solid foods. She sometimes takes a little bit of soup and has tried protein shakes. She feels full immediately. She does endorse some pain in her epigastric region ever since having her EGD performed. She occasionally has vomiting. Denies regurgitation of food. She continues to have worsening weakness and falls at home. She had two falls yesterday due to weakness. She denies significant dizziness. She is unable to walk due to her weakness and sister is concerned this is related to her poor nutrition.      In the ED VSS. Labs notable for Potassium of 3.0 which was repleted in the ED. No focal signs concerning for stroke. She was admitted to medicine for further management.      Overview/Hospital Course:  While on the floor PT/OT were consulted and recommended SNF. Nutrition consulted. Discussed with GI outpatient workup and no indication for further inpatient workup. She continued to be altered while in the hospital. CT spine/head ordered for concern of fall showing ischemic changes and signficant stenosis and narrowing.  She developed worsening hand weakness and numbness/tinglign with abnormal coordiation. Some of this was prior to admission but weakness significantly worsened. MRI ordered and showed old infarcts. She continued to refuse food and did not have abdominal complaints just would not eat. Started on IVF due to hypoglycemia. Acute encephalopathy workup initiated and negative aside from low folate. UA ordered but not performed. At this point if nutrition becomes suboptimal will have to consider other means of nutrition. Son with concern of worsening memory and possible dementia wanting placement in [place]. Started on supplements for poor nutrition including thiamine and folate to see if this will help with mental status     Interval History: To be seen by palliative care today    Review of Systems  Objective:     Vital Signs (Most Recent):  Temp: 98.3 °F (36.8 °C) (01/06/25 0748)  Pulse: 80 (01/06/25 0748)  Resp: 18 (01/06/25 0748)  BP: 130/75 (01/06/25 0748)  SpO2: 95 % (01/06/25 0748) Vital Signs (24h Range):  Temp:  [97.4 °F (36.3 °C)-98.5 °F (36.9 °C)] 98.3 °F (36.8 °C)  Pulse:  [80-89] 80  Resp:  [18] 18  SpO2:  [91 %-100 %] 95 %  BP: (115-133)/(71-75) 130/75     Weight: 54.9 kg (121 lb 0.5 oz)  Body mass index is 22.87 kg/m².    Intake/Output Summary (Last 24 hours) at 1/6/2025 0853  Last data filed at 1/6/2025 0600  Gross per 24 hour   Intake --   Output 125 ml   Net -125 ml         Physical Exam  Constitutional:       Appearance: Normal appearance. She is not ill-appearing.   HENT:      Head: Normocephalic and atraumatic.      Nose: Nose normal.      Mouth/Throat:      Mouth: Mucous membranes are moist.   Eyes:      Extraocular Movements: Extraocular movements intact.      Pupils: Pupils are equal, round, and reactive to light.   Cardiovascular:      Rate and Rhythm: Normal rate and regular rhythm.      Heart sounds: No murmur heard.     No gallop.   Pulmonary:      Effort: Pulmonary effort is normal.      Breath  sounds: Normal breath sounds. No wheezing or rales.   Abdominal:      General: Abdomen is flat. Bowel sounds are normal. There is no distension.      Palpations: Abdomen is soft.      Tenderness: There is no abdominal tenderness.   Musculoskeletal:         General: No swelling or tenderness. Normal range of motion.      Cervical back: Normal range of motion and neck supple.      Right lower leg: No edema.      Left lower leg: No edema.   Skin:     General: Skin is warm and dry.      Capillary Refill: Capillary refill takes less than 2 seconds.   Neurological:      General: No focal deficit present.      Motor: Weakness present.      Comments: Abnormal hand coordination    strength in tact     Psychiatric:         Mood and Affect: Mood normal.             Significant Labs: All pertinent labs within the past 24 hours have been reviewed.  BMP:   Recent Labs   Lab 01/05/25  1547   GLU 77      K 2.9*   *   CO2 23   BUN 5*   CREATININE 0.5   CALCIUM 8.0*       Significant Imaging: I have reviewed all pertinent imaging results/findings within the past 24 hours.    Assessment and Plan     Cognitive impairment  -history of confusion and forgetfulness prior to admission  -appears to be more confused inpatient as well   -given impairment and abnormal coordination MRI and C spine ordered  -consider Neurology consult if failure to improve in these symptoms   -HIV/RPR negative. Folate low, repleting. B12 high. Thiamine pending. Possibly 2/2 to dementia with poor nutritional intake.     Hypoglycemia  -episodes of hypoglycemia overnight  -given amp of D50, started on IV D10W   -monitor BG closely  -likely related to poor PO intake       Numbness and tingling in both hands  -noted by patient before admission with abnormal finger to nose and weakness   -CT head and spine reviewed. There is significant stenosis/narrowing of C6 and C7 which could be contributing to symptoms. Findings appear chronic and not acute. MRI  ordered and notable for cervical stenosis and lacunar infarcts. Limited visiblity due to movement   -CT head notable for remote lacunar infarcts, hx of stroke in 2005 currently on ASA   -continue PT/OT         Severe protein-calorie malnutrition  Nutrition consulted. Most recent weight and BMI monitored-     Measurements:  Wt Readings from Last 1 Encounters:   12/27/24 54.9 kg (121 lb 0.5 oz)   Body mass index is 22.87 kg/m².    Patient has been screened and assessed by RD.    Malnutrition Type:  Context: chronic illness  Level: severe    Malnutrition Characteristic Summary:  Weight Loss (Malnutrition): greater than 5% in 1 month  Energy Intake (Malnutrition): less than 75% for greater than or equal to 1 month  Subcutaneous Fat (Malnutrition): severe depletion  Muscle Mass (Malnutrition): severe depletion    Interventions/Recommendations (treatment strategy):  1. Continue texture modified diet- encourage intake as needed 2. Continue Boost supplements 3. RD following      Hypokalemia  Patient's most recent potassium results are listed below.   Recent Labs     12/30/24  0541 12/31/24  0540 01/01/25  0352   K 3.0* 4.9 3.0*       Plan  - Replete potassium per protocol  - Monitor potassium Daily  - Patient's hypokalemia is improving    Debility  Patient with Acute debility due to age-related physical debility and other reduced mobility. The patient's latest AMPAC (Activity Measure for Post Acute Care) Score is listed below.    AM-PAC Score - How much help does the patient need for each activity listed  Basic Mobility Total Score: 14  Turning over in bed (including adjusting bedclothes, sheets and blankets)?: A little  Sitting down on and standing up from a chair with arms (e.g., wheelchair, bedside commode, etc.): A little  Moving from lying on back to sitting on the side of the bed?: A little  Moving to and from a bed to a chair (including a wheelchair)?: A lot  Need to walk in hospital room?: A lot  Climbing 3-5 steps  with a railing?: Unable    Plan  - Progressive mobility protocol initated  - PT/OT consulted  - Fall precautions in place  -pt would benefit from SNF prior to discharge home       Diabetes mellitus  Sliding scale held, accuchecks for hypoglycemia     Patient's FSGs are controlled on current medication regimen.  Last A1c reviewed-   Lab Results   Component Value Date    HGBA1C 5.5 10/11/2024     Most recent fingerstick glucose reviewed-   Recent Labs   Lab 12/31/24  2120 12/31/24  2202 01/01/25  0720 01/01/25  0754   POCTGLUCOSE 77 176* 47* 221*       Current correctional scale   hold on correction scale give A1C is 5.5  Antihyperglycemics (From admission, onward)      None          Hold Oral hypoglycemics while patient is in the hospital.    Unintentional weight loss  Nutrition consulted. Most recent weight and BMI monitored-     Measurements:  Wt Readings from Last 1 Encounters:   12/27/24 54.9 kg (121 lb 0.5 oz)   Body mass index is 22.87 kg/m².    RD consultation   Extensive outpatient evaluation obtained by GI and General surgery   Will add supplementation to meals       1. Continue texture modified diet- encourage intake as needed 2. Continue Boost supplements 3. RD following      Benign essential hypertension  Patient's blood pressure range in the last 24 hours was: BP  Min: 101/66  Max: 131/79.The patient's inpatient anti-hypertensive regimen is listed below:  Current Antihypertensives  lisinopriL tablet 10 mg, Daily, Oral  NIFEdipine 24 hr tablet 30 mg, Daily, Oral    Plan  - BP is controlled, no changes needed to their regimen  - will continue home regimen, decrease lisinopril dose       VTE Risk Mitigation (From admission, onward)           Ordered     enoxaparin injection 40 mg  Daily         12/26/24 1700     IP VTE HIGH RISK PATIENT  Once         12/26/24 1547     Place sequential compression device  Until discontinued         12/26/24 1547                    Discharge Planning   RITA: 1/8/2025     Code  Status: Full Code   Medical Readiness for Discharge Date:   Discharge Plan A: Home Health                Please place Justification for DME        Tate Rubio MD  Department of Hospital Medicine   [facility] - Internal Medicine Tele[facility]

## 2025-01-06 NOTE — PT/OT/SLP PROGRESS
"Physical Therapy Co-Treatment    Patient Name:  Bhavna Lim   MRN:  [id number]    Recommendations:     Discharge Recommendations: Moderate Intensity Therapy  Discharge Equipment Recommendations: lift device, hospital bed, wheelchair  Barriers to discharge: Pt currently requiring increased level of assistance with mobility    Assessment:     Bhavna Lim is a 79 y.o. female admitted with a medical diagnosis of Malnutrition.  She presents with the following impairments/functional limitations: weakness, impaired endurance, impaired functional mobility, gait instability, impaired balance, decreased coordination, decreased upper extremity function, decreased lower extremity function, impaired cognition, decreased safety awareness, impaired self care skills.    Pt pleasantly confused however redirectable. Pt presented with poor proprioception and safety awareness with inattention to R UE. Tolerated EOB sitting with poor sitting balance due to R lateral lean with difficulty sustaining midline. Attempted STS with 2 person assist and poor initiation. Pt continues to benefit from skilled PT services while in house in order to address the aforementioned deficits.      Rehab Prognosis: Good; patient would benefit from acute skilled PT services to address these deficits and reach maximum level of function.    Recent Surgery: * No surgery found *      Plan:     During this hospitalization, patient to be seen 4 x/week to address the identified rehab impairments via gait training, therapeutic activities, therapeutic exercises, neuromuscular re-education and progress toward the following goals:    Plan of Care Expires:  01/31/25    Subjective     "I like to make gumbo"    Pain/Comfort:  Pain Rating 1:  (did not rate)  Location - Side 1: Left  Location - Orientation 1: generalized  Location 1: knee      Objective:     Communicated with RN prior to session.  Patient found HOB elevated with bed alarm, springer catheter upon PT " entry to room.     General Precautions: Standard, fall  Orthopedic Precautions: N/A  Braces: N/A  Respiratory Status: Room air     Functional Mobility:  Bed Mobility:     Supine to Sit: total assistance and of 2 persons  Sit to Supine: total assistance and of 2 persons  Transfers:     Sit to Stand:  total assistance and of 2 persons with B knee block  Balance:   Poor sitting balance totA      AM-PAC 6 CLICK MOBILITY  Turning over in bed (including adjusting bedclothes, sheets and blankets)?: 2  Sitting down on and standing up from a chair with arms (e.g., wheelchair, bedside commode, etc.): 1  Moving from lying on back to sitting on the side of the bed?: 1  Moving to and from a bed to a chair (including a wheelchair)?: 1  Need to walk in hospital room?: 1  Climbing 3-5 steps with a railing?: 1  Basic Mobility Total Score: 7       Treatment & Education:  Tolerated EOB sitting with focus on midline orientation, lateral lean onto forearms bilaterally, functional reach    Educated pt on PT role/POC  Educated pt on importance of OOB activity and daily ambulation   Pt educated on proper body mechanics, safety techniques, and energy conservation with PT facilitation and cueing throughout session   Pt verbalized understanding      Patient left with bed in chair position with all lines intact, call button in reach, RN notified, and ABDULLAHI and family present..    GOALS:   Multidisciplinary Problems       Physical Therapy Goals          Problem: Physical Therapy    Goal Priority Disciplines Outcome Interventions   Physical Therapy Goal     PT, PT/OT Progressing    Description: Goals to be met by: 1/10     Patient will increase functional independence with mobility by performin. Supine to sit with Modified Jack  2. Sit to supine with Modified Jack  3. Sit to stand transfer with Stand-by Assistance  4. Gait  x 100 feet with Contact Guard Assistance using LRAD.   5. Ascend/descend 6 stair with bilateral  Handrails Minimal Assistance using LRAD.     DME Justifcation  Patient demonstrates a mobility limitation that significantly impairs their ability to participate in one or more mobility related activities of daily living. Patient's mobility limitation cannot be sufficiently resolved with the use of a cane, but can be sufficiently resolved with the use of a rolling walker.The use of a rolling walker will considerably improve their ability to participate in MRADLs. Patient will use the walker on a regular basis at home.     Patient has a mobility limitation that significantly impairs their ability to participate in one or more mobility related activities of daily living in customary locations in the home. The mobility limitation cannot be sufficiently resolved by the use of a cane or walker. The use of a manual wheelchair will greatly improve the patient's ability to participate in MRADLs. The patient will use the wheelchair on a regular basis at home. They have expressed their willingness to use a manual wheelchair in the home, and have a caregiver who is available and willing to assist with the wheelchair if needed.                         Time Tracking:     PT Received On: 01/06/25  PT Start Time: 1217     PT Stop Time: 1244  PT Total Time (min): 27 min     Billable Minutes: Neuromuscular Re-education 27    Co-treatment performed due to patient's multiple deficits requiring two skilled therapists to appropriately and safely assess patient's strength and endurance while facilitating functional tasks in addition to accommodating for patient's activity tolerance.       Treatment Type: Treatment  PT/PTA: PT     Number of PTA visits since last PT visit: 0     01/06/2025

## 2025-01-06 NOTE — PROGRESS NOTES
[facility] - Internal Medicine [facility] Medicine  Progress Note    Patient Name: Bhavna Lim  MRN: [id number]  Patient Class: IP- Inpatient   Admission Date: 12/26/2024  Length of Stay: 8 days  Attending Physician: Tate Rubio MD  Primary Care Provider: Suzi Green MD        Subjective     Principal Problem:Malnutrition        HPI:  Mrs. Bhavna Lim is a 79 year old F with a history of HTN and HLD who presents to the ED for weakness and fatigue. She has been having weight loss and poor po intake over the last few months. She has been seen by GI and evaluated with EGD and Colonoscopy as well as MRCP which showed cholelithiasis with gallbladder distention and intrahepatic and extrahepatic biliary dilation. No significant abnormalities on EGD and coloscopy. She was referred to surgery who recommended NM study and possible EUS with GI. She has been unable to tolerate any solid foods. She sometimes takes a little bit of soup and has tried protein shakes. She feels full immediately. She does endorse some pain in her epigastric region ever since having her EGD performed. She occasionally has vomiting. Denies regurgitation of food. She continues to have worsening weakness and falls at home. She had two falls yesterday due to weakness. She denies significant dizziness. She is unable to walk due to her weakness and sister is concerned this is related to her poor nutrition.      In the ED VSS. Labs notable for Potassium of 3.0 which was repleted in the ED. No focal signs concerning for stroke. She was admitted to medicine for further management.      Overview/Hospital Course:  While on the floor PT/OT were consulted and recommended SNF. Nutrition consulted. Discussed with GI outpatient workup and no indication for further inpatient workup. She continued to be altered while in the hospital. CT spine/head ordered for concern of fall showing ischemic changes and signficant stenosis and narrowing.  She developed worsening hand weakness and numbness/tinglign with abnormal coordiation. Some of this was prior to admission but weakness significantly worsened. MRI ordered and showed old infarcts. She continued to refuse food and did not have abdominal complaints just would not eat. Started on IVF due to hypoglycemia. Acute encephalopathy workup initiated and negative aside from low folate. UA ordered but not performed. At this point if nutrition becomes suboptimal will have to consider other means of nutrition. Son with concern of worsening memory and possible dementia wanting placement in [place]. Started on supplements for poor nutrition including thiamine and folate to see if this will help with mental status     Interval History: Still not eating, palliative care to review tomorrow.    Review of Systems  Objective:     Vital Signs (Most Recent):  Temp: 98.6 °F (37 °C) (01/05/25 0735)  Pulse: 88 (01/05/25 1032)  Resp: 16 (01/05/25 0735)  BP: 128/74 (01/05/25 0736)  SpO2: 95 % (01/05/25 0736) Vital Signs (24h Range):  Temp:  [97.6 °F (36.4 °C)-98.6 °F (37 °C)] 98.6 °F (37 °C)  Pulse:  [66-89] 88  Resp:  [16-18] 16  SpO2:  [92 %-98 %] 95 %  BP: (119-134)/(74-81) 128/74     Weight: 54.9 kg (121 lb 0.5 oz)  Body mass index is 22.87 kg/m².    Intake/Output Summary (Last 24 hours) at 1/5/2025 1036  Last data filed at 1/5/2025 0711  Gross per 24 hour   Intake 340 ml   Output 275 ml   Net 65 ml         Physical Exam  Constitutional:       Appearance: Normal appearance. She is not ill-appearing.   HENT:      Head: Normocephalic and atraumatic.      Nose: Nose normal.      Mouth/Throat:      Mouth: Mucous membranes are moist.   Eyes:      Extraocular Movements: Extraocular movements intact.      Pupils: Pupils are equal, round, and reactive to light.   Cardiovascular:      Rate and Rhythm: Normal rate and regular rhythm.      Heart sounds: No murmur heard.     No gallop.   Pulmonary:      Effort: Pulmonary effort is  normal.      Breath sounds: Normal breath sounds. No wheezing or rales.   Abdominal:      General: Abdomen is flat. Bowel sounds are normal. There is no distension.      Palpations: Abdomen is soft.      Tenderness: There is no abdominal tenderness.   Musculoskeletal:         General: No swelling or tenderness. Normal range of motion.      Cervical back: Normal range of motion and neck supple.      Right lower leg: No edema.      Left lower leg: No edema.   Skin:     General: Skin is warm and dry.      Capillary Refill: Capillary refill takes less than 2 seconds.   Neurological:      General: No focal deficit present.      Motor: Weakness present.      Comments: Abnormal hand coordination    strength in tact     Psychiatric:         Mood and Affect: Mood normal.             Significant Labs: All pertinent labs within the past 24 hours have been reviewed.  BMP:   Recent Labs   Lab 01/04/25  1256   GLU 85   *   K 2.4*   *   CO2 22*   BUN 8   CREATININE 0.6   CALCIUM 7.8*       Significant Imaging: I have reviewed all pertinent imaging results/findings within the past 24 hours.    Assessment and Plan     Cognitive impairment  -history of confusion and forgetfulness prior to admission  -appears to be more confused inpatient as well   -given impairment and abnormal coordination MRI and C spine ordered  -consider Neurology consult if failure to improve in these symptoms   -HIV/RPR negative. Folate low, repleting. B12 high. Thiamine pending. Possibly 2/2 to dementia with poor nutritional intake.     Hypoglycemia  -episodes of hypoglycemia overnight  -given amp of D50, started on IV D10W   -monitor BG closely  -likely related to poor PO intake       Numbness and tingling in both hands  -noted by patient before admission with abnormal finger to nose and weakness   -CT head and spine reviewed. There is significant stenosis/narrowing of C6 and C7 which could be contributing to symptoms. Findings appear chronic  and not acute. MRI ordered and notable for cervical stenosis and lacunar infarcts. Limited visiblity due to movement   -CT head notable for remote lacunar infarcts, hx of stroke in 2005 currently on ASA   -continue PT/OT         Severe protein-calorie malnutrition  Nutrition consulted. Most recent weight and BMI monitored-     Measurements:  Wt Readings from Last 1 Encounters:   12/27/24 54.9 kg (121 lb 0.5 oz)   Body mass index is 22.87 kg/m².    Patient has been screened and assessed by RD.    Malnutrition Type:  Context: chronic illness  Level: severe    Malnutrition Characteristic Summary:  Weight Loss (Malnutrition): greater than 5% in 1 month  Energy Intake (Malnutrition): less than 75% for greater than or equal to 1 month  Subcutaneous Fat (Malnutrition): severe depletion  Muscle Mass (Malnutrition): severe depletion    Interventions/Recommendations (treatment strategy):  1. Continue texture modified diet- encourage intake as needed 2. Continue Boost supplements 3. RD following      Hypokalemia  Patient's most recent potassium results are listed below.   Recent Labs     12/30/24  0541 12/31/24  0540 01/01/25  0352   K 3.0* 4.9 3.0*       Plan  - Replete potassium per protocol  - Monitor potassium Daily  - Patient's hypokalemia is improving    Debility  Patient with Acute debility due to age-related physical debility and other reduced mobility. The patient's latest AMPAC (Activity Measure for Post Acute Care) Score is listed below.    AM-PAC Score - How much help does the patient need for each activity listed  Basic Mobility Total Score: 14  Turning over in bed (including adjusting bedclothes, sheets and blankets)?: A little  Sitting down on and standing up from a chair with arms (e.g., wheelchair, bedside commode, etc.): A little  Moving from lying on back to sitting on the side of the bed?: A little  Moving to and from a bed to a chair (including a wheelchair)?: A lot  Need to walk in hospital room?: A  lot  Climbing 3-5 steps with a railing?: Unable    Plan  - Progressive mobility protocol initated  - PT/OT consulted  - Fall precautions in place  -pt would benefit from SNF prior to discharge home       Diabetes mellitus  Sliding scale held, accuchecks for hypoglycemia     Patient's FSGs are controlled on current medication regimen.  Last A1c reviewed-   Lab Results   Component Value Date    HGBA1C 5.5 10/11/2024     Most recent fingerstick glucose reviewed-   Recent Labs   Lab 12/31/24  2120 12/31/24  2202 01/01/25  0720 01/01/25  0754   POCTGLUCOSE 77 176* 47* 221*       Current correctional scale   hold on correction scale give A1C is 5.5  Antihyperglycemics (From admission, onward)      None          Hold Oral hypoglycemics while patient is in the hospital.    Unintentional weight loss  Nutrition consulted. Most recent weight and BMI monitored-     Measurements:  Wt Readings from Last 1 Encounters:   12/27/24 54.9 kg (121 lb 0.5 oz)   Body mass index is 22.87 kg/m².    RD consultation   Extensive outpatient evaluation obtained by GI and General surgery   Will add supplementation to meals       1. Continue texture modified diet- encourage intake as needed 2. Continue Boost supplements 3. RD following      Benign essential hypertension  Patient's blood pressure range in the last 24 hours was: BP  Min: 101/66  Max: 131/79.The patient's inpatient anti-hypertensive regimen is listed below:  Current Antihypertensives  lisinopriL tablet 10 mg, Daily, Oral  NIFEdipine 24 hr tablet 30 mg, Daily, Oral    Plan  - BP is controlled, no changes needed to their regimen  - will continue home regimen, decrease lisinopril dose       VTE Risk Mitigation (From admission, onward)           Ordered     enoxaparin injection 40 mg  Daily         12/26/24 1700     IP VTE HIGH RISK PATIENT  Once         12/26/24 1547     Place sequential compression device  Until discontinued         12/26/24 1547                    Discharge Planning    RITA: 1/8/2025     Code Status: Full Code   Medical Readiness for Discharge Date:   Discharge Plan A: Home Health                Please place Justification for DME        Tate Rubio MD  Department of Hospital Medicine   [facility] - Internal Medicine Telemetry

## 2025-01-06 NOTE — CONSULTS
Palliative Medicine  Consult Note       Patient Name: Bhavna Lim   MRN: [id number]   Admission Date: 12/26/2024   Hospital Length of Stay: 8   Attending Provider: Tate Rubio MD   Consulting Provider: CHRIS King  Primary Care Physician: Suzi Green MD   Principal Problem: Malnutrition     Patient information was obtained from relative(s), past medical records, ER records, and primary team.        Inpatient consult to Palliative Care  Consult performed by: Mayelin Trejo CNS  Consult ordered by: Tate Rubio MD             Assessment/Plan:      Palliative Care Encounter:  Impression:  Ms. Bhavna Lim is a 78 y/o female with hx of HTN, HLD, DM, cholelithiasis, and unintended weight loss.  She had been living at home, with granddaughter staying with her. Pt presented to ED on 12/26 for decrease in appetite and weight loss. She was seen by GI and underwent workup, which was deemed non-contributory to her nutritional status.  CT/MRI head were obtained which did not show any acute process.   Her overall picture seems to be related to dementia, after other causes have been ruled out.       Palliative care consulted for [facility] conversations, as pt with progressive debility, dementia, and decrease in nutritional status, per communication with Dr. Rubio.        Advance Care Planning   Advance Directives:   Living Will: No    Do Not Resuscitate Status: Yes    Agent's Name:  Bruno Lim (son)   Agent's Contact Number:  [phone]    Decision Making:  Family answered questions  Goals of Care: The family endorses that what is most important right now is to focus on symptom/pain control and quality of life, even if it means sacrificing a little time    Accordingly, we have decided that the best plan to meet the patient's goals includes enrolling in hospice care upon d/c.          1/6/25:     Spoke with pt's son (Bruno) who lives out of state. He understanding of pt's overall poor  "prognosis and progressive weakness and debility, along with overall poor nutritional status. Because of these things, I began discussion on code status including meaning of cardiopulmonary event, resuscitation, and dnr. Son's wife is a nurse and has helped him to understand   -Son is agreeable to DNR status.    -Son shares that he does not want his mother to suffer and inquired about hospice care. I discussed philosophy of hospice and goals of care associated with hospice services including: avoidance of hospital, comfort through end of life outside of the hospital, symptom management, and QOL.     -I was able to speak with pt's sister (Lila) and answer questions r/t recommendation of hospice and reason for avoidance of peg tube. She is saddened but understanding.     Symptom Management:  -Debility: Pt now bed-bound and with progressive debility. Unable to return home.     -Malnutrition: Pt with weight loss and malnutrition, also with dysphagia.     -Confusion: r/t dementia. Pt unable to continue to care for self.     Summary of recommendations and follow up plan:  -Most important goals at this time: Assurance of comfort and QOL.    -Code status: DNR  -Disposition: to be determined.       The above recommendations communicated directly to primary team on 1/6/25    Thank you for your consult. I will sign off. Please contact us if you have any additional questions.       Subjective:     Chief Complaint:   Chief Complaint   Patient presents with    Fatigue     Increased weakness/fatigue and loss of appetite x2 months. No longer able to ambulate/frequent falls.         HPI:   Per chart review: "Mrs. Bhavna Lim is a 79 year old F with a history of HTN and HLD who presents to the ED for weakness and fatigue. She has been having weight loss and poor po intake over the last few months. She has been seen by GI and evaluated with EGD and Colonoscopy as well as MRCP which showed cholelithiasis with gallbladder " "distention and intrahepatic and extrahepatic biliary dilation. No significant abnormalities on EGD and coloscopy. She was referred to surgery who recommended NM study and possible EUS with GI. She has been unable to tolerate any solid foods. She sometimes takes a little bit of soup and has tried protein shakes. She feels full immediately. She does endorse some pain in her epigastric region ever since having her EGD performed. She occasionally has vomiting. Denies regurgitation of food. She continues to have worsening weakness and falls at home. She had two falls yesterday due to weakness. She denies significant dizziness. She is unable to walk due to her weakness and sister is concerned this is related to her poor nutrition.       In the ED VSS. Labs notable for Potassium of 3.0 which was repleted in the ED. No focal signs concerning for stroke. She was admitted to medicine for further management."      Hospital Course:  Per chart review: "While on the floor PT/OT were consulted and recommended SNF. Nutrition consulted. Discussed with GI outpatient workup and no indication for further inpatient workup. She continued to be altered while in the hospital. CT spine/head ordered for concern of fall showing ischemic changes and signficant stenosis and narrowing. She developed worsening hand weakness and numbness/tinglign with abnormal coordiation. Some of this was prior to admission but weakness significantly worsened. MRI ordered and showed old infarcts. She continued to refuse food and did not have abdominal complaints just would not eat. Started on IVF due to hypoglycemia. Acute encephalopathy workup initiated and negative aside from low folate. UA ordered but not performed. At this point if nutrition becomes suboptimal will have to consider other means of nutrition. Son with concern of worsening memory and possible dementia wanting placement in [place]. Started on supplements for poor nutrition including thiamine and " "folate to see if this will help with mental status "    Review of Symptoms      Symptom Assessment (ESAS 0-10 Scale)  Unable to complete assessment due to Acuity of condition         Pain Assessment in Advanced Demential Scale (PAINAD)   Breathing - Independent of vocalization:  0  Negative vocalization:  0  Facial expression:  0  Body language:  0  Consolability:  0  Total:  0    Performance Status:  50    Living Arrangements:  Lives with family    Psychosocial/Cultural:   See Palliative Psychosocial Note: Yes  Pt lives in her home and granddaughter able to stay with her. Pt has sister who lives near and is very involved in her care. Pt has one child who lives out of state.   **Primary  to Follow**  Palliative Care  Consult: No          ROS:  Review of Systems   Constitutional:  Positive for appetite change.         Past Medical History:   Diagnosis Date    Diabetes mellitus     Hyperlipidemia     Hypertension     Ingrown nail 2015    Bilateral great toe nail. No edema; tenderness or drainage.      Nail fungus 2015    To bilateral toenails X 10.      Obesity (BMI 30-39.9) 2024    Open wound of knee, leg, and ankle 2015     Past Surgical History:   Procedure Laterality Date     SECTION      COLONOSCOPY N/A 2024    Procedure: COLONOSCOPY;  Surgeon: Arturo Montalvo MD;  Location: [facility];  Service: Endoscopy;  Laterality: N/A;   ref by Jocelin Torres NP, [place], instructions given to pt in ofc and portal, cardiac clearance sent. edmund    ESOPHAGOGASTRODUODENOSCOPY N/A 2024    Procedure: EGD (ESOPHAGOGASTRODUODENOSCOPY);  Surgeon: Arturo Montalvo MD;  Location: [facility];  Service: Endoscopy;  Laterality: N/A;  cleared by cardiology Dr Pena-teodora BARFIELD consult dated 24-GT  12/10-[facility] for pre call-tb     No family history on file.      Review of patient's allergies indicates:  No Known Allergies    Medications:    Current Facility-Administered " Medications:     acetaminophen tablet 650 mg, 650 mg, Oral, Q4H PRN, Yarely Walters MD, 650 mg at 12/29/24 2323    acetaminophen tablet 650 mg, 650 mg, Oral, Q8H PRN, Yarely Walters MD    aluminum-magnesium hydroxide-simethicone 200-200-20 mg/5 mL suspension 30 mL, 30 mL, Oral, QID PRN, Yarely Walters MD    aspirin EC tablet 81 mg, 81 mg, Oral, Daily, Yarely Walters MD, 81 mg at 01/05/25 1031    atorvastatin tablet 40 mg, 40 mg, Oral, Daily, Yarely Walters MD, 40 mg at 01/05/25 1031    cefTRIAXone injection 1 g, 1 g, Intravenous, Q24H, Tate Rubio MD, 1 g at 01/04/25 1759    dextrose 5 % and 0.9 % NaCl infusion, , Intravenous, Continuous, Yarely Walters MD, Last Rate: 100 mL/hr at 01/04/25 0450, New Bag at 01/04/25 0450    dextrose 50% injection 12.5 g, 12.5 g, Intravenous, PRN, Yarely Walters MD, 25 g at 01/01/25 0722    dextrose 50% injection 25 g, 25 g, Intravenous, PRN, Yarely Walters MD    enoxaparin injection 40 mg, 40 mg, Subcutaneous, Daily, Yarely Walters MD, 40 mg at 01/05/25 1741    ferrous sulfate tablet 1 each, 1 tablet, Oral, Daily, Tate Rubio MD, 1 each at 01/05/25 1031    folic acid tablet 1 mg, 1 mg, Oral, Daily, Yarely Walters MD, 1 mg at 01/05/25 1031    glucagon (human recombinant) injection 1 mg, 1 mg, Intramuscular, PRN, Yarely Walters MD, 1 mg at 12/31/24 2053    glucose chewable tablet 16 g, 16 g, Oral, PRN, Yarely Walters MD, 16 g at 12/27/24 0733    glucose chewable tablet 24 g, 24 g, Oral, PRN, Yarely Walters MD    k phos di & mono-sod phos mono 250 mg tablet 1 tablet, 1 tablet, Oral, BID, Yarely Walters MD, 1 tablet at 01/05/25 2106    lisinopriL tablet 10 mg, 10 mg, Oral, Daily, Yarely Walters MD, 10 mg at 01/05/25 1031    melatonin tablet 6 mg, 6 mg, Oral, Nightly PRN, Yarely Walters MD, 6 mg at 01/02/25 2124    mupirocin 2 % ointment, , Nasal, BID, Lois Goel PAMaryC, Given at 01/05/25  2106    naloxone 0.4 mg/mL injection 0.02 mg, 0.02 mg, Intravenous, PRN, Yarely Walters MD    nicotine 7 mg/24 hr 1 patch, 1 patch, Transdermal, Daily, Yarely Walters MD, 1 patch at 01/05/25 1031    NIFEdipine 24 hr tablet 30 mg, 30 mg, Oral, Daily, Yarely Walters MD, 30 mg at 01/05/25 1031    ondansetron injection 4 mg, 4 mg, Intravenous, Q8H PRN, Yarely Walters MD    promethazine tablet 25 mg, 25 mg, Oral, Q6H PRN, Yarely Walters MD    simethicone chewable tablet 80 mg, 1 tablet, Oral, QID PRN, Yarely Walters MD    sodium chloride 0.9% flush 10 mL, 10 mL, Intravenous, Q12H PRN, Yarely Walters MD    thiamine tablet 100 mg, 100 mg, Oral, Daily, Yarely Walters MD, 100 mg at 01/05/25 1031         Objective:      Physical Exam:  Vitals: Temp: 98.3 °F (36.8 °C) (01/06/25 0748)  Pulse: 80 (01/06/25 0748)  Resp: 18 (01/06/25 0748)  BP: 130/75 (01/06/25 0748)  SpO2: 95 % (01/06/25 0748)    Physical Exam  Constitutional:       Appearance: She is ill-appearing.   Neurological:      Mental Status: She is disoriented.                 Labs:   Creatinine   Date Value Ref Range Status   01/05/2025 0.5 0.5 - 1.4 mg/dL Final      Hemoglobin   Date Value Ref Range Status   01/04/2025 9.9 (L) 12.0 - 16.0 g/dL Final      Albumin   Date Value Ref Range Status   01/03/2025 1.9 (L) 3.5 - 5.2 g/dL Final   01/02/2025 2.1 (L) 3.5 - 5.2 g/dL Final   01/01/2025 2.0 (L) 3.5 - 5.2 g/dL Final          Imaging: MRI Brain W WO Contrast  Order: [id number]  Status: Final result       Visible to patient: Yes (seen)       Next appt: None    0 Result Notes  Details    Reading Physician Reading Date Result Priority   Casimiro Bateman MD  [phone] 12/31/2024 Routine     Narrative & Impression  EXAMINATION:  MRI BRAIN W WO CONTRAST     CLINICAL HISTORY:  Memory loss;Ataxia or coordination problem (Ped 0-18y);abnormal coordiation, cognitive decline;.     TECHNIQUE:  Multiplanar multisequence MR imaging of the brain  was performed before and after the administration of 6 mL Gadavist  intravenous contrast.     COMPARISON:  CT head without contrast 12/29/2024.     FINDINGS:  Intracranial compartment:     Postcontrast images limited secondary to diffuse motion artifact     Mild prominence of the ventricles with compensatory enlargement of the sulci, in keeping with central volume loss.  No hydrocephalus.  No extra-axial blood or fluid collections.     Scattered regions of subcortical and periventricular T2/FLAIR hyperintense signal, overall nonspecific, but can be seen in setting of microvascular ischemic change.  Remote lacunar type infarct about the right thalamus.  No mass lesion, acute hemorrhage, edema or acute infarct. No abnormal enhancement.     Normal vascular flow voids are preserved.     Skull/extracranial contents (limited evaluation): Bone marrow signal intensity is normal.     Paranasal sinuses and mastoid air cells are essentially clear.     Impression:     No evidence for acute intracranial pathology, noting severe limitations from motion artifact degrading postcontrast imaging.     Sequela of chronic microvascular ischemic change.  Remote lacunar type infarct right thalamus.        Electronically signed by:Casimiro Bateman  Date:                                            12/31/2024  Time:                                           15:05        Exam Ended: 12/31/24 14:13 CST Last Resulted: 12/31/24 15:05 CST         Additional 17 min time spent on a voluntary advance care planning and /or goals of care discussion, providing emotional support, formulating, and communicating prognosis and exploring burden/benefit of various approaches of treatment.       Thank you for the opportunity to care for this patient and family.       Mayelin Trejo, CNS

## 2025-01-06 NOTE — SUBJECTIVE & OBJECTIVE
Interval History: To be seen by palliative care today    Review of Systems  Objective:     Vital Signs (Most Recent):  Temp: 98.3 °F (36.8 °C) (01/06/25 0748)  Pulse: 80 (01/06/25 0748)  Resp: 18 (01/06/25 0748)  BP: 130/75 (01/06/25 0748)  SpO2: 95 % (01/06/25 0748) Vital Signs (24h Range):  Temp:  [97.4 °F (36.3 °C)-98.5 °F (36.9 °C)] 98.3 °F (36.8 °C)  Pulse:  [80-89] 80  Resp:  [18] 18  SpO2:  [91 %-100 %] 95 %  BP: (115-133)/(71-75) 130/75     Weight: 54.9 kg (121 lb 0.5 oz)  Body mass index is 22.87 kg/m².    Intake/Output Summary (Last 24 hours) at 1/6/2025 0853  Last data filed at 1/6/2025 0600  Gross per 24 hour   Intake --   Output 125 ml   Net -125 ml         Physical Exam  Constitutional:       Appearance: Normal appearance. She is not ill-appearing.   HENT:      Head: Normocephalic and atraumatic.      Nose: Nose normal.      Mouth/Throat:      Mouth: Mucous membranes are moist.   Eyes:      Extraocular Movements: Extraocular movements intact.      Pupils: Pupils are equal, round, and reactive to light.   Cardiovascular:      Rate and Rhythm: Normal rate and regular rhythm.      Heart sounds: No murmur heard.     No gallop.   Pulmonary:      Effort: Pulmonary effort is normal.      Breath sounds: Normal breath sounds. No wheezing or rales.   Abdominal:      General: Abdomen is flat. Bowel sounds are normal. There is no distension.      Palpations: Abdomen is soft.      Tenderness: There is no abdominal tenderness.   Musculoskeletal:         General: No swelling or tenderness. Normal range of motion.      Cervical back: Normal range of motion and neck supple.      Right lower leg: No edema.      Left lower leg: No edema.   Skin:     General: Skin is warm and dry.      Capillary Refill: Capillary refill takes less than 2 seconds.   Neurological:      General: No focal deficit present.      Motor: Weakness present.      Comments: Abnormal hand coordination    strength in tact     Psychiatric:          Mood and Affect: Mood normal.             Significant Labs: All pertinent labs within the past 24 hours have been reviewed.  BMP:   Recent Labs   Lab 01/05/25  1547   GLU 77      K 2.9*   *   CO2 23   BUN 5*   CREATININE 0.5   CALCIUM 8.0*       Significant Imaging: I have reviewed all pertinent imaging results/findings within the past 24 hours.

## 2025-01-07 LAB
POCT GLUCOSE: 114 MG/DL (ref 70–110)
POCT GLUCOSE: 128 MG/DL (ref 70–110)
POCT GLUCOSE: 178 MG/DL (ref 70–110)
POCT GLUCOSE: 88 MG/DL (ref 70–110)
VIT B1 BLD-MCNC: 12 UG/L (ref 38–122)

## 2025-01-07 PROCEDURE — S5010 5% DEXTROSE AND 0.45% SALINE: HCPCS | Performed by: INTERNAL MEDICINE

## 2025-01-07 PROCEDURE — 97535 SELF CARE MNGMENT TRAINING: CPT | Mod: CO

## 2025-01-07 PROCEDURE — 25000003 PHARM REV CODE 250: Performed by: INTERNAL MEDICINE

## 2025-01-07 PROCEDURE — 25000003 PHARM REV CODE 250: Performed by: STUDENT IN AN ORGANIZED HEALTH CARE EDUCATION/TRAINING PROGRAM

## 2025-01-07 PROCEDURE — 97112 NEUROMUSCULAR REEDUCATION: CPT | Mod: CQ

## 2025-01-07 PROCEDURE — 97110 THERAPEUTIC EXERCISES: CPT | Mod: CQ

## 2025-01-07 PROCEDURE — 63600175 PHARM REV CODE 636 W HCPCS: Performed by: INTERNAL MEDICINE

## 2025-01-07 PROCEDURE — S4991 NICOTINE PATCH NONLEGEND: HCPCS | Performed by: STUDENT IN AN ORGANIZED HEALTH CARE EDUCATION/TRAINING PROGRAM

## 2025-01-07 PROCEDURE — 21400001 HC TELEMETRY ROOM

## 2025-01-07 PROCEDURE — 63600175 PHARM REV CODE 636 W HCPCS: Performed by: STUDENT IN AN ORGANIZED HEALTH CARE EDUCATION/TRAINING PROGRAM

## 2025-01-07 PROCEDURE — 11000001 HC ACUTE MED/SURG PRIVATE ROOM

## 2025-01-07 PROCEDURE — 25000003 PHARM REV CODE 250

## 2025-01-07 PROCEDURE — 97530 THERAPEUTIC ACTIVITIES: CPT | Mod: CO

## 2025-01-07 RX ORDER — ACETAMINOPHEN 325 MG/1
650 TABLET ORAL EVERY 4 HOURS PRN
Status: DISCONTINUED | OUTPATIENT
Start: 2025-01-07 | End: 2025-01-31

## 2025-01-07 RX ADMIN — ACETAMINOPHEN 650 MG: 325 TABLET ORAL at 04:01

## 2025-01-07 RX ADMIN — NIFEDIPINE 30 MG: 30 TABLET, FILM COATED, EXTENDED RELEASE ORAL at 08:01

## 2025-01-07 RX ADMIN — MUPIROCIN: 20 OINTMENT TOPICAL at 08:01

## 2025-01-07 RX ADMIN — Medication 100 MG: at 08:01

## 2025-01-07 RX ADMIN — CEFTRIAXONE SODIUM 1 G: 1 INJECTION, POWDER, FOR SOLUTION INTRAMUSCULAR; INTRAVENOUS at 08:01

## 2025-01-07 RX ADMIN — ASPIRIN 81 MG: 81 TABLET, COATED ORAL at 08:01

## 2025-01-07 RX ADMIN — FERROUS SULFATE TAB 325 MG (65 MG ELEMENTAL FE) 1 EACH: 325 (65 FE) TAB at 08:01

## 2025-01-07 RX ADMIN — Medication 1 TABLET: at 08:01

## 2025-01-07 RX ADMIN — Medication 1 TABLET: at 10:01

## 2025-01-07 RX ADMIN — DEXTROSE AND SODIUM CHLORIDE: 5; 450 INJECTION, SOLUTION INTRAVENOUS at 04:01

## 2025-01-07 RX ADMIN — ATORVASTATIN CALCIUM 40 MG: 40 TABLET, FILM COATED ORAL at 08:01

## 2025-01-07 RX ADMIN — LISINOPRIL 10 MG: 10 TABLET ORAL at 09:01

## 2025-01-07 RX ADMIN — NICOTINE 1 PATCH: 7 PATCH, EXTENDED RELEASE TRANSDERMAL at 08:01

## 2025-01-07 RX ADMIN — FOLIC ACID 1 MG: 1 TABLET ORAL at 08:01

## 2025-01-07 RX ADMIN — ENOXAPARIN SODIUM 40 MG: 40 INJECTION SUBCUTANEOUS at 04:01

## 2025-01-07 NOTE — PROGRESS NOTES
[facility] - Internal Medicine [facility]  Progress Note    Patient Name: Bhavna Lim  MRN: [id number]  Patient Class: IP- Inpatient   Admission Date: 12/26/2024  Length of Stay: 9 days  Attending Physician: Tate Rubio MD  Primary Care Provider: Suzi Green MD        Subjective     Principal Problem:Malnutrition        HPI:  Mrs. Bhavna Lim is a 79 year old F with a history of HTN and HLD who presents to the ED for weakness and fatigue. She has been having weight loss and poor po intake over the last few months. She has been seen by GI and evaluated with EGD and Colonoscopy as well as MRCP which showed cholelithiasis with gallbladder distention and intrahepatic and extrahepatic biliary dilation. No significant abnormalities on EGD and coloscopy. She was referred to surgery who recommended NM study and possible EUS with GI. She has been unable to tolerate any solid foods. She sometimes takes a little bit of soup and has tried protein shakes. She feels full immediately. She does endorse some pain in her epigastric region ever since having her EGD performed. She occasionally has vomiting. Denies regurgitation of food. She continues to have worsening weakness and falls at home. She had two falls yesterday due to weakness. She denies significant dizziness. She is unable to walk due to her weakness and sister is concerned this is related to her poor nutrition.      In the ED VSS. Labs notable for Potassium of 3.0 which was repleted in the ED. No focal signs concerning for stroke. She was admitted to medicine for further management.      Overview/Hospital Course:  While on the floor PT/OT were consulted and recommended SNF. Nutrition consulted. Discussed with GI outpatient workup and no indication for further inpatient workup. She continued to be altered while in the hospital. CT spine/head ordered for concern of fall showing ischemic changes and signficant stenosis and narrowing.  She developed worsening hand weakness and numbness/tinglign with abnormal coordiation. Some of this was prior to admission but weakness significantly worsened. MRI ordered and showed old infarcts. She continued to refuse food and did not have abdominal complaints just would not eat. Started on IVF due to hypoglycemia. Acute encephalopathy workup initiated and negative aside from low folate. UA ordered but not performed. At this point if nutrition becomes suboptimal will have to consider other means of nutrition. Son with concern of worsening memory and possible dementia wanting placement in [place]. Started on supplements for poor nutrition including thiamine and folate to see if this will help with mental status     Interval History: Await NH with hospice    Review of Systems  Objective:     Vital Signs (Most Recent):  Temp: 97.6 °F (36.4 °C) (01/07/25 0728)  Pulse: 86 (01/07/25 0728)  Resp: 18 (01/07/25 0728)  BP: 120/83 (01/07/25 0728)  SpO2: 95 % (01/07/25 0728) Vital Signs (24h Range):  Temp:  [97.5 °F (36.4 °C)-98.2 °F (36.8 °C)] 97.6 °F (36.4 °C)  Pulse:  [] 86  Resp:  [18] 18  SpO2:  [95 %-97 %] 95 %  BP: (106-126)/(68-83) 120/83     Weight: 54.9 kg (121 lb 0.5 oz)  Body mass index is 22.87 kg/m².    Intake/Output Summary (Last 24 hours) at 1/7/2025 0992  Last data filed at 1/7/2025 0454  Gross per 24 hour   Intake 300 ml   Output 650 ml   Net -350 ml         Physical Exam  Constitutional:       Appearance: Normal appearance. She is not ill-appearing.   HENT:      Head: Normocephalic and atraumatic.      Nose: Nose normal.      Mouth/Throat:      Mouth: Mucous membranes are moist.   Eyes:      Extraocular Movements: Extraocular movements intact.      Pupils: Pupils are equal, round, and reactive to light.   Cardiovascular:      Rate and Rhythm: Normal rate and regular rhythm.      Heart sounds: No murmur heard.     No gallop.   Pulmonary:      Effort: Pulmonary effort is normal.      Breath sounds:  Normal breath sounds. No wheezing or rales.   Abdominal:      General: Abdomen is flat. Bowel sounds are normal. There is no distension.      Palpations: Abdomen is soft.      Tenderness: There is no abdominal tenderness.   Musculoskeletal:         General: No swelling or tenderness. Normal range of motion.      Cervical back: Normal range of motion and neck supple.      Right lower leg: No edema.      Left lower leg: No edema.   Skin:     General: Skin is warm and dry.      Capillary Refill: Capillary refill takes less than 2 seconds.   Neurological:      General: No focal deficit present.      Motor: Weakness present.      Comments: Abnormal hand coordination    strength in tact     Psychiatric:         Mood and Affect: Mood normal.             Significant Labs: All pertinent labs within the past 24 hours have been reviewed.  BMP:   Recent Labs   Lab 01/06/25  0944   GLU 91      K 2.8*   *   CO2 21*   BUN 5*   CREATININE 0.5   CALCIUM 7.7*       Significant Imaging: I have reviewed all pertinent imaging results/findings within the past 24 hours.    Assessment and Plan     Cognitive impairment  -history of confusion and forgetfulness prior to admission  -appears to be more confused inpatient as well   -given impairment and abnormal coordination MRI and C spine ordered  -consider Neurology consult if failure to improve in these symptoms   -HIV/RPR negative. Folate low, repleting. B12 high. Thiamine pending. Possibly 2/2 to dementia with poor nutritional intake.     Hypoglycemia  -episodes of hypoglycemia overnight  -given amp of D50, started on IV D10W   -monitor BG closely  -likely related to poor PO intake       Numbness and tingling in both hands  -noted by patient before admission with abnormal finger to nose and weakness   -CT head and spine reviewed. There is significant stenosis/narrowing of C6 and C7 which could be contributing to symptoms. Findings appear chronic and not acute. MRI ordered  and notable for cervical stenosis and lacunar infarcts. Limited visiblity due to movement   -CT head notable for remote lacunar infarcts, hx of stroke in 2005 currently on ASA   -continue PT/OT         Severe protein-calorie malnutrition  Nutrition consulted. Most recent weight and BMI monitored-     Measurements:  Wt Readings from Last 1 Encounters:   12/27/24 54.9 kg (121 lb 0.5 oz)   Body mass index is 22.87 kg/m².    Patient has been screened and assessed by RD.    Malnutrition Type:  Context: chronic illness  Level: severe    Malnutrition Characteristic Summary:  Weight Loss (Malnutrition): greater than 5% in 1 month  Energy Intake (Malnutrition): less than 75% for greater than or equal to 1 month  Subcutaneous Fat (Malnutrition): severe depletion  Muscle Mass (Malnutrition): severe depletion    Interventions/Recommendations (treatment strategy):  1. Continue texture modified diet- encourage intake as needed 2. Continue Boost supplements 3. RD following      Hypokalemia  Patient's most recent potassium results are listed below.   Recent Labs     12/30/24  0541 12/31/24  0540 01/01/25  0352   K 3.0* 4.9 3.0*       Plan  - Replete potassium per protocol  - Monitor potassium Daily  - Patient's hypokalemia is improving    Debility  Patient with Acute debility due to age-related physical debility and other reduced mobility. The patient's latest AMPAC (Activity Measure for Post Acute Care) Score is listed below.    AM-PAC Score - How much help does the patient need for each activity listed  Basic Mobility Total Score: 14  Turning over in bed (including adjusting bedclothes, sheets and blankets)?: A little  Sitting down on and standing up from a chair with arms (e.g., wheelchair, bedside commode, etc.): A little  Moving from lying on back to sitting on the side of the bed?: A little  Moving to and from a bed to a chair (including a wheelchair)?: A lot  Need to walk in hospital room?: A lot  Climbing 3-5 steps with a  railing?: Unable    Plan  - Progressive mobility protocol initated  - PT/OT consulted  - Fall precautions in place  -pt would benefit from SNF prior to discharge home       Diabetes mellitus  Sliding scale held, accuchecks for hypoglycemia     Patient's FSGs are controlled on current medication regimen.  Last A1c reviewed-   Lab Results   Component Value Date    HGBA1C 5.5 10/11/2024     Most recent fingerstick glucose reviewed-   Recent Labs   Lab 12/31/24  2120 12/31/24  2202 01/01/25  0720 01/01/25  0754   POCTGLUCOSE 77 176* 47* 221*       Current correctional scale   hold on correction scale give A1C is 5.5  Antihyperglycemics (From admission, onward)      None          Hold Oral hypoglycemics while patient is in the hospital.    Unintentional weight loss  Nutrition consulted. Most recent weight and BMI monitored-     Measurements:  Wt Readings from Last 1 Encounters:   12/27/24 54.9 kg (121 lb 0.5 oz)   Body mass index is 22.87 kg/m².    RD consultation   Extensive outpatient evaluation obtained by GI and General surgery   Will add supplementation to meals       1. Continue texture modified diet- encourage intake as needed 2. Continue Boost supplements 3. RD following      Benign essential hypertension  Patient's blood pressure range in the last 24 hours was: BP  Min: 101/66  Max: 131/79.The patient's inpatient anti-hypertensive regimen is listed below:  Current Antihypertensives  lisinopriL tablet 10 mg, Daily, Oral  NIFEdipine 24 hr tablet 30 mg, Daily, Oral    Plan  - BP is controlled, no changes needed to their regimen  - will continue home regimen, decrease lisinopril dose       VTE Risk Mitigation (From admission, onward)           Ordered     enoxaparin injection 40 mg  Daily         12/26/24 1700     IP VTE HIGH RISK PATIENT  Once         12/26/24 1547     Place sequential compression device  Until discontinued         12/26/24 1547                    Discharge Planning   RITA: 1/8/2025     Code Status:  DNR   Medical Readiness for Discharge Date:   Discharge Plan A: Home Health                Please place Justification for DME        Tate Rubio MD  Department of Hospital Medicine   [facility] - Internal Medicine Telemetry

## 2025-01-07 NOTE — PT/OT/SLP PROGRESS
"Physical Therapy Treatment  Co-treatment performed due to patient's multiple deficits requiring two skilled therapists to appropriately and safely assess patient's strength and endurance while facilitating functional tasks in addition to accommodating for patient's activity tolerance.    Patient Name:  Bhavna Lim   MRN:  [id number]    Recommendations:     Discharge Recommendations: Moderate Intensity Therapy  Discharge Equipment Recommendations:  (lift device; hospital bed; wheelchair)  Barriers to discharge: Decreased caregiver support, Other (Comment) (patient requires increased level of assistance)     Assessment:     Bhavna Lim is a 79 y.o. female admitted with a medical diagnosis of Malnutrition.  She presents with the following impairments/functional limitations: weakness, impaired endurance, impaired functional mobility, gait instability, impaired balance, decreased coordination, decreased upper extremity function, decreased lower extremity function, impaired cognition, decreased safety awareness, impaired self care skills .Pt tolerated treatment fairly well and is progressing slowly with mobility. pt limited due to fatigue. Patient remains appropriate for continued skilled services within the acute environment and goals remain appropriate.    Rehab Prognosis: Good; patient would benefit from acute skilled PT services to address these deficits and reach maximum level of function.    Recent Surgery: * No surgery found *      Plan:     During this hospitalization, patient to be seen 4 x/week to address the identified rehab impairments via therapeutic activities, gait training, therapeutic exercises, neuromuscular re-education and progress toward the following goals:    Plan of Care Expires:  01/31/25    Subjective     Chief Complaint:  carry me to the bathroom"  Patient/Family Comments/goals: to return to PLOF    Pain/Comfort:  Pain Rating 1: 0/10  Location - Side 1: Left  Location - Orientation 1: " generalized  Pain Addressed 1: Distraction  Pain Rating Post-Intervention 1: 0/10      Objective:     Communicated with RN prior to session.  Patient found HOB elevated with bed alarm, springer catheter upon PT entry to room.     General Precautions: Standard, fall  Orthopedic Precautions: N/A  Braces: N/A  Respiratory Status: Room air     Functional Mobility:  Bed Mobility:     Rolling/Turning to Right with maximal assistance    Scooting to EOB and HOB  with maximal assistance of 2 persons   Supine to Sit: total assistance and of 2 persons  Sit to Supine: total assistance and of 2 persons  Transfers:     Sit to Stand:  total assistance and of 2 persons with B knee block  Balance: verbal and tactile cues to maintain balance, patient exhibits posterior and lateral lean   Poor sitting balance maxA         AM-PAC 6 CLICK MOBILITY  Turning over in bed (including adjusting bedclothes, sheets and blankets)?: 2  Sitting down on and standing up from a chair with arms (e.g., wheelchair, bedside commode, etc.): 1  Moving from lying on back to sitting on the side of the bed?: 1  Moving to and from a bed to a chair (including a wheelchair)?: 1  Need to walk in hospital room?: 1  Climbing 3-5 steps with a railing?: 1  Basic Mobility Total Score: 7       Treatment & Education:  Therapist provided instruction and educated of patient on progress, safety, d/c, PT POC,   proper body mechanics, energy conservation, and fall prevention strategies during tasks listed above, on the effects of prolonged immobility and the importance of performing OOB activity and exercises to promote healing and reduce recovery time  Updated white board with appropriate PT mobility information for medical team notification   Donned an extra gown   Seated B LE PROM therex AP,LAQ,hip flexion  Call nursing/pct to transfer to chair/use bathroom.   Bedside table in front of patient and area set up for function, convenience, and safety. RN aware of patient's  mobility needs and status. Questions/concerns addressed within PTA scope of practice, patient with no further questions. Time was provided for active listening, discussion of health disposition, and discussion of safe discharge. Pt?verbalized?agreement .     Patient left HOB elevated with all lines intact, call button in reach, bed alarm on, and nsg notified.  GOALS:   Multidisciplinary Problems       Physical Therapy Goals          Problem: Physical Therapy    Goal Priority Disciplines Outcome Interventions   Physical Therapy Goal     PT, PT/OT Progressing    Description: Goals to be met by: 1/10     Patient will increase functional independence with mobility by performin. Supine to sit with Modified [place]  2. Sit to supine with Modified [place]  3. Sit to stand transfer with Stand-by Assistance  4. Gait  x 100 feet with Contact Guard Assistance using LRAD.   5. Ascend/descend 6 stair with bilateral Handrails Minimal Assistance using LRAD.     DME Justifcation  Patient demonstrates a mobility limitation that significantly impairs their ability to participate in one or more mobility related activities of daily living. Patient's mobility limitation cannot be sufficiently resolved with the use of a cane, but can be sufficiently resolved with the use of a rolling walker.The use of a rolling walker will considerably improve their ability to participate in MRADLs. Patient will use the walker on a regular basis at home.     Patient has a mobility limitation that significantly impairs their ability to participate in one or more mobility related activities of daily living in customary locations in the home. The mobility limitation cannot be sufficiently resolved by the use of a cane or walker. The use of a manual wheelchair will greatly improve the patient's ability to participate in MRADLs. The patient will use the wheelchair on a regular basis at home. They have expressed their willingness to use a  manual wheelchair in the home, and have a caregiver who is available and willing to assist with the wheelchair if needed.                         Time Tracking:     PT Received On: 01/07/25  PT Start Time: 1045     PT Stop Time: 1110  PT Total Time (min): 25 min     Billable Minutes: Therapeutic Exercise 10 and Neuromuscular Re-education 15    Treatment Type: Treatment  PT/PTA: PTA     Number of PTA visits since last PT visit: 1 01/07/2025

## 2025-01-07 NOTE — PLAN OF CARE
01/07/25 1535   Discharge Reassessment   Assessment Type Discharge Planning Reassessment   Discharge Plan discussed with: Sibling;Adult children   Name(s) and Number(s) Sister Karla.   To be discussed with rigoberto Carr.   Communicated RITA with patient/caregiver No   Discharge Plan A New Nursing Home placement - senior care care facility  (with hospice service)   Post-Acute Status   Post-Acute Authorization Placement   Post-Acute Placement Status Referrals Sent       Met with  sister Karla to review discharge recommendation of NH with hospice and is agreeable to plan.    Patient/family provided list of facilities in-network with patient's payor plan.  Providers that are owned, operated, or affiliated with [facility] are included on the list.     Notified that referral sent to below listed facilities from list based on proximity to home/family support:    [facility]  [facility]  [facility]  [facility]- said they can accept  [facility]- said they can accept  [facility]       Patient/family instructed to identify preference.        Preferred Facility: TBD      If an additional preferred facility not listed above is identified, additional referral to be sent.  If above facilities unable to accept, will send additional referrals to in-network providers.

## 2025-01-07 NOTE — SUBJECTIVE & OBJECTIVE
Interval History: Await NH with hospice    Review of Systems  Objective:     Vital Signs (Most Recent):  Temp: 97.6 °F (36.4 °C) (01/07/25 0728)  Pulse: 86 (01/07/25 0728)  Resp: 18 (01/07/25 0728)  BP: 120/83 (01/07/25 0728)  SpO2: 95 % (01/07/25 0728) Vital Signs (24h Range):  Temp:  [97.5 °F (36.4 °C)-98.2 °F (36.8 °C)] 97.6 °F (36.4 °C)  Pulse:  [] 86  Resp:  [18] 18  SpO2:  [95 %-97 %] 95 %  BP: (106-126)/(68-83) 120/83     Weight: 54.9 kg (121 lb 0.5 oz)  Body mass index is 22.87 kg/m².    Intake/Output Summary (Last 24 hours) at 1/7/2025 0985  Last data filed at 1/7/2025 0454  Gross per 24 hour   Intake 300 ml   Output 650 ml   Net -350 ml         Physical Exam  Constitutional:       Appearance: Normal appearance. She is not ill-appearing.   HENT:      Head: Normocephalic and atraumatic.      Nose: Nose normal.      Mouth/Throat:      Mouth: Mucous membranes are moist.   Eyes:      Extraocular Movements: Extraocular movements intact.      Pupils: Pupils are equal, round, and reactive to light.   Cardiovascular:      Rate and Rhythm: Normal rate and regular rhythm.      Heart sounds: No murmur heard.     No gallop.   Pulmonary:      Effort: Pulmonary effort is normal.      Breath sounds: Normal breath sounds. No wheezing or rales.   Abdominal:      General: Abdomen is flat. Bowel sounds are normal. There is no distension.      Palpations: Abdomen is soft.      Tenderness: There is no abdominal tenderness.   Musculoskeletal:         General: No swelling or tenderness. Normal range of motion.      Cervical back: Normal range of motion and neck supple.      Right lower leg: No edema.      Left lower leg: No edema.   Skin:     General: Skin is warm and dry.      Capillary Refill: Capillary refill takes less than 2 seconds.   Neurological:      General: No focal deficit present.      Motor: Weakness present.      Comments: Abnormal hand coordination    strength in tact     Psychiatric:         Mood and  Affect: Mood normal.             Significant Labs: All pertinent labs within the past 24 hours have been reviewed.  BMP:   Recent Labs   Lab 01/06/25  0944   GLU 91      K 2.8*   *   CO2 21*   BUN 5*   CREATININE 0.5   CALCIUM 7.7*       Significant Imaging: I have reviewed all pertinent imaging results/findings within the past 24 hours.

## 2025-01-07 NOTE — NURSING
"Patient reports experiencing pain in her legs, describing the sensation as "pins and needles" with associated numbness in her feet. She is observed performing a side-to-side rocking motion. Additionally, the patient mentions feeling cold. She was covered with extra blankets, and the room temperature was increased. MD notified.  "

## 2025-01-07 NOTE — PT/OT/SLP PROGRESS
"Occupational Therapy   Treatment    Name: Bhavna Lim  MRN: [id number]  Admitting Diagnosis:  Malnutrition       Recommendations:     Discharge Recommendations: Moderate Intensity Therapy  Discharge Equipment Recommendations:  hospital bed, lift device, wheelchair  Barriers to discharge:  Decreased caregiver support, Other (Comment) (patient requires increased level of assistance)    Assessment:     Bhavna Lim is a 79 y.o. female with a medical diagnosis of Malnutrition.  She presents with the fallowing performance deficits affecting function are weakness, impaired endurance, impaired self care skills, impaired functional mobility, gait instability, impaired balance, pain, decreased safety awareness, decreased lower extremity function, decreased upper extremity function, abnormal tone, impaired cardiopulmonary response to activity, decreased coordination, impaired cognition, impaired coordination, impaired fine motor. Patient agreeable to tx session, patient continues to require significant assistance with functional transfers, bed mobility, and self-care task, however; patient continues to have limited activity tolerance due to pain, weakness, and deficits with eye-hand coordination, and intermittent confusion from recent hospitalization. Patient would benefit from Moderate intensity therapy intervention to address over all functional decline with mobility task, endurance, and ADLs in order to return to OF.     Rehab Prognosis:  Good; patient would benefit from acute skilled OT services to address these deficits and reach maximum level of function.       Plan:     Patient to be seen 4 x/week to address the above listed problems via self-care/home management, therapeutic activities, therapeutic exercises, neuromuscular re-education  Plan of Care Expires: 01/26/25  Plan of Care Reviewed with: patient    Subjective     Chief Complaint: " let me go to the bathroom"  Patient/Family Comments/goals: to " return to PLOF  Pain/Comfort:  Pain Rating 1: 0/10  Location 1: knee  Pain Addressed 1: Reposition, Distraction  Pain Rating Post-Intervention 1: 0/10    Objective:     Communicated with: Nurse prior to session.  Patient found HOB elevated with bed alarm, springer catheter upon OT entry to room.  Co-treated with PT due to patient complexity deficits requiring two skilled therapist to appropriately and safely mobilized patient while facilitating functional task in addition to accommodating for patient's activity tolerance.    General Precautions: Standard, fall    Orthopedic Precautions:N/A  Braces: N/A  Respiratory Status: Room air     Occupational Performance:     Bed Mobility:    Patient completed Rolling/Turning to Right with maximal assistance   Patient completed Scooting to EOB and HOB  with maximal assistance of 2 persons   Patient completed Supine to Sit with maximal assistance of 2 persons   Patient completed Sit to Supine with maximal assistance of 2 persons   Patient required Max A of 2 persons to maintain static sitting balance   Patient required verbal and tactile cues to maintain balance, patient exhibits posterior and lateral lean    Functional Mobility/Transfers:  Deferred further mobility task due to poor sitting balance and tolerance     Activities of Daily Living:  Grooming: maximal assistance to wash face sitting up at EOB   Lower Body Dressing: total assistance to dave/doff socks       [facility] 6 Click ADL: 10    Treatment & Education:  Discussed OT POC and progress  Educated patient on the importance to continue to perform exercises in order to reduce stiffness and promote joint mobility and blood flow  Addressed patient's questions and concerns within [facility] scope of practice      Patient left HOB elevated with all lines intact, call button in reach, bed alarm on, and tele-sitter present    GOALS:   Multidisciplinary Problems       Occupational Therapy Goals          Problem: Occupational Therapy     Goal Priority Disciplines Outcome Interventions   Occupational Therapy Goal     OT, PT/OT Progressing    Description: Goals to be met by: 1/26/25     Patient will increase functional independence with ADLs by performing:    UE Dressing with Modified [place].  LE Dressing with Modified [place].  Grooming while standing at sink with Modified [place].  Toileting from toilet/bedside commode with Modified [place] for hygiene and clothing management.   Supine to sit with Modified [place].  Toilet transfer to toilet/bedside commode with Stand-by Assistance.    Patient demonstrates a mobility limitation that significantly impairs their ability to participate in one or more mobility related activities of daily living. Patient's mobility limitation cannot be sufficiently resolved with the use of a cane, but can be sufficiently resolved with the use of a rolling walker.The use of a rolling walker will considerably improve their ability to participate in MRADLs. Patient will use the walker on a regular basis at home.                           Time Tracking:     OT Date of Treatment: 01/07/25  OT Start Time: 1045  OT Stop Time: 1108  OT Total Time (min): 23 min    Billable Minutes:Self Care/Home Management 13  Therapeutic Activity 10    OT/ABDULLAHI: ABDULLAHI     Number of ABDULLAHI visits since last OT visit: 2    1/7/2025

## 2025-01-08 LAB
POCT GLUCOSE: 106 MG/DL (ref 70–110)
POCT GLUCOSE: 76 MG/DL (ref 70–110)
POCT GLUCOSE: 97 MG/DL (ref 70–110)

## 2025-01-08 PROCEDURE — S5010 5% DEXTROSE AND 0.45% SALINE: HCPCS | Performed by: INTERNAL MEDICINE

## 2025-01-08 PROCEDURE — 25000003 PHARM REV CODE 250: Performed by: STUDENT IN AN ORGANIZED HEALTH CARE EDUCATION/TRAINING PROGRAM

## 2025-01-08 PROCEDURE — 63600175 PHARM REV CODE 636 W HCPCS: Performed by: STUDENT IN AN ORGANIZED HEALTH CARE EDUCATION/TRAINING PROGRAM

## 2025-01-08 PROCEDURE — 94761 N-INVAS EAR/PLS OXIMETRY MLT: CPT

## 2025-01-08 PROCEDURE — S4991 NICOTINE PATCH NONLEGEND: HCPCS | Performed by: STUDENT IN AN ORGANIZED HEALTH CARE EDUCATION/TRAINING PROGRAM

## 2025-01-08 PROCEDURE — 11000001 HC ACUTE MED/SURG PRIVATE ROOM

## 2025-01-08 PROCEDURE — 25000003 PHARM REV CODE 250: Performed by: INTERNAL MEDICINE

## 2025-01-08 PROCEDURE — 21400001 HC TELEMETRY ROOM

## 2025-01-08 RX ADMIN — FOLIC ACID 1 MG: 1 TABLET ORAL at 09:01

## 2025-01-08 RX ADMIN — Medication 1 TABLET: at 09:01

## 2025-01-08 RX ADMIN — Medication 1 TABLET: at 08:01

## 2025-01-08 RX ADMIN — DEXTROSE AND SODIUM CHLORIDE: 5; 450 INJECTION, SOLUTION INTRAVENOUS at 07:01

## 2025-01-08 RX ADMIN — LISINOPRIL 10 MG: 10 TABLET ORAL at 09:01

## 2025-01-08 RX ADMIN — Medication 6 MG: at 08:01

## 2025-01-08 RX ADMIN — FERROUS SULFATE TAB 325 MG (65 MG ELEMENTAL FE) 1 EACH: 325 (65 FE) TAB at 09:01

## 2025-01-08 RX ADMIN — ATORVASTATIN CALCIUM 40 MG: 40 TABLET, FILM COATED ORAL at 09:01

## 2025-01-08 RX ADMIN — ACETAMINOPHEN 650 MG: 325 TABLET ORAL at 03:01

## 2025-01-08 RX ADMIN — NIFEDIPINE 30 MG: 30 TABLET, FILM COATED, EXTENDED RELEASE ORAL at 09:01

## 2025-01-08 RX ADMIN — NICOTINE 1 PATCH: 7 PATCH, EXTENDED RELEASE TRANSDERMAL at 09:01

## 2025-01-08 RX ADMIN — Medication 100 MG: at 09:01

## 2025-01-08 RX ADMIN — ASPIRIN 81 MG: 81 TABLET, COATED ORAL at 09:01

## 2025-01-08 RX ADMIN — ENOXAPARIN SODIUM 40 MG: 40 INJECTION SUBCUTANEOUS at 05:01

## 2025-01-08 NOTE — PROGRESS NOTES
[facility] - Internal Medicine Telemetry  Adult Nutrition  Progress Note    SUMMARY       Recommendations  Please prioritize patient's comfort and preferences over strict nutritional goals at this time. Offer small, frequent meals, texture modifications, and respect the patient's desire to eat or not eat. Also ensure proper hydration with small sips of fluids periodically.    Goals: Liberalize diet in line with patient's status   Nutrition Goal Status: comfort care   Communication of RD Recs: POC    Assessment and Plan    Endocrine  Severe protein-calorie malnutrition  Malnutrition Type:  Context: chronic illness  Level: severe    Related to (etiology):   Decreased ability to consume sufficient energy 2/2 poor intake PTA    Signs and Symptoms (as evidenced by):   Malnutrition Characteristic Summary:  Weight Loss (Malnutrition): greater than 5% in 1 month  Energy Intake (Malnutrition): less than 75% for greater than or equal to 1 month  Subcutaneous Fat (Malnutrition): severe depletion  Muscle Mass (Malnutrition): severe depletion    Interventions (treatment strategy):  1. Continue texture modified diet- encourage intake as needed   2. Continue Boost supplements   3. RD following    Nutrition Diagnosis Status:   Continues          Malnutrition Assessment  Malnutrition Context: chronic illness  Malnutrition Level: severe          Weight Loss (Malnutrition): greater than 5% in 1 month  Energy Intake (Malnutrition): less than 75% for greater than or equal to 1 month  Subcutaneous Fat (Malnutrition): severe depletion  Muscle Mass (Malnutrition): severe depletion   Orbital Region (Subcutaneous Fat Loss): severe depletion  Upper Arm Region (Subcutaneous Fat Loss): severe depletion   [place] Region (Muscle Loss): severe depletion  Clavicle Bone Region (Muscle Loss): severe depletion                 Reason for Assessment    Reason For Assessment: RD follow-up  Diagnosis: other (see comments) (FTT)  General Information Comments:  "RD follow-up: Pt reporting an ok appetite at bedside, pt consuming 25-50% of meals provided. Drinking supplements w/ meals- asked it pt had any nutritional requests pt had none at this time- noted that pt is going home to hospice. RD following.  Nutrition Discharge Planning: Pt's comfort- liberalize as needed   Nutrition Related Social Determinants of Health: SDOH: Unable to assess at this time.     Nutrition/Diet History    Spiritual, Cultural Beliefs, Gnosticism Practices, Values that Affect Care: no  Food Allergies: NKFA    Anthropometrics    Temp: 97.8 °F (36.6 °C)  Height Method: Measured  Height: 5' 1" (154.9 cm)  Height (inches): 61 in  Weight Method: Bed Scale  Weight: 54.9 kg (121 lb 0.5 oz)  Weight (lb): 121.03 lb  Ideal Body Weight (IBW), Female: 105 lb  % Ideal Body Weight, Female (lb): 115.27 %  BMI (Calculated): 22.9  BMI Grade: 18.5-24.9 - normal       Lab/Procedures/Meds    Pertinent Labs Reviewed: reviewed  Pertinent Labs Comments: Potassium 2.7, GFR 68  Pertinent Medications Reviewed: reviewed  Pertinent Medications Comments: thiamine    Estimated/Assessed Needs    Weight Used For Calorie Calculations: 54.9 kg (121 lb 0.5 oz)  Energy Calorie Requirements (kcal): 1647  Energy Need Method: Kcal/kg (30 kcal/kg)  Protein Requirements: 66-82 g (1.2-1.5 g/kg)  Weight Used For Protein Calculations: 54.9 kg (121 lb 0.5 oz)  RDA Method (mL): 1647         Nutrition Prescription Ordered    Current Diet Order: soft and bite sized diet    Evaluation of Received Nutrient/Fluid Intake    I/O: -  Energy Calories Required: not meeting needs  Protein Required: not meeting needs  Fluid Required: not meeting needs  Total Fluid Intake (mL/kg): 1 ml or fluid per MD  Tolerance: tolerating  % Intake of Estimated Energy Needs: 25%  % Meal Intake: 25%    Nutrition Risk    Level of Risk/Frequency of Follow-up: comfort care     Monitor and Evaluation    Food and Nutrient Intake: energy intake, food and beverage intake  Food " and Nutrient Adminstration: diet order  Knowledge/Beliefs/Attitudes: food and nutrition knowledge/skill, beliefs and attitudes  Physical Activity and Function: nutrition-related ADLs and IADLs, factors affecting access to physical activity  Anthropometric Measurements: height/length, weight, weight change, body mass index, growth pattern indices/percentile ranks  Biochemical Data, Medical Tests and Procedures: electrolyte and renal panel, gastrointestinal profile, glucose/endocrine profile, inflammatory profile, lipid profile  Nutrition-Focused Physical Findings: overall appearance, extremities, muscles and bones, head and eyes, skin     Nutrition Follow-Up    RD Follow-up?: Yes

## 2025-01-08 NOTE — PT/OT/SLP PROGRESS
Occupational Therapy      Patient Name:  Bhavna Lim   MRN:  [id number]    Per MD; Pt awaiting transition to a NH with hospice. Continue ROM and positioning with nursing staff. DC acute OT 1/8.     1/8/2025

## 2025-01-08 NOTE — PROGRESS NOTES
CM spoke to son Bruno who confirmed  that he received emailed NH list.  Per conversation, family does not want pt to go to [facility].  They will review [facility] and other facilities.  CM will contact remaining facilities to confirm clinical acceptance/denial.

## 2025-01-08 NOTE — PLAN OF CARE
Recommendations  Please prioritize patient's comfort and preferences over strict nutritional goals at this time. Offer small, frequent meals, texture modifications, and respect the patient's desire to eat or not eat. Also ensure proper hydration with small sips of fluids periodically.     Goals: Liberalize diet in line with patient's status   Nutrition Goal Status: comfort care   Communication of RD Recs: POC

## 2025-01-08 NOTE — SUBJECTIVE & OBJECTIVE
Interval History: Patient talkative and intermittently coherent but with confusion and hallucinations that someone was in room and threatening to hit her.  She denies any physical complaints at this time.    Review of Systems   Constitutional:  Negative for fatigue and fever.   Eyes:  Negative for photophobia.   Respiratory:  Negative for cough and shortness of breath.    Cardiovascular:  Negative for chest pain and palpitations.   Gastrointestinal:  Negative for abdominal pain and nausea.   Neurological:  Negative for dizziness and facial asymmetry.   Psychiatric/Behavioral:  Negative for agitation and behavioral problems.      Objective:     Vital Signs (Most Recent):  Temp: 98.1 °F (36.7 °C) (01/08/25 0724)  Pulse: 87 (01/08/25 0724)  Resp: 17 (01/08/25 0724)  BP: 137/74 (01/08/25 0724)  SpO2: 97 % (01/08/25 0724) Vital Signs (24h Range):  Temp:  [97.8 °F (36.6 °C)-98.4 °F (36.9 °C)] 98.1 °F (36.7 °C)  Pulse:  [] 87  Resp:  [17-18] 17  SpO2:  [95 %-98 %] 97 %  BP: (111-156)/(69-84) 137/74     Weight: 54.9 kg (121 lb 0.5 oz)  Body mass index is 22.87 kg/m².    Intake/Output Summary (Last 24 hours) at 1/8/2025 0906  Last data filed at 1/8/2025 0659  Gross per 24 hour   Intake 640 ml   Output 1000 ml   Net -360 ml         Physical Exam  Constitutional:       Appearance: Normal appearance. She is not ill-appearing.   HENT:      Head: Normocephalic and atraumatic.      Nose: Nose normal.      Mouth/Throat:      Mouth: Mucous membranes are moist.   Eyes:      Extraocular Movements: Extraocular movements intact.      Pupils: Pupils are equal, round, and reactive to light.   Cardiovascular:      Rate and Rhythm: Normal rate and regular rhythm.      Heart sounds: No murmur heard.     No gallop.   Pulmonary:      Effort: Pulmonary effort is normal.      Breath sounds: Normal breath sounds. No wheezing or rales.   Abdominal:      General: Abdomen is flat. Bowel sounds are normal. There is no distension.       Palpations: Abdomen is soft.      Tenderness: There is no abdominal tenderness.   Musculoskeletal:         General: No swelling or tenderness. Normal range of motion.      Cervical back: Normal range of motion and neck supple.      Right lower leg: No edema.      Left lower leg: No edema.   Skin:     General: Skin is warm and dry.      Capillary Refill: Capillary refill takes less than 2 seconds.   Neurological:      Motor: Weakness present.      Comments: Continued difficulty with hand coordination  Oriented only to self; believes she is in a [place] and that the year is 1974; hallucinating               Significant Labs: All pertinent labs within the past 24 hours have been reviewed.  BMP:   Recent Labs   Lab 01/06/25  0944   GLU 91      K 2.8*   *   CO2 21*   BUN 5*   CREATININE 0.5   CALCIUM 7.7*       Significant Imaging: I have reviewed all pertinent imaging results/findings within the past 24 hours.

## 2025-01-08 NOTE — PT/OT/SLP PROGRESS
Physical Therapy Discharge       Patient Name:  Bhavna Lim   MRN:  [id number]    Pt awaiting transition to a NH with hospice. Continue ROM and positioning with nursing staff. DC acute PT 1/8.

## 2025-01-09 LAB
POCT GLUCOSE: 77 MG/DL (ref 70–110)
POCT GLUCOSE: 83 MG/DL (ref 70–110)
POCT GLUCOSE: 95 MG/DL (ref 70–110)
POCT GLUCOSE: 97 MG/DL (ref 70–110)

## 2025-01-09 PROCEDURE — 11000001 HC ACUTE MED/SURG PRIVATE ROOM

## 2025-01-09 PROCEDURE — 63600175 PHARM REV CODE 636 W HCPCS: Performed by: STUDENT IN AN ORGANIZED HEALTH CARE EDUCATION/TRAINING PROGRAM

## 2025-01-09 PROCEDURE — 25000003 PHARM REV CODE 250: Performed by: STUDENT IN AN ORGANIZED HEALTH CARE EDUCATION/TRAINING PROGRAM

## 2025-01-09 PROCEDURE — 25000003 PHARM REV CODE 250: Performed by: INTERNAL MEDICINE

## 2025-01-09 PROCEDURE — 21400001 HC TELEMETRY ROOM

## 2025-01-09 PROCEDURE — S4991 NICOTINE PATCH NONLEGEND: HCPCS | Performed by: STUDENT IN AN ORGANIZED HEALTH CARE EDUCATION/TRAINING PROGRAM

## 2025-01-09 RX ADMIN — Medication 6 MG: at 09:01

## 2025-01-09 RX ADMIN — ENOXAPARIN SODIUM 40 MG: 40 INJECTION SUBCUTANEOUS at 04:01

## 2025-01-09 RX ADMIN — ASPIRIN 81 MG: 81 TABLET, COATED ORAL at 10:01

## 2025-01-09 RX ADMIN — NICOTINE 1 PATCH: 7 PATCH, EXTENDED RELEASE TRANSDERMAL at 10:01

## 2025-01-09 RX ADMIN — ATORVASTATIN CALCIUM 40 MG: 40 TABLET, FILM COATED ORAL at 10:01

## 2025-01-09 RX ADMIN — FERROUS SULFATE TAB 325 MG (65 MG ELEMENTAL FE) 1 EACH: 325 (65 FE) TAB at 10:01

## 2025-01-09 RX ADMIN — Medication 100 MG: at 10:01

## 2025-01-09 RX ADMIN — Medication 1 TABLET: at 10:01

## 2025-01-09 RX ADMIN — FOLIC ACID 1 MG: 1 TABLET ORAL at 10:01

## 2025-01-09 RX ADMIN — Medication 1 TABLET: at 09:01

## 2025-01-09 RX ADMIN — NIFEDIPINE 30 MG: 30 TABLET, FILM COATED, EXTENDED RELEASE ORAL at 10:01

## 2025-01-09 RX ADMIN — LISINOPRIL 10 MG: 10 TABLET ORAL at 10:01

## 2025-01-09 NOTE — ASSESSMENT & PLAN NOTE
-history of confusion and forgetfulness prior to admission  -appears to be more confused inpatient as well  -given impairment and abnormal coordination MRI and C spine ordered: - CT head: Sequela of chronic microvascular ischemic change. Remote lacunar type infarct right thalamus; MRI C-spine: Multilevel cervical spondylosis, worst at C3-C4, contributing to moderate spinal canal stenosis as well as moderate left neural foraminal narrowing.    -HIV/RPR negative. Folate low, repleting. B12 high. Thiamine low and repleting. Possibly 2/2 to dementia with poor nutritional intake  - STAT imaging and consideration of Neurology consult if concerning features arise

## 2025-01-09 NOTE — NURSING
"Springer removed for void trial in which pt failed. Per, Dr. Rubio, "replace springer and leave it."  "

## 2025-01-09 NOTE — ASSESSMENT & PLAN NOTE
-episodes of hypoglycemia overnight  -given amp of D50, started on IV D5  -monitor BG closely  -likely related to poor PO intake

## 2025-01-09 NOTE — PROGRESS NOTES
ALONDRA left messages for [facility], [facility], and [facility]mission coordinators requesting call back regarding alf placement.  Unable to reach [facility] liaison will close referral and if family chooses them, will reopen.

## 2025-01-09 NOTE — ASSESSMENT & PLAN NOTE
- continue ASA and statin     [facility] [phone] (MON-FRI 8 AM- 5PM). Radiology Resident on call [phone].

## 2025-01-09 NOTE — PLAN OF CARE
Problem: Adult Inpatient Plan of Care  Goal: Plan of Care Review  Outcome: Not Progressing  Goal: Patient-Specific Goal (Individualized)  Outcome: Not Progressing  Goal: Absence of Hospital-Acquired Illness or Injury  Outcome: Not Progressing  Goal: Optimal Comfort and Wellbeing  Outcome: Not Progressing  Goal: Readiness for Transition of Care  Outcome: Not Progressing     Problem: Diabetes Comorbidity  Goal: Blood Glucose Level Within Targeted Range  Outcome: Not Progressing     Problem: Skin Injury Risk Increased  Goal: Skin Health and Integrity  Outcome: Not Progressing     Problem: Confusion Acute  Goal: Optimal Cognitive Function  Outcome: Not Progressing     Problem: Wound  Goal: Optimal Coping  Outcome: Not Progressing  Goal: Optimal Functional Ability  Outcome: Not Progressing  Goal: Absence of Infection Signs and Symptoms  Outcome: Not Progressing  Goal: Improved Oral Intake  Outcome: Not Progressing  Goal: Optimal Pain Control and Function  Outcome: Not Progressing  Goal: Skin Health and Integrity  Outcome: Not Progressing  Goal: Optimal Wound Healing  Outcome: Not Progressing     Problem: Infection  Goal: Absence of Infection Signs and Symptoms  Outcome: Not Progressing     Problem: Coping Ineffective  Goal: Effective Coping  Outcome: Not Progressing

## 2025-01-09 NOTE — ASSESSMENT & PLAN NOTE
Nutrition consulted. Most recent weight and BMI monitored-     Measurements:  Wt Readings from Last 1 Encounters:   12/27/24 54.9 kg (121 lb 0.5 oz)   Body mass index is 22.87 kg/m².    RD consultation   Extensive outpatient evaluation obtained by GI and General surgery   Will add supplementation to meals; soft bite-sized per rec's      Please prioritize patient's comfort and preferences over strict nutritional goals at this time. Offer small, frequent meals, texture modifications, and respect the patient's desire to eat or not eat. Also ensure proper hydration with small sips of fluids periodically.

## 2025-01-09 NOTE — CARE UPDATE
Unit [facility] Care Support Interaction      I have reviewed the chart of Bhavna Lim who is hospitalized for Palliative care encounter. The [facility]        I have assisted the primary physician in management of the following:      Springer - Springer removed for voiding trial 1/8 - urinary retention persistent. Springer replaced evening of 1/8. Plan to DC to NH with springer.           Lois Goel PA-C  Unit Based [facility]

## 2025-01-09 NOTE — PROGRESS NOTES
[facility] - Internal Medicine [facility] Medicine  Progress Note    Patient Name: Bhavna Lim  MRN: [id number]  Patient Class: IP- Inpatient   Admission Date: 12/26/2024  Length of Stay: 11 days  Attending Physician: Les Rizo MD  Primary Care Provider: Suzi Green MD        Subjective     Principal Problem:Palliative care encounter        HPI:  Mrs. Bhavna Lim is a 79 year old F with a history of HTN and HLD who presents to the ED for weakness and fatigue. She has been having weight loss and poor po intake over the last few months. She has been seen by GI and evaluated with EGD and Colonoscopy as well as MRCP which showed cholelithiasis with gallbladder distention and intrahepatic and extrahepatic biliary dilation. No significant abnormalities on EGD and coloscopy. She was referred to surgery who recommended NM study and possible EUS with GI. She has been unable to tolerate any solid foods. She sometimes takes a little bit of soup and has tried protein shakes. She feels full immediately. She does endorse some pain in her epigastric region ever since having her EGD performed. She occasionally has vomiting. Denies regurgitation of food. She continues to have worsening weakness and falls at home. She had two falls yesterday due to weakness. She denies significant dizziness. She is unable to walk due to her weakness and sister is concerned this is related to her poor nutrition.      In the ED VSS. Labs notable for Potassium of 3.0 which was repleted in the ED. No focal signs concerning for stroke. She was admitted to medicine for further management.      Overview/Hospital Course:  While on the floor PT/OT were consulted and recommended SNF. Nutrition consulted. Discussed with GI outpatient workup and no indication for further inpatient workup. She continued to be altered while in the hospital. CT spine/head ordered for concern of fall showing ischemic changes and signficant stenosis and  narrowing. She developed worsening hand weakness and numbness/tinglign with abnormal coordiation. Some of this was prior to admission but weakness significantly worsened. MRI ordered and showed old infarcts. She continued to refuse food and did not have abdominal complaints just would not eat. Started on IVF due to hypoglycemia. Acute encephalopathy workup initiated and negative aside from low folate. UA ordered but not performed. At this point if nutrition becomes suboptimal will have to consider other means of nutrition. Son with concern of worsening memory and possible dementia wanting placement in [place]. Started on supplements for poor nutrition including thiamine and folate to see if this will help with mental status     1/08/24  Plan is NH with hospice, not checking labs.    Interval History: Patient talkative and intermittently coherent but with confusion and hallucinations that someone was in room and threatening to hit her.  She denies any physical complaints at this time.    Review of Systems   Constitutional:  Negative for fatigue and fever.   Eyes:  Negative for photophobia.   Respiratory:  Negative for cough and shortness of breath.    Cardiovascular:  Negative for chest pain and palpitations.   Gastrointestinal:  Negative for abdominal pain and nausea.   Neurological:  Negative for dizziness and facial asymmetry.   Psychiatric/Behavioral:  Negative for agitation and behavioral problems.      Objective:     Vital Signs (Most Recent):  Temp: 98.1 °F (36.7 °C) (01/08/25 0724)  Pulse: 87 (01/08/25 0724)  Resp: 17 (01/08/25 0724)  BP: 137/74 (01/08/25 0724)  SpO2: 97 % (01/08/25 0724) Vital Signs (24h Range):  Temp:  [97.8 °F (36.6 °C)-98.4 °F (36.9 °C)] 98.1 °F (36.7 °C)  Pulse:  [] 87  Resp:  [17-18] 17  SpO2:  [95 %-98 %] 97 %  BP: (111-156)/(69-84) 137/74     Weight: 54.9 kg (121 lb 0.5 oz)  Body mass index is 22.87 kg/m².    Intake/Output Summary (Last 24 hours) at 1/8/2025 0906  Last data  filed at 1/8/2025 0659  Gross per 24 hour   Intake 640 ml   Output 1000 ml   Net -360 ml         Physical Exam  Constitutional:       Appearance: Normal appearance. She is not ill-appearing.   HENT:      Head: Normocephalic and atraumatic.      Nose: Nose normal.      Mouth/Throat:      Mouth: Mucous membranes are moist.   Eyes:      Extraocular Movements: Extraocular movements intact.      Pupils: Pupils are equal, round, and reactive to light.   Cardiovascular:      Rate and Rhythm: Normal rate and regular rhythm.      Heart sounds: No murmur heard.     No gallop.   Pulmonary:      Effort: Pulmonary effort is normal.      Breath sounds: Normal breath sounds. No wheezing or rales.   Abdominal:      General: Abdomen is flat. Bowel sounds are normal. There is no distension.      Palpations: Abdomen is soft.      Tenderness: There is no abdominal tenderness.   Musculoskeletal:         General: No swelling or tenderness. Normal range of motion.      Cervical back: Normal range of motion and neck supple.      Right lower leg: No edema.      Left lower leg: No edema.   Skin:     General: Skin is warm and dry.      Capillary Refill: Capillary refill takes less than 2 seconds.   Neurological:      Motor: Weakness present.      Comments: Continued difficulty with hand coordination  Oriented only to self; believes she is in a [employer] and that the year is 1974; hallucinating               Significant Labs: All pertinent labs within the past 24 hours have been reviewed.  BMP:   Recent Labs   Lab 01/06/25  0944   GLU 91      K 2.8*   *   CO2 21*   BUN 5*   CREATININE 0.5   CALCIUM 7.7*       Significant Imaging: I have reviewed all pertinent imaging results/findings within the past 24 hours.    Assessment and Plan     * Palliative care encounter  Plan is NH with Hospice as discussed above      Cognitive impairment  -history of confusion and forgetfulness prior to admission  -appears to be more confused inpatient as  well  -given impairment and abnormal coordination MRI and C spine ordered: - CT head: Sequela of chronic microvascular ischemic change. Remote lacunar type infarct right thalamus; MRI C-spine: Multilevel cervical spondylosis, worst at C3-C4, contributing to moderate spinal canal stenosis as well as moderate left neural foraminal narrowing.    -HIV/RPR negative. Folate low, repleting. B12 high. Thiamine low and repleting. Possibly 2/2 to dementia with poor nutritional intake  - STAT imaging and consideration of Neurology consult if concerning features arise    Severe protein-calorie malnutrition  Nutrition consulted. Most recent weight and BMI monitored-     Measurements:  Wt Readings from Last 1 Encounters:   12/27/24 54.9 kg (121 lb 0.5 oz)   Body mass index is 22.87 kg/m².    Patient has been screened and assessed by RD.    Malnutrition Type:  Context: chronic illness  Level: severe    Malnutrition Characteristic Summary:  Weight Loss (Malnutrition): greater than 5% in 1 month  Energy Intake (Malnutrition): less than 75% for greater than or equal to 1 month  Subcutaneous Fat (Malnutrition): severe depletion  Muscle Mass (Malnutrition): severe depletion    Interventions/Recommendations (treatment strategy):  Please prioritize patient's comfort and preferences over strict nutritional goals at this time. Offer small, frequent meals, texture modifications, and respect the patient's desire to eat or not eat. Also ensure proper hydration with small sips of fluids periodically.    - supplying D5 fluids given poor intake    Debility  Patient with Acute debility due to age-related physical debility and other reduced mobility. The patient's latest AMPAC (Activity Measure for Post Acute Care) Score is listed below.    AM-PAC Score - How much help does the patient need for each activity listed  Basic Mobility Total Score: 7  Turning over in bed (including adjusting bedclothes, sheets and blankets)?: A lot  Sitting down on and  "standing up from a chair with arms (e.g., wheelchair, bedside commode, etc.): Unable  Moving from lying on back to sitting on the side of the bed?: Unable  Moving to and from a bed to a chair (including a wheelchair)?: Unable  Need to walk in hospital room?: Unable  Climbing 3-5 steps with a railing?: Unable    Plan  - Progressive mobility protocol initated  - PT/OT consulted  - Fall precautions in place  -plan for NH with hospice      Benign essential hypertension  Patient's blood pressure range in the last 24 hours was: BP  Min: 122/72  Max: 153/77.The patient's inpatient anti-hypertensive regimen is listed below:  Current Antihypertensives  lisinopriL tablet 10 mg, Daily, Oral  NIFEdipine 24 hr tablet 30 mg, Daily, Oral    Plan  - BP is controlled, no changes needed to their regimen  - will continue home regimen    Unintentional weight loss  Nutrition consulted. Most recent weight and BMI monitored-     Measurements:  Wt Readings from Last 1 Encounters:   12/27/24 54.9 kg (121 lb 0.5 oz)   Body mass index is 22.87 kg/m².    RD consultation   Extensive outpatient evaluation obtained by GI and General surgery   Will add supplementation to meals; soft bite-sized per rec's      Please prioritize patient's comfort and preferences over strict nutritional goals at this time. Offer small, frequent meals, texture modifications, and respect the patient's desire to eat or not eat. Also ensure proper hydration with small sips of fluids periodically.      Hyperlipidemia  - continue ASA and statin      Hypokalemia  Patient's most recent potassium results are listed below.   No results for input(s): "K" in the last 72 hours.    Plan  - Replete potassium per protocol  - Monitor potassium Daily  - decreasing frequency of lab draw but will obtain metabolic panel for assessment; repleting with supplements and can increase if needed    Numbness and tingling in both hands  -noted by patient before admission with abnormal finger to " nose and weakness   -CT head and spine reviewed. There is significant stenosis/narrowing of C6 and C7 which could be contributing to symptoms. Findings appear chronic and not acute. MRI ordered and notable for cervical stenosis and lacunar infarcts. Limited visiblity due to movement   -CT head notable for remote lacunar infarcts, hx of stroke in 2005 currently on ASA   -continue PT/OT         Diabetes mellitus  Sliding scale held, accuchecks for hypoglycemia     Patient's FSGs are controlled on current medication regimen.  Last A1c reviewed-   Lab Results   Component Value Date    HGBA1C 5.5 10/11/2024     Most recent fingerstick glucose reviewed-   Recent Labs   Lab 01/08/25  1610 01/09/25  0726   POCTGLUCOSE 97 97       Current correctional scale   hold on correction scale give A1C is 5.5  Antihyperglycemics (From admission, onward)      None          Hold Oral hypoglycemics while patient is in the hospital; holding all given low intake    Hypoglycemia  -episodes of hypoglycemia overnight  -given amp of D50, started on IV D5  -monitor BG closely  -likely related to poor PO intake         VTE Risk Mitigation (From admission, onward)           Ordered     enoxaparin injection 40 mg  Daily         12/26/24 1700     IP VTE HIGH RISK PATIENT  Once         12/26/24 1547     Place sequential compression device  Until discontinued         12/26/24 1547                    Discharge Planning   RITA: 1/10/2025     Code Status: DNR   Medical Readiness for Discharge Date: 1/10/2025  Discharge Plan A: New Nursing Home placement - long term care facility (with hospice service)                        Les Rizo MD  Department of Hospital Medicine   [facility] - Internal Medicine Telemetry

## 2025-01-09 NOTE — NURSING
Patient's sister Janet called to request a family meeting and an update from the doctors on her sister's plan of care. Phone number from chart included below:  [phone]

## 2025-01-09 NOTE — ASSESSMENT & PLAN NOTE
Patient with Acute debility due to age-related physical debility and other reduced mobility. The patient's latest AMPAC (Activity Measure for Post Acute Care) Score is listed below.    AM-PAC Score - How much help does the patient need for each activity listed  Basic Mobility Total Score: 7  Turning over in bed (including adjusting bedclothes, sheets and blankets)?: A lot  Sitting down on and standing up from a chair with arms (e.g., wheelchair, bedside commode, etc.): Unable  Moving from lying on back to sitting on the side of the bed?: Unable  Moving to and from a bed to a chair (including a wheelchair)?: Unable  Need to walk in hospital room?: Unable  Climbing 3-5 steps with a railing?: Unable    Plan  - Progressive mobility protocol initated  - PT/OT consulted  - Fall precautions in place  -plan for NH with hospice

## 2025-01-09 NOTE — ASSESSMENT & PLAN NOTE
Nutrition consulted. Most recent weight and BMI monitored-     Measurements:  Wt Readings from Last 1 Encounters:   12/27/24 54.9 kg (121 lb 0.5 oz)   Body mass index is 22.87 kg/m².    Patient has been screened and assessed by RD.    Malnutrition Type:  Context: chronic illness  Level: severe    Malnutrition Characteristic Summary:  Weight Loss (Malnutrition): greater than 5% in 1 month  Energy Intake (Malnutrition): less than 75% for greater than or equal to 1 month  Subcutaneous Fat (Malnutrition): severe depletion  Muscle Mass (Malnutrition): severe depletion    Interventions/Recommendations (treatment strategy):  Please prioritize patient's comfort and preferences over strict nutritional goals at this time. Offer small, frequent meals, texture modifications, and respect the patient's desire to eat or not eat. Also ensure proper hydration with small sips of fluids periodically.    - supplying D5 fluids given poor intake

## 2025-01-09 NOTE — ASSESSMENT & PLAN NOTE
"Patient's most recent potassium results are listed below.   No results for input(s): "K" in the last 72 hours.    Plan  - Replete potassium per protocol  - Monitor potassium Daily  - decreasing frequency of lab draw but will obtain metabolic panel for assessment; repleting with supplements and can increase if needed  "

## 2025-01-09 NOTE — PLAN OF CARE
Problem: Adult Inpatient Plan of Care  Goal: Plan of Care Review  Outcome: Progressing  Goal: Optimal Comfort and Wellbeing  Outcome: Progressing  Goal: Readiness for Transition of Care  Outcome: Progressing     Problem: Skin Injury Risk Increased  Goal: Skin Health and Integrity  Outcome: Progressing     Problem: Confusion Acute  Goal: Optimal Cognitive Function  Outcome: Progressing     Problem: Wound  Goal: Optimal Coping  Outcome: Progressing  Goal: Absence of Infection Signs and Symptoms  Outcome: Progressing  Goal: Improved Oral Intake  Outcome: Progressing  Goal: Optimal Pain Control and Function  Outcome: Progressing  Goal: Skin Health and Integrity  Outcome: Progressing     Problem: Coping Ineffective  Goal: Effective Coping  Outcome: Progressing

## 2025-01-09 NOTE — ASSESSMENT & PLAN NOTE
Patient's blood pressure range in the last 24 hours was: BP  Min: 122/72  Max: 153/77.The patient's inpatient anti-hypertensive regimen is listed below:  Current Antihypertensives  lisinopriL tablet 10 mg, Daily, Oral  NIFEdipine 24 hr tablet 30 mg, Daily, Oral    Plan  - BP is controlled, no changes needed to their regimen  - will continue home regimen

## 2025-01-09 NOTE — ASSESSMENT & PLAN NOTE
Sliding scale held, accuchecks for hypoglycemia     Patient's FSGs are controlled on current medication regimen.  Last A1c reviewed-   Lab Results   Component Value Date    HGBA1C 5.5 10/11/2024     Most recent fingerstick glucose reviewed-   Recent Labs   Lab 01/08/25  1610 01/09/25  0726   POCTGLUCOSE 97 97       Current correctional scale   hold on correction scale give A1C is 5.5  Antihyperglycemics (From admission, onward)      None          Hold Oral hypoglycemics while patient is in the hospital; holding all given low intake

## 2025-01-09 NOTE — PROGRESS NOTES
ALONDRA spoke to Bhavna martins [facility].  She will check for half-way bed availability and also stated that their facility uses [facility] services.

## 2025-01-10 LAB
ALBUMIN SERPL BCP-MCNC: 1.8 G/DL (ref 3.5–5.2)
ALP SERPL-CCNC: 300 U/L (ref 40–150)
ALT SERPL W/O P-5'-P-CCNC: 41 U/L (ref 10–44)
ANION GAP SERPL CALC-SCNC: 8 MMOL/L (ref 8–16)
AST SERPL-CCNC: 31 U/L (ref 10–40)
BASOPHILS # BLD AUTO: 0.09 K/UL (ref 0–0.2)
BASOPHILS NFR BLD: 0.6 % (ref 0–1.9)
BILIRUB SERPL-MCNC: 1 MG/DL (ref 0.1–1)
BUN SERPL-MCNC: 3 MG/DL (ref 8–23)
CALCIUM SERPL-MCNC: 7.6 MG/DL (ref 8.7–10.5)
CHLORIDE SERPL-SCNC: 108 MMOL/L (ref 95–110)
CO2 SERPL-SCNC: 19 MMOL/L (ref 23–29)
CREAT SERPL-MCNC: 0.5 MG/DL (ref 0.5–1.4)
DIFFERENTIAL METHOD BLD: ABNORMAL
EOSINOPHIL # BLD AUTO: 0.2 K/UL (ref 0–0.5)
EOSINOPHIL NFR BLD: 1.5 % (ref 0–8)
ERYTHROCYTE [DISTWIDTH] IN BLOOD BY AUTOMATED COUNT: 20.4 % (ref 11.5–14.5)
EST. GFR  (NO RACE VARIABLE): >60 ML/MIN/1.73 M^2
GLUCOSE SERPL-MCNC: 86 MG/DL (ref 70–110)
HCT VFR BLD AUTO: 31.8 % (ref 37–48.5)
HGB BLD-MCNC: 10.1 G/DL (ref 12–16)
IMM GRANULOCYTES # BLD AUTO: 0.07 K/UL (ref 0–0.04)
IMM GRANULOCYTES NFR BLD AUTO: 0.5 % (ref 0–0.5)
LYMPHOCYTES # BLD AUTO: 1.5 K/UL (ref 1–4.8)
LYMPHOCYTES NFR BLD: 10 % (ref 18–48)
MCH RBC QN AUTO: 28 PG (ref 27–31)
MCHC RBC AUTO-ENTMCNC: 31.8 G/DL (ref 32–36)
MCV RBC AUTO: 88 FL (ref 82–98)
MONOCYTES # BLD AUTO: 1.4 K/UL (ref 0.3–1)
MONOCYTES NFR BLD: 9.3 % (ref 4–15)
NEUTROPHILS # BLD AUTO: 11.8 K/UL (ref 1.8–7.7)
NEUTROPHILS NFR BLD: 78.1 % (ref 38–73)
NRBC BLD-RTO: 0 /100 WBC
PLATELET # BLD AUTO: 317 K/UL (ref 150–450)
PMV BLD AUTO: 11.8 FL (ref 9.2–12.9)
POCT GLUCOSE: 128 MG/DL (ref 70–110)
POCT GLUCOSE: 78 MG/DL (ref 70–110)
POCT GLUCOSE: 98 MG/DL (ref 70–110)
POTASSIUM SERPL-SCNC: 3 MMOL/L (ref 3.5–5.1)
PROT SERPL-MCNC: 5.5 G/DL (ref 6–8.4)
RBC # BLD AUTO: 3.61 M/UL (ref 4–5.4)
SODIUM SERPL-SCNC: 135 MMOL/L (ref 136–145)
WBC # BLD AUTO: 15.06 K/UL (ref 3.9–12.7)

## 2025-01-10 PROCEDURE — 11000001 HC ACUTE MED/SURG PRIVATE ROOM

## 2025-01-10 PROCEDURE — S4991 NICOTINE PATCH NONLEGEND: HCPCS | Performed by: STUDENT IN AN ORGANIZED HEALTH CARE EDUCATION/TRAINING PROGRAM

## 2025-01-10 PROCEDURE — 63600175 PHARM REV CODE 636 W HCPCS: Performed by: STUDENT IN AN ORGANIZED HEALTH CARE EDUCATION/TRAINING PROGRAM

## 2025-01-10 PROCEDURE — 25000003 PHARM REV CODE 250: Performed by: STUDENT IN AN ORGANIZED HEALTH CARE EDUCATION/TRAINING PROGRAM

## 2025-01-10 PROCEDURE — 85025 COMPLETE CBC W/AUTO DIFF WBC: CPT | Performed by: STUDENT IN AN ORGANIZED HEALTH CARE EDUCATION/TRAINING PROGRAM

## 2025-01-10 PROCEDURE — 25000003 PHARM REV CODE 250: Performed by: INTERNAL MEDICINE

## 2025-01-10 PROCEDURE — 36415 COLL VENOUS BLD VENIPUNCTURE: CPT | Performed by: STUDENT IN AN ORGANIZED HEALTH CARE EDUCATION/TRAINING PROGRAM

## 2025-01-10 PROCEDURE — S5010 5% DEXTROSE AND 0.45% SALINE: HCPCS | Performed by: INTERNAL MEDICINE

## 2025-01-10 PROCEDURE — 94761 N-INVAS EAR/PLS OXIMETRY MLT: CPT

## 2025-01-10 PROCEDURE — 80053 COMPREHEN METABOLIC PANEL: CPT | Performed by: STUDENT IN AN ORGANIZED HEALTH CARE EDUCATION/TRAINING PROGRAM

## 2025-01-10 RX ORDER — DEXTROSE MONOHYDRATE, SODIUM CHLORIDE, AND POTASSIUM CHLORIDE 50; 1.49; 9 G/1000ML; G/1000ML; G/1000ML
INJECTION, SOLUTION INTRAVENOUS CONTINUOUS
Status: DISCONTINUED | OUTPATIENT
Start: 2025-01-10 | End: 2025-01-17

## 2025-01-10 RX ADMIN — NIFEDIPINE 30 MG: 30 TABLET, FILM COATED, EXTENDED RELEASE ORAL at 08:01

## 2025-01-10 RX ADMIN — FOLIC ACID 1 MG: 1 TABLET ORAL at 08:01

## 2025-01-10 RX ADMIN — DEXTROSE MONOHYDRATE, SODIUM CHLORIDE, AND POTASSIUM CHLORIDE: 50; 9; 1.49 INJECTION, SOLUTION INTRAVENOUS at 08:01

## 2025-01-10 RX ADMIN — Medication 100 MG: at 08:01

## 2025-01-10 RX ADMIN — ASPIRIN 81 MG: 81 TABLET, COATED ORAL at 08:01

## 2025-01-10 RX ADMIN — ATORVASTATIN CALCIUM 40 MG: 40 TABLET, FILM COATED ORAL at 08:01

## 2025-01-10 RX ADMIN — LISINOPRIL 10 MG: 10 TABLET ORAL at 08:01

## 2025-01-10 RX ADMIN — FERROUS SULFATE TAB 325 MG (65 MG ELEMENTAL FE) 1 EACH: 325 (65 FE) TAB at 08:01

## 2025-01-10 RX ADMIN — Medication 6 MG: at 08:01

## 2025-01-10 RX ADMIN — Medication 1 TABLET: at 08:01

## 2025-01-10 RX ADMIN — DEXTROSE MONOHYDRATE, SODIUM CHLORIDE, AND POTASSIUM CHLORIDE: 50; 9; 1.49 INJECTION, SOLUTION INTRAVENOUS at 10:01

## 2025-01-10 RX ADMIN — NICOTINE 1 PATCH: 7 PATCH, EXTENDED RELEASE TRANSDERMAL at 08:01

## 2025-01-10 RX ADMIN — DEXTROSE AND SODIUM CHLORIDE: 5; 450 INJECTION, SOLUTION INTRAVENOUS at 12:01

## 2025-01-10 RX ADMIN — Medication 1 TABLET: at 09:01

## 2025-01-10 RX ADMIN — ENOXAPARIN SODIUM 40 MG: 40 INJECTION SUBCUTANEOUS at 04:01

## 2025-01-10 RX ADMIN — ACETAMINOPHEN 650 MG: 325 TABLET ORAL at 08:01

## 2025-01-10 NOTE — ASSESSMENT & PLAN NOTE
Patient with Acute debility due to age-related physical debility and other reduced mobility. The patient's latest AMPAC (Activity Measure for Post Acute Care) Score is listed below.    AM-PAC Score - How much help does the patient need for each activity listed  Basic Mobility Total Score: 7  Turning over in bed (including adjusting bedclothes, sheets and blankets)?: A lot  Sitting down on and standing up from a chair with arms (e.g., wheelchair, bedside commode, etc.): Unable  Moving from lying on back to sitting on the side of the bed?: Unable  Moving to and from a bed to a chair (including a wheelchair)?: Unable  Need to walk in hospital room?: Unable  Climbing 3-5 steps with a railing?: Unable    Plan  - Progressive mobility protocol initated  - PT/OT consulted  - Fall precautions in place  -plan for NH with hospice services

## 2025-01-10 NOTE — PLAN OF CARE
01/10/25 1601   Discharge Reassessment   Assessment Type Discharge Planning Reassessment   Did the patient's condition or plan change since previous assessment? No   Discharge Plan discussed with: Sibling   Communicated RITA with patient/caregiver Date not available/Unable to determine   Discharge Plan A New Nursing Home placement - [facility]  (with hospice services)   Transition of Care Barriers Underinsured;Nursing Home rejection   Post-Acute Status   Post-Acute Authorization Placement   Post-Acute Placement Status Referrals Sent  ([facility], [facility], [facility], [facility]will review [facility] Monday))

## 2025-01-10 NOTE — SUBJECTIVE & OBJECTIVE
"Interval History: I had thorough discussion with patient's loved ones, including in person with patient's sister Karla Barrera ([phone]) and discussed why G tube placement did not occur and is not ideal versus hand-mouth feeding.  We further discussed electrolytes and hydration due to patient's poor oral intake. At this stage, family would like to continue to plan for nursing home with hospice and were reassured that our  were working thoroughly and efficiently to meet their needs and requests.    Patient with continued confusion this morning and endorses that we are "trying to kill her" but states it in almost playful manner and is easily re-directable.  She denies pain and has no complaints at this time.      Review of Systems   Reason unable to perform ROS: Patient with marked cognitive decline.     Objective:     Vital Signs (Most Recent):  Temp: 98.6 °F (37 °C) (01/10/25 0729)  Pulse: 76 (01/10/25 0729)  Resp: 18 (01/10/25 0729)  BP: 120/71 (01/10/25 0729)  SpO2: 95 % (01/10/25 0729) Vital Signs (24h Range):  Temp:  [97.7 °F (36.5 °C)-98.6 °F (37 °C)] 98.6 °F (37 °C)  Pulse:  [] 76  Resp:  [17-18] 18  SpO2:  [95 %-99 %] 95 %  BP: (113-145)/(71-83) 120/71     Weight: 54.9 kg (121 lb 0.5 oz)  Body mass index is 22.87 kg/m².    Intake/Output Summary (Last 24 hours) at 1/10/2025 0949  Last data filed at 1/10/2025 0845  Gross per 24 hour   Intake 1090 ml   Output 1600 ml   Net -510 ml         Physical Exam  Constitutional:       General: She is not in acute distress.     Appearance: She is not toxic-appearing.   HENT:      Head: Normocephalic and atraumatic.   Eyes:      Extraocular Movements: Extraocular movements intact.   Pulmonary:      Effort: Pulmonary effort is normal. No respiratory distress.   Genitourinary:     Comments: Urinary catheter in place with good output  Musculoskeletal:      Right lower leg: No edema.      Left lower leg: No edema.   Neurological:      Comments: At likely " new baseline.  Tangential speech but re-directable and without agitation.  Moves all limbs spontaneously.             Significant Labs: All pertinent labs within the past 24 hours have been reviewed.  CBC:   Recent Labs   Lab 01/10/25  0308   WBC 15.06*   HGB 10.1*   HCT 31.8*        CMP:   Recent Labs   Lab 01/10/25  0308   *   K 3.0*      CO2 19*   GLU 86   BUN 3*   CREATININE 0.5   CALCIUM 7.6*   PROT 5.5*   ALBUMIN 1.8*   BILITOT 1.0   ALKPHOS 300*   AST 31   ALT 41   ANIONGAP 8       Significant Imaging: I have reviewed all pertinent imaging results/findings within the past 24 hours.

## 2025-01-10 NOTE — ASSESSMENT & PLAN NOTE
Patient's blood pressure range in the last 24 hours was: BP  Min: 113/78  Max: 145/83.The patient's inpatient anti-hypertensive regimen is listed below:  Current Antihypertensives  lisinopriL tablet 10 mg, Daily, Oral  NIFEdipine 24 hr tablet 30 mg, Daily, Oral    Plan  - BP is controlled, no changes needed to their regimen  - will continue home regimen

## 2025-01-10 NOTE — ASSESSMENT & PLAN NOTE
Sliding scale held, accuchecks for hypoglycemia     Patient's FSGs are controlled on current medication regimen.  Last A1c reviewed-   Lab Results   Component Value Date    HGBA1C 5.5 10/11/2024     Most recent fingerstick glucose reviewed-   Recent Labs   Lab 01/09/25  1511 01/09/25  1945 01/10/25  0730   POCTGLUCOSE 83 95 78       Current correctional scale   hold on correction scale give A1C is 5.5  Antihyperglycemics (From admission, onward)    None        Hold Oral hypoglycemics while patient is in the hospital; holding all given low intake

## 2025-01-10 NOTE — PROGRESS NOTES
[facility] - Internal Medicine [facility] Medicine  Progress Note    Patient Name: Bhavna Lim  MRN: [id number]  Patient Class: IP- Inpatient   Admission Date: 12/26/2024  Length of Stay: 12 days  Attending Physician: Les Rizo MD  Primary Care Provider: Suzi Green MD        Subjective     Principal Problem:Palliative care encounter        HPI:  Mrs. Bhavna Lim is a 79 year old F with a history of HTN and HLD who presents to the ED for weakness and fatigue. She has been having weight loss and poor po intake over the last few months. She has been seen by GI and evaluated with EGD and Colonoscopy as well as MRCP which showed cholelithiasis with gallbladder distention and intrahepatic and extrahepatic biliary dilation. No significant abnormalities on EGD and coloscopy. She was referred to surgery who recommended NM study and possible EUS with GI. She has been unable to tolerate any solid foods. She sometimes takes a little bit of soup and has tried protein shakes. She feels full immediately. She does endorse some pain in her epigastric region ever since having her EGD performed. She occasionally has vomiting. Denies regurgitation of food. She continues to have worsening weakness and falls at home. She had two falls yesterday due to weakness. She denies significant dizziness. She is unable to walk due to her weakness and sister is concerned this is related to her poor nutrition.      In the ED VSS. Labs notable for Potassium of 3.0 which was repleted in the ED. No focal signs concerning for stroke. She was admitted to medicine for further management.      Overview/Hospital Course:  While on the floor PT/OT were consulted and recommended SNF. Nutrition consulted. Discussed with GI outpatient workup and no indication for further inpatient workup. She continued to be altered while in the hospital. CT spine/head ordered for concern of fall showing ischemic changes and signficant stenosis and  "narrowing. She developed worsening hand weakness and numbness/tinglign with abnormal coordiation. Some of this was prior to admission but weakness significantly worsened. MRI ordered and showed old infarcts. She continued to refuse food and did not have abdominal complaints just would not eat. Started on IVF due to hypoglycemia. Acute encephalopathy workup initiated and negative aside from low folate. UA ordered but not performed. At this point if nutrition becomes suboptimal will have to consider other means of nutrition. Son with concern of worsening memory and possible dementia wanting placement in [place]. Started on supplements for poor nutrition including thiamine and folate to see if this will help with mental status     Plan is NH with hospice services.    Interval History: I had thorough discussion with patient's loved ones, including in person with patient's sister Karla Barrera ([phone]) and discussed why G tube placement did not occur and is not ideal versus hand-mouth feeding.  We further discussed electrolytes and hydration due to patient's poor oral intake. At this stage, family would like to continue to plan for nursing home with hospice and were reassured that our  were working thoroughly and efficiently to meet their needs and requests.    Patient with continued confusion this morning and endorses that we are "trying to kill her" but states it in almost playful manner and is easily re-directable.  She denies pain and has no complaints at this time.      Review of Systems   Reason unable to perform ROS: Patient with marked cognitive decline.     Objective:     Vital Signs (Most Recent):  Temp: 98.6 °F (37 °C) (01/10/25 0729)  Pulse: 76 (01/10/25 0729)  Resp: 18 (01/10/25 0729)  BP: 120/71 (01/10/25 0729)  SpO2: 95 % (01/10/25 0729) Vital Signs (24h Range):  Temp:  [97.7 °F (36.5 °C)-98.6 °F (37 °C)] 98.6 °F (37 °C)  Pulse:  [] 76  Resp:  [17-18] 18  SpO2:  [95 %-99 %] 95 " %  BP: (113-145)/(71-83) 120/71     Weight: 54.9 kg (121 lb 0.5 oz)  Body mass index is 22.87 kg/m².    Intake/Output Summary (Last 24 hours) at 1/10/2025 0949  Last data filed at 1/10/2025 0845  Gross per 24 hour   Intake 1090 ml   Output 1600 ml   Net -510 ml         Physical Exam  Constitutional:       General: She is not in acute distress.     Appearance: She is not toxic-appearing.   HENT:      Head: Normocephalic and atraumatic.   Eyes:      Extraocular Movements: Extraocular movements intact.   Pulmonary:      Effort: Pulmonary effort is normal. No respiratory distress.   Genitourinary:     Comments: Urinary catheter in place with good output  Musculoskeletal:      Right lower leg: No edema.      Left lower leg: No edema.   Neurological:      Comments: At likely new baseline.  Tangential speech but re-directable and without agitation.  Moves all limbs spontaneously.             Significant Labs: All pertinent labs within the past 24 hours have been reviewed.  CBC:   Recent Labs   Lab 01/10/25  0308   WBC 15.06*   HGB 10.1*   HCT 31.8*        CMP:   Recent Labs   Lab 01/10/25  0308   *   K 3.0*      CO2 19*   GLU 86   BUN 3*   CREATININE 0.5   CALCIUM 7.6*   PROT 5.5*   ALBUMIN 1.8*   BILITOT 1.0   ALKPHOS 300*   AST 31   ALT 41   ANIONGAP 8       Significant Imaging: I have reviewed all pertinent imaging results/findings within the past 24 hours.    Assessment and Plan     * Palliative care encounter  Plan is NH with Hospice as discussed above      Cognitive impairment  -history of confusion and forgetfulness prior to admission  -appears to be more confused inpatient as well  -given impairment and abnormal coordination MRI and C spine ordered: - CT head: Sequela of chronic microvascular ischemic change. Remote lacunar type infarct right thalamus; MRI C-spine: Multilevel cervical spondylosis, worst at C3-C4, contributing to moderate spinal canal stenosis as well as moderate left neural  foraminal narrowing.    -HIV/RPR negative. Folate low, repleting. B12 high. Thiamine low and repleting. Possibly 2/2 to dementia with poor nutritional intake; TSH wnl on 1/1/2025  - exhausted reversible causes of poor mentation and with imaging, appears to be age-related decline accelerated by some degree of vascular dementia and large degree of confidence that this is patient's new baseline with expected further decline  - administering fluids with dextrose and electrolytes given poor oral intake; discussed pitfalls of G-tube placement  - plan for Nursing Home with hospice services  - STAT imaging and consideration of Neurology consult if concerning features arise    Severe protein-calorie malnutrition  Nutrition consulted. Most recent weight and BMI monitored-     Measurements:  Wt Readings from Last 1 Encounters:   12/27/24 54.9 kg (121 lb 0.5 oz)   Body mass index is 22.87 kg/m².    Patient has been screened and assessed by RD.    Malnutrition Type:  Context: chronic illness  Level: severe    Malnutrition Characteristic Summary:  Weight Loss (Malnutrition): greater than 5% in 1 month  Energy Intake (Malnutrition): less than 75% for greater than or equal to 1 month  Subcutaneous Fat (Malnutrition): severe depletion  Muscle Mass (Malnutrition): severe depletion    Interventions/Recommendations (treatment strategy):  Please prioritize patient's comfort and preferences over strict nutritional goals at this time. Offer small, frequent meals, texture modifications, and respect the patient's desire to eat or not eat. Also ensure proper hydration with small sips of fluids periodically.    - supplying D5 fluids given poor intake    Debility  Patient with Acute debility due to age-related physical debility and other reduced mobility. The patient's latest AMPAC (Activity Measure for Post Acute Care) Score is listed below.    AM-PAC Score - How much help does the patient need for each activity listed  Basic Mobility Total  Score: 7  Turning over in bed (including adjusting bedclothes, sheets and blankets)?: A lot  Sitting down on and standing up from a chair with arms (e.g., wheelchair, bedside commode, etc.): Unable  Moving from lying on back to sitting on the side of the bed?: Unable  Moving to and from a bed to a chair (including a wheelchair)?: Unable  Need to walk in hospital room?: Unable  Climbing 3-5 steps with a railing?: Unable    Plan  - Progressive mobility protocol initated  - PT/OT consulted  - Fall precautions in place  -plan for NH with hospice services      Benign essential hypertension  Patient's blood pressure range in the last 24 hours was: BP  Min: 113/78  Max: 145/83.The patient's inpatient anti-hypertensive regimen is listed below:  Current Antihypertensives  lisinopriL tablet 10 mg, Daily, Oral  NIFEdipine 24 hr tablet 30 mg, Daily, Oral    Plan  - BP is controlled, no changes needed to their regimen  - will continue home regimen    Unintentional weight loss  Nutrition consulted. Most recent weight and BMI monitored-     Measurements:  Wt Readings from Last 1 Encounters:   12/27/24 54.9 kg (121 lb 0.5 oz)   Body mass index is 22.87 kg/m².    RD consultation   Extensive outpatient evaluation obtained by GI and General surgery   Will add supplementation to meals; soft bite-sized per rec's      Please prioritize patient's comfort and preferences over strict nutritional goals at this time. Offer small, frequent meals, texture modifications, and respect the patient's desire to eat or not eat. Also ensure proper hydration with small sips of fluids periodically.      Hyperlipidemia  - continue ASA and statin      Hypokalemia  Patient's most recent potassium results are listed below.   Recent Labs     01/10/25  0308   K 3.0*       Plan  - Replete potassium per protocol  - Monitor potassium Daily  - decreasing frequency of lab draw but will obtain metabolic panels for assessments: adding K+ to fluids; discontinue if  metabolic panel normalizes    Numbness and tingling in both hands  -noted by patient before admission with abnormal finger to nose and weakness   -CT head and spine reviewed. There is significant stenosis/narrowing of C6 and C7 which could be contributing to symptoms. Findings appear chronic and not acute. MRI ordered and notable for cervical stenosis and lacunar infarcts. Limited visiblity due to movement   -CT head notable for remote lacunar infarcts, hx of stroke in 2005 currently on ASA   -continue PT/OT         Diabetes mellitus  Sliding scale held, accuchecks for hypoglycemia     Patient's FSGs are controlled on current medication regimen.  Last A1c reviewed-   Lab Results   Component Value Date    HGBA1C 5.5 10/11/2024     Most recent fingerstick glucose reviewed-   Recent Labs   Lab 01/09/25  1511 01/09/25  1945 01/10/25  0730   POCTGLUCOSE 83 95 78       Current correctional scale   hold on correction scale give A1C is 5.5  Antihyperglycemics (From admission, onward)      None          Hold Oral hypoglycemics while patient is in the hospital; holding all given low intake    Hypoglycemia  -episodes of hypoglycemia overnight  -given amp of D50, started on IV D5  -monitor BG closely  -likely related to poor PO intake         VTE Risk Mitigation (From admission, onward)           Ordered     enoxaparin injection 40 mg  Daily         12/26/24 1700     IP VTE HIGH RISK PATIENT  Once         12/26/24 1547     Place sequential compression device  Until discontinued         12/26/24 1547                    Discharge Planning   RITA: 1/13/2025     Code Status: DNR   Medical Readiness for Discharge Date: 1/10/2025  Discharge Plan A: [facility] Nursing [facility] placement [facility]with hospice service)          Les Rizo MD  Department of Hospital Medicine   [facility] - Internal Medicine Telemetry

## 2025-01-10 NOTE — ASSESSMENT & PLAN NOTE
Patient's most recent potassium results are listed below.   Recent Labs     01/10/25  0308   K 3.0*       Plan  - Replete potassium per protocol  - Monitor potassium Daily  - decreasing frequency of lab draw but will obtain metabolic panels for assessments: adding K+ to fluids; discontinue if metabolic panel normalizes

## 2025-01-10 NOTE — ASSESSMENT & PLAN NOTE
-history of confusion and forgetfulness prior to admission  -appears to be more confused inpatient as well  -given impairment and abnormal coordination MRI and C spine ordered: - CT head: Sequela of chronic microvascular ischemic change. Remote lacunar type infarct right thalamus; MRI C-spine: Multilevel cervical spondylosis, worst at C3-C4, contributing to moderate spinal canal stenosis as well as moderate left neural foraminal narrowing.    -HIV/RPR negative. Folate low, repleting. B12 high. Thiamine low and repleting. Possibly 2/2 to dementia with poor nutritional intake; TSH wnl on 1/1/2025  - exhausted reversible causes of poor mentation and with imaging, appears to be age-related decline accelerated by some degree of vascular dementia and large degree of confidence that this is patient's new baseline with expected further decline  - administering fluids with dextrose and electrolytes given poor oral intake; discussed pitfalls of G-tube placement  - plan for Nursing Home with hospice services  - STAT imaging and consideration of Neurology consult if concerning features arise

## 2025-01-11 LAB
ALBUMIN SERPL BCP-MCNC: 1.7 G/DL (ref 3.5–5.2)
ALP SERPL-CCNC: 290 U/L (ref 40–150)
ALT SERPL W/O P-5'-P-CCNC: 37 U/L (ref 10–44)
ANION GAP SERPL CALC-SCNC: 7 MMOL/L (ref 8–16)
AST SERPL-CCNC: 26 U/L (ref 10–40)
BILIRUB SERPL-MCNC: 0.8 MG/DL (ref 0.1–1)
BUN SERPL-MCNC: 4 MG/DL (ref 8–23)
CALCIUM SERPL-MCNC: 7.8 MG/DL (ref 8.7–10.5)
CHLORIDE SERPL-SCNC: 113 MMOL/L (ref 95–110)
CO2 SERPL-SCNC: 23 MMOL/L (ref 23–29)
CREAT SERPL-MCNC: 0.5 MG/DL (ref 0.5–1.4)
EST. GFR  (NO RACE VARIABLE): >60 ML/MIN/1.73 M^2
GLUCOSE SERPL-MCNC: 97 MG/DL (ref 70–110)
POCT GLUCOSE: 110 MG/DL (ref 70–110)
POCT GLUCOSE: 114 MG/DL (ref 70–110)
POCT GLUCOSE: 119 MG/DL (ref 70–110)
POCT GLUCOSE: 164 MG/DL (ref 70–110)
POCT GLUCOSE: 85 MG/DL (ref 70–110)
POTASSIUM SERPL-SCNC: 2.9 MMOL/L (ref 3.5–5.1)
PROT SERPL-MCNC: 5.2 G/DL (ref 6–8.4)
SODIUM SERPL-SCNC: 143 MMOL/L (ref 136–145)

## 2025-01-11 PROCEDURE — S4991 NICOTINE PATCH NONLEGEND: HCPCS | Performed by: STUDENT IN AN ORGANIZED HEALTH CARE EDUCATION/TRAINING PROGRAM

## 2025-01-11 PROCEDURE — 36415 COLL VENOUS BLD VENIPUNCTURE: CPT | Performed by: STUDENT IN AN ORGANIZED HEALTH CARE EDUCATION/TRAINING PROGRAM

## 2025-01-11 PROCEDURE — 80053 COMPREHEN METABOLIC PANEL: CPT | Performed by: STUDENT IN AN ORGANIZED HEALTH CARE EDUCATION/TRAINING PROGRAM

## 2025-01-11 PROCEDURE — 25000003 PHARM REV CODE 250: Performed by: STUDENT IN AN ORGANIZED HEALTH CARE EDUCATION/TRAINING PROGRAM

## 2025-01-11 PROCEDURE — 25000003 PHARM REV CODE 250: Performed by: INTERNAL MEDICINE

## 2025-01-11 PROCEDURE — 11000001 HC ACUTE MED/SURG PRIVATE ROOM

## 2025-01-11 PROCEDURE — 63600175 PHARM REV CODE 636 W HCPCS: Performed by: STUDENT IN AN ORGANIZED HEALTH CARE EDUCATION/TRAINING PROGRAM

## 2025-01-11 RX ORDER — POTASSIUM CHLORIDE 750 MG/1
30 CAPSULE, EXTENDED RELEASE ORAL ONCE
Status: COMPLETED | OUTPATIENT
Start: 2025-01-11 | End: 2025-01-11

## 2025-01-11 RX ADMIN — DEXTROSE MONOHYDRATE, SODIUM CHLORIDE, AND POTASSIUM CHLORIDE: 50; 9; 1.49 INJECTION, SOLUTION INTRAVENOUS at 06:01

## 2025-01-11 RX ADMIN — Medication 1 TABLET: at 10:01

## 2025-01-11 RX ADMIN — ENOXAPARIN SODIUM 40 MG: 40 INJECTION SUBCUTANEOUS at 05:01

## 2025-01-11 RX ADMIN — ACETAMINOPHEN 650 MG: 325 TABLET ORAL at 09:01

## 2025-01-11 RX ADMIN — Medication 6 MG: at 09:01

## 2025-01-11 RX ADMIN — NICOTINE 1 PATCH: 7 PATCH, EXTENDED RELEASE TRANSDERMAL at 10:01

## 2025-01-11 RX ADMIN — NIFEDIPINE 30 MG: 30 TABLET, FILM COATED, EXTENDED RELEASE ORAL at 10:01

## 2025-01-11 RX ADMIN — Medication 100 MG: at 10:01

## 2025-01-11 RX ADMIN — POTASSIUM CHLORIDE 30 MEQ: 750 CAPSULE, EXTENDED RELEASE ORAL at 05:01

## 2025-01-11 RX ADMIN — FERROUS SULFATE TAB 325 MG (65 MG ELEMENTAL FE) 1 EACH: 325 (65 FE) TAB at 10:01

## 2025-01-11 RX ADMIN — ATORVASTATIN CALCIUM 40 MG: 40 TABLET, FILM COATED ORAL at 10:01

## 2025-01-11 RX ADMIN — LISINOPRIL 10 MG: 10 TABLET ORAL at 10:01

## 2025-01-11 RX ADMIN — ASPIRIN 81 MG: 81 TABLET, COATED ORAL at 10:01

## 2025-01-11 RX ADMIN — Medication 1 TABLET: at 09:01

## 2025-01-11 RX ADMIN — FOLIC ACID 1 MG: 1 TABLET ORAL at 10:01

## 2025-01-11 NOTE — PROGRESS NOTES
[facility] - Internal Medicine [facility] Medicine  Progress Note    Patient Name: Bhavna Lim  MRN: [id number]  Patient Class: IP- Inpatient   Admission Date: 12/26/2024  Length of Stay: 13 days  Attending Physician: Les Rizo MD  Primary Care Provider: Suzi Green MD        Subjective     Principal Problem:Palliative care encounter        HPI:  Mrs. Bhavna Lim is a 79 year old F with a history of HTN and HLD who presents to the ED for weakness and fatigue. She has been having weight loss and poor po intake over the last few months. She has been seen by GI and evaluated with EGD and Colonoscopy as well as MRCP which showed cholelithiasis with gallbladder distention and intrahepatic and extrahepatic biliary dilation. No significant abnormalities on EGD and coloscopy. She was referred to surgery who recommended NM study and possible EUS with GI. She has been unable to tolerate any solid foods. She sometimes takes a little bit of soup and has tried protein shakes. She feels full immediately. She does endorse some pain in her epigastric region ever since having her EGD performed. She occasionally has vomiting. Denies regurgitation of food. She continues to have worsening weakness and falls at home. She had two falls yesterday due to weakness. She denies significant dizziness. She is unable to walk due to her weakness and sister is concerned this is related to her poor nutrition.      In the ED VSS. Labs notable for Potassium of 3.0 which was repleted in the ED. No focal signs concerning for stroke. She was admitted to medicine for further management.      Overview/Hospital Course:  While on the floor PT/OT were consulted and recommended SNF. Nutrition consulted. Discussed with GI outpatient workup and no indication for further inpatient workup. She continued to be altered while in the hospital. CT spine/head ordered for concern of fall showing ischemic changes and signficant stenosis and  narrowing. She developed worsening hand weakness and numbness/tinglign with abnormal coordiation. Some of this was prior to admission but weakness significantly worsened. MRI ordered and showed old infarcts. She continued to refuse food and did not have abdominal complaints just would not eat. Started on IVF due to hypoglycemia. Acute encephalopathy workup initiated and negative aside from low folate. UA ordered but not performed. At this point if nutrition becomes suboptimal will have to consider other means of nutrition. Son with concern of worsening memory and possible dementia wanting placement in [place]. Started on supplements for poor nutrition including thiamine and folate to see if this will help with mental status     Plan is NH with hospice services.    Interval History: Provided patient's sister with updates.  Patient appears without agitation today,  Voices no complaints at this time.  Able to provide somewhat of a review of systems.    Review of Systems   HENT:  Negative for trouble swallowing.    Respiratory:  Negative for shortness of breath.    Cardiovascular:  Negative for chest pain.   Gastrointestinal:  Negative for abdominal pain.   Remainder of ROS limited due to cooperation.      Objective:     Vital Signs (Most Recent):  Temp: 98 °F (36.7 °C) (01/11/25 0821)  Pulse: 97 (01/11/25 0821)  Resp: 18 (01/11/25 0821)  BP: 124/79 (01/11/25 0821)  SpO2: (!) 94 % (01/11/25 0821) Vital Signs (24h Range):  Temp:  [98 °F (36.7 °C)-98.7 °F (37.1 °C)] 98 °F (36.7 °C)  Pulse:  [] 97  Resp:  [18] 18  SpO2:  [92 %-95 %] 94 %  BP: (118-127)/(58-79) 124/79     Weight: 54.9 kg (121 lb 0.5 oz)  Body mass index is 22.87 kg/m².    Intake/Output Summary (Last 24 hours) at 1/11/2025 0945  Last data filed at 1/11/2025 0702  Gross per 24 hour   Intake 1327.11 ml   Output 800 ml   Net 527.11 ml         Physical Exam  Constitutional:       General: She is not in acute distress.     Appearance: She is not  toxic-appearing.   HENT:      Head: Normocephalic and atraumatic.   Eyes:      Extraocular Movements: Extraocular movements intact.   Cardiovascular:      Rate and Rhythm: Normal rate and regular rhythm.   Pulmonary:      Effort: Pulmonary effort is normal. No respiratory distress.   Abdominal:      Tenderness: There is no abdominal tenderness.   Musculoskeletal:      Right lower leg: No edema.      Left lower leg: No edema.   Neurological:      Mental Status: Mental status is at baseline.      Comments: Able to cooperate slightly better with physical exam.  No tangential speech or active hallucinations.             Significant Labs: All pertinent labs within the past 24 hours have been reviewed.  CMP:   Recent Labs   Lab 01/10/25  0308   *   K 3.0*      CO2 19*   GLU 86   BUN 3*   CREATININE 0.5   CALCIUM 7.6*   PROT 5.5*   ALBUMIN 1.8*   BILITOT 1.0   ALKPHOS 300*   AST 31   ALT 41   ANIONGAP 8       Significant Imaging: I have reviewed all pertinent imaging results/findings within the past 24 hours.    Assessment and Plan     * Palliative care encounter  Plan is NH with Hospice as discussed above      Cognitive impairment  -history of confusion and forgetfulness prior to admission  -appears to be more confused inpatient as well  -given impairment and abnormal coordination MRI and C spine ordered: - CT head: Sequela of chronic microvascular ischemic change. Remote lacunar type infarct right thalamus; MRI C-spine: Multilevel cervical spondylosis, worst at C3-C4, contributing to moderate spinal canal stenosis as well as moderate left neural foraminal narrowing.    -HIV/RPR negative. Folate low, repleting. B12 high. Thiamine low and repleting. Possibly 2/2 to dementia with poor nutritional intake; TSH wnl on 1/1/2025  - exhausted reversible causes of poor mentation and with imaging, appears to be age-related decline accelerated by some degree of vascular dementia and large degree of confidence that this  is patient's new baseline with expected further decline  - administering fluids with dextrose and electrolytes given poor oral intake; discussed pitfalls of G-tube placement  - plan for Nursing Home with hospice services  - STAT imaging and consideration of Neurology consult if concerning features arise    Severe protein-calorie malnutrition  Nutrition consulted. Most recent weight and BMI monitored-     Measurements:  Wt Readings from Last 1 Encounters:   12/27/24 54.9 kg (121 lb 0.5 oz)   Body mass index is 22.87 kg/m².    Patient has been screened and assessed by RD.    Malnutrition Type:  Context: chronic illness  Level: severe    Malnutrition Characteristic Summary:  Weight Loss (Malnutrition): greater than 5% in 1 month  Energy Intake (Malnutrition): less than 75% for greater than or equal to 1 month  Subcutaneous Fat (Malnutrition): severe depletion  Muscle Mass (Malnutrition): severe depletion    Interventions/Recommendations (treatment strategy):  Please prioritize patient's comfort and preferences over strict nutritional goals at this time. Offer small, frequent meals, texture modifications, and respect the patient's desire to eat or not eat. Also ensure proper hydration with small sips of fluids periodically.    - supplying D5 fluids given poor intake    Debility  Patient with Acute debility due to age-related physical debility and other reduced mobility. The patient's latest AMPAC (Activity Measure for Post Acute Care) Score is listed below.    AM-PAC Score - How much help does the patient need for each activity listed  Basic Mobility Total Score: 7  Turning over in bed (including adjusting bedclothes, sheets and blankets)?: A lot  Sitting down on and standing up from a chair with arms (e.g., wheelchair, bedside commode, etc.): Unable  Moving from lying on back to sitting on the side of the bed?: Unable  Moving to and from a bed to a chair (including a wheelchair)?: Unable  Need to walk in hospital  room?: Unable  Climbing 3-5 steps with a railing?: Unable    Plan  - Progressive mobility protocol initated  - PT/OT consulted  - Fall precautions in place  -plan for NH with hospice services      Benign essential hypertension  Patient's blood pressure range in the last 24 hours was: BP  Min: 118/70  Max: 127/79.The patient's inpatient anti-hypertensive regimen is listed below:  Current Antihypertensives  lisinopriL tablet 10 mg, Daily, Oral  NIFEdipine 24 hr tablet 30 mg, Daily, Oral    Plan  - BP is controlled, no changes needed to their regimen  - will continue home regimen    Unintentional weight loss  Nutrition consulted. Most recent weight and BMI monitored-     Measurements:  Wt Readings from Last 1 Encounters:   12/27/24 54.9 kg (121 lb 0.5 oz)   Body mass index is 22.87 kg/m².    RD consultation   Extensive outpatient evaluation obtained by GI and General surgery   Will add supplementation to meals; soft bite-sized per rec's      Please prioritize patient's comfort and preferences over strict nutritional goals at this time. Offer small, frequent meals, texture modifications, and respect the patient's desire to eat or not eat. Also ensure proper hydration with small sips of fluids periodically.      Hyperlipidemia  - continue ASA and statin      Hypokalemia  Patient's most recent potassium results are listed below.   Recent Labs     01/10/25  0308   K 3.0*       Plan  - Replete potassium per protocol  - Monitor potassium Daily  - decreasing frequency of lab draw but will obtain metabolic panels for assessments: adding K+ to fluids; discontinue if metabolic panel normalizes    Numbness and tingling in both hands  -noted by patient before admission with abnormal finger to nose and weakness   -CT head and spine reviewed. There is significant stenosis/narrowing of C6 and C7 which could be contributing to symptoms. Findings appear chronic and not acute. MRI ordered and notable for cervical stenosis and lacunar  infarcts. Limited visiblity due to movement   -CT head notable for remote lacunar infarcts, hx of stroke in 2005 currently on ASA   -continue PT/OT         Diabetes mellitus  Sliding scale held, accuchecks for hypoglycemia     Patient's FSGs are controlled on current medication regimen.  Last A1c reviewed-   Lab Results   Component Value Date    HGBA1C 5.5 10/11/2024     Most recent fingerstick glucose reviewed-   Recent Labs   Lab 01/10/25  1112 01/10/25  2010 01/11/25  0823   POCTGLUCOSE 98 128* 119*       Current correctional scale   hold on correction scale give A1C is 5.5  Antihyperglycemics (From admission, onward)      None          Hold Oral hypoglycemics while patient is in the hospital; holding all given low intake    Hypoglycemia  -episodes of hypoglycemia overnight  -given amp of D50, started on IV D5  -monitor BG closely  -likely related to poor PO intake         VTE Risk Mitigation (From admission, onward)           Ordered     enoxaparin injection 40 mg  Daily         12/26/24 1700     IP VTE HIGH RISK PATIENT  Once         12/26/24 1547     Place sequential compression device  Until discontinued         12/26/24 1547                    Discharge Planning   RITA: 1/13/2025     Code Status: DNR   Medical Readiness for Discharge Date: 1/10/2025  Discharge Plan A: New Nursing Home placement - assisted care facility (with hospice services)              Les Rizo MD  Department of Hospital Medicine   [facility] - Internal Medicine Telemetry

## 2025-01-11 NOTE — CONSULTS
NIAS consulted for IV access for PVA.    Nurse obtained PIV access after consult placed.    Re consult NIAS if needed.

## 2025-01-11 NOTE — ASSESSMENT & PLAN NOTE
Sliding scale held, accuchecks for hypoglycemia     Patient's FSGs are controlled on current medication regimen.  Last A1c reviewed-   Lab Results   Component Value Date    HGBA1C 5.5 10/11/2024     Most recent fingerstick glucose reviewed-   Recent Labs   Lab 01/10/25  1112 01/10/25  2010 01/11/25  0823   POCTGLUCOSE 98 128* 119*       Current correctional scale   hold on correction scale give A1C is 5.5  Antihyperglycemics (From admission, onward)    None        Hold Oral hypoglycemics while patient is in the hospital; holding all given low intake

## 2025-01-11 NOTE — ASSESSMENT & PLAN NOTE
Patient's blood pressure range in the last 24 hours was: BP  Min: 118/70  Max: 127/79.The patient's inpatient anti-hypertensive regimen is listed below:  Current Antihypertensives  lisinopriL tablet 10 mg, Daily, Oral  NIFEdipine 24 hr tablet 30 mg, Daily, Oral    Plan  - BP is controlled, no changes needed to their regimen  - will continue home regimen

## 2025-01-11 NOTE — SUBJECTIVE & OBJECTIVE
Interval History: Provided patient's sister with updates.  Patient appears without agitation today,  Voices no complaints at this time.  Able to provide somewhat of a review of systems.    Review of Systems   HENT:  Negative for trouble swallowing.    Respiratory:  Negative for shortness of breath.    Cardiovascular:  Negative for chest pain.   Gastrointestinal:  Negative for abdominal pain.   Remainder of ROS limited due to cooperation.      Objective:     Vital Signs (Most Recent):  Temp: 98 °F (36.7 °C) (01/11/25 0821)  Pulse: 97 (01/11/25 0821)  Resp: 18 (01/11/25 0821)  BP: 124/79 (01/11/25 0821)  SpO2: (!) 94 % (01/11/25 0821) Vital Signs (24h Range):  Temp:  [98 °F (36.7 °C)-98.7 °F (37.1 °C)] 98 °F (36.7 °C)  Pulse:  [] 97  Resp:  [18] 18  SpO2:  [92 %-95 %] 94 %  BP: (118-127)/(58-79) 124/79     Weight: 54.9 kg (121 lb 0.5 oz)  Body mass index is 22.87 kg/m².    Intake/Output Summary (Last 24 hours) at 1/11/2025 0945  Last data filed at 1/11/2025 0702  Gross per 24 hour   Intake 1327.11 ml   Output 800 ml   Net 527.11 ml         Physical Exam  Constitutional:       General: She is not in acute distress.     Appearance: She is not toxic-appearing.   HENT:      Head: Normocephalic and atraumatic.   Eyes:      Extraocular Movements: Extraocular movements intact.   Cardiovascular:      Rate and Rhythm: Normal rate and regular rhythm.   Pulmonary:      Effort: Pulmonary effort is normal. No respiratory distress.   Abdominal:      Tenderness: There is no abdominal tenderness.   Musculoskeletal:      Right lower leg: No edema.      Left lower leg: No edema.   Neurological:      Mental Status: Mental status is at baseline.      Comments: Able to cooperate slightly better with physical exam.  No tangential speech or active hallucinations.             Significant Labs: All pertinent labs within the past 24 hours have been reviewed.  CMP:   Recent Labs   Lab 01/10/25  0308   *   K 3.0*      CO2 19*    GLU 86   BUN 3*   CREATININE 0.5   CALCIUM 7.6*   PROT 5.5*   ALBUMIN 1.8*   BILITOT 1.0   ALKPHOS 300*   AST 31   ALT 41   ANIONGAP 8       Significant Imaging: I have reviewed all pertinent imaging results/findings within the past 24 hours.

## 2025-01-12 LAB
ALBUMIN SERPL BCP-MCNC: 1.6 G/DL (ref 3.5–5.2)
ALP SERPL-CCNC: 393 U/L (ref 40–150)
ALT SERPL W/O P-5'-P-CCNC: 52 U/L (ref 10–44)
ANION GAP SERPL CALC-SCNC: 8 MMOL/L (ref 8–16)
AST SERPL-CCNC: 98 U/L (ref 10–40)
BILIRUB SERPL-MCNC: 1.9 MG/DL (ref 0.1–1)
BUN SERPL-MCNC: 4 MG/DL (ref 8–23)
CALCIUM SERPL-MCNC: 7.7 MG/DL (ref 8.7–10.5)
CHLORIDE SERPL-SCNC: 117 MMOL/L (ref 95–110)
CO2 SERPL-SCNC: 22 MMOL/L (ref 23–29)
CREAT SERPL-MCNC: 0.5 MG/DL (ref 0.5–1.4)
EST. GFR  (NO RACE VARIABLE): >60 ML/MIN/1.73 M^2
GLUCOSE SERPL-MCNC: 118 MG/DL (ref 70–110)
POCT GLUCOSE: 105 MG/DL (ref 70–110)
POCT GLUCOSE: 149 MG/DL (ref 70–110)
POCT GLUCOSE: 155 MG/DL (ref 70–110)
POCT GLUCOSE: 85 MG/DL (ref 70–110)
POCT GLUCOSE: 97 MG/DL (ref 70–110)
POTASSIUM SERPL-SCNC: 3.4 MMOL/L (ref 3.5–5.1)
PROT SERPL-MCNC: 5 G/DL (ref 6–8.4)
SODIUM SERPL-SCNC: 147 MMOL/L (ref 136–145)

## 2025-01-12 PROCEDURE — 36415 COLL VENOUS BLD VENIPUNCTURE: CPT | Performed by: STUDENT IN AN ORGANIZED HEALTH CARE EDUCATION/TRAINING PROGRAM

## 2025-01-12 PROCEDURE — 11000001 HC ACUTE MED/SURG PRIVATE ROOM

## 2025-01-12 PROCEDURE — 25000003 PHARM REV CODE 250: Performed by: STUDENT IN AN ORGANIZED HEALTH CARE EDUCATION/TRAINING PROGRAM

## 2025-01-12 PROCEDURE — 63600175 PHARM REV CODE 636 W HCPCS: Performed by: STUDENT IN AN ORGANIZED HEALTH CARE EDUCATION/TRAINING PROGRAM

## 2025-01-12 PROCEDURE — 25000003 PHARM REV CODE 250: Performed by: INTERNAL MEDICINE

## 2025-01-12 PROCEDURE — S4991 NICOTINE PATCH NONLEGEND: HCPCS | Performed by: STUDENT IN AN ORGANIZED HEALTH CARE EDUCATION/TRAINING PROGRAM

## 2025-01-12 PROCEDURE — 80053 COMPREHEN METABOLIC PANEL: CPT | Performed by: STUDENT IN AN ORGANIZED HEALTH CARE EDUCATION/TRAINING PROGRAM

## 2025-01-12 RX ADMIN — NICOTINE 1 PATCH: 7 PATCH, EXTENDED RELEASE TRANSDERMAL at 09:01

## 2025-01-12 RX ADMIN — Medication 1 TABLET: at 08:01

## 2025-01-12 RX ADMIN — ATORVASTATIN CALCIUM 40 MG: 40 TABLET, FILM COATED ORAL at 08:01

## 2025-01-12 RX ADMIN — Medication 1 TABLET: at 10:01

## 2025-01-12 RX ADMIN — FOLIC ACID 1 MG: 1 TABLET ORAL at 08:01

## 2025-01-12 RX ADMIN — NIFEDIPINE 30 MG: 30 TABLET, FILM COATED, EXTENDED RELEASE ORAL at 08:01

## 2025-01-12 RX ADMIN — ENOXAPARIN SODIUM 40 MG: 40 INJECTION SUBCUTANEOUS at 06:01

## 2025-01-12 RX ADMIN — ACETAMINOPHEN 650 MG: 325 TABLET ORAL at 08:01

## 2025-01-12 RX ADMIN — Medication 100 MG: at 08:01

## 2025-01-12 RX ADMIN — LISINOPRIL 10 MG: 10 TABLET ORAL at 08:01

## 2025-01-12 RX ADMIN — ASPIRIN 81 MG: 81 TABLET, COATED ORAL at 08:01

## 2025-01-12 RX ADMIN — ACETAMINOPHEN 650 MG: 325 TABLET ORAL at 11:01

## 2025-01-12 RX ADMIN — FERROUS SULFATE TAB 325 MG (65 MG ELEMENTAL FE) 1 EACH: 325 (65 FE) TAB at 08:01

## 2025-01-12 RX ADMIN — Medication 6 MG: at 11:01

## 2025-01-12 NOTE — SUBJECTIVE & OBJECTIVE
"Interval History: Thorough discussion with family to reassure that patient's mentation difficulties have had acute sources ruled out.  I do find urinalysis to be reasonable as requested by family and will obtain.  No additional issues at this time.    Review of Systems   Reason unable to perform ROS: Patient with impaired cognition.     Objective:     Vital Signs (Most Recent):  Temp: 98.3 °F (36.8 °C) (01/12/25 1158)  Pulse: 101 (01/12/25 1158)  Resp: 18 (01/12/25 1158)  BP: (!) 91/56 (01/12/25 1158)  SpO2: 95 % (01/12/25 1158) Vital Signs (24h Range):  Temp:  [98.1 °F (36.7 °C)-98.5 °F (36.9 °C)] 98.3 °F (36.8 °C)  Pulse:  [] 101  Resp:  [17-18] 18  SpO2:  [94 %-97 %] 95 %  BP: ()/(56-88) 91/56     Weight: 54.9 kg (121 lb 0.5 oz)  Body mass index is 22.87 kg/m².    Intake/Output Summary (Last 24 hours) at 1/12/2025 1201  Last data filed at 1/12/2025 0800  Gross per 24 hour   Intake 470 ml   Output 450 ml   Net 20 ml         Physical Exam  Constitutional:       General: She is not in acute distress.     Comments: Somnolent but arousable   Eyes:      Extraocular Movements: Extraocular movements intact.   Cardiovascular:      Rate and Rhythm: Normal rate and regular rhythm.   Pulmonary:      Effort: Pulmonary effort is normal. No respiratory distress.   Musculoskeletal:      Right lower leg: No edema.      Left lower leg: No edema.   Neurological:      Mental Status: Mental status is at baseline.             Significant Labs: All pertinent labs within the past 24 hours have been reviewed.  CBC: No results for input(s): "WBC", "HGB", "HCT", "PLT" in the last 48 hours.  CMP:   Recent Labs   Lab 01/11/25  1011 01/12/25  1117    147*   K 2.9* 3.4*   * 117*   CO2 23 22*   GLU 97 118*   BUN 4* 4*   CREATININE 0.5 0.5   CALCIUM 7.8* 7.7*   PROT 5.2* 5.0*   ALBUMIN 1.7* 1.6*   BILITOT 0.8 1.9*   ALKPHOS 290* 393*   AST 26 98*   ALT 37 52*   ANIONGAP 7* 8       Significant Imaging: I have reviewed all " pertinent imaging results/findings within the past 24 hours.

## 2025-01-12 NOTE — ASSESSMENT & PLAN NOTE
Patient's most recent potassium results are listed below.   Recent Labs     01/10/25  0308 01/11/25  1011 01/12/25  1117   K 3.0* 2.9* 3.4*       Plan  - Replete potassium per protocol  - Monitor potassium Daily  - decreasing frequency of lab draw but will obtain metabolic panels for assessments: adding K+ to fluids; discontinue if metabolic panel normalizes; adding additional day of K+ in fluids

## 2025-01-12 NOTE — PLAN OF CARE
Problem: Adult Inpatient Plan of Care  Goal: Plan of Care Review  Outcome: Progressing  Goal: Patient-Specific Goal (Individualized)  Outcome: Progressing  Goal: Absence of Hospital-Acquired Illness or Injury  Outcome: Progressing  Goal: Optimal Comfort and Wellbeing  Outcome: Progressing  Goal: Readiness for Transition of Care  Outcome: Progressing     Problem: Adult Inpatient Plan of Care  Goal: Plan of Care Review  Outcome: Progressing  Goal: Patient-Specific Goal (Individualized)  Outcome: Progressing  Goal: Absence of Hospital-Acquired Illness or Injury  Outcome: Progressing  Goal: Optimal Comfort and Wellbeing  Outcome: Progressing  Goal: Readiness for Transition of Care  Outcome: Progressing     Problem: Diabetes Comorbidity  Goal: Blood Glucose Level Within Targeted Range  Outcome: Progressing     Problem: Skin Injury Risk Increased  Goal: Skin Health and Integrity  Outcome: Progressing     Problem: Confusion Acute  Goal: Optimal Cognitive Function  Outcome: Progressing     Problem: Wound  Goal: Optimal Coping  Outcome: Progressing  Goal: Optimal Functional Ability  Outcome: Progressing  Goal: Absence of Infection Signs and Symptoms  Outcome: Progressing  Goal: Improved Oral Intake  Outcome: Progressing  Goal: Optimal Pain Control and Function  Outcome: Progressing  Goal: Skin Health and Integrity  Outcome: Progressing  Goal: Optimal Wound Healing  Outcome: Progressing     Problem: Infection  Goal: Absence of Infection Signs and Symptoms  Outcome: Progressing     Problem: Coping Ineffective  Goal: Effective Coping  Outcome: Progressing    Pt had an uneventful night.  Pt Aaox1  to self and confused. VSS. Pt remain on D5 with K and NS at 75ml/hr Pt is free of falls and injuries.   [place] moniitor remains place for safety. Bed in lowest position and call light within reach  Bed alarm set  Pt denied any pain or discomfort  Pt is free of falls and injuries. Bed in lowest position and call light within reach.  Safety maintained

## 2025-01-12 NOTE — ASSESSMENT & PLAN NOTE
-history of confusion and forgetfulness prior to admission  -appears to be more confused inpatient as well  -given impairment and abnormal coordination MRI and C spine ordered: - CT head: Sequela of chronic microvascular ischemic change. Remote lacunar type infarct right thalamus; MRI C-spine: Multilevel cervical spondylosis, worst at C3-C4, contributing to moderate spinal canal stenosis as well as moderate left neural foraminal narrowing.    -HIV/RPR negative. Folate low, repleting. B12 high. Thiamine low and repleting. Possibly 2/2 to dementia with poor nutritional intake; TSH wnl on 1/1/2025  - exhausted reversible causes of poor mentation and with imaging, appears to be age-related decline accelerated by some degree of vascular dementia and large degree of confidence that this is patient's new baseline with expected further decline  - administering fluids with dextrose and electrolytes given poor oral intake; discussed pitfalls of G-tube placement  - plan for Nursing Home with hospice services  - STAT imaging and consideration of Neurology consult if concerning features arise  - consult to Neurology at discharge to characterize dementia and potential interventions to slow progress

## 2025-01-12 NOTE — ASSESSMENT & PLAN NOTE
Sliding scale held, accuchecks for hypoglycemia     Patient's FSGs are controlled on current medication regimen.  Last A1c reviewed-   Lab Results   Component Value Date    HGBA1C 5.5 10/11/2024     Most recent fingerstick glucose reviewed-   Recent Labs   Lab 01/11/25  2323 01/12/25  0407 01/12/25  0755 01/12/25  1159   POCTGLUCOSE 110 105 85 149*       Current correctional scale   hold on correction scale give A1C is 5.5  Antihyperglycemics (From admission, onward)    None        Hold Oral hypoglycemics while patient is in the hospital; holding all given low intake

## 2025-01-12 NOTE — PROGRESS NOTES
[facility] - Internal Medicine [facility]  Progress Note    Patient Name: Bhavna Lim  MRN: [id number]  Patient Class: IP- Inpatient   Admission Date: 12/26/2024  Length of Stay: 14 days  Attending Physician: Les Rizo MD  Primary Care Provider: Suzi Green MD        Subjective     Principal Problem:Palliative care encounter        HPI:  Mrs. Bhavna Lim is a 79 year old F with a history of HTN and HLD who presents to the ED for weakness and fatigue. She has been having weight loss and poor po intake over the last few months. She has been seen by GI and evaluated with EGD and Colonoscopy as well as MRCP which showed cholelithiasis with gallbladder distention and intrahepatic and extrahepatic biliary dilation. No significant abnormalities on EGD and coloscopy. She was referred to surgery who recommended NM study and possible EUS with GI. She has been unable to tolerate any solid foods. She sometimes takes a little bit of soup and has tried protein shakes. She feels full immediately. She does endorse some pain in her epigastric region ever since having her EGD performed. She occasionally has vomiting. Denies regurgitation of food. She continues to have worsening weakness and falls at home. She had two falls yesterday due to weakness. She denies significant dizziness. She is unable to walk due to her weakness and sister is concerned this is related to her poor nutrition.      In the ED VSS. Labs notable for Potassium of 3.0 which was repleted in the ED. No focal signs concerning for stroke. She was admitted to medicine for further management.      Overview/Hospital Course:  While on the floor PT/OT were consulted and recommended SNF. Nutrition consulted. Discussed with GI outpatient workup and no indication for further inpatient workup. She continued to be altered while in the hospital. CT spine/head ordered for concern of fall showing ischemic changes and signficant stenosis and  narrowing. She developed worsening hand weakness and numbness/tinglign with abnormal coordiation. Some of this was prior to admission but weakness significantly worsened. MRI ordered and showed old infarcts. She continued to refuse food and did not have abdominal complaints just would not eat. Started on IVF due to hypoglycemia. Acute encephalopathy workup initiated and negative aside from low folate. UA ordered but not performed. At this point if nutrition becomes suboptimal will have to consider other means of nutrition. Son with concern of worsening memory and possible dementia wanting placement in [place]. Started on supplements for poor nutrition including thiamine and folate to see if this will help with mental status     Plan is NH with hospice services.    Interval History: Thorough discussion with family to reassure that patient's mentation difficulties have had acute sources ruled out.  I do find urinalysis to be reasonable as requested by family and will obtain.  No additional issues at this time.    Review of Systems   Reason unable to perform ROS: Patient with impaired cognition.     Objective:     Vital Signs (Most Recent):  Temp: 98.3 °F (36.8 °C) (01/12/25 1158)  Pulse: 101 (01/12/25 1158)  Resp: 18 (01/12/25 1158)  BP: (!) 91/56 (01/12/25 1158)  SpO2: 95 % (01/12/25 1158) Vital Signs (24h Range):  Temp:  [98.1 °F (36.7 °C)-98.5 °F (36.9 °C)] 98.3 °F (36.8 °C)  Pulse:  [] 101  Resp:  [17-18] 18  SpO2:  [94 %-97 %] 95 %  BP: ()/(56-88) 91/56     Weight: 54.9 kg (121 lb 0.5 oz)  Body mass index is 22.87 kg/m².    Intake/Output Summary (Last 24 hours) at 1/12/2025 1201  Last data filed at 1/12/2025 0800  Gross per 24 hour   Intake 470 ml   Output 450 ml   Net 20 ml         Physical Exam  Constitutional:       General: She is not in acute distress.     Comments: Somnolent but arousable   Eyes:      Extraocular Movements: Extraocular movements intact.   Cardiovascular:      Rate and Rhythm:  "Normal rate and regular rhythm.   Pulmonary:      Effort: Pulmonary effort is normal. No respiratory distress.   Musculoskeletal:      Right lower leg: No edema.      Left lower leg: No edema.   Neurological:      Mental Status: Mental status is at baseline.             Significant Labs: All pertinent labs within the past 24 hours have been reviewed.  CBC: No results for input(s): "WBC", "HGB", "HCT", "PLT" in the last 48 hours.  CMP:   Recent Labs   Lab 01/11/25  1011 01/12/25  1117    147*   K 2.9* 3.4*   * 117*   CO2 23 22*   GLU 97 118*   BUN 4* 4*   CREATININE 0.5 0.5   CALCIUM 7.8* 7.7*   PROT 5.2* 5.0*   ALBUMIN 1.7* 1.6*   BILITOT 0.8 1.9*   ALKPHOS 290* 393*   AST 26 98*   ALT 37 52*   ANIONGAP 7* 8       Significant Imaging: I have reviewed all pertinent imaging results/findings within the past 24 hours.    Assessment and Plan     * Palliative care encounter  Plan is NH with Hospice as discussed above      Cognitive impairment  -history of confusion and forgetfulness prior to admission  -appears to be more confused inpatient as well  -given impairment and abnormal coordination MRI and C spine ordered: - CT head: Sequela of chronic microvascular ischemic change. Remote lacunar type infarct right thalamus; MRI C-spine: Multilevel cervical spondylosis, worst at C3-C4, contributing to moderate spinal canal stenosis as well as moderate left neural foraminal narrowing.    -HIV/RPR negative. Folate low, repleting. B12 high. Thiamine low and repleting. Possibly 2/2 to dementia with poor nutritional intake; TSH wnl on 1/1/2025  - exhausted reversible causes of poor mentation and with imaging, appears to be age-related decline accelerated by some degree of vascular dementia and large degree of confidence that this is patient's new baseline with expected further decline  - administering fluids with dextrose and electrolytes given poor oral intake; discussed pitfalls of G-tube placement  - plan for Nursing " Home with hospice services  - STAT imaging and consideration of Neurology consult if concerning features arise  - consult to Neurology at discharge to characterize dementia and potential interventions to slow progress    Severe protein-calorie malnutrition  Nutrition consulted. Most recent weight and BMI monitored-     Measurements:  Wt Readings from Last 1 Encounters:   12/27/24 54.9 kg (121 lb 0.5 oz)   Body mass index is 22.87 kg/m².    Patient has been screened and assessed by RD.    Malnutrition Type:  Context: chronic illness  Level: severe    Malnutrition Characteristic Summary:  Weight Loss (Malnutrition): greater than 5% in 1 month  Energy Intake (Malnutrition): less than 75% for greater than or equal to 1 month  Subcutaneous Fat (Malnutrition): severe depletion  Muscle Mass (Malnutrition): severe depletion    Interventions/Recommendations (treatment strategy):  Please prioritize patient's comfort and preferences over strict nutritional goals at this time. Offer small, frequent meals, texture modifications, and respect the patient's desire to eat or not eat. Also ensure proper hydration with small sips of fluids periodically.    - supplying D5 fluids given poor intake    Debility  Patient with Acute debility due to age-related physical debility and other reduced mobility. The patient's latest AMPAC (Activity Measure for Post Acute Care) Score is listed below.    AM-PAC Score - How much help does the patient need for each activity listed  Basic Mobility Total Score: 7  Turning over in bed (including adjusting bedclothes, sheets and blankets)?: A lot  Sitting down on and standing up from a chair with arms (e.g., wheelchair, bedside commode, etc.): Unable  Moving from lying on back to sitting on the side of the bed?: Unable  Moving to and from a bed to a chair (including a wheelchair)?: Unable  Need to walk in hospital room?: Unable  Climbing 3-5 steps with a railing?: Unable    Plan  - Progressive mobility  protocol initated  - PT/OT consulted  - Fall precautions in place  -plan for NH with hospice services      Benign essential hypertension  Patient's blood pressure range in the last 24 hours was: BP  Min: 118/70  Max: 127/79.The patient's inpatient anti-hypertensive regimen is listed below:  Current Antihypertensives  lisinopriL tablet 10 mg, Daily, Oral  NIFEdipine 24 hr tablet 30 mg, Daily, Oral    Plan  - BP is controlled, no changes needed to their regimen  - will continue home regimen    Unintentional weight loss  Nutrition consulted. Most recent weight and BMI monitored-     Measurements:  Wt Readings from Last 1 Encounters:   12/27/24 54.9 kg (121 lb 0.5 oz)   Body mass index is 22.87 kg/m².    RD consultation   Extensive outpatient evaluation obtained by GI and General surgery   Will add supplementation to meals; soft bite-sized per rec's      Please prioritize patient's comfort and preferences over strict nutritional goals at this time. Offer small, frequent meals, texture modifications, and respect the patient's desire to eat or not eat. Also ensure proper hydration with small sips of fluids periodically.      Hyperlipidemia  - continue ASA and statin      Hypokalemia  Patient's most recent potassium results are listed below.   Recent Labs     01/10/25  0308 01/11/25  1011 01/12/25  1117   K 3.0* 2.9* 3.4*       Plan  - Replete potassium per protocol  - Monitor potassium Daily  - decreasing frequency of lab draw but will obtain metabolic panels for assessments: adding K+ to fluids; discontinue if metabolic panel normalizes; adding additional day of K+ in fluids    Numbness and tingling in both hands  -noted by patient before admission with abnormal finger to nose and weakness   -CT head and spine reviewed. There is significant stenosis/narrowing of C6 and C7 which could be contributing to symptoms. Findings appear chronic and not acute. MRI ordered and notable for cervical stenosis and lacunar infarcts.  Limited visiblity due to movement   -CT head notable for remote lacunar infarcts, hx of stroke in 2005 currently on ASA   -continue PT/OT         Diabetes mellitus  Sliding scale held, accuchecks for hypoglycemia     Patient's FSGs are controlled on current medication regimen.  Last A1c reviewed-   Lab Results   Component Value Date    HGBA1C 5.5 10/11/2024     Most recent fingerstick glucose reviewed-   Recent Labs   Lab 01/11/25  2323 01/12/25  0407 01/12/25  0755 01/12/25  1159   POCTGLUCOSE 110 105 85 149*       Current correctional scale   hold on correction scale give A1C is 5.5  Antihyperglycemics (From admission, onward)      None          Hold Oral hypoglycemics while patient is in the hospital; holding all given low intake    Hypoglycemia  -episodes of hypoglycemia overnight  -given amp of D50, started on IV D5  -monitor BG closely  -likely related to poor PO intake         VTE Risk Mitigation (From admission, onward)           Ordered     enoxaparin injection 40 mg  Daily         12/26/24 1700     IP VTE HIGH RISK PATIENT  Once         12/26/24 1547     Place sequential compression device  Until discontinued         12/26/24 1547                    Discharge Planning   RITA: 1/13/2025     Code Status: DNR   Medical Readiness for Discharge Date: 1/10/2025  Discharge Plan A: New Nursing Home placement - [facility] care facility (with hospice services)                        Les Rizo MD  Department of Hospital Medicine   [facility] - Internal Medicine Telemetry

## 2025-01-13 PROBLEM — E87.6 HYPOKALEMIA: Status: RESOLVED | Noted: 2024-12-26 | Resolved: 2025-01-13

## 2025-01-13 LAB
ALBUMIN SERPL BCP-MCNC: 1.5 G/DL (ref 3.5–5.2)
ALP SERPL-CCNC: 415 U/L (ref 40–150)
ALT SERPL W/O P-5'-P-CCNC: 85 U/L (ref 10–44)
ANION GAP SERPL CALC-SCNC: 8 MMOL/L (ref 8–16)
AST SERPL-CCNC: 141 U/L (ref 10–40)
BACTERIA #/AREA URNS AUTO: ABNORMAL /HPF
BASOPHILS # BLD AUTO: 0.05 K/UL (ref 0–0.2)
BASOPHILS NFR BLD: 0.3 % (ref 0–1.9)
BILIRUB SERPL-MCNC: 2.7 MG/DL (ref 0.1–1)
BILIRUB UR QL STRIP: ABNORMAL
BUN SERPL-MCNC: 5 MG/DL (ref 8–23)
CALCIUM SERPL-MCNC: 7.6 MG/DL (ref 8.7–10.5)
CHLORIDE SERPL-SCNC: 117 MMOL/L (ref 95–110)
CLARITY UR REFRACT.AUTO: ABNORMAL
CO2 SERPL-SCNC: 21 MMOL/L (ref 23–29)
COLOR UR AUTO: YELLOW
CREAT SERPL-MCNC: 0.5 MG/DL (ref 0.5–1.4)
DIFFERENTIAL METHOD BLD: ABNORMAL
EOSINOPHIL # BLD AUTO: 0.2 K/UL (ref 0–0.5)
EOSINOPHIL NFR BLD: 1.4 % (ref 0–8)
ERYTHROCYTE [DISTWIDTH] IN BLOOD BY AUTOMATED COUNT: 21 % (ref 11.5–14.5)
EST. GFR  (NO RACE VARIABLE): >60 ML/MIN/1.73 M^2
GLUCOSE SERPL-MCNC: 78 MG/DL (ref 70–110)
GLUCOSE UR QL STRIP: NEGATIVE
HCT VFR BLD AUTO: 27.3 % (ref 37–48.5)
HGB BLD-MCNC: 9.1 G/DL (ref 12–16)
HGB UR QL STRIP: ABNORMAL
HYALINE CASTS UR QL AUTO: 4 /LPF
IMM GRANULOCYTES # BLD AUTO: 0.06 K/UL (ref 0–0.04)
IMM GRANULOCYTES NFR BLD AUTO: 0.4 % (ref 0–0.5)
KETONES UR QL STRIP: NEGATIVE
LEUKOCYTE ESTERASE UR QL STRIP: ABNORMAL
LYMPHOCYTES # BLD AUTO: 1.2 K/UL (ref 1–4.8)
LYMPHOCYTES NFR BLD: 8.2 % (ref 18–48)
MCH RBC QN AUTO: 27.7 PG (ref 27–31)
MCHC RBC AUTO-ENTMCNC: 33.3 G/DL (ref 32–36)
MCV RBC AUTO: 83 FL (ref 82–98)
MICROSCOPIC COMMENT: ABNORMAL
MONOCYTES # BLD AUTO: 0.8 K/UL (ref 0.3–1)
MONOCYTES NFR BLD: 5.3 % (ref 4–15)
NEUTROPHILS # BLD AUTO: 12.3 K/UL (ref 1.8–7.7)
NEUTROPHILS NFR BLD: 84.4 % (ref 38–73)
NITRITE UR QL STRIP: NEGATIVE
NRBC BLD-RTO: 0 /100 WBC
PH UR STRIP: 7 [PH] (ref 5–8)
PLATELET # BLD AUTO: 432 K/UL (ref 150–450)
PMV BLD AUTO: 10.3 FL (ref 9.2–12.9)
POCT GLUCOSE: 103 MG/DL (ref 70–110)
POCT GLUCOSE: 112 MG/DL (ref 70–110)
POCT GLUCOSE: 113 MG/DL (ref 70–110)
POCT GLUCOSE: 135 MG/DL (ref 70–110)
POTASSIUM SERPL-SCNC: 3.5 MMOL/L (ref 3.5–5.1)
PROT SERPL-MCNC: 4.9 G/DL (ref 6–8.4)
PROT UR QL STRIP: ABNORMAL
RBC # BLD AUTO: 3.28 M/UL (ref 4–5.4)
RBC #/AREA URNS AUTO: 4 /HPF (ref 0–4)
SODIUM SERPL-SCNC: 146 MMOL/L (ref 136–145)
SP GR UR STRIP: 1.02 (ref 1–1.03)
SQUAMOUS #/AREA URNS AUTO: 1 /HPF
URN SPEC COLLECT METH UR: ABNORMAL
WBC # BLD AUTO: 14.62 K/UL (ref 3.9–12.7)
WBC #/AREA URNS AUTO: 40 /HPF (ref 0–5)

## 2025-01-13 PROCEDURE — 11000001 HC ACUTE MED/SURG PRIVATE ROOM

## 2025-01-13 PROCEDURE — 36415 COLL VENOUS BLD VENIPUNCTURE: CPT | Performed by: STUDENT IN AN ORGANIZED HEALTH CARE EDUCATION/TRAINING PROGRAM

## 2025-01-13 PROCEDURE — 81001 URINALYSIS AUTO W/SCOPE: CPT | Performed by: STUDENT IN AN ORGANIZED HEALTH CARE EDUCATION/TRAINING PROGRAM

## 2025-01-13 PROCEDURE — 87088 URINE BACTERIA CULTURE: CPT | Performed by: STUDENT IN AN ORGANIZED HEALTH CARE EDUCATION/TRAINING PROGRAM

## 2025-01-13 PROCEDURE — 25000003 PHARM REV CODE 250: Performed by: INTERNAL MEDICINE

## 2025-01-13 PROCEDURE — 25000003 PHARM REV CODE 250: Performed by: STUDENT IN AN ORGANIZED HEALTH CARE EDUCATION/TRAINING PROGRAM

## 2025-01-13 PROCEDURE — 87186 SC STD MICRODIL/AGAR DIL: CPT | Performed by: STUDENT IN AN ORGANIZED HEALTH CARE EDUCATION/TRAINING PROGRAM

## 2025-01-13 PROCEDURE — 63600175 PHARM REV CODE 636 W HCPCS: Performed by: STUDENT IN AN ORGANIZED HEALTH CARE EDUCATION/TRAINING PROGRAM

## 2025-01-13 PROCEDURE — 87086 URINE CULTURE/COLONY COUNT: CPT | Performed by: STUDENT IN AN ORGANIZED HEALTH CARE EDUCATION/TRAINING PROGRAM

## 2025-01-13 PROCEDURE — 80053 COMPREHEN METABOLIC PANEL: CPT | Performed by: STUDENT IN AN ORGANIZED HEALTH CARE EDUCATION/TRAINING PROGRAM

## 2025-01-13 PROCEDURE — 85025 COMPLETE CBC W/AUTO DIFF WBC: CPT | Performed by: STUDENT IN AN ORGANIZED HEALTH CARE EDUCATION/TRAINING PROGRAM

## 2025-01-13 PROCEDURE — S4991 NICOTINE PATCH NONLEGEND: HCPCS | Performed by: STUDENT IN AN ORGANIZED HEALTH CARE EDUCATION/TRAINING PROGRAM

## 2025-01-13 RX ORDER — PROMETHAZINE HYDROCHLORIDE 25 MG/1
25 TABLET ORAL EVERY 6 HOURS PRN
Start: 2025-01-13

## 2025-01-13 RX ORDER — FERROUS SULFATE 325(65) MG
325 TABLET, DELAYED RELEASE (ENTERIC COATED) ORAL DAILY
Start: 2025-01-13

## 2025-01-13 RX ORDER — NIFEDIPINE 30 MG/1
30 TABLET, EXTENDED RELEASE ORAL DAILY
Start: 2025-01-14 | End: 2026-01-14

## 2025-01-13 RX ORDER — CEFTRIAXONE 1 G/1
1 INJECTION, POWDER, FOR SOLUTION INTRAMUSCULAR; INTRAVENOUS
Status: DISCONTINUED | OUTPATIENT
Start: 2025-01-13 | End: 2025-01-14

## 2025-01-13 RX ORDER — FOLIC ACID 1 MG/1
1 TABLET ORAL DAILY
Start: 2025-01-14 | End: 2026-01-14

## 2025-01-13 RX ORDER — TALC
6 POWDER (GRAM) TOPICAL NIGHTLY PRN
Start: 2025-01-13

## 2025-01-13 RX ORDER — ACETAMINOPHEN 325 MG/1
650 TABLET ORAL EVERY 4 HOURS PRN
Start: 2025-01-13

## 2025-01-13 RX ORDER — LANOLIN ALCOHOL/MO/W.PET/CERES
100 CREAM (GRAM) TOPICAL DAILY
Start: 2025-01-14

## 2025-01-13 RX ORDER — ALUMINUM HYDROXIDE, MAGNESIUM HYDROXIDE, AND SIMETHICONE 1200; 120; 1200 MG/30ML; MG/30ML; MG/30ML
30 SUSPENSION ORAL 4 TIMES DAILY PRN
Start: 2025-01-13 | End: 2026-01-13

## 2025-01-13 RX ORDER — LISINOPRIL 10 MG/1
10 TABLET ORAL DAILY
Start: 2025-01-14 | End: 2026-01-14

## 2025-01-13 RX ADMIN — ACETAMINOPHEN 650 MG: 325 TABLET ORAL at 09:01

## 2025-01-13 RX ADMIN — Medication 6 MG: at 09:01

## 2025-01-13 RX ADMIN — DEXTROSE MONOHYDRATE, SODIUM CHLORIDE, AND POTASSIUM CHLORIDE: 50; 9; 1.49 INJECTION, SOLUTION INTRAVENOUS at 03:01

## 2025-01-13 RX ADMIN — Medication 1 TABLET: at 09:01

## 2025-01-13 RX ADMIN — FERROUS SULFATE TAB 325 MG (65 MG ELEMENTAL FE) 1 EACH: 325 (65 FE) TAB at 08:01

## 2025-01-13 RX ADMIN — NIFEDIPINE 30 MG: 30 TABLET, FILM COATED, EXTENDED RELEASE ORAL at 08:01

## 2025-01-13 RX ADMIN — ENOXAPARIN SODIUM 40 MG: 40 INJECTION SUBCUTANEOUS at 04:01

## 2025-01-13 RX ADMIN — FOLIC ACID 1 MG: 1 TABLET ORAL at 08:01

## 2025-01-13 RX ADMIN — ASPIRIN 81 MG: 81 TABLET, COATED ORAL at 08:01

## 2025-01-13 RX ADMIN — ATORVASTATIN CALCIUM 40 MG: 40 TABLET, FILM COATED ORAL at 08:01

## 2025-01-13 RX ADMIN — LISINOPRIL 10 MG: 10 TABLET ORAL at 08:01

## 2025-01-13 RX ADMIN — Medication 100 MG: at 08:01

## 2025-01-13 RX ADMIN — CEFTRIAXONE 1 G: 1 INJECTION, POWDER, FOR SOLUTION INTRAMUSCULAR; INTRAVENOUS at 08:01

## 2025-01-13 RX ADMIN — DEXTROSE MONOHYDRATE, SODIUM CHLORIDE, AND POTASSIUM CHLORIDE: 50; 9; 1.49 INJECTION, SOLUTION INTRAVENOUS at 06:01

## 2025-01-13 RX ADMIN — NICOTINE 1 PATCH: 7 PATCH, EXTENDED RELEASE TRANSDERMAL at 08:01

## 2025-01-13 RX ADMIN — Medication 1 TABLET: at 08:01

## 2025-01-13 NOTE — PLAN OF CARE
Problem: Adult Inpatient Plan of Care  Goal: Plan of Care Review  1/13/2025 0651 by Lawanda Ghosh RN  Outcome: Progressing  1/13/2025 0322 by Lawanda Ghosh RN  Outcome: Progressing  Goal: Patient-Specific Goal (Individualized)  1/13/2025 0651 by Lawanda Ghosh RN  Outcome: Progressing  1/13/2025 0322 by Lawanda Ghosh RN  Outcome: Progressing  Goal: Absence of Hospital-Acquired Illness or Injury  1/13/2025 0651 by Lawanda Ghosh RN  Outcome: Progressing  1/13/2025 0322 by Lawanda Ghosh RN  Outcome: Progressing  Goal: Optimal Comfort and Wellbeing  1/13/2025 0651 by Lawanda Ghosh RN  Outcome: Progressing  1/13/2025 0322 by Lawanda Ghosh RN  Outcome: Progressing  Goal: Readiness for Transition of Care  1/13/2025 0651 by Lawanda Ghosh RN  Outcome: Progressing  1/13/2025 0322 by Lawanda Ghosh RN  Outcome: Progressing     Problem: Diabetes Comorbidity  Goal: Blood Glucose Level Within Targeted Range  1/13/2025 0651 by Lawanda Ghosh RN  Outcome: Progressing  1/13/2025 0322 by Lawanda Ghosh RN  Outcome: Progressing     Problem: Skin Injury Risk Increased  Goal: Skin Health and Integrity  1/13/2025 0651 by Lawanda Ghosh RN  Outcome: Progressing  1/13/2025 0322 by Lawanda Ghosh RN  Outcome: Progressing     Problem: Confusion Acute  Goal: Optimal Cognitive Function  1/13/2025 0651 by Lawanda Ghosh RN  Outcome: Progressing  1/13/2025 0322 by Lawanda Ghosh RN  Outcome: Progressing     Problem: Wound  Goal: Optimal Coping  1/13/2025 0651 by Lawanda Ghosh RN  Outcome: Progressing  1/13/2025 0322 by Lawanda Ghosh RN  Outcome: Progressing  Goal: Optimal Functional Ability  1/13/2025 0651 by Lawanda Ghosh RN  Outcome: Progressing  1/13/2025 0322 by Lawanda Ghosh RN  Outcome: Progressing  Goal: Absence of Infection Signs and Symptoms  1/13/2025 0651 by Lawanda Ghosh, RN  Outcome: Progressing  1/13/2025 0322 by Lawanda Ghosh, RN  Outcome:  Progressing  Goal: Improved Oral Intake  1/13/2025 0651 by Lawanda Ghosh RN  Outcome: Progressing  1/13/2025 0322 by Lawanda Ghosh RN  Outcome: Progressing  Goal: Optimal Pain Control and Function  1/13/2025 0651 by Lawanda Ghosh RN  Outcome: Progressing  1/13/2025 0322 by Lawanda Ghosh RN  Outcome: Progressing  Goal: Skin Health and Integrity  1/13/2025 0651 by Lawanda Ghosh RN  Outcome: Progressing  1/13/2025 0322 by Lawanda Ghosh RN  Outcome: Progressing  Goal: Optimal Wound Healing  1/13/2025 0651 by Lawanda Ghosh RN  Outcome: Progressing  1/13/2025 0322 by Lawanda Ghosh RN  Outcome: Progressing     Problem: Infection  Goal: Absence of Infection Signs and Symptoms  1/13/2025 0651 by Lawanda Ghosh RN  Outcome: Progressing  1/13/2025 0322 by Lawanda Ghosh RN  Outcome: Progressing     Problem: Coping Ineffective  Goal: Effective Coping  1/13/2025 0651 by Lawanda Ghosh RN  Outcome: Progressing  1/13/2025 0322 by Lawanda Ghosh RN  Outcome: Progressing    Pt had an uneventful night. VSS. Pt remains on D5 K+ and NS at 75 ml/hr. Pt denied any pain or discomfort  Urine specimen collected  Pt is free of falls and injuries.  [place] monitor remain in place Bed in lowest position and call light within reach. Safety maintained

## 2025-01-13 NOTE — PROGRESS NOTES
Notified Dr Rizo via secure chat  was 2.9 labs are q48 so next lab draw isnt until tomor  cmp ordered and drawn at noon by lab. Notified Dr Rizo of results. Patient granddaughter  requested repeat UA so to reassure famliy Dr Rizo ordered UA.   Springer removed and new springer pl;aced for urine collection. No complication with springer insertion. Informed Dr Rizo patients urinated about 300 cloudy urine and only drank about 350 cc orally today. Only put out about 20cc  so havent collected. Informed Night SHYLA Phillips. Pt is reluctant to drink anything but coke so isnt drinking much but she's still getting her fluids    Continue to monitor per Dr Rizo. intervention tomorrow if she doesn't start putting out more urine.

## 2025-01-13 NOTE — PROGRESS NOTES
[facility] - Internal Medicine [facility] Medicine  Progress Note    Patient Name: Bhavna Lim  MRN: [id number]  Patient Class: IP- Inpatient   Admission Date: 12/26/2024  Length of Stay: 15 days  Attending Physician: Les Rizo MD  Primary Care Provider: Suzi Green MD        Subjective     Principal Problem:Palliative care encounter        HPI:  Mrs. Bhavna Lim is a 79 year old F with a history of HTN and HLD who presents to the ED for weakness and fatigue. She has been having weight loss and poor po intake over the last few months. She has been seen by GI and evaluated with EGD and Colonoscopy as well as MRCP which showed cholelithiasis with gallbladder distention and intrahepatic and extrahepatic biliary dilation. No significant abnormalities on EGD and coloscopy. She was referred to surgery who recommended NM study and possible EUS with GI. She has been unable to tolerate any solid foods. She sometimes takes a little bit of soup and has tried protein shakes. She feels full immediately. She does endorse some pain in her epigastric region ever since having her EGD performed. She occasionally has vomiting. Denies regurgitation of food. She continues to have worsening weakness and falls at home. She had two falls yesterday due to weakness. She denies significant dizziness. She is unable to walk due to her weakness and sister is concerned this is related to her poor nutrition.      In the ED VSS. Labs notable for Potassium of 3.0 which was repleted in the ED. No focal signs concerning for stroke. She was admitted to medicine for further management.      Overview/Hospital Course:  While on the floor PT/OT were consulted and recommended SNF. Nutrition consulted. Discussed with GI outpatient workup and no indication for further inpatient workup. She continued to be altered while in the hospital. CT spine/head ordered for concern of fall showing ischemic changes and signficant stenosis and  narrowing. She developed worsening hand weakness and numbness/tinglign with abnormal coordiation. Some of this was prior to admission but weakness significantly worsened. MRI ordered and showed old infarcts. She continued to refuse food and did not have abdominal complaints just would not eat. Started on IVF due to hypoglycemia. Acute encephalopathy workup initiated and negative aside from low folate. UA ordered but not performed. At this point if nutrition becomes suboptimal will have to consider other means of nutrition. Son with concern of worsening memory and possible dementia wanting placement in [place]. Started on supplements for poor nutrition including thiamine and folate to see if this will help with mental status     Plan is NH with hospice services.    Interval History: urinalysis with some concern for infection and awaiting cultures.  Initiating Rocephin.  Ongoinig discussion with family about planning.    Review of Systems   Reason unable to perform ROS: Limited due to cognition; denies pain and shortness of breath.     Objective:     Vital Signs (Most Recent):  Temp: 98.8 °F (37.1 °C) (01/13/25 0759)  Pulse: 93 (01/13/25 0759)  Resp: 17 (01/13/25 0759)  BP: 129/75 (01/13/25 0759)  SpO2: 100 % (01/13/25 0759) Vital Signs (24h Range):  Temp:  [97.7 °F (36.5 °C)-98.8 °F (37.1 °C)] 98.8 °F (37.1 °C)  Pulse:  [] 93  Resp:  [17-18] 17  SpO2:  [93 %-100 %] 100 %  BP: ()/(56-76) 129/75     Weight: 54.9 kg (121 lb 0.5 oz)  Body mass index is 22.87 kg/m².    Intake/Output Summary (Last 24 hours) at 1/13/2025 0802  Last data filed at 1/12/2025 2000  Gross per 24 hour   Intake 300 ml   Output 450 ml   Net -150 ml         Physical Exam  Constitutional:       General: She is not in acute distress.     Appearance: She is not toxic-appearing.   Eyes:      Extraocular Movements: Extraocular movements intact.   Cardiovascular:      Rate and Rhythm: Normal rate and regular rhythm.   Pulmonary:      Effort:  "Pulmonary effort is normal. No respiratory distress.   Musculoskeletal:      Right lower leg: No edema.      Left lower leg: No edema.   Neurological:      Mental Status: Mental status is at baseline.             Significant Labs: All pertinent labs within the past 24 hours have been reviewed.  CBC: No results for input(s): "WBC", "HGB", "HCT", "PLT" in the last 48 hours.  CMP:   Recent Labs   Lab 01/11/25  1011 01/12/25  1117    147*   K 2.9* 3.4*   * 117*   CO2 23 22*   GLU 97 118*   BUN 4* 4*   CREATININE 0.5 0.5   CALCIUM 7.8* 7.7*   PROT 5.2* 5.0*   ALBUMIN 1.7* 1.6*   BILITOT 0.8 1.9*   ALKPHOS 290* 393*   AST 26 98*   ALT 37 52*   ANIONGAP 7* 8         Significant Imaging: I have reviewed all pertinent imaging results/findings within the past 24 hours.    Assessment and Plan     * Palliative care encounter  Plan is NH with Hospice as discussed above      Cognitive impairment  -history of confusion and forgetfulness prior to admission  -appears to be more confused inpatient as well  -given impairment and abnormal coordination MRI and C spine ordered: - CT head: Sequela of chronic microvascular ischemic change. Remote lacunar type infarct right thalamus; MRI C-spine: Multilevel cervical spondylosis, worst at C3-C4, contributing to moderate spinal canal stenosis as well as moderate left neural foraminal narrowing.    -HIV/RPR negative. Folate low, repleting. B12 high. Thiamine low and repleting. Possibly 2/2 to dementia with poor nutritional intake; TSH wnl on 1/1/2025  - exhausted reversible causes of poor mentation and with imaging, appears to be age-related decline accelerated by some degree of vascular dementia and large degree of confidence that this is patient's new baseline with expected further decline  - administering fluids with dextrose and electrolytes given poor oral intake; discussed pitfalls of G-tube placement  - plan for Nursing Home with hospice services  - STAT imaging and " consideration of Neurology consult if concerning features arise  - consult to Neurology at discharge to characterize dementia and potential interventions to slow progress  - initiating treatment for likely urinary tract infection and assess for any improvement in cognition    Severe protein-calorie malnutrition  Nutrition consulted. Most recent weight and BMI monitored-     Measurements:  Wt Readings from Last 1 Encounters:   12/27/24 54.9 kg (121 lb 0.5 oz)   Body mass index is 22.87 kg/m².    Patient has been screened and assessed by RD.    Malnutrition Type:  Context: chronic illness  Level: severe    Malnutrition Characteristic Summary:  Weight Loss (Malnutrition): greater than 5% in 1 month  Energy Intake (Malnutrition): less than 75% for greater than or equal to 1 month  Subcutaneous Fat (Malnutrition): severe depletion  Muscle Mass (Malnutrition): severe depletion    Interventions/Recommendations (treatment strategy):  Please prioritize patient's comfort and preferences over strict nutritional goals at this time. Offer small, frequent meals, texture modifications, and respect the patient's desire to eat or not eat. Also ensure proper hydration with small sips of fluids periodically.    - supplying D5 fluids given poor intake    Debility  Patient with Acute debility due to age-related physical debility and other reduced mobility. The patient's latest AMPAC (Activity Measure for Post Acute Care) Score is listed below.    AM-PAC Score - How much help does the patient need for each activity listed  Basic Mobility Total Score: 7  Turning over in bed (including adjusting bedclothes, sheets and blankets)?: A lot  Sitting down on and standing up from a chair with arms (e.g., wheelchair, bedside commode, etc.): Unable  Moving from lying on back to sitting on the side of the bed?: Unable  Moving to and from a bed to a chair (including a wheelchair)?: Unable  Need to walk in hospital room?: Unable  Climbing 3-5 steps  with a railing?: Unable    Plan  - Progressive mobility protocol initated  - PT/OT consulted  - Fall precautions in place  -plan for NH with hospice services      Benign essential hypertension  Patient's blood pressure range in the last 24 hours was: BP  Min: 91/56  Max: 131/60.The patient's inpatient anti-hypertensive regimen is listed below:  Current Antihypertensives  lisinopriL tablet 10 mg, Daily, Oral  NIFEdipine 24 hr tablet 30 mg, Daily, Oral    Plan  - BP is controlled, no changes needed to their regimen  - will continue home regimen    Unintentional weight loss  Nutrition consulted. Most recent weight and BMI monitored-     Measurements:  Wt Readings from Last 1 Encounters:   12/27/24 54.9 kg (121 lb 0.5 oz)   Body mass index is 22.87 kg/m².    RD consultation   Extensive outpatient evaluation obtained by GI and General surgery   Will add supplementation to meals; soft bite-sized per rec's      Please prioritize patient's comfort and preferences over strict nutritional goals at this time. Offer small, frequent meals, texture modifications, and respect the patient's desire to eat or not eat. Also ensure proper hydration with small sips of fluids periodically.      Hyperlipidemia  - continue ASA and statin      Hypokalemia  Patient's most recent potassium results are listed below.   Recent Labs     01/11/25  1011 01/12/25  1117   K 2.9* 3.4*       Plan  - Replete potassium per protocol  - Monitor potassium Daily  - decreasing frequency of lab draw but will obtain metabolic panels for assessments: adding K+ to fluids; discontinue if metabolic panel normalizes; adding additional day of K+ in fluids    Numbness and tingling in both hands  -noted by patient before admission with abnormal finger to nose and weakness   -CT head and spine reviewed. There is significant stenosis/narrowing of C6 and C7 which could be contributing to symptoms. Findings appear chronic and not acute. MRI ordered and notable for cervical  stenosis and lacunar infarcts. Limited visiblity due to movement   -CT head notable for remote lacunar infarcts, hx of stroke in 2005 currently on ASA   -continue PT/OT         Diabetes mellitus  Sliding scale held, accuchecks for hypoglycemia     Patient's FSGs are controlled on current medication regimen.  Last A1c reviewed-   Lab Results   Component Value Date    HGBA1C 5.5 10/11/2024     Most recent fingerstick glucose reviewed-   Recent Labs   Lab 01/12/25  1159 01/12/25  1705 01/12/25  2102 01/13/25  0758   POCTGLUCOSE 149* 155* 97 103       Current correctional scale   hold on correction scale give A1C is 5.5  Antihyperglycemics (From admission, onward)      None          Hold Oral hypoglycemics while patient is in the hospital; holding all given low intake    Hypoglycemia  -episodes of hypoglycemia overnight  -given amp of D50, started on IV D5  -monitor BG closely  -likely related to poor PO intake         VTE Risk Mitigation (From admission, onward)           Ordered     enoxaparin injection 40 mg  Daily         12/26/24 1700     IP VTE HIGH RISK PATIENT  Once         12/26/24 1547     Place sequential compression device  Until discontinued         12/26/24 1547                    Discharge Planning   RITA: 1/14/2025     Code Status: DNR   Medical Readiness for Discharge Date: 1/10/2025  Discharge Plan A: New Nursing Home placement - longterm care facility (with hospice services)            Les Rizo MD  Department of Hospital Medicine   [facility] - Internal Medicine Telemetry

## 2025-01-13 NOTE — ASSESSMENT & PLAN NOTE
Sliding scale held, accuchecks for hypoglycemia     Patient's FSGs are controlled on current medication regimen.  Last A1c reviewed-   Lab Results   Component Value Date    HGBA1C 5.5 10/11/2024     Most recent fingerstick glucose reviewed-   Recent Labs   Lab 01/12/25  1159 01/12/25  1705 01/12/25  2102 01/13/25  0758   POCTGLUCOSE 149* 155* 97 103       Current correctional scale   hold on correction scale give A1C is 5.5  Antihyperglycemics (From admission, onward)    None        Hold Oral hypoglycemics while patient is in the hospital; holding all given low intake

## 2025-01-13 NOTE — NURSING
" Plan of Care Note    Dx: Hypokalemia [E87.6]  Weakness [R53.1]  Chest pain [R07.9]    Shift Events: pain mgmt, fluid mgmt, safety, assist with ADLs    Goals of Care: Free from falls/injury, pain mgmt    Neuro: AAOx1 orientated to self     Vital Signs: /83   Pulse 103   Temp 97.4 °F (36.3 °C) (Oral)   Resp 17   Ht 5' 1" (1.549 m)   Wt 54.9 kg (121 lb 0.5 oz)   SpO2 95%   Breastfeeding No   BMI 22.87 kg/m²     Respiratory: RA    Diet: Diet Soft & Bite Sized (IDDSI Level 6)  Dietary nutrition supplements Boost Plus Nutritional Drink - Any flavor    Is patient tolerating current diet? Yes    GTTS: D5 and 0.9 NaCl ith KCL 20 mEq 75ml/hr    Urine Output/Bowel Movement:     Intake/Output Summary (Last 24 hours) at 1/13/2025 1736  Last data filed at 1/12/2025 2000  Gross per 24 hour   Intake --   Output 150 ml   Net -150 ml          Drains/Tubes/Tube Feeds (include total output/shift):   Output by Drain (mL) 01/11/25 0701 - 01/11/25 1900 01/11/25 1901 - 01/12/25 0700 01/12/25 0701 - 01/12/25 1900 01/12/25 1901 - 01/13/25 0700 01/13/25 0701 - 01/13/25 1736   Requested LDAs do not have output data documented.          Lines: 22 G Rt forearm, 22 G Rt AC      Accuchecks: ACHS    Skin: Abrasions    Fall Risk Score: See flowsheet    Activity level? See flowsheet    Any scheduled procedures? N/a    Any safety concerns? Risk for falls, camera at bedside    Other: n/a   "

## 2025-01-13 NOTE — ASSESSMENT & PLAN NOTE
Patient's most recent potassium results are listed below.   Recent Labs     01/11/25  1011 01/12/25  1117   K 2.9* 3.4*       Plan  - Replete potassium per protocol  - Monitor potassium Daily  - decreasing frequency of lab draw but will obtain metabolic panels for assessments: adding K+ to fluids; discontinue if metabolic panel normalizes; adding additional day of K+ in fluids

## 2025-01-13 NOTE — PROGRESS NOTES
[facility] that they are able to clinically accept pt.  Noted that along with [facility], [facility] and [facility] replied that they accepted via EPIC portal.  CM will speak to family to provide update.

## 2025-01-13 NOTE — SUBJECTIVE & OBJECTIVE
"Interval History: urinalysis with some concern for infection and awaiting cultures.  Initiating Rocephin.  Ongoinig discussion with family about planning.    Review of Systems   Reason unable to perform ROS: Limited due to cognition; denies pain and shortness of breath.     Objective:     Vital Signs (Most Recent):  Temp: 98.8 °F (37.1 °C) (01/13/25 0759)  Pulse: 93 (01/13/25 0759)  Resp: 17 (01/13/25 0759)  BP: 129/75 (01/13/25 0759)  SpO2: 100 % (01/13/25 0759) Vital Signs (24h Range):  Temp:  [97.7 °F (36.5 °C)-98.8 °F (37.1 °C)] 98.8 °F (37.1 °C)  Pulse:  [] 93  Resp:  [17-18] 17  SpO2:  [93 %-100 %] 100 %  BP: ()/(56-76) 129/75     Weight: 54.9 kg (121 lb 0.5 oz)  Body mass index is 22.87 kg/m².    Intake/Output Summary (Last 24 hours) at 1/13/2025 0845  Last data filed at 1/12/2025 2000  Gross per 24 hour   Intake 300 ml   Output 450 ml   Net -150 ml         Physical Exam  Constitutional:       General: She is not in acute distress.     Appearance: She is not toxic-appearing.   Eyes:      Extraocular Movements: Extraocular movements intact.   Cardiovascular:      Rate and Rhythm: Normal rate and regular rhythm.   Pulmonary:      Effort: Pulmonary effort is normal. No respiratory distress.   Musculoskeletal:      Right lower leg: No edema.      Left lower leg: No edema.   Neurological:      Mental Status: Mental status is at baseline.             Significant Labs: All pertinent labs within the past 24 hours have been reviewed.  CBC: No results for input(s): "WBC", "HGB", "HCT", "PLT" in the last 48 hours.  CMP:   Recent Labs   Lab 01/11/25  1011 01/12/25  1117    147*   K 2.9* 3.4*   * 117*   CO2 23 22*   GLU 97 118*   BUN 4* 4*   CREATININE 0.5 0.5   CALCIUM 7.8* 7.7*   PROT 5.2* 5.0*   ALBUMIN 1.7* 1.6*   BILITOT 0.8 1.9*   ALKPHOS 290* 393*   AST 26 98*   ALT 37 52*   ANIONGAP 7* 8         Significant Imaging: I have reviewed all pertinent imaging results/findings within the past 24 " hours.

## 2025-01-13 NOTE — ASSESSMENT & PLAN NOTE
-history of confusion and forgetfulness prior to admission  -appears to be more confused inpatient as well  -given impairment and abnormal coordination MRI and C spine ordered: - CT head: Sequela of chronic microvascular ischemic change. Remote lacunar type infarct right thalamus; MRI C-spine: Multilevel cervical spondylosis, worst at C3-C4, contributing to moderate spinal canal stenosis as well as moderate left neural foraminal narrowing.    -HIV/RPR negative. Folate low, repleting. B12 high. Thiamine low and repleting. Possibly 2/2 to dementia with poor nutritional intake; TSH wnl on 1/1/2025  - exhausted reversible causes of poor mentation and with imaging, appears to be age-related decline accelerated by some degree of vascular dementia and large degree of confidence that this is patient's new baseline with expected further decline  - administering fluids with dextrose and electrolytes given poor oral intake; discussed pitfalls of G-tube placement  - plan for Nursing Home with hospice services  - STAT imaging and consideration of Neurology consult if concerning features arise  - consult to Neurology at discharge to characterize dementia and potential interventions to slow progress  - initiating treatment for likely urinary tract infection and assess for any improvement in cognition

## 2025-01-13 NOTE — PROGRESS NOTES
This writer spoke to pt's sister Karla via phone informing her that a list of accepting facilities were sent to her and pt's son via email for their review.

## 2025-01-13 NOTE — ASSESSMENT & PLAN NOTE
Patient's blood pressure range in the last 24 hours was: BP  Min: 91/56  Max: 131/60.The patient's inpatient anti-hypertensive regimen is listed below:  Current Antihypertensives  lisinopriL tablet 10 mg, Daily, Oral  NIFEdipine 24 hr tablet 30 mg, Daily, Oral    Plan  - BP is controlled, no changes needed to their regimen  - will continue home regimen

## 2025-01-14 PROBLEM — B96.5 PSEUDOMONAS URINARY TRACT INFECTION: Status: ACTIVE | Noted: 2025-01-14

## 2025-01-14 PROBLEM — N39.0 PSEUDOMONAS URINARY TRACT INFECTION: Status: ACTIVE | Noted: 2025-01-14

## 2025-01-14 LAB
POCT GLUCOSE: 110 MG/DL (ref 70–110)
POCT GLUCOSE: 128 MG/DL (ref 70–110)
POCT GLUCOSE: 61 MG/DL (ref 70–110)
POCT GLUCOSE: 66 MG/DL (ref 70–110)
POCT GLUCOSE: 82 MG/DL (ref 70–110)

## 2025-01-14 PROCEDURE — 11000001 HC ACUTE MED/SURG PRIVATE ROOM

## 2025-01-14 PROCEDURE — 63600175 PHARM REV CODE 636 W HCPCS: Performed by: STUDENT IN AN ORGANIZED HEALTH CARE EDUCATION/TRAINING PROGRAM

## 2025-01-14 PROCEDURE — 25000003 PHARM REV CODE 250: Performed by: STUDENT IN AN ORGANIZED HEALTH CARE EDUCATION/TRAINING PROGRAM

## 2025-01-14 PROCEDURE — 87040 BLOOD CULTURE FOR BACTERIA: CPT | Performed by: STUDENT IN AN ORGANIZED HEALTH CARE EDUCATION/TRAINING PROGRAM

## 2025-01-14 PROCEDURE — S4991 NICOTINE PATCH NONLEGEND: HCPCS | Performed by: STUDENT IN AN ORGANIZED HEALTH CARE EDUCATION/TRAINING PROGRAM

## 2025-01-14 PROCEDURE — 25000003 PHARM REV CODE 250: Performed by: INTERNAL MEDICINE

## 2025-01-14 PROCEDURE — 36415 COLL VENOUS BLD VENIPUNCTURE: CPT | Performed by: STUDENT IN AN ORGANIZED HEALTH CARE EDUCATION/TRAINING PROGRAM

## 2025-01-14 RX ORDER — CEFEPIME HYDROCHLORIDE 2 G/1
2 INJECTION, POWDER, FOR SOLUTION INTRAVENOUS
Status: DISCONTINUED | OUTPATIENT
Start: 2025-01-14 | End: 2025-01-18

## 2025-01-14 RX ORDER — CEFEPIME HYDROCHLORIDE 2 G/1
2 INJECTION, POWDER, FOR SOLUTION INTRAVENOUS
Status: DISCONTINUED | OUTPATIENT
Start: 2025-01-14 | End: 2025-01-14

## 2025-01-14 RX ADMIN — NIFEDIPINE 30 MG: 30 TABLET, FILM COATED, EXTENDED RELEASE ORAL at 09:01

## 2025-01-14 RX ADMIN — LISINOPRIL 10 MG: 10 TABLET ORAL at 09:01

## 2025-01-14 RX ADMIN — ATORVASTATIN CALCIUM 40 MG: 40 TABLET, FILM COATED ORAL at 09:01

## 2025-01-14 RX ADMIN — DEXTROSE MONOHYDRATE, SODIUM CHLORIDE, AND POTASSIUM CHLORIDE: 50; 9; 1.49 INJECTION, SOLUTION INTRAVENOUS at 04:01

## 2025-01-14 RX ADMIN — ASPIRIN 81 MG: 81 TABLET, COATED ORAL at 09:01

## 2025-01-14 RX ADMIN — NICOTINE 1 PATCH: 7 PATCH, EXTENDED RELEASE TRANSDERMAL at 09:01

## 2025-01-14 RX ADMIN — ENOXAPARIN SODIUM 40 MG: 40 INJECTION SUBCUTANEOUS at 04:01

## 2025-01-14 RX ADMIN — Medication 1 TABLET: at 09:01

## 2025-01-14 RX ADMIN — Medication 100 MG: at 09:01

## 2025-01-14 RX ADMIN — FOLIC ACID 1 MG: 1 TABLET ORAL at 09:01

## 2025-01-14 RX ADMIN — Medication 16 G: at 07:01

## 2025-01-14 RX ADMIN — FERROUS SULFATE TAB 325 MG (65 MG ELEMENTAL FE) 1 EACH: 325 (65 FE) TAB at 09:01

## 2025-01-14 RX ADMIN — CEFEPIME 2 G: 2 INJECTION, POWDER, FOR SOLUTION INTRAVENOUS at 06:01

## 2025-01-14 RX ADMIN — Medication 16 G: at 12:01

## 2025-01-14 NOTE — ASSESSMENT & PLAN NOTE
-history of confusion and forgetfulness prior to admission  -appears to be more confused inpatient as well  -given impairment and abnormal coordination MRI and C spine ordered: - CT head: Sequela of chronic microvascular ischemic change. Remote lacunar type infarct right thalamus; MRI C-spine: Multilevel cervical spondylosis, worst at C3-C4, contributing to moderate spinal canal stenosis as well as moderate left neural foraminal narrowing.    -HIV/RPR negative. Folate low, repleting. B12 high. Thiamine low and repleting. Possibly 2/2 to dementia with poor nutritional intake; TSH wnl on 1/1/2025  - exhausted reversible causes of poor mentation and with imaging, appears to be age-related decline accelerated by some degree of vascular dementia and large degree of confidence that this is patient's new baseline with expected further decline  - administering fluids with dextrose and electrolytes given poor oral intake; discussed pitfalls of G-tube placement  - plan for Nursing Home with hospice services  - STAT imaging and consideration of Neurology consult if concerning features arise  - consult to Neurology at discharge to characterize dementia and potential interventions to slow progress  - Pseudomonas found in urine; will treat and assess for change in mentation  - initiating treatment for likely urinary tract infection and assess for any improvement in cognition

## 2025-01-14 NOTE — ASSESSMENT & PLAN NOTE
- urinalysis concerning for UTI and found to have pseudomonas  - switched Rocephin to Cefepime and obtaining blood cx  - close assessment to see if mentation improves

## 2025-01-14 NOTE — PROGRESS NOTES
[facility] - Internal Medicine [facility] Medicine  Progress Note    Patient Name: Bhavna Lim  MRN: [id number]  Patient Class: IP- Inpatient   Admission Date: 12/26/2024  Length of Stay: 16 days  Attending Physician: Les Rizo MD  Primary Care Provider: Suzi Green MD        Subjective     Principal Problem:Palliative care encounter        HPI:  Mrs. Bhavna Lim is a 79 year old F with a history of HTN and HLD who presents to the ED for weakness and fatigue. She has been having weight loss and poor po intake over the last few months. She has been seen by GI and evaluated with EGD and Colonoscopy as well as MRCP which showed cholelithiasis with gallbladder distention and intrahepatic and extrahepatic biliary dilation. No significant abnormalities on EGD and coloscopy. She was referred to surgery who recommended NM study and possible EUS with GI. She has been unable to tolerate any solid foods. She sometimes takes a little bit of soup and has tried protein shakes. She feels full immediately. She does endorse some pain in her epigastric region ever since having her EGD performed. She occasionally has vomiting. Denies regurgitation of food. She continues to have worsening weakness and falls at home. She had two falls yesterday due to weakness. She denies significant dizziness. She is unable to walk due to her weakness and sister is concerned this is related to her poor nutrition.      In the ED VSS. Labs notable for Potassium of 3.0 which was repleted in the ED. No focal signs concerning for stroke. She was admitted to medicine for further management.      Overview/Hospital Course:  While on the floor PT/OT were consulted and recommended SNF. Nutrition consulted. Discussed with GI outpatient workup and no indication for further inpatient workup. She continued to be altered while in the hospital. CT spine/head ordered for concern of fall showing ischemic changes and signficant stenosis and  narrowing. She developed worsening hand weakness and numbness/tinglign with abnormal coordiation. Some of this was prior to admission but weakness significantly worsened. MRI ordered and showed old infarcts. She continued to refuse food and did not have abdominal complaints just would not eat. Started on IVF due to hypoglycemia. Acute encephalopathy workup initiated and negative aside from low folate. UA ordered but not performed. At this point if nutrition becomes suboptimal will have to consider other means of nutrition. Son with concern of worsening memory and possible dementia wanting placement in [place]. Started on supplements for poor nutrition including thiamine and folate to see if this will help with mental status     Plan is NH with hospice services.    Interval History: Patient with Pseudomonas in urine culture and antibiotic switched to Cefepime.  Afebrile.  Denies complaints.  Remains at similar mentation as days prior.    Review of Systems   Reason unable to perform ROS: Difficult to obtain because of mentation but no complaints.     Objective:     Vital Signs (Most Recent):  Temp: 97.9 °F (36.6 °C) (01/14/25 0723)  Pulse: 92 (01/14/25 0723)  Resp: 17 (01/14/25 0723)  BP: (!) 154/84 (01/14/25 0723)  SpO2: 97 % (01/14/25 0723) Vital Signs (24h Range):  Temp:  [97.4 °F (36.3 °C)-98.5 °F (36.9 °C)] 97.9 °F (36.6 °C)  Pulse:  [] 92  Resp:  [17-18] 17  SpO2:  [93 %-99 %] 97 %  BP: (114-154)/(65-84) 154/84     Weight: 54.9 kg (121 lb 0.5 oz)  Body mass index is 22.87 kg/m².    Intake/Output Summary (Last 24 hours) at 1/14/2025 1030  Last data filed at 1/13/2025 1808  Gross per 24 hour   Intake --   Output 500 ml   Net -500 ml         Physical Exam  Constitutional:       General: She is not in acute distress.     Appearance: She is not toxic-appearing.   HENT:      Head: Normocephalic and atraumatic.   Cardiovascular:      Rate and Rhythm: Normal rate and regular rhythm.   Pulmonary:      Effort:  Pulmonary effort is normal. No respiratory distress.   Musculoskeletal:      Right lower leg: No edema.      Left lower leg: No edema.   Neurological:      Comments: Remains with confusion.  No hallucinations.  Moving limbs spontaneously.             Significant Labs: All pertinent labs within the past 24 hours have been reviewed.  CBC:   Recent Labs   Lab 01/13/25  1111   WBC 14.62*   HGB 9.1*   HCT 27.3*        CMP:   Recent Labs   Lab 01/12/25  1117 01/13/25  1111   * 146*   K 3.4* 3.5   * 117*   CO2 22* 21*   * 78   BUN 4* 5*   CREATININE 0.5 0.5   CALCIUM 7.7* 7.6*   PROT 5.0* 4.9*   ALBUMIN 1.6* 1.5*   BILITOT 1.9* 2.7*   ALKPHOS 393* 415*   AST 98* 141*   ALT 52* 85*   ANIONGAP 8 8       Significant Imaging: I have reviewed all pertinent imaging results/findings within the past 24 hours.    Assessment and Plan     * Palliative care encounter  Plan is NH with Hospice as discussed above      Cognitive impairment  -history of confusion and forgetfulness prior to admission  -appears to be more confused inpatient as well  -given impairment and abnormal coordination MRI and C spine ordered: - CT head: Sequela of chronic microvascular ischemic change. Remote lacunar type infarct right thalamus; MRI C-spine: Multilevel cervical spondylosis, worst at C3-C4, contributing to moderate spinal canal stenosis as well as moderate left neural foraminal narrowing.    -HIV/RPR negative. Folate low, repleting. B12 high. Thiamine low and repleting. Possibly 2/2 to dementia with poor nutritional intake; TSH wnl on 1/1/2025  - exhausted reversible causes of poor mentation and with imaging, appears to be age-related decline accelerated by some degree of vascular dementia and large degree of confidence that this is patient's new baseline with expected further decline  - administering fluids with dextrose and electrolytes given poor oral intake; discussed pitfalls of G-tube placement  - plan for Nursing Home  with hospice services  - STAT imaging and consideration of Neurology consult if concerning features arise  - consult to Neurology at discharge to characterize dementia and potential interventions to slow progress  - Pseudomonas found in urine; will treat and assess for change in mentation  - initiating treatment for likely urinary tract infection and assess for any improvement in cognition    Pseudomonas urinary tract infection  - urinalysis concerning for UTI and found to have pseudomonas  - switched Rocephin to Cefepime and obtaining blood cx  - close assessment to see if mentation improves      Severe protein-calorie malnutrition  Nutrition consulted. Most recent weight and BMI monitored-     Measurements:  Wt Readings from Last 1 Encounters:   12/27/24 54.9 kg (121 lb 0.5 oz)   Body mass index is 22.87 kg/m².    Patient has been screened and assessed by RD.    Malnutrition Type:  Context: chronic illness  Level: severe    Malnutrition Characteristic Summary:  Weight Loss (Malnutrition): greater than 5% in 1 month  Energy Intake (Malnutrition): less than 75% for greater than or equal to 1 month  Subcutaneous Fat (Malnutrition): severe depletion  Muscle Mass (Malnutrition): severe depletion    Interventions/Recommendations (treatment strategy):  Please prioritize patient's comfort and preferences over strict nutritional goals at this time. Offer small, frequent meals, texture modifications, and respect the patient's desire to eat or not eat. Also ensure proper hydration with small sips of fluids periodically.    - supplying D5 fluids given poor intake    Debility  Patient with Acute debility due to age-related physical debility and other reduced mobility. The patient's latest AMPAC (Activity Measure for Post Acute Care) Score is listed below.    AM-PAC Score - How much help does the patient need for each activity listed  Basic Mobility Total Score: 7  Turning over in bed (including adjusting bedclothes, sheets and  blankets)?: A lot  Sitting down on and standing up from a chair with arms (e.g., wheelchair, bedside commode, etc.): Unable  Moving from lying on back to sitting on the side of the bed?: Unable  Moving to and from a bed to a chair (including a wheelchair)?: Unable  Need to walk in hospital room?: Unable  Climbing 3-5 steps with a railing?: Unable    Plan  - Progressive mobility protocol initated  - PT/OT consulted  - Fall precautions in place  -plan for NH with hospice services      Benign essential hypertension  Patient's blood pressure range in the last 24 hours was: BP  Min: 114/65  Max: 154/84.The patient's inpatient anti-hypertensive regimen is listed below:  Current Antihypertensives  lisinopriL tablet 10 mg, Daily, Oral  NIFEdipine 24 hr tablet 30 mg, Daily, Oral    Plan  - BP is controlled, no changes needed to their regimen  - will continue home regimen    Unintentional weight loss  Nutrition consulted. Most recent weight and BMI monitored-     Measurements:  Wt Readings from Last 1 Encounters:   12/27/24 54.9 kg (121 lb 0.5 oz)   Body mass index is 22.87 kg/m².    RD consultation   Extensive outpatient evaluation obtained by GI and General surgery   Will add supplementation to meals; soft bite-sized per rec's      Please prioritize patient's comfort and preferences over strict nutritional goals at this time. Offer small, frequent meals, texture modifications, and respect the patient's desire to eat or not eat. Also ensure proper hydration with small sips of fluids periodically.      Hyperlipidemia  - continue ASA and statin      Hypokalemia  Patient's most recent potassium results are listed below.   Recent Labs     01/12/25  1117 01/13/25  1111   K 3.4* 3.5     Plan  - Replete potassium per protocol  - Monitor potassium Daily  - Patient's hypokalemia is improving  - K+ in fluids    Numbness and tingling in both hands  -noted by patient before admission with abnormal finger to nose and weakness   -CT head  and spine reviewed. There is significant stenosis/narrowing of C6 and C7 which could be contributing to symptoms. Findings appear chronic and not acute. MRI ordered and notable for cervical stenosis and lacunar infarcts. Limited visiblity due to movement   -CT head notable for remote lacunar infarcts, hx of stroke in 2005 currently on ASA   -continue PT/OT         Diabetes mellitus  Sliding scale held, accuchecks for hypoglycemia     Patient's FSGs are controlled on current medication regimen.  Last A1c reviewed-   Lab Results   Component Value Date    HGBA1C 5.5 10/11/2024     Most recent fingerstick glucose reviewed-   Recent Labs   Lab 01/13/25  1635 01/13/25  2145 01/14/25  0722 01/14/25  0904   POCTGLUCOSE 112* 135* 61* 110       Current correctional scale   hold on correction scale give A1C is 5.5  Antihyperglycemics (From admission, onward)      None          Hold Oral hypoglycemics while patient is in the hospital; holding all given low intake    Hypoglycemia  -episodes of hypoglycemia overnight  -given amp of D50, started on IV D5  -monitor BG closely  -likely related to poor PO intake         VTE Risk Mitigation (From admission, onward)           Ordered     enoxaparin injection 40 mg  Daily         12/26/24 1700     IP VTE HIGH RISK PATIENT  Once         12/26/24 1547     Place sequential compression device  Until discontinued         12/26/24 1547                    Discharge Planning   RITA: 1/16/2025     Code Status: DNR   Medical Readiness for Discharge Date: 1/10/2025  Discharge Plan A: [facility] Nursing Home placement [facility] (with hospice services)              Les Rizo MD  Department of Hospital Medicine   [facility] - Internal Medicine Telemetry

## 2025-01-14 NOTE — ASSESSMENT & PLAN NOTE
Patient's most recent potassium results are listed below.   Recent Labs     01/12/25  1117 01/13/25  1111   K 3.4* 3.5     Plan  - Replete potassium per protocol  - Monitor potassium Daily  - Patient's hypokalemia is improving  - K+ in fluids

## 2025-01-14 NOTE — PLAN OF CARE
01/14/25 1604   Discharge Reassessment   Assessment Type Discharge Planning Reassessment   Did the patient's condition or plan change since previous assessment? Yes  (no longer medically clear r/t pseudomonas UTI culture)   Why the patient remains in the hospital Requires continued medical care   Post-Acute Status   Post-Acute Authorization Placement   Post-Acute Placement Status Referrals Sent   Discharge Delays (!) Patient and Family Barriers  (Family has not decided on facility.)     Discharge now pending medically readiness and family decision on facility. CM following.

## 2025-01-14 NOTE — SUBJECTIVE & OBJECTIVE
Interval History: Patient with Pseudomonas in urine culture and antibiotic switched to Cefepime.  Afebrile.  Denies complaints.  Remains at similar mentation as days prior.    Review of Systems   Reason unable to perform ROS: Difficult to obtain because of mentation but no complaints.     Objective:     Vital Signs (Most Recent):  Temp: 97.9 °F (36.6 °C) (01/14/25 0723)  Pulse: 92 (01/14/25 0723)  Resp: 17 (01/14/25 0723)  BP: (!) 154/84 (01/14/25 0723)  SpO2: 97 % (01/14/25 0723) Vital Signs (24h Range):  Temp:  [97.4 °F (36.3 °C)-98.5 °F (36.9 °C)] 97.9 °F (36.6 °C)  Pulse:  [] 92  Resp:  [17-18] 17  SpO2:  [93 %-99 %] 97 %  BP: (114-154)/(65-84) 154/84     Weight: 54.9 kg (121 lb 0.5 oz)  Body mass index is 22.87 kg/m².    Intake/Output Summary (Last 24 hours) at 1/14/2025 1030  Last data filed at 1/13/2025 1808  Gross per 24 hour   Intake --   Output 500 ml   Net -500 ml         Physical Exam  Constitutional:       General: She is not in acute distress.     Appearance: She is not toxic-appearing.   HENT:      Head: Normocephalic and atraumatic.   Cardiovascular:      Rate and Rhythm: Normal rate and regular rhythm.   Pulmonary:      Effort: Pulmonary effort is normal. No respiratory distress.   Musculoskeletal:      Right lower leg: No edema.      Left lower leg: No edema.   Neurological:      Comments: Remains with confusion.  No hallucinations.  Moving limbs spontaneously.             Significant Labs: All pertinent labs within the past 24 hours have been reviewed.  CBC:   Recent Labs   Lab 01/13/25  1111   WBC 14.62*   HGB 9.1*   HCT 27.3*        CMP:   Recent Labs   Lab 01/12/25  1117 01/13/25  1111   * 146*   K 3.4* 3.5   * 117*   CO2 22* 21*   * 78   BUN 4* 5*   CREATININE 0.5 0.5   CALCIUM 7.7* 7.6*   PROT 5.0* 4.9*   ALBUMIN 1.6* 1.5*   BILITOT 1.9* 2.7*   ALKPHOS 393* 415*   AST 98* 141*   ALT 52* 85*   ANIONGAP 8 8       Significant Imaging: I have reviewed all pertinent  imaging results/findings within the past 24 hours.

## 2025-01-14 NOTE — ASSESSMENT & PLAN NOTE
Sliding scale held, accuchecks for hypoglycemia     Patient's FSGs are controlled on current medication regimen.  Last A1c reviewed-   Lab Results   Component Value Date    HGBA1C 5.5 10/11/2024     Most recent fingerstick glucose reviewed-   Recent Labs   Lab 01/13/25  1635 01/13/25  2145 01/14/25  0722 01/14/25  0904   POCTGLUCOSE 112* 135* 61* 110       Current correctional scale   hold on correction scale give A1C is 5.5  Antihyperglycemics (From admission, onward)    None        Hold Oral hypoglycemics while patient is in the hospital; holding all given low intake

## 2025-01-14 NOTE — ASSESSMENT & PLAN NOTE
Patient's blood pressure range in the last 24 hours was: BP  Min: 114/65  Max: 154/84.The patient's inpatient anti-hypertensive regimen is listed below:  Current Antihypertensives  lisinopriL tablet 10 mg, Daily, Oral  NIFEdipine 24 hr tablet 30 mg, Daily, Oral    Plan  - BP is controlled, no changes needed to their regimen  - will continue home regimen

## 2025-01-14 NOTE — PROGRESS NOTES
Pharmacist Renal Dose Adjustment Note    Bhavna Lim is a 79 y.o. female being treated with the medication cefepime    Patient Data:    Vital Signs (Most Recent):  Temp: 97.9 °F (36.6 °C) (01/14/25 0723)  Pulse: 92 (01/14/25 0723)  Resp: 17 (01/14/25 0723)  BP: (!) 154/84 (01/14/25 0723)  SpO2: 97 % (01/14/25 0723) Vital Signs (72h Range):  Temp:  [97.4 °F (36.3 °C)-98.8 °F (37.1 °C)]   Pulse:  []   Resp:  [17-18]   BP: ()/(56-88)   SpO2:  [91 %-100 %]      Recent Labs   Lab 01/11/25  1011 01/12/25  1117 01/13/25  1111   CREATININE 0.5 0.5 0.5     Serum creatinine: 0.5 mg/dL 01/13/25 1111  Estimated creatinine clearance: 71.1 mL/min    Cefepime 2 g IV q24h will be changed to 2 g IV q8h given PSA growth on 1/13 urine cx    Pharmacist's Name: Matteo Thomas, PharmD, BCPS   Pharmacist's Extension: [phone]

## 2025-01-15 PROBLEM — E80.6 HYPERBILIRUBINEMIA: Status: ACTIVE | Noted: 2025-01-15

## 2025-01-15 LAB
PHOSPHATE SERPL-MCNC: 2.4 MG/DL (ref 2.7–4.5)
POCT GLUCOSE: 100 MG/DL (ref 70–110)
POCT GLUCOSE: 116 MG/DL (ref 70–110)
POCT GLUCOSE: 89 MG/DL (ref 70–110)
POCT GLUCOSE: 95 MG/DL (ref 70–110)
POCT GLUCOSE: 99 MG/DL (ref 70–110)

## 2025-01-15 PROCEDURE — 36415 COLL VENOUS BLD VENIPUNCTURE: CPT | Performed by: STUDENT IN AN ORGANIZED HEALTH CARE EDUCATION/TRAINING PROGRAM

## 2025-01-15 PROCEDURE — 25000003 PHARM REV CODE 250: Performed by: STUDENT IN AN ORGANIZED HEALTH CARE EDUCATION/TRAINING PROGRAM

## 2025-01-15 PROCEDURE — 84100 ASSAY OF PHOSPHORUS: CPT | Performed by: STUDENT IN AN ORGANIZED HEALTH CARE EDUCATION/TRAINING PROGRAM

## 2025-01-15 PROCEDURE — S4991 NICOTINE PATCH NONLEGEND: HCPCS | Performed by: STUDENT IN AN ORGANIZED HEALTH CARE EDUCATION/TRAINING PROGRAM

## 2025-01-15 PROCEDURE — 25000003 PHARM REV CODE 250: Performed by: INTERNAL MEDICINE

## 2025-01-15 PROCEDURE — 63600175 PHARM REV CODE 636 W HCPCS: Performed by: STUDENT IN AN ORGANIZED HEALTH CARE EDUCATION/TRAINING PROGRAM

## 2025-01-15 PROCEDURE — 11000001 HC ACUTE MED/SURG PRIVATE ROOM

## 2025-01-15 RX ADMIN — ASPIRIN 81 MG: 81 TABLET, COATED ORAL at 10:01

## 2025-01-15 RX ADMIN — Medication 1 TABLET: at 07:01

## 2025-01-15 RX ADMIN — NICOTINE 1 PATCH: 7 PATCH, EXTENDED RELEASE TRANSDERMAL at 10:01

## 2025-01-15 RX ADMIN — CEFEPIME 2 G: 2 INJECTION, POWDER, FOR SOLUTION INTRAVENOUS at 07:01

## 2025-01-15 RX ADMIN — ATORVASTATIN CALCIUM 40 MG: 40 TABLET, FILM COATED ORAL at 10:01

## 2025-01-15 RX ADMIN — DEXTROSE MONOHYDRATE, SODIUM CHLORIDE, AND POTASSIUM CHLORIDE: 50; 9; 1.49 INJECTION, SOLUTION INTRAVENOUS at 12:01

## 2025-01-15 RX ADMIN — Medication 100 MG: at 10:01

## 2025-01-15 RX ADMIN — ENOXAPARIN SODIUM 40 MG: 40 INJECTION SUBCUTANEOUS at 05:01

## 2025-01-15 RX ADMIN — CEFEPIME 2 G: 2 INJECTION, POWDER, FOR SOLUTION INTRAVENOUS at 02:01

## 2025-01-15 RX ADMIN — NIFEDIPINE 30 MG: 30 TABLET, FILM COATED, EXTENDED RELEASE ORAL at 10:01

## 2025-01-15 RX ADMIN — FOLIC ACID 1 MG: 1 TABLET ORAL at 10:01

## 2025-01-15 RX ADMIN — CEFEPIME 2 G: 2 INJECTION, POWDER, FOR SOLUTION INTRAVENOUS at 12:01

## 2025-01-15 RX ADMIN — DEXTROSE MONOHYDRATE, SODIUM CHLORIDE, AND POTASSIUM CHLORIDE: 50; 9; 1.49 INJECTION, SOLUTION INTRAVENOUS at 05:01

## 2025-01-15 RX ADMIN — Medication 1 TABLET: at 10:01

## 2025-01-15 RX ADMIN — FERROUS SULFATE TAB 325 MG (65 MG ELEMENTAL FE) 1 EACH: 325 (65 FE) TAB at 10:01

## 2025-01-15 RX ADMIN — LISINOPRIL 10 MG: 10 TABLET ORAL at 10:01

## 2025-01-15 NOTE — SUBJECTIVE & OBJECTIVE
Interval History: Some swelling in RUE with mild warmth and erythema.  Patient denies pain there, imaging ordered for evaluation.  Patient denies any pain and mentation remains similar to days prior.    Review of Systems   Reason unable to perform ROS: Limited due to cognitive decline; denies pain and shortness of breath.     Objective:     Vital Signs (Most Recent):  Temp: 98.2 °F (36.8 °C) (01/15/25 0741)  Pulse: 105 (01/15/25 0741)  Resp: 18 (01/15/25 0507)  BP: 114/68 (01/15/25 0741)  SpO2: (!) 93 % (01/15/25 0741) Vital Signs (24h Range):  Temp:  [97.7 °F (36.5 °C)-98.3 °F (36.8 °C)] 98.2 °F (36.8 °C)  Pulse:  [] 105  Resp:  [17-18] 18  SpO2:  [93 %-96 %] 93 %  BP: (114-137)/(60-81) 114/68     Weight: 54.9 kg (121 lb 0.5 oz)  Body mass index is 22.87 kg/m².    Intake/Output Summary (Last 24 hours) at 1/15/2025 0851  Last data filed at 1/15/2025 0848  Gross per 24 hour   Intake --   Output 1950 ml   Net -1950 ml         Physical Exam  Constitutional:       General: She is not in acute distress.     Appearance: She is not toxic-appearing.   HENT:      Head: Normocephalic and atraumatic.   Cardiovascular:      Rate and Rhythm: Normal rate and regular rhythm.   Pulmonary:      Effort: Pulmonary effort is normal.      Breath sounds: Normal breath sounds.   Musculoskeletal:      Comments: RUE redness, swelling   Neurological:      Mental Status: Mental status is at baseline.             Significant Labs: All pertinent labs within the past 24 hours have been reviewed.  CBC:   Recent Labs   Lab 01/13/25  1111   WBC 14.62*   HGB 9.1*   HCT 27.3*        CMP:   Recent Labs   Lab 01/13/25  1111   *   K 3.5   *   CO2 21*   GLU 78   BUN 5*   CREATININE 0.5   CALCIUM 7.6*   PROT 4.9*   ALBUMIN 1.5*   BILITOT 2.7*   ALKPHOS 415*   *   ALT 85*   ANIONGAP 8       Significant Imaging: I have reviewed all pertinent imaging results/findings within the past 24 hours.

## 2025-01-15 NOTE — ASSESSMENT & PLAN NOTE
Sliding scale held, accuchecks for hypoglycemia     Patient's FSGs are controlled on current medication regimen.  Last A1c reviewed-   Lab Results   Component Value Date    HGBA1C 5.5 10/11/2024     Most recent fingerstick glucose reviewed-   Recent Labs   Lab 01/14/25  1305 01/14/25  1514 01/14/25  2053 01/15/25  0740   POCTGLUCOSE 128* 82 89 99       Current correctional scale   hold on correction scale give A1C is 5.5  Antihyperglycemics (From admission, onward)    None        Hold Oral hypoglycemics while patient is in the hospital; holding all given low intake

## 2025-01-15 NOTE — PROGRESS NOTES
[facility] - Internal Medicine Telemetry    Wound Care     Patient Name:  Bhavna Lim  MRN:  [id number]  Date: 1/15/2025  Diagnosis: Palliative care encounter     History:  Past Medical History:   Diagnosis Date    Diabetes mellitus     Hyperlipidemia     Hypertension     Ingrown nail 01/09/2015    Bilateral great toe nail. No edema; tenderness or drainage.      Nail fungus 01/09/2015    To bilateral toenails X 10.      Obesity (BMI 30-39.9) 12/18/2024    Open wound of knee, leg, and ankle 02/09/2015     Social History     Socioeconomic History    Marital status: Single   Tobacco Use    Smoking status: Some Days     Types: Cigarettes   Substance and Sexual Activity    Alcohol use: Yes     Comment: rarely    Drug use: No     Social Drivers of Health     Financial Resource Strain: Low Risk  (12/27/2024)    Overall Financial Resource Strain (CARDIA)     Difficulty of Paying Living Expenses: Not hard at all   Recent Concern: Financial Resource Strain - Medium Risk (12/26/2024)    Overall Financial Resource Strain (CARDIA)     Difficulty of Paying Living Expenses: Somewhat hard   Food Insecurity: No Food Insecurity (12/27/2024)    Hunger Vital Sign     Worried About Running Out of Food in the Last Year: Never true     Ran Out of Food in the Last Year: Never true   Transportation Needs: No Transportation Needs (12/27/2024)    TRANSPORTATION NEEDS     Transportation : No   Physical Activity: Unknown (12/26/2024)    Exercise Vital Sign     Days of Exercise per Week: 0 days   Stress: Stress Concern Present (12/27/2024)    [employer] [place] [employer] - Occupational Stress Questionnaire     Feeling of Stress : To some extent   Housing Stability: Low Risk  (12/27/2024)    Housing Stability Vital Sign     Unable to Pay for Housing in the Last Year: No     Homeless in the Last Year: No     Precautions:  Allergies as of 12/26/2024    (No Known Allergies)       [facility] Assessment Details / Treatment:    Patient seen for  wound care: Follow-up  Chart reviewed for this encounter.   Labs:   WBC (K/uL)   Date Value   01/13/2025 14.62 (H)   01/10/2025 15.06 (H)     Glucose (mg/dL)   Date Value   01/13/2025 78   01/12/2025 118 (H)     Albumin (g/dL)   Date Value   01/13/2025 1.5 (L)   01/12/2025 1.6 (L)     Zac Score: 12    Narrative:  Pt seen for WC follow-up and agreed to assessment  Chart reviewed for this encounter.   See Flow Sheet for additional documentation and media.    Pt laying in bed on waffle, waffle overlay was not fully inflated, additional air was added to ensure the proper air level for optimal patient immersion. Urethral catheter in place, pt able to turn with assistance from other WC nurse. Back has no open wounds on bilateral back.   Perirectal area denuded, moist and erythremic area, previous areas of partial thickness skin loss appears to be improved. Applied triad.   Erythema / warmth noted to anterior right lower arm, right posterior arm appears ecchymotic, no open wounds, reported to bedside nurse.     RECOMMENDATIONS:  Bedside nurse assess for acute changes (purulence, increased redness/swelling, increased drainage, malodor, increased pain, pallor, necrosis) please contact physician on any acute changes.    Nutrition consult  Apply Triad correctly:      Always cleanse wound before applying Triad.  To remove Triad:   Use pH-balanced wound cleanser to soften Triad  Gently wipe without scrubbing  For complete removal, repeat as needed.     Gently spread Triad evenly over the area of application to the thickness of a dime.    Apply waffle overlay to mattress top.  Ensure waffle mattress overlay is inflated to the proper air amount - perform hand check to verify proper immersion.    Fitted sheet only on waffle -- not over waffle and mattress    Ensure Q2H turn protocol continues to be implemented.     Discussed POC with patient and primary nurse.   See EMR for orders & patient education.     Bedside nursing to  continue care & monitoring.  Bedside nursing to maintain pressure injury prevention interventions, (PIP).     Recommendations made to primary team for above plan.    Thank you for the consult. Wound Care will continue to follow.     01/15/25 0800   WOCN Assessment   WOCN Total Time (mins) 30   Visit Date 01/15/25   Visit Time 0800   Consult Type New;Follow Up   WOCN Speciality Wound   Wound moisture;At risk for pressure Injury   Intervention chart review;assessed;changed;applied;orders   Teaching complication        Wound 01/01/25 1120 Other (comment) Left Back   Date First Assessed/Time First Assessed: 01/01/25 1120   Present on Original Admission: No  Primary Wound Type: (c) Other (comment)  Side: Left  Location: Back   Wound Image          Wound 01/04/25 Incontinence associated dermatitis Perineum   Date First Assessed: 01/04/25   Present on Original Admission: No  Primary Wound Type: Incontinence associated dermatitis  Location: Perineum   Wound Image    Dressing Appearance Open to air   Drainage Amount None   Drainage Characteristics/Odor No odor   Appearance Pink;Moist   Tissue loss description Not applicable   Red (%), Wound Tissue Color 100 %   Periwound Area Denuded   Care Cleansed with:;Other (see comments)  (purple bath wipes)   Dressing Applied;Other (comment)  (Triad)   Periwound Care Topical treatment applied   Off Loading Other (see comments)  (waffle)   Dressing Change Due 01/15/25

## 2025-01-15 NOTE — PROGRESS NOTES
Palliative Medicine  Consult Note       Patient Name: Bhavna Lim   MRN: [id number]   Admission Date: 12/26/2024   Hospital Length of Stay: 17   Attending Provider: Les Rizo MD   Consulting Provider: CHRIS King  Primary Care Physician: Suzi Green MD   Principal Problem: Palliative care encounter     Patient information was obtained from relative(s), past medical records, ER records, and primary team.           Assessment/Plan:      Palliative Care Encounter:  Impression:  Ms. Bhavna Lim is a 80 y/o female with hx of HTN, HLD, DM, cholelithiasis, and unintended weight loss.  She had been living at home, with granddaughter staying with her. Pt presented to ED on 12/26 for decrease in appetite and weight loss. She was seen by GI and underwent workup, which was deemed non-contributory to her nutritional status.  CT/MRI head were obtained which did not show any acute process.   Her overall picture seems to be related to dementia, after other causes have been ruled out.       Palliative care consulted for GOC conversations, as pt with progressive debility, dementia, and decrease in nutritional status, per communication with Dr. Rubio.        Advance Care Planning   Advance Directives:   Living Will: No    Do Not Resuscitate Status: Yes    Agent's Name:  Bruno Lim (son)   Agent's Contact Number:  [phone]    Decision Making:  Family answered questions  Goals of Care: The family endorses that what is most important right now is to focus on symptom/pain control and quality of life, even if it means sacrificing a little time    Accordingly, we have decided that the best plan to meet the patient's goals includes enrolling in hospice care upon d/c.      1/15/25:    Pal med re-consulted for GOC, as pt's sister, expressed concern about appropriateness of hospice enrollment.   -I visited with pt this morning, who is alone in room and sleeping. She awakens easily, she seems more alert  than upon my last meeting with her, but she remains confused. She is not oriented to time/place/situation, she shares she is not in pain and nothing is currently causing her discomfort.   -I called pt's son (bruno) who shares he will be here on  Friday morning.  I shared that his mom seemed more awake today but remains confused. I discussed briefly that pt is on antbx, but overall still not eating much and no changes to concern for dementia.   -Bruno and his wife (who is a nurse) have been reading p'ts chart and are up to date on chronicity of concerns. He does not feel his mother would want a peg tube and understands that not eating enough will eventually contribute to death. He does feel that hospice is most appropriate option at this time.   -Bruno shares that he understands pt's sister may not agree, and that unfortunately she may never agree. He also expresses concern that pt's sister seems to have some forgetfulness/dementia herself which may be making it harder for her.       1/6/25:     Spoke with pt's son (Bruno) who lives out of state. He understanding of pt's overall poor prognosis and progressive weakness and debility, along with overall poor nutritional status. Because of these things, I began discussion on code status including meaning of cardiopulmonary event, resuscitation, and dnr. Son's wife is a nurse and has helped him to understand   -Son is agreeable to DNR status.    -Son shares that he does not want his mother to suffer and inquired about hospice care. I discussed philosophy of hospice and goals of care associated with hospice services including: avoidance of hospital, comfort through end of life outside of the hospital, symptom management, and QOL.     -I was able to speak with pt's sister (Lila) and answer questions r/t recommendation of hospice and reason for avoidance of peg tube. She is saddened but understanding.     Symptom Management:  -Debility: Pt now bed-bound and with  "progressive debility. Unable to return home.     -Malnutrition: Pt with weight loss and malnutrition, also with dysphagia.     -Confusion: r/t dementia. Pt unable to continue to care for self.     Summary of recommendations and follow up plan:  -Most important goals at this time: Assurance of comfort and QOL.    -Code status: DNR  -Disposition: to be determined.       The above recommendations communicated directly to primary team on 1/6/25    Thank you for your consult. I will sign off. Please contact us if you have any additional questions.       Subjective:     Chief Complaint:   Chief Complaint   Patient presents with    Fatigue     Increased weakness/fatigue and loss of appetite x2 months. No longer able to ambulate/frequent falls.         HPI:   Per chart review: "Mrs. Bhavna Lim is a 79 year old F with a history of HTN and HLD who presents to the ED for weakness and fatigue. She has been having weight loss and poor po intake over the last few months. She has been seen by GI and evaluated with EGD and Colonoscopy as well as MRCP which showed cholelithiasis with gallbladder distention and intrahepatic and extrahepatic biliary dilation. No significant abnormalities on EGD and coloscopy. She was referred to surgery who recommended NM study and possible EUS with GI. She has been unable to tolerate any solid foods. She sometimes takes a little bit of soup and has tried protein shakes. She feels full immediately. She does endorse some pain in her epigastric region ever since having her EGD performed. She occasionally has vomiting. Denies regurgitation of food. She continues to have worsening weakness and falls at home. She had two falls yesterday due to weakness. She denies significant dizziness. She is unable to walk due to her weakness and sister is concerned this is related to her poor nutrition.       In the ED VSS. Labs notable for Potassium of 3.0 which was repleted in the ED. No focal signs concerning " "for stroke. She was admitted to medicine for further management."      Hospital Course:  Per chart review: "While on the floor PT/OT were consulted and recommended SNF. Nutrition consulted. Discussed with GI outpatient workup and no indication for further inpatient workup. She continued to be altered while in the hospital. CT spine/head ordered for concern of fall showing ischemic changes and signficant stenosis and narrowing. She developed worsening hand weakness and numbness/tinglign with abnormal coordiation. Some of this was prior to admission but weakness significantly worsened. MRI ordered and showed old infarcts. She continued to refuse food and did not have abdominal complaints just would not eat. Started on IVF due to hypoglycemia. Acute encephalopathy workup initiated and negative aside from low folate. UA ordered but not performed. At this point if nutrition becomes suboptimal will have to consider other means of nutrition. Son with concern of worsening memory and possible dementia wanting placement in [place]. Started on supplements for poor nutrition including thiamine and folate to see if this will help with mental status "    Review of Symptoms      Symptom Assessment (ESAS 0-10 Scale)  Unable to complete assessment due to Acuity of condition         Pain Assessment in Advanced Demential Scale (PAINAD)   Breathing - Independent of vocalization:  0  Negative vocalization:  0  Facial expression:  0  Body language:  0  Consolability:  0  Total:  0    Performance Status:  50    Living Arrangements:  Lives with family    Psychosocial/Cultural:   See Palliative Psychosocial Note: Yes  Pt lives in her home and granddaughter able to stay with her. Pt has sister who lives near and is very involved in her care. Pt has one child who lives out of state.   **Primary  to Follow**  Palliative Care  Consult: No          ROS:  Review of Systems   Constitutional:  Positive for appetite " change.         Past Medical History:   Diagnosis Date    Diabetes mellitus     Hyperlipidemia     Hypertension     Ingrown nail 2015    Bilateral great toe nail. No edema; tenderness or drainage.      Nail fungus 2015    To bilateral toenails X 10.      Obesity (BMI 30-39.9) 2024    Open wound of knee, leg, and ankle 2015     Past Surgical History:   Procedure Laterality Date     SECTION      COLONOSCOPY N/A 2024    Procedure: COLONOSCOPY;  Surgeon: Arturo Montalvo MD;  Location: [facility] ENDO;  Service: Endoscopy;  Laterality: N/A;   ref by Jocelin Torrse NP, S[place], instructions given to pt in ofc and portal, cardiac clearance sent. edmund    ESOPHAGOGASTRODUODENOSCOPY N/A 2024    Procedure: EGD (ESOPHAGOGASTRODUODENOSCOPY);  Surgeon: Arturo Montalvo MD;  Location: [facility] ENDO;  Service: Endoscopy;  Laterality: N/A;  cleared by cardiology Dr Pena-teodora BARFIELD consult dated 24-GT  12/10-[facility] for pre call-tb     No family history on file.      Review of patient's allergies indicates:  No Known Allergies    Medications:    Current Facility-Administered Medications:     acetaminophen tablet 650 mg, 650 mg, Oral, Q8H PRN, Yarely Walters MD, 650 mg at 25 2112    acetaminophen tablet 650 mg, 650 mg, Oral, Q4H PRN, Yanick Miller MD, 650 mg at 25 2300    aluminum-magnesium hydroxide-simethicone 200-200-20 mg/5 mL suspension 30 mL, 30 mL, Oral, QID PRN, Yarely Walters MD    aspirin EC tablet 81 mg, 81 mg, Oral, Daily, Yarely Walters MD, 81 mg at 01/15/25 1002    atorvastatin tablet 40 mg, 40 mg, Oral, Daily, Yarely Walters MD, 40 mg at 01/15/25 1001    ceFEPIme injection 2 g, 2 g, Intravenous, Q8H, Les Rizo MD, 2 g at 01/15/25 0245    dextrose 5 % and 0.9 % NaCl with KCl 20 mEq infusion, , Intravenous, Continuous, Les Rizo MD, Last Rate: 75 mL/hr at 01/15/25 0020, New Bag at 01/15/25 0020    dextrose 50% injection 12.5 g, 12.5 g,  Intravenous, PRN, Yarely Walters MD, 12.5 g at 01/06/25 0818    dextrose 50% injection 25 g, 25 g, Intravenous, PRN, Yarely Walters MD    enoxaparin injection 40 mg, 40 mg, Subcutaneous, Daily, Yarely Walters MD, 40 mg at 01/14/25 1621    ferrous sulfate tablet 1 each, 1 tablet, Oral, Daily, Tate Rubio MD, 1 each at 01/15/25 1002    folic acid tablet 1 mg, 1 mg, Oral, Daily, Yarely Walters MD, 1 mg at 01/15/25 1002    glucagon (human recombinant) injection 1 mg, 1 mg, Intramuscular, PRN, Yarely Walters MD, 1 mg at 12/31/24 2053    glucose chewable tablet 16 g, 16 g, Oral, PRN, Yarely Walters MD, 16 g at 01/14/25 1202    glucose chewable tablet 24 g, 24 g, Oral, PRN, Yarely Walters MD    k phos di & mono-sod phos mono 250 mg tablet 1 tablet, 1 tablet, Oral, BID, Yarely Walters MD, 1 tablet at 01/15/25 1002    lisinopriL tablet 10 mg, 10 mg, Oral, Daily, Yarely Walters MD, 10 mg at 01/15/25 1002    melatonin tablet 6 mg, 6 mg, Oral, Nightly PRN, Yarely Walters MD, 6 mg at 01/13/25 2112    naloxone 0.4 mg/mL injection 0.02 mg, 0.02 mg, Intravenous, PRN, Yarely Walters MD    nicotine 7 mg/24 hr 1 patch, 1 patch, Transdermal, Daily, Yarely Walters MD, 1 patch at 01/15/25 1000    NIFEdipine 24 hr tablet 30 mg, 30 mg, Oral, Daily, Yarely Walters MD, 30 mg at 01/15/25 1001    ondansetron injection 4 mg, 4 mg, Intravenous, Q8H PRN, Yarely Walters MD    promethazine tablet 25 mg, 25 mg, Oral, Q6H PRN, Yarely Walters MD    simethicone chewable tablet 80 mg, 1 tablet, Oral, QID PRN, Yarely Walters MD    sodium chloride 0.9% flush 10 mL, 10 mL, Intravenous, Q12H PRN, Yarely Walters MD    thiamine tablet 100 mg, 100 mg, Oral, Daily, Yarely Walters MD, 100 mg at 01/15/25 1001         Objective:      Physical Exam:  Vitals: Temp: 98.8 °F (37.1 °C) (01/15/25 1201)  Pulse: 105 (01/15/25 1201)  Resp: 18 (01/15/25 0507)  BP: 114/68  (01/15/25 0741)  SpO2: (!) 93 % (01/15/25 1201)    Physical Exam  Constitutional:       Appearance: She is ill-appearing.   Neurological:      Mental Status: She is disoriented.                 Labs:   Creatinine   Date Value Ref Range Status   01/13/2025 0.5 0.5 - 1.4 mg/dL Final      Hemoglobin   Date Value Ref Range Status   01/13/2025 9.1 (L) 12.0 - 16.0 g/dL Final      Albumin   Date Value Ref Range Status   01/13/2025 1.5 (L) 3.5 - 5.2 g/dL Final   01/12/2025 1.6 (L) 3.5 - 5.2 g/dL Final   01/11/2025 1.7 (L) 3.5 - 5.2 g/dL Final          Imaging: MRI Brain W WO Contrast  Order: [id number]  Status: Final result       Visible to patient: Yes (seen)       Next appt: None    0 Result Notes  Details    Reading Physician Reading Date Result Priority   Casimiro Bateman MD  [phone] 12/31/2024 Routine     Narrative & Impression  EXAMINATION:  MRI BRAIN W WO CONTRAST     CLINICAL HISTORY:  Memory loss;Ataxia or coordination problem (Ped 0-18y);abnormal coordiation, cognitive decline;.     TECHNIQUE:  Multiplanar multisequence MR imaging of the brain was performed before and after the administration of 6 mL Gadavist  intravenous contrast.     COMPARISON:  CT head without contrast 12/29/2024.     FINDINGS:  Intracranial compartment:     Postcontrast images limited secondary to diffuse motion artifact     Mild prominence of the ventricles with compensatory enlargement of the sulci, in keeping with central volume loss.  No hydrocephalus.  No extra-axial blood or fluid collections.     Scattered regions of subcortical and periventricular T2/FLAIR hyperintense signal, overall nonspecific, but can be seen in setting of microvascular ischemic change.  Remote lacunar type infarct about the right thalamus.  No mass lesion, acute hemorrhage, edema or acute infarct. No abnormal enhancement.     Normal vascular flow voids are preserved.     Skull/extracranial contents (limited evaluation): Bone marrow signal intensity is  normal.     Paranasal sinuses and mastoid air cells are essentially clear.     Impression:     No evidence for acute intracranial pathology, noting severe limitations from motion artifact degrading postcontrast imaging.     Sequela of chronic microvascular ischemic change.  Remote lacunar type infarct right thalamus.        Electronically signed by:Casimiro Bateman  Date:                                            12/31/2024  Time:                                           15:05        Exam Ended: 12/31/24 14:13 CST Last Resulted: 12/31/24 15:05 CST       > 50% of  55 min visit spent in chart review, face to face discussion of goals of care,  symptom assessment, coordination of care, charting, and emotional support     Thank you for the opportunity to care for this patient and family.       Mayelin Trejo, CNS

## 2025-01-15 NOTE — ASSESSMENT & PLAN NOTE
Patient with Acute debility due to age-related physical debility and other reduced mobility. The patient's latest AMPAC (Activity Measure for Post Acute Care) Score is listed below.    AM-PAC Score - How much help does the patient need for each activity listed  Basic Mobility Total Score: 7  Turning over in bed (including adjusting bedclothes, sheets and blankets)?: A lot  Sitting down on and standing up from a chair with arms (e.g., wheelchair, bedside commode, etc.): Unable  Moving from lying on back to sitting on the side of the bed?: Unable  Moving to and from a bed to a chair (including a wheelchair)?: Unable  Need to walk in hospital room?: Unable  Climbing 3-5 steps with a railing?: Unable    Plan  - Progressive mobility protocol initated  - PT/OT consulted  - Fall precautions in place  -plan for NH with hospice services  - conversation with patient's son Bruno who arrives on 1/16/25 for final discussions on plan

## 2025-01-15 NOTE — PLAN OF CARE
Problem: Confusion Acute  Goal: Optimal Cognitive Function  Outcome: Progressing     Problem: Adult Inpatient Plan of Care  Goal: Plan of Care Review  Outcome: Progressing  Goal: Patient-Specific Goal (Individualized)  Outcome: Progressing  Goal: Absence of Hospital-Acquired Illness or Injury  Outcome: Progressing  Goal: Optimal Comfort and Wellbeing  Outcome: Progressing  Goal: Readiness for Transition of Care  Outcome: Progressing       Pt A&Ox1 to self. Remain on springer and D5W with KCl drip. On RA. VVS

## 2025-01-15 NOTE — PROGRESS NOTES
[facility] - Internal Medicine [facility] Medicine  Progress Note    Patient Name: Bhavna Lim  MRN: [id number]  Patient Class: IP- Inpatient   Admission Date: 12/26/2024  Length of Stay: 17 days  Attending Physician: Les Rizo MD  Primary Care Provider: Suzi Green MD        Subjective     Principal Problem:Palliative care encounter        HPI:  Mrs. Bhavna Lim is a 79 year old F with a history of HTN and HLD who presents to the ED for weakness and fatigue. She has been having weight loss and poor po intake over the last few months. She has been seen by GI and evaluated with EGD and Colonoscopy as well as MRCP which showed cholelithiasis with gallbladder distention and intrahepatic and extrahepatic biliary dilation. No significant abnormalities on EGD and coloscopy. She was referred to surgery who recommended NM study and possible EUS with GI. She has been unable to tolerate any solid foods. She sometimes takes a little bit of soup and has tried protein shakes. She feels full immediately. She does endorse some pain in her epigastric region ever since having her EGD performed. She occasionally has vomiting. Denies regurgitation of food. She continues to have worsening weakness and falls at home. She had two falls yesterday due to weakness. She denies significant dizziness. She is unable to walk due to her weakness and sister is concerned this is related to her poor nutrition.      In the ED VSS. Labs notable for Potassium of 3.0 which was repleted in the ED. No focal signs concerning for stroke. She was admitted to medicine for further management.      Overview/Hospital Course:  While on the floor PT/OT were consulted and recommended SNF. Nutrition consulted. Discussed with GI outpatient workup and no indication for further inpatient workup. She continued to be altered while in the hospital. CT spine/head ordered for concern of fall showing ischemic changes and signficant stenosis and  narrowing. She developed worsening hand weakness and numbness/tinglign with abnormal coordiation. Some of this was prior to admission but weakness significantly worsened. MRI ordered and showed old infarcts. She continued to refuse food and did not have abdominal complaints just would not eat. Started on IVF due to hypoglycemia. Acute encephalopathy workup initiated and negative aside from low folate. UA ordered but not performed. At this point if nutrition becomes suboptimal will have to consider other means of nutrition. Son with concern of worsening memory and possible dementia wanting placement in [place]. Started on supplements for poor nutrition including thiamine and folate to see if this will help with mental status     Plan is NH with hospice services.    Interval History: Some swelling in RUE with mild warmth and erythema.  Patient denies pain there, imaging ordered for evaluation.  Patient denies any pain and mentation remains similar to days prior.    Review of Systems   Reason unable to perform ROS: Limited due to cognitive decline; denies pain and shortness of breath.     Objective:     Vital Signs (Most Recent):  Temp: 98.2 °F (36.8 °C) (01/15/25 0741)  Pulse: 105 (01/15/25 0741)  Resp: 18 (01/15/25 0507)  BP: 114/68 (01/15/25 0741)  SpO2: (!) 93 % (01/15/25 0741) Vital Signs (24h Range):  Temp:  [97.7 °F (36.5 °C)-98.3 °F (36.8 °C)] 98.2 °F (36.8 °C)  Pulse:  [] 105  Resp:  [17-18] 18  SpO2:  [93 %-96 %] 93 %  BP: (114-137)/(60-81) 114/68     Weight: 54.9 kg (121 lb 0.5 oz)  Body mass index is 22.87 kg/m².    Intake/Output Summary (Last 24 hours) at 1/15/2025 0851  Last data filed at 1/15/2025 0848  Gross per 24 hour   Intake --   Output 1950 ml   Net -1950 ml         Physical Exam  Constitutional:       General: She is not in acute distress.     Appearance: She is not toxic-appearing.   HENT:      Head: Normocephalic and atraumatic.   Cardiovascular:      Rate and Rhythm: Normal rate and  regular rhythm.   Pulmonary:      Effort: Pulmonary effort is normal.      Breath sounds: Normal breath sounds.   Musculoskeletal:      Comments: RUE redness, swelling   Neurological:      Mental Status: Mental status is at baseline.             Significant Labs: All pertinent labs within the past 24 hours have been reviewed.  CBC:   Recent Labs   Lab 01/13/25  1111   WBC 14.62*   HGB 9.1*   HCT 27.3*        CMP:   Recent Labs   Lab 01/13/25  1111   *   K 3.5   *   CO2 21*   GLU 78   BUN 5*   CREATININE 0.5   CALCIUM 7.6*   PROT 4.9*   ALBUMIN 1.5*   BILITOT 2.7*   ALKPHOS 415*   *   ALT 85*   ANIONGAP 8       Significant Imaging: I have reviewed all pertinent imaging results/findings within the past 24 hours.    Assessment and Plan     * Palliative care encounter  Plan is NH with Hospice as discussed above      Cognitive impairment  -history of confusion and forgetfulness prior to admission  -appears to be more confused inpatient as well  -given impairment and abnormal coordination MRI and C spine ordered: - CT head: Sequela of chronic microvascular ischemic change. Remote lacunar type infarct right thalamus; MRI C-spine: Multilevel cervical spondylosis, worst at C3-C4, contributing to moderate spinal canal stenosis as well as moderate left neural foraminal narrowing.    -HIV/RPR negative. Folate low, repleting. B12 high. Thiamine low and repleting. Possibly 2/2 to dementia with poor nutritional intake; TSH wnl on 1/1/2025  - exhausted reversible causes of poor mentation and with imaging, appears to be age-related decline accelerated by some degree of vascular dementia and large degree of confidence that this is patient's new baseline with expected further decline  - administering fluids with dextrose and electrolytes given poor oral intake; discussed pitfalls of G-tube placement  - plan for Nursing Home with hospice services  - STAT imaging and consideration of Neurology consult if  concerning features arise  - consult to Neurology at discharge to characterize dementia and potential interventions to slow progress  - Pseudomonas found in urine; will treat and assess for change in mentation  - initiating treatment for urinary tract infection and assess for any improvement in cognition    Pseudomonas urinary tract infection  - urinalysis concerning for UTI and found to have pseudomonas  - switched Rocephin to Cefepime and obtaining blood cx  - close assessment to see if mentation improves      Hyperbilirubinemia  - elevation in bilirubin with concomitant elevations in LFTs  - obtaining RUQ US and fractionating for assessment      Severe protein-calorie malnutrition  Nutrition consulted. Most recent weight and BMI monitored-     Measurements:  Wt Readings from Last 1 Encounters:   12/27/24 54.9 kg (121 lb 0.5 oz)   Body mass index is 22.87 kg/m².    Patient has been screened and assessed by RD.    Malnutrition Type:  Context: chronic illness  Level: severe    Malnutrition Characteristic Summary:  Weight Loss (Malnutrition): greater than 5% in 1 month  Energy Intake (Malnutrition): less than 75% for greater than or equal to 1 month  Subcutaneous Fat (Malnutrition): severe depletion  Muscle Mass (Malnutrition): severe depletion    Interventions/Recommendations (treatment strategy):  Please prioritize patient's comfort and preferences over strict nutritional goals at this time. Offer small, frequent meals, texture modifications, and respect the patient's desire to eat or not eat. Also ensure proper hydration with small sips of fluids periodically.    - supplying D5 fluids given poor intake    Debility  Patient with Acute debility due to age-related physical debility and other reduced mobility. The patient's latest AMPAC (Activity Measure for Post Acute Care) Score is listed below.    AM-PAC Score - How much help does the patient need for each activity listed  Basic Mobility Total Score: 7  Turning  over in bed (including adjusting bedclothes, sheets and blankets)?: A lot  Sitting down on and standing up from a chair with arms (e.g., wheelchair, bedside commode, etc.): Unable  Moving from lying on back to sitting on the side of the bed?: Unable  Moving to and from a bed to a chair (including a wheelchair)?: Unable  Need to walk in hospital room?: Unable  Climbing 3-5 steps with a railing?: Unable    Plan  - Progressive mobility protocol initated  - PT/OT consulted  - Fall precautions in place  -plan for NH with hospice services  - conversation with patient's son Bruno who arrives on 1/16/25 for final discussions on plan      Benign essential hypertension  Patient's blood pressure range in the last 24 hours was: BP  Min: 114/68  Max: 137/81.The patient's inpatient anti-hypertensive regimen is listed below:  Current Antihypertensives  lisinopriL tablet 10 mg, Daily, Oral  NIFEdipine 24 hr tablet 30 mg, Daily, Oral    Plan  - BP is controlled, no changes needed to their regimen  - will continue home regimen    Unintentional weight loss  Nutrition consulted. Most recent weight and BMI monitored-     Measurements:  Wt Readings from Last 1 Encounters:   12/27/24 54.9 kg (121 lb 0.5 oz)   Body mass index is 22.87 kg/m².    RD consultation   Extensive outpatient evaluation obtained by GI and General surgery   Will add supplementation to meals; soft bite-sized per rec's      Please prioritize patient's comfort and preferences over strict nutritional goals at this time. Offer small, frequent meals, texture modifications, and respect the patient's desire to eat or not eat. Also ensure proper hydration with small sips of fluids periodically.      Hyperlipidemia  - continue ASA and statin      Hypokalemia  Patient's most recent potassium results are listed below.   Recent Labs     01/12/25  1117 01/13/25  1111   K 3.4* 3.5       Plan  - Replete potassium per protocol  - Monitor potassium Daily  - Patient's hypokalemia is  improving  - K+ in fluids    Numbness and tingling in both hands  -noted by patient before admission with abnormal finger to nose and weakness   -CT head and spine reviewed. There is significant stenosis/narrowing of C6 and C7 which could be contributing to symptoms. Findings appear chronic and not acute. MRI ordered and notable for cervical stenosis and lacunar infarcts. Limited visiblity due to movement   -CT head notable for remote lacunar infarcts, hx of stroke in 2005 currently on ASA   -continue PT/OT         Diabetes mellitus  Sliding scale held, accuchecks for hypoglycemia     Patient's FSGs are controlled on current medication regimen.  Last A1c reviewed-   Lab Results   Component Value Date    HGBA1C 5.5 10/11/2024     Most recent fingerstick glucose reviewed-   Recent Labs   Lab 01/14/25  1305 01/14/25  1514 01/14/25  2053 01/15/25  0740   POCTGLUCOSE 128* 82 89 99       Current correctional scale   hold on correction scale give A1C is 5.5  Antihyperglycemics (From admission, onward)      None          Hold Oral hypoglycemics while patient is in the hospital; holding all given low intake    Hypoglycemia  -episodes of hypoglycemia overnight  -given amp of D50, started on IV D5  -monitor BG closely  -likely related to poor PO intake         VTE Risk Mitigation (From admission, onward)           Ordered     enoxaparin injection 40 mg  Daily         12/26/24 1700     IP VTE HIGH RISK PATIENT  Once         12/26/24 1547     Place sequential compression device  Until discontinued         12/26/24 1547                    Discharge Planning   RITA: 1/16/2025     Code Status: DNR   Medical Readiness for Discharge Date:   Discharge Plan A: New Nursing Home placement [facility] (with hospice services)   Discharge Delays: (!) Patient and Family Barriers (Family has not decided on facility.)          Les Rizo MD  Department of Hospital Medicine   [facility] - Internal Medicine Telemetry

## 2025-01-15 NOTE — PLAN OF CARE
Problem: Adult Inpatient Plan of Care  Goal: Plan of Care Review  Outcome: Progressing  Goal: Absence of Hospital-Acquired Illness or Injury  Outcome: Progressing  Goal: Optimal Comfort and Wellbeing  Outcome: Progressing  Goal: Readiness for Transition of Care  Outcome: Progressing     Problem: Diabetes Comorbidity  Goal: Blood Glucose Level Within Targeted Range  Outcome: Not Progressing     Problem: Skin Injury Risk Increased  Goal: Skin Health and Integrity  Outcome: Progressing

## 2025-01-15 NOTE — ASSESSMENT & PLAN NOTE
Patient's blood pressure range in the last 24 hours was: BP  Min: 114/68  Max: 137/81.The patient's inpatient anti-hypertensive regimen is listed below:  Current Antihypertensives  lisinopriL tablet 10 mg, Daily, Oral  NIFEdipine 24 hr tablet 30 mg, Daily, Oral    Plan  - BP is controlled, no changes needed to their regimen  - will continue home regimen

## 2025-01-15 NOTE — ASSESSMENT & PLAN NOTE
-history of confusion and forgetfulness prior to admission  -appears to be more confused inpatient as well  -given impairment and abnormal coordination MRI and C spine ordered: - CT head: Sequela of chronic microvascular ischemic change. Remote lacunar type infarct right thalamus; MRI C-spine: Multilevel cervical spondylosis, worst at C3-C4, contributing to moderate spinal canal stenosis as well as moderate left neural foraminal narrowing.    -HIV/RPR negative. Folate low, repleting. B12 high. Thiamine low and repleting. Possibly 2/2 to dementia with poor nutritional intake; TSH wnl on 1/1/2025  - exhausted reversible causes of poor mentation and with imaging, appears to be age-related decline accelerated by some degree of vascular dementia and large degree of confidence that this is patient's new baseline with expected further decline  - administering fluids with dextrose and electrolytes given poor oral intake; discussed pitfalls of G-tube placement  - plan for Nursing Home with hospice services  - STAT imaging and consideration of Neurology consult if concerning features arise  - consult to Neurology at discharge to characterize dementia and potential interventions to slow progress  - Pseudomonas found in urine; will treat and assess for change in mentation  - initiating treatment for urinary tract infection and assess for any improvement in cognition

## 2025-01-16 PROBLEM — M79.89 SWELLING OF RIGHT UPPER EXTREMITY: Status: ACTIVE | Noted: 2025-01-16

## 2025-01-16 LAB
BACTERIA UR CULT: ABNORMAL
POCT GLUCOSE: 100 MG/DL (ref 70–110)
POCT GLUCOSE: 101 MG/DL (ref 70–110)
POCT GLUCOSE: 104 MG/DL (ref 70–110)
POCT GLUCOSE: 88 MG/DL (ref 70–110)

## 2025-01-16 PROCEDURE — S4991 NICOTINE PATCH NONLEGEND: HCPCS | Performed by: STUDENT IN AN ORGANIZED HEALTH CARE EDUCATION/TRAINING PROGRAM

## 2025-01-16 PROCEDURE — 25000003 PHARM REV CODE 250: Performed by: STUDENT IN AN ORGANIZED HEALTH CARE EDUCATION/TRAINING PROGRAM

## 2025-01-16 PROCEDURE — 63600175 PHARM REV CODE 636 W HCPCS: Performed by: STUDENT IN AN ORGANIZED HEALTH CARE EDUCATION/TRAINING PROGRAM

## 2025-01-16 PROCEDURE — 11000001 HC ACUTE MED/SURG PRIVATE ROOM

## 2025-01-16 PROCEDURE — 25000003 PHARM REV CODE 250: Performed by: INTERNAL MEDICINE

## 2025-01-16 RX ADMIN — ATORVASTATIN CALCIUM 40 MG: 40 TABLET, FILM COATED ORAL at 09:01

## 2025-01-16 RX ADMIN — NIFEDIPINE 30 MG: 30 TABLET, FILM COATED, EXTENDED RELEASE ORAL at 09:01

## 2025-01-16 RX ADMIN — Medication 1 TABLET: at 09:01

## 2025-01-16 RX ADMIN — FOLIC ACID 1 MG: 1 TABLET ORAL at 09:01

## 2025-01-16 RX ADMIN — ENOXAPARIN SODIUM 40 MG: 40 INJECTION SUBCUTANEOUS at 04:01

## 2025-01-16 RX ADMIN — Medication 1 TABLET: at 07:01

## 2025-01-16 RX ADMIN — CEFEPIME 2 G: 2 INJECTION, POWDER, FOR SOLUTION INTRAVENOUS at 01:01

## 2025-01-16 RX ADMIN — Medication 100 MG: at 09:01

## 2025-01-16 RX ADMIN — LISINOPRIL 10 MG: 10 TABLET ORAL at 09:01

## 2025-01-16 RX ADMIN — ASPIRIN 81 MG: 81 TABLET, COATED ORAL at 09:01

## 2025-01-16 RX ADMIN — CEFEPIME 2 G: 2 INJECTION, POWDER, FOR SOLUTION INTRAVENOUS at 07:01

## 2025-01-16 RX ADMIN — FERROUS SULFATE TAB 325 MG (65 MG ELEMENTAL FE) 1 EACH: 325 (65 FE) TAB at 09:01

## 2025-01-16 RX ADMIN — CEFEPIME 2 G: 2 INJECTION, POWDER, FOR SOLUTION INTRAVENOUS at 03:01

## 2025-01-16 RX ADMIN — NICOTINE 1 PATCH: 7 PATCH, EXTENDED RELEASE TRANSDERMAL at 09:01

## 2025-01-16 NOTE — ASSESSMENT & PLAN NOTE
-history of confusion and forgetfulness prior to admission  -appears to be more confused inpatient as well  -given impairment and abnormal coordination MRI and C spine ordered: - CT head: Sequela of chronic microvascular ischemic change. Remote lacunar type infarct right thalamus; MRI C-spine: Multilevel cervical spondylosis, worst at C3-C4, contributing to moderate spinal canal stenosis as well as moderate left neural foraminal narrowing.    -HIV/RPR negative. Folate low, repleting. B12 high. Thiamine low and repleting. Possibly 2/2 to dementia with poor nutritional intake; TSH wnl on 1/1/2025  - exhausted reversible causes of poor mentation and with imaging, appears to be age-related decline accelerated by some degree of vascular dementia and large degree of confidence that this is patient's new baseline with expected further decline  - administering fluids with dextrose and electrolytes given poor oral intake; discussed pitfalls of G-tube placement  - plan for Nursing Home with hospice services  - STAT imaging and consideration of Neurology consult if concerning features arise and if not planned to transition to comfort   - consult to Neurology at discharge to characterize dementia and potential interventions to slow progress - if not opting for hospice  - Pseudomonas found in urine; will treat and assess for change in mentation/ cognition

## 2025-01-16 NOTE — ASSESSMENT & PLAN NOTE
- urinalysis concerning for UTI and found to have pseudomonas  - switched Rocephin to Cefepime and obtaining blood cx  - close assessment to see if mentation improves  - C/w cefepime

## 2025-01-16 NOTE — PROGRESS NOTES
[facility] - Internal Medicine [facility]  Progress Note    Patient Name: Bhavna Lim  MRN: [id number]  Patient Class: IP- Inpatient   Admission Date: 12/26/2024  Length of Stay: 18 days  Attending Physician: Ga Green MD  Primary Care Provider: Suzi Green MD        Subjective     Principal Problem:Palliative care encounter        HPI:  Mrs. Bhavna Lim is a 79 year old F with a history of HTN and HLD who presents to the ED for weakness and fatigue. She has been having weight loss and poor po intake over the last few months. She has been seen by GI and evaluated with EGD and Colonoscopy as well as MRCP which showed cholelithiasis with gallbladder distention and intrahepatic and extrahepatic biliary dilation. No significant abnormalities on EGD and coloscopy. She was referred to surgery who recommended NM study and possible EUS with GI. She has been unable to tolerate any solid foods. She sometimes takes a little bit of soup and has tried protein shakes. She feels full immediately. She does endorse some pain in her epigastric region ever since having her EGD performed. She occasionally has vomiting. Denies regurgitation of food. She continues to have worsening weakness and falls at home. She had two falls yesterday due to weakness. She denies significant dizziness. She is unable to walk due to her weakness and sister is concerned this is related to her poor nutrition.      In the ED VSS. Labs notable for Potassium of 3.0 which was repleted in the ED. No focal signs concerning for stroke. She was admitted to medicine for further management.      Overview/Hospital Course:  While on the floor PT/OT were consulted and recommended SNF. Nutrition consulted. Discussed with GI outpatient workup and no indication for further inpatient workup. She continued to be altered while in the hospital. CT spine/head ordered for concern of fall showing ischemic changes and signficant stenosis and  narrowing. She developed worsening hand weakness and numbness/tinglign with abnormal coordiation. Some of this was prior to admission but weakness significantly worsened. MRI ordered and showed old infarcts. She continued to refuse food and did not have abdominal complaints just would not eat. Started on IVF due to hypoglycemia. Acute encephalopathy workup initiated and negative aside from low folate. UA ordered but not performed. At this point if nutrition becomes suboptimal will have to consider other means of nutrition. Son with concern of worsening memory and possible dementia wanting placement in [place]. Started on supplements for poor nutrition including thiamine and folate to see if this will help with mental status     Plan is NH with hospice services.    Interval History: Some swelling in RUE with mild warmth and erythema.  Patient denies pain there, Ct forearm showed Nonspecific subcutaneous edema throughout the distal arm and forearm, possibly sterile or infectious in nature.  No fluid collection.  No osteomyelitis. .  Patient denies any pain and mentation remains similar to days prior.    Review of Systems   Reason unable to perform ROS: Limited due to cognitive decline; denies pain and shortness of breath.     Objective:     Vital Signs (Most Recent):  Temp: 97.7 °F (36.5 °C) (01/16/25 1110)  Pulse: 100 (01/16/25 1110)  Resp: 18 (01/16/25 1110)  BP: 98/62 (01/16/25 1110)  SpO2: (!) 94 % (01/16/25 1110) Vital Signs (24h Range):  Temp:  [97.7 °F (36.5 °C)-100.1 °F (37.8 °C)] 97.7 °F (36.5 °C)  Pulse:  [100-110] 100  Resp:  [18] 18  SpO2:  [91 %-97 %] 94 %  BP: ()/(55-64) 98/62     Weight: 54.9 kg (121 lb 0.5 oz)  Body mass index is 22.87 kg/m².    Intake/Output Summary (Last 24 hours) at 1/16/2025 1153  Last data filed at 1/16/2025 1000  Gross per 24 hour   Intake 100 ml   Output 750 ml   Net -650 ml         Physical Exam  Constitutional:       General: She is not in acute distress.      "Appearance: She is not toxic-appearing.   HENT:      Head: Normocephalic and atraumatic.   Cardiovascular:      Rate and Rhythm: Normal rate and regular rhythm.   Pulmonary:      Effort: Pulmonary effort is normal.      Breath sounds: Normal breath sounds.   Musculoskeletal:      Comments: RUE redness, swelling   Neurological:      Mental Status: Mental status is at baseline.             Significant Labs: All pertinent labs within the past 24 hours have been reviewed.  CBC:   No results for input(s): "WBC", "HGB", "HCT", "PLT" in the last 48 hours.    CMP:   No results for input(s): "NA", "K", "CL", "CO2", "GLU", "BUN", "CREATININE", "CALCIUM", "PROT", "ALBUMIN", "BILITOT", "ALKPHOS", "AST", "ALT", "ANIONGAP", "EGFRNONAA" in the last 48 hours.    Invalid input(s): "ESTGFAFRICA"      Significant Imaging: I have reviewed all pertinent imaging results/findings within the past 24 hours.    Assessment and Plan     * Palliative care encounter  Plan is NH with Hospice as discussed above      Swelling of right upper extremity  CT UE with Nonspecific subcutaneous edema throughout the distal arm and forearm, possibly sterile or infectious in nature.  No fluid collection.  No osteomyelitis.     Monitor    Hyperbilirubinemia  - elevation in bilirubin with concomitant elevations in LFTs  - obtaining RUQ US and fractionating for assessment      Pseudomonas urinary tract infection  - urinalysis concerning for UTI and found to have pseudomonas  - switched Rocephin to Cefepime and obtaining blood cx  - close assessment to see if mentation improves  - C/w cefepime      Cognitive impairment  -history of confusion and forgetfulness prior to admission  -appears to be more confused inpatient as well  -given impairment and abnormal coordination MRI and C spine ordered: - CT head: Sequela of chronic microvascular ischemic change. Remote lacunar type infarct right thalamus; MRI C-spine: Multilevel cervical spondylosis, worst at C3-C4, " contributing to moderate spinal canal stenosis as well as moderate left neural foraminal narrowing.    -HIV/RPR negative. Folate low, repleting. B12 high. Thiamine low and repleting. Possibly 2/2 to dementia with poor nutritional intake; TSH wnl on 1/1/2025  - exhausted reversible causes of poor mentation and with imaging, appears to be age-related decline accelerated by some degree of vascular dementia and large degree of confidence that this is patient's new baseline with expected further decline  - administering fluids with dextrose and electrolytes given poor oral intake; discussed pitfalls of G-tube placement  - plan for Nursing Home with hospice services  - STAT imaging and consideration of Neurology consult if concerning features arise and if not planned to transition to comfort   - consult to Neurology at discharge to characterize dementia and potential interventions to slow progress - if not opting for hospice  - Pseudomonas found in urine; will treat and assess for change in mentation/ cognition    Hypoglycemia  -episodes of hypoglycemia overnight  -given amp of D50, started on IV D5  -monitor BG closely  -likely related to poor PO intake       Numbness and tingling in both hands  -noted by patient before admission with abnormal finger to nose and weakness   -CT head and spine reviewed. There is significant stenosis/narrowing of C6 and C7 which could be contributing to symptoms. Findings appear chronic and not acute. MRI ordered and notable for cervical stenosis and lacunar infarcts. Limited visiblity due to movement   -CT head notable for remote lacunar infarcts, hx of stroke in 2005 currently on ASA   -continue PT/OT         Severe protein-calorie malnutrition  Nutrition consulted. Most recent weight and BMI monitored-     Measurements:  Wt Readings from Last 1 Encounters:   12/27/24 54.9 kg (121 lb 0.5 oz)   Body mass index is 22.87 kg/m².    Patient has been screened and assessed by RD.    Malnutrition  "Type:  Context: chronic illness  Level: severe    Malnutrition Characteristic Summary:  Weight Loss (Malnutrition): greater than 5% in 1 month  Energy Intake (Malnutrition): less than 75% for greater than or equal to 1 month  Subcutaneous Fat (Malnutrition): severe depletion  Muscle Mass (Malnutrition): severe depletion    Interventions/Recommendations (treatment strategy):  Please prioritize patient's comfort and preferences over strict nutritional goals at this time. Offer small, frequent meals, texture modifications, and respect the patient's desire to eat or not eat. Also ensure proper hydration with small sips of fluids periodically.    - supplying D5 fluids given poor intake    Hypokalemia  Patient's most recent potassium results are listed below.   No results for input(s): "K" in the last 72 hours.    Plan  - Replete potassium per protocol  - Monitor potassium Daily  - Patient's hypokalemia is improving  - K+ in fluids    Debility  Patient with Acute debility due to age-related physical debility and other reduced mobility. The patient's latest AMPAC (Activity Measure for Post Acute Care) Score is listed below.    AM-PAC Score - How much help does the patient need for each activity listed  Basic Mobility Total Score: 7  Turning over in bed (including adjusting bedclothes, sheets and blankets)?: A lot  Sitting down on and standing up from a chair with arms (e.g., wheelchair, bedside commode, etc.): Unable  Moving from lying on back to sitting on the side of the bed?: Unable  Moving to and from a bed to a chair (including a wheelchair)?: Unable  Need to walk in hospital room?: Unable  Climbing 3-5 steps with a railing?: Unable    Plan  - Progressive mobility protocol initated  - PT/OT consulted  - Fall precautions in place  -plan for NH with hospice services  - Palliative care following  - conversation with patient's son Bruno who arrives on 1/16/25 or 1/17 for final discussions on plan -not in room " today      Diabetes mellitus  Sliding scale held, accuchecks for hypoglycemia     Patient's FSGs are controlled on current medication regimen.  Last A1c reviewed-   Lab Results   Component Value Date    HGBA1C 5.5 10/11/2024     Most recent fingerstick glucose reviewed-   Recent Labs   Lab 01/14/25  1305 01/14/25  1514 01/14/25  2053 01/15/25  0740   POCTGLUCOSE 128* 82 89 99       Current correctional scale   hold on correction scale give A1C is 5.5  Antihyperglycemics (From admission, onward)      None          Hold Oral hypoglycemics while patient is in the hospital; holding all given low intake    Unintentional weight loss  Nutrition consulted. Most recent weight and BMI monitored-     Measurements:  Wt Readings from Last 1 Encounters:   12/27/24 54.9 kg (121 lb 0.5 oz)   Body mass index is 22.87 kg/m².    RD consultation   Extensive outpatient evaluation obtained by GI and General surgery   Will add supplementation to meals; soft bite-sized per rec's      Please prioritize patient's comfort and preferences over strict nutritional goals at this time. Offer small, frequent meals, texture modifications, and respect the patient's desire to eat or not eat. Also ensure proper hydration with small sips of fluids periodically.      Hyperlipidemia  - continue ASA and statin      Benign essential hypertension  Patient's blood pressure range in the last 24 hours was: BP  Min: 114/68  Max: 137/81.The patient's inpatient anti-hypertensive regimen is listed below:  Current Antihypertensives  lisinopriL tablet 10 mg, Daily, Oral  NIFEdipine 24 hr tablet 30 mg, Daily, Oral    Plan  - BP is controlled, no changes needed to their regimen  - will continue home regimen      VTE Risk Mitigation (From admission, onward)           Ordered     enoxaparin injection 40 mg  Daily         12/26/24 1700     IP VTE HIGH RISK PATIENT  Once         12/26/24 1547     Place sequential compression device  Until discontinued         12/26/24 7709                     Discharge Planning   RITA: 1/21/2025     Code Status: DNR   Medical Readiness for Discharge Date:   Discharge Plan A: New Nursing Home placement - skilled nursing care facility (with hospice services)   Discharge Delays: (!) Patient and Family Barriers (Family has not decided on facility.)                    Ga Green MD  Department of Hospital Medicine   [facility] - [facility] Tele[facility]

## 2025-01-16 NOTE — PLAN OF CARE
Problem: Adult Inpatient Plan of Care  Goal: Plan of Care Review  Outcome: Progressing  Goal: Patient-Specific Goal (Individualized)  Outcome: Progressing  Goal: Absence of Hospital-Acquired Illness or Injury  Outcome: Progressing  Goal: Optimal Comfort and Wellbeing  Outcome: Progressing  Goal: Readiness for Transition of Care  Outcome: Progressing     Problem: Diabetes Comorbidity  Goal: Blood Glucose Level Within Targeted Range  Outcome: Progressing     Problem: Confusion Acute  Goal: Optimal Cognitive Function  Outcome: Progressing       Pt A&Ox1 to self, on RA. D5W in NS with Kcl drips running. No c/o pain/N/V. VVS

## 2025-01-16 NOTE — ASSESSMENT & PLAN NOTE
CT UE with Nonspecific subcutaneous edema throughout the distal arm and forearm, possibly sterile or infectious in nature.  No fluid collection.  No osteomyelitis.     Monitor

## 2025-01-16 NOTE — SUBJECTIVE & OBJECTIVE
"Interval History: Some swelling in RUE with mild warmth and erythema.  Patient denies pain there, Ct forearm showed Nonspecific subcutaneous edema throughout the distal arm and forearm, possibly sterile or infectious in nature.  No fluid collection.  No osteomyelitis. .  Patient denies any pain and mentation remains similar to days prior.    Review of Systems   Reason unable to perform ROS: Limited due to cognitive decline; denies pain and shortness of breath.     Objective:     Vital Signs (Most Recent):  Temp: 97.7 °F (36.5 °C) (01/16/25 1110)  Pulse: 100 (01/16/25 1110)  Resp: 18 (01/16/25 1110)  BP: 98/62 (01/16/25 1110)  SpO2: (!) 94 % (01/16/25 1110) Vital Signs (24h Range):  Temp:  [97.7 °F (36.5 °C)-100.1 °F (37.8 °C)] 97.7 °F (36.5 °C)  Pulse:  [100-110] 100  Resp:  [18] 18  SpO2:  [91 %-97 %] 94 %  BP: ()/(55-64) 98/62     Weight: 54.9 kg (121 lb 0.5 oz)  Body mass index is 22.87 kg/m².    Intake/Output Summary (Last 24 hours) at 1/16/2025 1153  Last data filed at 1/16/2025 1000  Gross per 24 hour   Intake 100 ml   Output 750 ml   Net -650 ml         Physical Exam  Constitutional:       General: She is not in acute distress.     Appearance: She is not toxic-appearing.   HENT:      Head: Normocephalic and atraumatic.   Cardiovascular:      Rate and Rhythm: Normal rate and regular rhythm.   Pulmonary:      Effort: Pulmonary effort is normal.      Breath sounds: Normal breath sounds.   Musculoskeletal:      Comments: RUE redness, swelling   Neurological:      Mental Status: Mental status is at baseline.             Significant Labs: All pertinent labs within the past 24 hours have been reviewed.  CBC:   No results for input(s): "WBC", "HGB", "HCT", "PLT" in the last 48 hours.    CMP:   No results for input(s): "NA", "K", "CL", "CO2", "GLU", "BUN", "CREATININE", "CALCIUM", "PROT", "ALBUMIN", "BILITOT", "ALKPHOS", "AST", "ALT", "ANIONGAP", "EGFRNONAA" in the last 48 hours.    Invalid input(s): " ""ESTGFAFR[place]"      Significant Imaging: I have reviewed all pertinent imaging results/findings within the past 24 hours.  "

## 2025-01-16 NOTE — ASSESSMENT & PLAN NOTE
Patient with Acute debility due to age-related physical debility and other reduced mobility. The patient's latest AMPAC (Activity Measure for Post Acute Care) Score is listed below.    AM-PAC Score - How much help does the patient need for each activity listed  Basic Mobility Total Score: 7  Turning over in bed (including adjusting bedclothes, sheets and blankets)?: A lot  Sitting down on and standing up from a chair with arms (e.g., wheelchair, bedside commode, etc.): Unable  Moving from lying on back to sitting on the side of the bed?: Unable  Moving to and from a bed to a chair (including a wheelchair)?: Unable  Need to walk in hospital room?: Unable  Climbing 3-5 steps with a railing?: Unable    Plan  - Progressive mobility protocol initated  - PT/OT consulted  - Fall precautions in place  -plan for NH with hospice services  - Palliative care following  - conversation with patient's son Bruno who arrives on 1/16/25 or 1/17 for final discussions on plan -not in room today

## 2025-01-16 NOTE — ASSESSMENT & PLAN NOTE
"Patient's most recent potassium results are listed below.   No results for input(s): "K" in the last 72 hours.    Plan  - Replete potassium per protocol  - Monitor potassium Daily  - Patient's hypokalemia is improving  - K+ in fluids  "

## 2025-01-17 LAB
ALBUMIN SERPL BCP-MCNC: 1.6 G/DL (ref 3.5–5.2)
ALP SERPL-CCNC: 522 U/L (ref 40–150)
ALT SERPL W/O P-5'-P-CCNC: 60 U/L (ref 10–44)
ANION GAP SERPL CALC-SCNC: 9 MMOL/L (ref 8–16)
AST SERPL-CCNC: 50 U/L (ref 10–40)
BILIRUB SERPL-MCNC: 1.5 MG/DL (ref 0.1–1)
BUN SERPL-MCNC: 6 MG/DL (ref 8–23)
CALCIUM SERPL-MCNC: 7.8 MG/DL (ref 8.7–10.5)
CHLORIDE SERPL-SCNC: 118 MMOL/L (ref 95–110)
CO2 SERPL-SCNC: 20 MMOL/L (ref 23–29)
CREAT SERPL-MCNC: 0.5 MG/DL (ref 0.5–1.4)
EST. GFR  (NO RACE VARIABLE): >60 ML/MIN/1.73 M^2
GLUCOSE SERPL-MCNC: 68 MG/DL (ref 70–110)
POCT GLUCOSE: 101 MG/DL (ref 70–110)
POCT GLUCOSE: 84 MG/DL (ref 70–110)
POCT GLUCOSE: 85 MG/DL (ref 70–110)
POCT GLUCOSE: 93 MG/DL (ref 70–110)
POTASSIUM SERPL-SCNC: 3.2 MMOL/L (ref 3.5–5.1)
PROT SERPL-MCNC: 5.5 G/DL (ref 6–8.4)
SODIUM SERPL-SCNC: 147 MMOL/L (ref 136–145)

## 2025-01-17 PROCEDURE — 63600175 PHARM REV CODE 636 W HCPCS: Performed by: STUDENT IN AN ORGANIZED HEALTH CARE EDUCATION/TRAINING PROGRAM

## 2025-01-17 PROCEDURE — 36415 COLL VENOUS BLD VENIPUNCTURE: CPT | Performed by: STUDENT IN AN ORGANIZED HEALTH CARE EDUCATION/TRAINING PROGRAM

## 2025-01-17 PROCEDURE — 25000003 PHARM REV CODE 250: Performed by: STUDENT IN AN ORGANIZED HEALTH CARE EDUCATION/TRAINING PROGRAM

## 2025-01-17 PROCEDURE — 80053 COMPREHEN METABOLIC PANEL: CPT | Performed by: STUDENT IN AN ORGANIZED HEALTH CARE EDUCATION/TRAINING PROGRAM

## 2025-01-17 PROCEDURE — 25000003 PHARM REV CODE 250: Performed by: INTERNAL MEDICINE

## 2025-01-17 PROCEDURE — 11000001 HC ACUTE MED/SURG PRIVATE ROOM

## 2025-01-17 PROCEDURE — S4991 NICOTINE PATCH NONLEGEND: HCPCS | Performed by: STUDENT IN AN ORGANIZED HEALTH CARE EDUCATION/TRAINING PROGRAM

## 2025-01-17 RX ORDER — DEXTROSE MONOHYDRATE AND SODIUM CHLORIDE 5; .9 G/100ML; G/100ML
INJECTION, SOLUTION INTRAVENOUS CONTINUOUS
Status: ACTIVE | OUTPATIENT
Start: 2025-01-17 | End: 2025-01-18

## 2025-01-17 RX ORDER — DEXTROSE MONOHYDRATE AND SODIUM CHLORIDE 5; .45 G/100ML; G/100ML
INJECTION, SOLUTION INTRAVENOUS CONTINUOUS
Status: DISCONTINUED | OUTPATIENT
Start: 2025-01-17 | End: 2025-01-17

## 2025-01-17 RX ORDER — ELECTROLYTES/DEXTROSE
200 SOLUTION, ORAL ORAL
Status: DISCONTINUED | OUTPATIENT
Start: 2025-01-17 | End: 2025-01-21

## 2025-01-17 RX ADMIN — NIFEDIPINE 30 MG: 30 TABLET, FILM COATED, EXTENDED RELEASE ORAL at 08:01

## 2025-01-17 RX ADMIN — DEXTROSE AND SODIUM CHLORIDE: 5; 900 INJECTION, SOLUTION INTRAVENOUS at 12:01

## 2025-01-17 RX ADMIN — Medication 200 ML: at 09:01

## 2025-01-17 RX ADMIN — NICOTINE 1 PATCH: 7 PATCH, EXTENDED RELEASE TRANSDERMAL at 08:01

## 2025-01-17 RX ADMIN — ASPIRIN 81 MG: 81 TABLET, COATED ORAL at 09:01

## 2025-01-17 RX ADMIN — Medication 1 TABLET: at 08:01

## 2025-01-17 RX ADMIN — ATORVASTATIN CALCIUM 40 MG: 40 TABLET, FILM COATED ORAL at 08:01

## 2025-01-17 RX ADMIN — Medication 200 ML: at 05:01

## 2025-01-17 RX ADMIN — FOLIC ACID 1 MG: 1 TABLET ORAL at 08:01

## 2025-01-17 RX ADMIN — CEFEPIME 2 G: 2 INJECTION, POWDER, FOR SOLUTION INTRAVENOUS at 12:01

## 2025-01-17 RX ADMIN — ENOXAPARIN SODIUM 40 MG: 40 INJECTION SUBCUTANEOUS at 05:01

## 2025-01-17 RX ADMIN — Medication 100 MG: at 08:01

## 2025-01-17 RX ADMIN — CEFEPIME 2 G: 2 INJECTION, POWDER, FOR SOLUTION INTRAVENOUS at 09:01

## 2025-01-17 RX ADMIN — FERROUS SULFATE TAB 325 MG (65 MG ELEMENTAL FE) 1 EACH: 325 (65 FE) TAB at 08:01

## 2025-01-17 RX ADMIN — Medication 200 ML: at 01:01

## 2025-01-17 RX ADMIN — CEFEPIME 2 G: 2 INJECTION, POWDER, FOR SOLUTION INTRAVENOUS at 03:01

## 2025-01-17 RX ADMIN — LISINOPRIL 10 MG: 10 TABLET ORAL at 08:01

## 2025-01-17 NOTE — ASSESSMENT & PLAN NOTE
Nutrition consulted. Most recent weight and BMI monitored-     Measurements:  Wt Readings from Last 1 Encounters:   12/27/24 54.9 kg (121 lb 0.5 oz)   Body mass index is 22.87 kg/m².    Patient has been screened and assessed by RD.    Malnutrition Type:  Context: chronic illness  Level: severe    Malnutrition Characteristic Summary:  Weight Loss (Malnutrition): greater than 5% in 1 month  Energy Intake (Malnutrition): less than 75% for greater than or equal to 1 month  Subcutaneous Fat (Malnutrition): severe depletion  Muscle Mass (Malnutrition): severe depletion    Interventions/Recommendations (treatment strategy):  Please prioritize patient's comfort and preferences over strict nutritional goals at this time. Offer small, frequent meals, texture modifications, and respect the patient's desire to eat or not eat. Also ensure proper hydration with small sips of fluids periodically.    - supplying D5/NS fluids given poor intake

## 2025-01-17 NOTE — PLAN OF CARE
Problem: Adult Inpatient Plan of Care  Goal: Plan of Care Review  1/17/2025 0505 by Elena Tidwell RN  Outcome: Progressing  1/17/2025 0505 by Elena Tidwell RN  Outcome: Progressing  Goal: Patient-Specific Goal (Individualized)  1/17/2025 0505 by Elena Tidwell RN  Outcome: Progressing  1/17/2025 0505 by Elena Tidwell RN  Outcome: Progressing  Goal: Absence of Hospital-Acquired Illness or Injury  1/17/2025 0505 by Elena Tidwell RN  Outcome: Progressing  1/17/2025 0505 by Elena Tidwell RN  Outcome: Progressing  Goal: Optimal Comfort and Wellbeing  1/17/2025 0505 by Elena Tidwell RN  Outcome: Progressing  1/17/2025 0505 by Elena Tidwell RN  Outcome: Progressing  Goal: Readiness for Transition of Care  1/17/2025 0505 by Elena Tidwell RN  Outcome: Progressing  1/17/2025 0505 by Elena Tidwell RN  Outcome: Progressing     Problem: Fall Injury Risk  Goal: Absence of Fall and Fall-Related Injury  1/17/2025 0505 by Elena Tidwell RN  Outcome: Progressing  1/17/2025 0505 by Elena Tidwell RN  Outcome: Progressing     Problem: Infection  Goal: Absence of Infection Signs and Symptoms  1/17/2025 0505 by Elena Tidwell RN  Outcome: Progressing  1/17/2025 0505 by Elena Tidwell RN  Outcome: Progressing    Pt A&Ox1 to self, on RA and continuous drip. no c/o pain/N/V. VVS

## 2025-01-17 NOTE — ASSESSMENT & PLAN NOTE
Sliding scale held, accuchecks for hypoglycemia     Patient's FSGs are controlled on current medication regimen.  Last A1c reviewed-   Lab Results   Component Value Date    HGBA1C 5.5 10/11/2024     Most recent fingerstick glucose reviewed-   Recent Labs   Lab 01/16/25  1639 01/16/25  1926 01/17/25  0736   POCTGLUCOSE 88 104 84     Current correctional scale   hold on correction scale give A1C is 5.5  Antihyperglycemics (From admission, onward)      None          Hold Oral hypoglycemics while patient is in the hospital; holding all given low intake

## 2025-01-17 NOTE — SUBJECTIVE & OBJECTIVE
"Interval History: Improved swelling in RUE with mild warmth and erythema.  Patient denies pain there, CT forearm showed Nonspecific subcutaneous edema throughout the distal arm and forearm, possibly sterile or infectious in nature.  No fluid collection.  No osteomyelitis. .  Patient's mentation remains similar to days prior.    Palliative care talked with the son who is still not  here and may not be here until late afternoon. Per palliative care he does want hospice with placement in NH/facility. Continuing abx for now, awaiting discussion with son to transition to comfort care.      Review of Systems   Reason unable to perform ROS: Limited due to cognitive decline; denies pain and shortness of breath.     Objective:     Vital Signs (Most Recent):  Temp: 98 °F (36.7 °C) (01/17/25 0737)  Pulse: 91 (01/17/25 0737)  Resp: 18 (01/17/25 0732)  BP: (!) 159/72 (01/17/25 0737)  SpO2: 95 % (01/17/25 0737) Vital Signs (24h Range):  Temp:  [98 °F (36.7 °C)-98.8 °F (37.1 °C)] 98 °F (36.7 °C)  Pulse:  [] 91  Resp:  [18] 18  SpO2:  [89 %-96 %] 95 %  BP: (122-159)/(59-75) 159/72     Weight: 54.9 kg (121 lb 0.5 oz)  Body mass index is 22.87 kg/m².    Intake/Output Summary (Last 24 hours) at 1/17/2025 1113  Last data filed at 1/16/2025 2317  Gross per 24 hour   Intake 340 ml   Output 550 ml   Net -210 ml         Physical Exam  Constitutional:       General: She is not in acute distress.     Appearance: She is not toxic-appearing.   HENT:      Head: Normocephalic and atraumatic.   Cardiovascular:      Rate and Rhythm: Normal rate and regular rhythm.   Pulmonary:      Effort: Pulmonary effort is normal.      Breath sounds: Normal breath sounds.   Musculoskeletal:      Comments: RUE redness, swelling   Neurological:      Mental Status: Mental status is at baseline.             Significant Labs: All pertinent labs within the past 24 hours have been reviewed.  CBC:   No results for input(s): "WBC", "HGB", "HCT", "PLT" in the last " 48 hours.    CMP:   Recent Labs   Lab 01/17/25  0815   *   K 3.2*   *   CO2 20*   GLU 68*   BUN 6*   CREATININE 0.5   CALCIUM 7.8*   PROT 5.5*   ALBUMIN 1.6*   BILITOT 1.5*   ALKPHOS 522*   AST 50*   ALT 60*   ANIONGAP 9         Significant Imaging: I have reviewed all pertinent imaging results/findings within the past 24 hours.

## 2025-01-17 NOTE — ASSESSMENT & PLAN NOTE
Patient with Acute debility due to age-related physical debility and other reduced mobility. The patient's latest AMPAC (Activity Measure for Post Acute Care) Score is listed below.    AM-PAC Score - How much help does the patient need for each activity listed  Basic Mobility Total Score: 7  Turning over in bed (including adjusting bedclothes, sheets and blankets)?: A lot  Sitting down on and standing up from a chair with arms (e.g., wheelchair, bedside commode, etc.): Unable  Moving from lying on back to sitting on the side of the bed?: Unable  Moving to and from a bed to a chair (including a wheelchair)?: Unable  Need to walk in hospital room?: Unable  Climbing 3-5 steps with a railing?: Unable    Plan  - Progressive mobility protocol initated  - PT/OT consulted  - Fall precautions in place  - Plan for NH with hospice services - pending confirmation/discussion of palliative acre with son in person  - Palliative care following  - conversation with patient's son Bruno who arrives on 1/17 or 1/18 for final discussions on plan

## 2025-01-17 NOTE — PROGRESS NOTES
Palliative Medicine  Consult Note       Patient Name: Bhavna Lim   MRN: [id number]   Admission Date: 12/26/2024   Hospital Length of Stay: 19   Attending Provider: Ga Green MD   Consulting Provider: CHRIS King  Primary Care Physician: Suzi Green MD   Principal Problem: Palliative care encounter     Patient information was obtained from relative(s), past medical records, ER records, and primary team.           Assessment/Plan:      Palliative Care Encounter:  Impression:  Ms. Bhavna Lim is a 78 y/o female with hx of HTN, HLD, DM, cholelithiasis, and unintended weight loss.  She had been living at home, with granddaughter staying with her. Pt presented to ED on 12/26 for decrease in appetite and weight loss. She was seen by GI and underwent workup, which was deemed non-contributory to her nutritional status.  CT/MRI head were obtained which did not show any acute process.   Her overall picture seems to be related to dementia, after other causes have been ruled out.       Palliative care consulted for GOC conversations, as pt with progressive debility, dementia, and decrease in nutritional status, per communication with Dr. Rubio.        Advance Care Planning   Advance Directives:   Living Will: No    Do Not Resuscitate Status: Yes    Agent's Name:  Bruno Lim (son)   Agent's Contact Number:  [phone]    Decision Making:  Family answered questions  Goals of Care: The family endorses that what is most important right now is to focus on symptom/pain control and quality of life, even if it means sacrificing a little time    Accordingly, we have decided that the best plan to meet the patient's goals includes enrolling in hospice care upon d/c.     1/17/25: Pt remains confused and unable to participate in GOC conversation. Called pt's son to arrange meeting for today. He reports he is driving in and will be here around 4pm or later, and he will be here through Monday.    -I  discussed clarified current GOC of NH placement with hospice and he verbalized yes. His biggest concern/confusion at this moment is relating to insurance/medicaid paperwork, shares he is working with CM. He is hopeful to speak with MD this evening or Sat for full understanding of current issues, but is overall understanding and agreeable to hospice care.   -Family with clear GOC at this time. [facility] will sign off, please re-consult if needed.      1/15/25:    [facility] re-consulted for GOC, as pt's sister, expressed concern about appropriateness of hospice enrollment.   -I visited with pt this morning, who is alone in room and sleeping. She awakens easily, she seems more alert than upon my last meeting with her, but she remains confused. She is not oriented to time/place/situation, she shares she is not in pain and nothing is currently causing her discomfort.   -I called pt's son (bruno) who shares he will be here on  Friday morning.  I shared that his mom seemed more awake today but remains confused. I discussed briefly that pt is on antbx, but overall still not eating much and no changes to concern for dementia.   -Bruno and his wife (who is a nurse) have been reading p'ts chart and are up to date on chronicity of concerns. He does not feel his mother would want a peg tube and understands that not eating enough will eventually contribute to death. He does feel that hospice is most appropriate option at this time.   -Bruno shares that he understands pt's sister may not agree, and that unfortunately she may never agree. He also expresses concern that pt's sister seems to have some forgetfulness/dementia herself which may be making it harder for her.       1/6/25:     Spoke with pt's son (Bruno) who lives out of state. He understanding of pt's overall poor prognosis and progressive weakness and debility, along with overall poor nutritional status. Because of these things, I began discussion on code status  "including meaning of cardiopulmonary event, resuscitation, and dnr. Son's wife is a nurse and has helped him to understand   -Son is agreeable to DNR status.    -Son shares that he does not want his mother to suffer and inquired about hospice care. I discussed philosophy of hospice and goals of care associated with hospice services including: avoidance of hospital, comfort through end of life outside of the hospital, symptom management, and QOL.     -I was able to speak with pt's sister (Lila) and answer questions r/t recommendation of hospice and reason for avoidance of peg tube. She is saddened but understanding.     Symptom Management:  -Debility: Pt now bed-bound and with progressive debility. Unable to return home.     -Malnutrition: Pt with weight loss and malnutrition, also with dysphagia.     -Confusion: r/t dementia. Pt unable to continue to care for self.     Summary of recommendations and follow up plan:  -Most important goals at this time: Assurance of comfort and QOL.    -Code status: DNR  -Disposition: to be determined.       The above recommendations communicated directly to primary team on 1/17/25    Thank you for your consult. I will sign off. Please contact us if you have any additional questions.       Subjective:     Chief Complaint:   Chief Complaint   Patient presents with    Fatigue     Increased weakness/fatigue and loss of appetite x2 months. No longer able to ambulate/frequent falls.         HPI:   Per chart review: "Mrs. Bhavna Lim is a 79 year old F with a history of HTN and HLD who presents to the ED for weakness and fatigue. She has been having weight loss and poor po intake over the last few months. She has been seen by GI and evaluated with EGD and Colonoscopy as well as MRCP which showed cholelithiasis with gallbladder distention and intrahepatic and extrahepatic biliary dilation. No significant abnormalities on EGD and coloscopy. She was referred to surgery who recommended " "NM study and possible EUS with GI. She has been unable to tolerate any solid foods. She sometimes takes a little bit of soup and has tried protein shakes. She feels full immediately. She does endorse some pain in her epigastric region ever since having her EGD performed. She occasionally has vomiting. Denies regurgitation of food. She continues to have worsening weakness and falls at home. She had two falls yesterday due to weakness. She denies significant dizziness. She is unable to walk due to her weakness and sister is concerned this is related to her poor nutrition.       In the ED VSS. Labs notable for Potassium of 3.0 which was repleted in the ED. No focal signs concerning for stroke. She was admitted to medicine for further management."      Hospital Course:  Per chart review: "While on the floor PT/OT were consulted and recommended SNF. Nutrition consulted. Discussed with GI outpatient workup and no indication for further inpatient workup. She continued to be altered while in the hospital. CT spine/head ordered for concern of fall showing ischemic changes and signficant stenosis and narrowing. She developed worsening hand weakness and numbness/tinglign with abnormal coordiation. Some of this was prior to admission but weakness significantly worsened. MRI ordered and showed old infarcts. She continued to refuse food and did not have abdominal complaints just would not eat. Started on IVF due to hypoglycemia. Acute encephalopathy workup initiated and negative aside from low folate. UA ordered but not performed. At this point if nutrition becomes suboptimal will have to consider other means of nutrition. Son with concern of worsening memory and possible dementia wanting placement in [place]. Started on supplements for poor nutrition including thiamine and folate to see if this will help with mental status "    Review of Symptoms      Symptom Assessment (ESAS 0-10 Scale)  Unable to complete assessment due to " Acuity of condition         Pain Assessment in Advanced Demential Scale (PAINAD)   Breathing - Independent of vocalization:  0  Negative vocalization:  0  Facial expression:  0  Body language:  0  Consolability:  0  Total:  0    Performance Status:  50    Living Arrangements:  Lives with family    Psychosocial/Cultural:   See Palliative Psychosocial Note: Yes  Pt lives in her home and granddaughter able to stay with her. Pt has sister who lives near and is very involved in her care. Pt has one child who lives out of state.   **Primary  to Follow**  Palliative Care  Consult: No          ROS:  Review of Systems   Constitutional:  Positive for appetite change.         Past Medical History:   Diagnosis Date    Diabetes mellitus     Hyperlipidemia     Hypertension     Ingrown nail 2015    Bilateral great toe nail. No edema; tenderness or drainage.      Nail fungus 2015    To bilateral toenails X 10.      Obesity (BMI 30-39.9) 2024    Open wound of knee, leg, and ankle 2015     Past Surgical History:   Procedure Laterality Date     SECTION      COLONOSCOPY N/A 2024    Procedure: COLONOSCOPY;  Surgeon: Arturo Montalvo MD;  Location: [facility];  Service: Endoscopy;  Laterality: N/A;   ref by Jocelin Torres NP, [place], instructions given to pt in ofc and portal, cardiac clearance sent. edmund    ESOPHAGOGASTRODUODENOSCOPY N/A 2024    Procedure: EGD (ESOPHAGOGASTRODUODENOSCOPY);  Surgeon: Arturo Montalvo MD;  Location: [facility];  Service: Endoscopy;  Laterality: N/A;  cleared by cardiology Dr Pena-teodora E consult dated 24-GT  12/10-[facility] for pre call-tb     No family history on file.      Review of patient's allergies indicates:  No Known Allergies    Medications:    Current Facility-Administered Medications:     acetaminophen tablet 650 mg, 650 mg, Oral, Q8H PRN, Yarely Walters MD, 650 mg at 25    acetaminophen tablet 650 mg, 650 mg, Oral, Q4H  GERALDO, Yanick Miller MD, 650 mg at 01/12/25 2300    aluminum-magnesium hydroxide-simethicone 200-200-20 mg/5 mL suspension 30 mL, 30 mL, Oral, QID PRN, Yarely Walters MD    aspirin EC tablet 81 mg, 81 mg, Oral, Daily, Yarely Walters MD, 81 mg at 01/16/25 0946    atorvastatin tablet 40 mg, 40 mg, Oral, Daily, Yarely Walters MD, 40 mg at 01/17/25 0821    ceFEPIme injection 2 g, 2 g, Intravenous, Q8H, Les Rizo MD, 2 g at 01/17/25 0306    dextrose 5 % and 0.9 % NaCl infusion, , Intravenous, Continuous, Ga Green MD    dextrose 50% injection 12.5 g, 12.5 g, Intravenous, PRN, Yarely Walters MD, 12.5 g at 01/06/25 0818    dextrose 50% injection 25 g, 25 g, Intravenous, PRN, Yarely Walters MD    electrolytes-dextrose (Pedialyte) oral solution 200 mL, 200 mL, Oral, Q4H, Ga Green MD    enoxaparin injection 40 mg, 40 mg, Subcutaneous, Daily, Yarely Walters MD, 40 mg at 01/16/25 1625    ferrous sulfate tablet 1 each, 1 tablet, Oral, Daily, Tate Rubio MD, 1 each at 01/17/25 0821    folic acid tablet 1 mg, 1 mg, Oral, Daily, Yarely Walters MD, 1 mg at 01/17/25 0821    glucagon (human recombinant) injection 1 mg, 1 mg, Intramuscular, PRN, Yarely Walters MD, 1 mg at 12/31/24 2053    glucose chewable tablet 16 g, 16 g, Oral, PRNRomeo Nicole D., MD, 16 g at 01/14/25 1202    glucose chewable tablet 24 g, 24 g, Oral, PRN, Yarely Walters MD    k phos di & mono-sod phos mono 250 mg tablet 1 tablet, 1 tablet, Oral, BID, Yarely Walters MD, 1 tablet at 01/17/25 0821    lisinopriL tablet 10 mg, 10 mg, Oral, Daily, Yarely Walters MD, 10 mg at 01/17/25 0821    melatonin tablet 6 mg, 6 mg, Oral, Nightly PRN, Yarely Walters MD, 6 mg at 01/13/25 2112    naloxone 0.4 mg/mL injection 0.02 mg, 0.02 mg, Intravenous, PRN, Yarely Walters MD    nicotine 7 mg/24 hr 1 patch, 1 patch, Transdermal, Daily, Yarely Walters MD, 1 patch at 01/17/25 0821     NIFEdipine 24 hr tablet 30 mg, 30 mg, Oral, Daily, Yarely Walters MD, 30 mg at 01/17/25 0820    ondansetron injection 4 mg, 4 mg, Intravenous, Q8H PRN, Yarely Walters MD    promethazine tablet 25 mg, 25 mg, Oral, Q6H PRN, Yarely Walters MD    simethicone chewable tablet 80 mg, 1 tablet, Oral, QID PRN, Yarely Walters MD    sodium chloride 0.9% flush 10 mL, 10 mL, Intravenous, Q12H PRN, Yarely Walters MD    thiamine tablet 100 mg, 100 mg, Oral, Daily, Yarely Walters MD, 100 mg at 01/17/25 0821         Objective:      Physical Exam:  Vitals: Temp: 98 °F (36.7 °C) (01/17/25 0737)  Pulse: 91 (01/17/25 0737)  Resp: 18 (01/17/25 0732)  BP: (!) 159/72 (01/17/25 0737)  SpO2: 95 % (01/17/25 0737)    Physical Exam  Constitutional:       Appearance: She is ill-appearing.   Neurological:      Mental Status: She is disoriented.                 Labs:   Creatinine   Date Value Ref Range Status   01/17/2025 0.5 0.5 - 1.4 mg/dL Final      Hemoglobin   Date Value Ref Range Status   01/13/2025 9.1 (L) 12.0 - 16.0 g/dL Final      Albumin   Date Value Ref Range Status   01/17/2025 1.6 (L) 3.5 - 5.2 g/dL Final   01/13/2025 1.5 (L) 3.5 - 5.2 g/dL Final   01/12/2025 1.6 (L) 3.5 - 5.2 g/dL Final          Imaging: MRI Brain W WO Contrast  Order: [id number]  Status: Final result       Visible to patient: Yes (seen)       Next appt: None    0 Result Notes  Details    Reading Physician Reading Date Result Priority   Casimiro Bateman MD  [phone] 12/31/2024 Routine     Narrative & Impression  EXAMINATION:  MRI BRAIN W WO CONTRAST     CLINICAL HISTORY:  Memory loss;Ataxia or coordination problem (Ped 0-18y);abnormal coordiation, cognitive decline;.     TECHNIQUE:  Multiplanar multisequence MR imaging of the brain was performed before and after the administration of 6 mL Gadavist  intravenous contrast.     COMPARISON:  CT head without contrast 12/29/2024.     FINDINGS:  Intracranial compartment:     Postcontrast  images limited secondary to diffuse motion artifact     Mild prominence of the ventricles with compensatory enlargement of the sulci, in keeping with central volume loss.  No hydrocephalus.  No extra-axial blood or fluid collections.     Scattered regions of subcortical and periventricular T2/FLAIR hyperintense signal, overall nonspecific, but can be seen in setting of microvascular ischemic change.  Remote lacunar type infarct about the right thalamus.  No mass lesion, acute hemorrhage, edema or acute infarct. No abnormal enhancement.     Normal vascular flow voids are preserved.     Skull/extracranial contents (limited evaluation): Bone marrow signal intensity is normal.     Paranasal sinuses and mastoid air cells are essentially clear.     Impression:     No evidence for acute intracranial pathology, noting severe limitations from motion artifact degrading postcontrast imaging.     Sequela of chronic microvascular ischemic change.  Remote lacunar type infarct right thalamus.        Electronically signed by:Casimiro Bateman  Date:                                            12/31/2024  Time:                                           15:05        Exam Ended: 12/31/24 14:13 CST Last Resulted: 12/31/24 15:05 CST       > 50% of  55 min visit spent in chart review, face to face discussion of goals of care,  symptom assessment, coordination of care, charting, and emotional support     Thank you for the opportunity to care for this patient and family.       Mayelin Trejo, CNS

## 2025-01-17 NOTE — PROGRESS NOTES
[facility] - Internal Medicine [facility] Medicine  Progress Note    Patient Name: Bhavna Lim  MRN: [id number]  Patient Class: IP- Inpatient   Admission Date: 12/26/2024  Length of Stay: 19 days  Attending Physician: Ga Green MD  Primary Care Provider: Suzi Green MD        Subjective     Principal Problem:Palliative care encounter        HPI:  Mrs. Bhavna Lim is a 79 year old F with a history of HTN and HLD who presents to the ED for weakness and fatigue. She has been having weight loss and poor po intake over the last few months. She has been seen by GI and evaluated with EGD and Colonoscopy as well as MRCP which showed cholelithiasis with gallbladder distention and intrahepatic and extrahepatic biliary dilation. No significant abnormalities on EGD and coloscopy. She was referred to surgery who recommended NM study and possible EUS with GI. She has been unable to tolerate any solid foods. She sometimes takes a little bit of soup and has tried protein shakes. She feels full immediately. She does endorse some pain in her epigastric region ever since having her EGD performed. She occasionally has vomiting. Denies regurgitation of food. She continues to have worsening weakness and falls at home. She had two falls yesterday due to weakness. She denies significant dizziness. She is unable to walk due to her weakness and sister is concerned this is related to her poor nutrition.      In the ED VSS. Labs notable for Potassium of 3.0 which was repleted in the ED. No focal signs concerning for stroke. She was admitted to medicine for further management.      Overview/Hospital Course:  While on the floor PT/OT were consulted and recommended SNF. Nutrition consulted. Discussed with GI outpatient workup and no indication for further inpatient workup. She continued to be altered while in the hospital. CT spine/head ordered for concern of fall showing ischemic changes and signficant stenosis and  narrowing. She developed worsening hand weakness and numbness/tinglign with abnormal coordiation. Some of this was prior to admission but weakness significantly worsened. MRI ordered and showed old infarcts. She continued to refuse food and did not have abdominal complaints just would not eat. Started on IVF due to hypoglycemia. Acute encephalopathy workup initiated and negative aside from low folate. UA ordered but not performed. At this point if nutrition becomes suboptimal will have to consider other means of nutrition. Son with concern of worsening memory and possible dementia wanting placement in [place]. Started on supplements for poor nutrition including thiamine and folate to see if this will help with mental status     Plan is NH with hospice services.    Interval History: Improved swelling in RUE with mild warmth and erythema.  Patient denies pain there, CT forearm showed Nonspecific subcutaneous edema throughout the distal arm and forearm, possibly sterile or infectious in nature.  No fluid collection.  No osteomyelitis. .  Patient's mentation remains similar to days prior.    Palliative care talked with the son who is still not  here and may not be here until late afternoon. Per palliative care he does want hospice with placement in NH/facility. Continuing abx for now, awaiting discussion with son to transition to comfort care.      Review of Systems   Reason unable to perform ROS: Limited due to cognitive decline; denies pain and shortness of breath.     Objective:     Vital Signs (Most Recent):  Temp: 98 °F (36.7 °C) (01/17/25 0737)  Pulse: 91 (01/17/25 0737)  Resp: 18 (01/17/25 0732)  BP: (!) 159/72 (01/17/25 0737)  SpO2: 95 % (01/17/25 0737) Vital Signs (24h Range):  Temp:  [98 °F (36.7 °C)-98.8 °F (37.1 °C)] 98 °F (36.7 °C)  Pulse:  [] 91  Resp:  [18] 18  SpO2:  [89 %-96 %] 95 %  BP: (122-159)/(59-75) 159/72     Weight: 54.9 kg (121 lb 0.5 oz)  Body mass index is 22.87 kg/m².    Intake/Output  "Summary (Last 24 hours) at 1/17/2025 1113  Last data filed at 1/16/2025 2317  Gross per 24 hour   Intake 340 ml   Output 550 ml   Net -210 ml         Physical Exam  Constitutional:       General: She is not in acute distress.     Appearance: She is not toxic-appearing.   HENT:      Head: Normocephalic and atraumatic.   Cardiovascular:      Rate and Rhythm: Normal rate and regular rhythm.   Pulmonary:      Effort: Pulmonary effort is normal.      Breath sounds: Normal breath sounds.   Musculoskeletal:      Comments: RUE redness, swelling   Neurological:      Mental Status: Mental status is at baseline.             Significant Labs: All pertinent labs within the past 24 hours have been reviewed.  CBC:   No results for input(s): "WBC", "HGB", "HCT", "PLT" in the last 48 hours.    CMP:   Recent Labs   Lab 01/17/25  0815   *   K 3.2*   *   CO2 20*   GLU 68*   BUN 6*   CREATININE 0.5   CALCIUM 7.8*   PROT 5.5*   ALBUMIN 1.6*   BILITOT 1.5*   ALKPHOS 522*   AST 50*   ALT 60*   ANIONGAP 9         Significant Imaging: I have reviewed all pertinent imaging results/findings within the past 24 hours.    Assessment and Plan     * Palliative care encounter  Plan is NH with Hospice as discussed above      Swelling of right upper extremity  CT UE with Nonspecific subcutaneous edema throughout the distal arm and forearm, possibly sterile or infectious in nature.  No fluid collection.  No osteomyelitis.     Monitor    Hyperbilirubinemia  - elevation in bilirubin with concomitant elevations in LFTs  - obtaining RUQ US and fractionating for assessment      Pseudomonas urinary tract infection  - urinalysis concerning for UTI and found to have pseudomonas  - c/w Cefepime - started 1/14  - Follow blood cx  - close assessment to see if mentation improves  - [facility] ongoing      Cognitive impairment  -history of confusion and forgetfulness prior to admission  -appears to be more confused inpatient as well  -given impairment and " abnormal coordination MRI and C spine ordered: - CT head: Sequela of chronic microvascular ischemic change. Remote lacunar type infarct right thalamus; MRI C-spine: Multilevel cervical spondylosis, worst at C3-C4, contributing to moderate spinal canal stenosis as well as moderate left neural foraminal narrowing.    -HIV/RPR negative. Folate low, repleting. B12 high. Thiamine low and repleting. Possibly 2/2 to dementia with poor nutritional intake; TSH wnl on 1/1/2025  - exhausted reversible causes of poor mentation and with imaging, appears to be age-related decline accelerated by some degree of vascular dementia and large degree of confidence that this is patient's new baseline with expected further decline  - administering fluids with dextrose and electrolytes given poor oral intake; discussed pitfalls of G-tube placement  - plan for Nursing Home with hospice services - awaiting son's arrival and confirmation  - consult to Neurology at discharge to characterize dementia and potential interventions to slow progress - if not opting for hospice  - Pseudomonas found in urine; will treat and assess for change in mentation/ cognition    Hypoglycemia  -episodes of hypoglycemia overnight  -previously needed amp of D50  - started on IV D5 NS for 1 day through 1/18   -monitor BG closely  -likely related to poor PO intake       Numbness and tingling in both hands  -noted by patient before admission with abnormal finger to nose and weakness   -CT head and spine reviewed. There is significant stenosis/narrowing of C6 and C7 which could be contributing to symptoms. Findings appear chronic and not acute. MRI ordered and notable for cervical stenosis and lacunar infarcts. Limited visiblity due to movement   -CT head notable for remote lacunar infarcts, hx of stroke in 2005 currently on ASA   -continue PT/OT         Severe protein-calorie malnutrition  Nutrition consulted. Most recent weight and BMI monitored-  "    Measurements:  Wt Readings from Last 1 Encounters:   12/27/24 54.9 kg (121 lb 0.5 oz)   Body mass index is 22.87 kg/m².    Patient has been screened and assessed by RD.    Malnutrition Type:  Context: chronic illness  Level: severe    Malnutrition Characteristic Summary:  Weight Loss (Malnutrition): greater than 5% in 1 month  Energy Intake (Malnutrition): less than 75% for greater than or equal to 1 month  Subcutaneous Fat (Malnutrition): severe depletion  Muscle Mass (Malnutrition): severe depletion    Interventions/Recommendations (treatment strategy):  Please prioritize patient's comfort and preferences over strict nutritional goals at this time. Offer small, frequent meals, texture modifications, and respect the patient's desire to eat or not eat. Also ensure proper hydration with small sips of fluids periodically.    - supplying D5/NS fluids given poor intake    Hypokalemia  Patient's most recent potassium results are listed below.   No results for input(s): "K" in the last 72 hours.    Plan  - Replete potassium per protocol  - Monitor potassium Daily  - Patient's hypokalemia is improving  - K+ in fluids    Debility  Patient with Acute debility due to age-related physical debility and other reduced mobility. The patient's latest AMPAC (Activity Measure for Post Acute Care) Score is listed below.    AM-PAC Score - How much help does the patient need for each activity listed  Basic Mobility Total Score: 7  Turning over in bed (including adjusting bedclothes, sheets and blankets)?: A lot  Sitting down on and standing up from a chair with arms (e.g., wheelchair, bedside commode, etc.): Unable  Moving from lying on back to sitting on the side of the bed?: Unable  Moving to and from a bed to a chair (including a wheelchair)?: Unable  Need to walk in hospital room?: Unable  Climbing 3-5 steps with a railing?: Unable    Plan  - Progressive mobility protocol initated  - PT/OT consulted  - Fall precautions in " place  - Plan for NH with hospice services - pending confirmation/discussion of palliative acre with son in person  - Palliative care following  - conversation with patient's son Bruno who arrives on 1/17 or 1/18 for final discussions on plan       Diabetes mellitus  Sliding scale held, accuchecks for hypoglycemia     Patient's FSGs are controlled on current medication regimen.  Last A1c reviewed-   Lab Results   Component Value Date    HGBA1C 5.5 10/11/2024     Most recent fingerstick glucose reviewed-   Recent Labs   Lab 01/16/25  1639 01/16/25  1926 01/17/25  0736   POCTGLUCOSE 88 104 84     Current correctional scale   hold on correction scale give A1C is 5.5  Antihyperglycemics (From admission, onward)      None          Hold Oral hypoglycemics while patient is in the hospital; holding all given low intake    Unintentional weight loss  Nutrition consulted. Most recent weight and BMI monitored-     Measurements:  Wt Readings from Last 1 Encounters:   12/27/24 54.9 kg (121 lb 0.5 oz)   Body mass index is 22.87 kg/m².    RD consultation   Extensive outpatient evaluation obtained by GI and General surgery   Will add supplementation to meals; soft bite-sized per rec's      Please prioritize patient's comfort and preferences over strict nutritional goals at this time. Offer small, frequent meals, texture modifications, and respect the patient's desire to eat or not eat. Also ensure proper hydration with small sips of fluids periodically.      Hyperlipidemia  - continue ASA and statin      Benign essential hypertension  Patient's blood pressure range in the last 24 hours was: BP  Min: 114/68  Max: 137/81.The patient's inpatient anti-hypertensive regimen is listed below:  Current Antihypertensives  lisinopriL tablet 10 mg, Daily, Oral  NIFEdipine 24 hr tablet 30 mg, Daily, Oral    Plan  - BP is controlled, no changes needed to their regimen  - will continue home regimen      VTE Risk Mitigation (From admission,  onward)           Ordered     enoxaparin injection 40 mg  Daily         12/26/24 1700     IP VTE HIGH RISK PATIENT  Once         12/26/24 1547     Place sequential compression device  Until discontinued         12/26/24 1547                    Discharge Planning   RITA: 1/21/2025     Code Status: DNR   Medical Readiness for Discharge Date:   Discharge Plan A: New Nursing Home placement - correction care facility (with hospice services)   Discharge Delays: (!) Patient and Family Barriers (Family has not decided on facility.)                    Ga Green MD  Department of Hospital Medicine   [facility] - Internal Medicine Telemetry

## 2025-01-17 NOTE — ASSESSMENT & PLAN NOTE
- urinalysis concerning for UTI and found to have pseudomonas  - c/w Cefepime   - Follow blood cx  - close assessment to see if mentation improves  - C ongoing

## 2025-01-17 NOTE — ASSESSMENT & PLAN NOTE
-episodes of hypoglycemia overnight  -previously needed amp of D50  - started on IV D5 NS for 1 day through 1/18   -monitor BG closely  -likely related to poor PO intake

## 2025-01-17 NOTE — ASSESSMENT & PLAN NOTE
-history of confusion and forgetfulness prior to admission  -appears to be more confused inpatient as well  -given impairment and abnormal coordination MRI and C spine ordered: - CT head: Sequela of chronic microvascular ischemic change. Remote lacunar type infarct right thalamus; MRI C-spine: Multilevel cervical spondylosis, worst at C3-C4, contributing to moderate spinal canal stenosis as well as moderate left neural foraminal narrowing.    -HIV/RPR negative. Folate low, repleting. B12 high. Thiamine low and repleting. Possibly 2/2 to dementia with poor nutritional intake; TSH wnl on 1/1/2025  - exhausted reversible causes of poor mentation and with imaging, appears to be age-related decline accelerated by some degree of vascular dementia and large degree of confidence that this is patient's new baseline with expected further decline  - administering fluids with dextrose and electrolytes given poor oral intake; discussed pitfalls of G-tube placement  - plan for Nursing Home with hospice services - awaiting son's arrival and confirmation  - consult to Neurology at discharge to characterize dementia and potential interventions to slow progress - if not opting for hospice  - Pseudomonas found in urine; will treat and assess for change in mentation/ cognition

## 2025-01-17 NOTE — PROGRESS NOTES
AAO to self only. . POC reviewed, No acute events on shift.  Afebrile. LFA IV infiltration. Dr Green notified fluids in hold until new piv placed. Picc team placed RFA 20. Patients D5 NS 20K running at 75cc/hr. No BM today, Good karissa urine output to springer. CHG bath. Pt also received bedbath today. Patient awake and alert with periods of sleeping off and on today. Patient pleasant and very cooperative.   Bed alarm set, fall precautions maintained.  Bed locked, in lowest position, side rails up X2, call light w/in reach, personal items @ bedside. Telesitter in  view of patient.  Safety measures maintained

## 2025-01-18 PROBLEM — E87.0 HYPERNATREMIA: Status: ACTIVE | Noted: 2025-01-18

## 2025-01-18 PROBLEM — D64.9 ANEMIA: Status: ACTIVE | Noted: 2025-01-18

## 2025-01-18 PROBLEM — D69.6 THROMBOCYTOPENIA: Status: ACTIVE | Noted: 2025-01-18

## 2025-01-18 PROBLEM — E83.39 HYPOPHOSPHATEMIA: Status: ACTIVE | Noted: 2025-01-18

## 2025-01-18 PROBLEM — E87.1 HYPONATREMIA: Status: RESOLVED | Noted: 2025-01-18 | Resolved: 2025-01-18

## 2025-01-18 PROBLEM — E83.42 HYPOMAGNESEMIA: Status: ACTIVE | Noted: 2025-01-18

## 2025-01-18 PROBLEM — E87.0 HYPERNATREMIA: Status: RESOLVED | Noted: 2025-01-18 | Resolved: 2025-01-18

## 2025-01-18 PROBLEM — E87.1 HYPONATREMIA: Status: ACTIVE | Noted: 2025-01-18

## 2025-01-18 LAB
ALBUMIN SERPL BCP-MCNC: 1.5 G/DL (ref 3.5–5.2)
ALP SERPL-CCNC: 513 U/L (ref 40–150)
ALT SERPL W/O P-5'-P-CCNC: 58 U/L (ref 10–44)
ANION GAP SERPL CALC-SCNC: 8 MMOL/L (ref 8–16)
AST SERPL-CCNC: 55 U/L (ref 10–40)
BASOPHILS # BLD AUTO: 0.09 K/UL (ref 0–0.2)
BASOPHILS NFR BLD: 0.5 % (ref 0–1.9)
BILIRUB SERPL-MCNC: 1.5 MG/DL (ref 0.1–1)
BUN SERPL-MCNC: 6 MG/DL (ref 8–23)
CALCIUM SERPL-MCNC: 7.8 MG/DL (ref 8.7–10.5)
CHLORIDE SERPL-SCNC: 118 MMOL/L (ref 95–110)
CO2 SERPL-SCNC: 19 MMOL/L (ref 23–29)
CREAT SERPL-MCNC: 0.6 MG/DL (ref 0.5–1.4)
DIFFERENTIAL METHOD BLD: ABNORMAL
EOSINOPHIL # BLD AUTO: 0.7 K/UL (ref 0–0.5)
EOSINOPHIL NFR BLD: 3.8 % (ref 0–8)
ERYTHROCYTE [DISTWIDTH] IN BLOOD BY AUTOMATED COUNT: 23.8 % (ref 11.5–14.5)
EST. GFR  (NO RACE VARIABLE): >60 ML/MIN/1.73 M^2
GLUCOSE SERPL-MCNC: 59 MG/DL (ref 70–110)
HCT VFR BLD AUTO: 28.4 % (ref 37–48.5)
HGB BLD-MCNC: 9.5 G/DL (ref 12–16)
IMM GRANULOCYTES # BLD AUTO: 0.14 K/UL (ref 0–0.04)
IMM GRANULOCYTES NFR BLD AUTO: 0.8 % (ref 0–0.5)
LYMPHOCYTES # BLD AUTO: 1.5 K/UL (ref 1–4.8)
LYMPHOCYTES NFR BLD: 8.4 % (ref 18–48)
MAGNESIUM SERPL-MCNC: 1.4 MG/DL (ref 1.6–2.6)
MCH RBC QN AUTO: 28.3 PG (ref 27–31)
MCHC RBC AUTO-ENTMCNC: 33.5 G/DL (ref 32–36)
MCV RBC AUTO: 85 FL (ref 82–98)
MONOCYTES # BLD AUTO: 1.2 K/UL (ref 0.3–1)
MONOCYTES NFR BLD: 6.9 % (ref 4–15)
NEUTROPHILS # BLD AUTO: 13.7 K/UL (ref 1.8–7.7)
NEUTROPHILS NFR BLD: 79.6 % (ref 38–73)
NRBC BLD-RTO: 0 /100 WBC
PHOSPHATE SERPL-MCNC: 2 MG/DL (ref 2.7–4.5)
PLATELET # BLD AUTO: 311 K/UL (ref 150–450)
PMV BLD AUTO: 11.7 FL (ref 9.2–12.9)
POCT GLUCOSE: 73 MG/DL (ref 70–110)
POCT GLUCOSE: 75 MG/DL (ref 70–110)
POCT GLUCOSE: 82 MG/DL (ref 70–110)
POCT GLUCOSE: 92 MG/DL (ref 70–110)
POTASSIUM SERPL-SCNC: 3 MMOL/L (ref 3.5–5.1)
PROT SERPL-MCNC: 5.4 G/DL (ref 6–8.4)
RBC # BLD AUTO: 3.36 M/UL (ref 4–5.4)
SODIUM SERPL-SCNC: 145 MMOL/L (ref 136–145)
WBC # BLD AUTO: 17.21 K/UL (ref 3.9–12.7)

## 2025-01-18 PROCEDURE — 11000001 HC ACUTE MED/SURG PRIVATE ROOM

## 2025-01-18 PROCEDURE — 25000003 PHARM REV CODE 250: Performed by: INTERNAL MEDICINE

## 2025-01-18 PROCEDURE — 25000003 PHARM REV CODE 250: Performed by: STUDENT IN AN ORGANIZED HEALTH CARE EDUCATION/TRAINING PROGRAM

## 2025-01-18 PROCEDURE — 85025 COMPLETE CBC W/AUTO DIFF WBC: CPT | Performed by: STUDENT IN AN ORGANIZED HEALTH CARE EDUCATION/TRAINING PROGRAM

## 2025-01-18 PROCEDURE — 84100 ASSAY OF PHOSPHORUS: CPT | Performed by: STUDENT IN AN ORGANIZED HEALTH CARE EDUCATION/TRAINING PROGRAM

## 2025-01-18 PROCEDURE — 63600175 PHARM REV CODE 636 W HCPCS: Performed by: STUDENT IN AN ORGANIZED HEALTH CARE EDUCATION/TRAINING PROGRAM

## 2025-01-18 PROCEDURE — 63600175 PHARM REV CODE 636 W HCPCS: Performed by: INTERNAL MEDICINE

## 2025-01-18 PROCEDURE — 80053 COMPREHEN METABOLIC PANEL: CPT | Performed by: STUDENT IN AN ORGANIZED HEALTH CARE EDUCATION/TRAINING PROGRAM

## 2025-01-18 PROCEDURE — 36415 COLL VENOUS BLD VENIPUNCTURE: CPT | Performed by: STUDENT IN AN ORGANIZED HEALTH CARE EDUCATION/TRAINING PROGRAM

## 2025-01-18 PROCEDURE — 83735 ASSAY OF MAGNESIUM: CPT | Performed by: STUDENT IN AN ORGANIZED HEALTH CARE EDUCATION/TRAINING PROGRAM

## 2025-01-18 PROCEDURE — S4991 NICOTINE PATCH NONLEGEND: HCPCS | Performed by: STUDENT IN AN ORGANIZED HEALTH CARE EDUCATION/TRAINING PROGRAM

## 2025-01-18 RX ORDER — LANOLIN ALCOHOL/MO/W.PET/CERES
400 CREAM (GRAM) TOPICAL ONCE
Status: COMPLETED | OUTPATIENT
Start: 2025-01-18 | End: 2025-01-18

## 2025-01-18 RX ORDER — HALOPERIDOL 5 MG/ML
0.5 INJECTION INTRAMUSCULAR EVERY 6 HOURS PRN
Status: DISCONTINUED | OUTPATIENT
Start: 2025-01-18 | End: 2025-01-18

## 2025-01-18 RX ORDER — LEVOFLOXACIN 750 MG/1
750 TABLET ORAL DAILY
Status: COMPLETED | OUTPATIENT
Start: 2025-01-18 | End: 2025-01-20

## 2025-01-18 RX ORDER — SODIUM,POTASSIUM PHOSPHATES 280-250MG
2 POWDER IN PACKET (EA) ORAL ONCE
Status: COMPLETED | OUTPATIENT
Start: 2025-01-18 | End: 2025-01-18

## 2025-01-18 RX ORDER — HALOPERIDOL 5 MG/ML
0.5 INJECTION INTRAMUSCULAR EVERY 6 HOURS PRN
Status: DISCONTINUED | OUTPATIENT
Start: 2025-01-18 | End: 2025-02-13 | Stop reason: HOSPADM

## 2025-01-18 RX ORDER — POTASSIUM CHLORIDE 20 MEQ/1
40 TABLET, EXTENDED RELEASE ORAL ONCE
Status: COMPLETED | OUTPATIENT
Start: 2025-01-18 | End: 2025-01-18

## 2025-01-18 RX ADMIN — HALOPERIDOL LACTATE 0.5 MG: 5 INJECTION, SOLUTION INTRAMUSCULAR at 09:01

## 2025-01-18 RX ADMIN — LISINOPRIL 10 MG: 10 TABLET ORAL at 09:01

## 2025-01-18 RX ADMIN — Medication 200 ML: at 09:01

## 2025-01-18 RX ADMIN — Medication 200 ML: at 04:01

## 2025-01-18 RX ADMIN — Medication 200 ML: at 01:01

## 2025-01-18 RX ADMIN — FERROUS SULFATE TAB 325 MG (65 MG ELEMENTAL FE) 1 EACH: 325 (65 FE) TAB at 09:01

## 2025-01-18 RX ADMIN — ASPIRIN 81 MG: 81 TABLET, COATED ORAL at 09:01

## 2025-01-18 RX ADMIN — ACETAMINOPHEN 650 MG: 325 TABLET ORAL at 06:01

## 2025-01-18 RX ADMIN — FOLIC ACID 1 MG: 1 TABLET ORAL at 09:01

## 2025-01-18 RX ADMIN — NICOTINE 1 PATCH: 7 PATCH, EXTENDED RELEASE TRANSDERMAL at 09:01

## 2025-01-18 RX ADMIN — ATORVASTATIN CALCIUM 40 MG: 40 TABLET, FILM COATED ORAL at 09:01

## 2025-01-18 RX ADMIN — Medication 400 MG: at 09:01

## 2025-01-18 RX ADMIN — Medication 1 TABLET: at 09:01

## 2025-01-18 RX ADMIN — Medication 100 MG: at 09:01

## 2025-01-18 RX ADMIN — LEVOFLOXACIN 750 MG: 750 TABLET, FILM COATED ORAL at 09:01

## 2025-01-18 RX ADMIN — CEFEPIME 2 G: 2 INJECTION, POWDER, FOR SOLUTION INTRAVENOUS at 05:01

## 2025-01-18 RX ADMIN — POTASSIUM CHLORIDE 40 MEQ: 1500 TABLET, EXTENDED RELEASE ORAL at 09:01

## 2025-01-18 RX ADMIN — ENOXAPARIN SODIUM 40 MG: 40 INJECTION SUBCUTANEOUS at 04:01

## 2025-01-18 RX ADMIN — NIFEDIPINE 30 MG: 30 TABLET, FILM COATED, EXTENDED RELEASE ORAL at 09:01

## 2025-01-18 RX ADMIN — POTASSIUM & SODIUM PHOSPHATES POWDER PACK 280-160-250 MG 2 PACKET: 280-160-250 PACK at 09:01

## 2025-01-18 NOTE — ASSESSMENT & PLAN NOTE
- elevation in bilirubin with concomitant elevations in LFTs  - RUQ U/S significant for biliary sludge, mild/moderate intrahepatic/extrahepatic biliary ductla dilation. Unchanged compare to previous MRCP.

## 2025-01-18 NOTE — ASSESSMENT & PLAN NOTE
Patient's most recent potassium results are listed below.   Recent Labs     01/17/25  0815 01/18/25  0538   K 3.2* 3.0*       Plan  - Replete potassium per protocol  - Monitor potassium Daily  - Patient's hypokalemia is improving  - K+ in fluids

## 2025-01-18 NOTE — ASSESSMENT & PLAN NOTE
Nutrition consulted. Most recent weight and BMI monitored-     Measurements:  Wt Readings from Last 1 Encounters:   12/27/24 54.9 kg (121 lb 0.5 oz)   Body mass index is 22.87 kg/m².    RD consultation   Extensive outpatient evaluation recommended by GI and General surgery   Will add supplementation to meals; soft bite-sized per rec's  No plan for feeding tube at this time as family is undecided.       Please prioritize patient's comfort and preferences over strict nutritional goals at this time. Offer small, frequent meals, texture modifications, and respect the patient's desire to eat or not eat. Also ensure proper hydration with small sips of fluids periodically.

## 2025-01-18 NOTE — ASSESSMENT & PLAN NOTE
Patient with Acute debility due to age-related physical debility and other reduced mobility. The patient's latest AMPAC (Activity Measure for Post Acute Care) Score is listed below.    AM-PAC Score - How much help does the patient need for each activity listed  Basic Mobility Total Score: 7  Turning over in bed (including adjusting bedclothes, sheets and blankets)?: A lot  Sitting down on and standing up from a chair with arms (e.g., wheelchair, bedside commode, etc.): Unable  Moving from lying on back to sitting on the side of the bed?: Unable  Moving to and from a bed to a chair (including a wheelchair)?: Unable  Need to walk in hospital room?: Unable  Climbing 3-5 steps with a railing?: Unable    Plan  - Progressive mobility protocol initated  - PT/OT consulted  - Fall precautions in place  - Plan for NH with hospice services -pending insurance(Medicaid/Medicare)   - Palliative care following  -Pt is unable to be safely discharged at this time due to lack of continuous home care.

## 2025-01-18 NOTE — ASSESSMENT & PLAN NOTE
Patient has Abnormal Magnesium: hypomagnesemia. Will continue to monitor electrolytes closely. Will replace the affected electrolytes and repeat labs to be done after interventions completed. The patient's magnesium results have been reviewed and are listed below.  Recent Labs   Lab 01/18/25  0538   MG 1.4*

## 2025-01-18 NOTE — PROGRESS NOTES
[facility] - Internal Medicine [facility] Medicine  Progress Note    Patient Name: Bhavna Lim  MRN: [id number]  Patient Class: IP- Inpatient   Admission Date: 12/26/2024  Length of Stay: 20 days  Attending Physician: Justin Romero MD  Primary Care Provider: Suzi Green MD        Subjective     Principal Problem:Palliative care encounter        HPI:  Mrs. Bhavna Lim is a 79 year old F with a history of HTN and HLD who presents to the ED for weakness and fatigue. She has been having weight loss and poor po intake over the last few months. She has been seen by GI and evaluated with EGD and Colonoscopy as well as MRCP which showed cholelithiasis with gallbladder distention and intrahepatic and extrahepatic biliary dilation. No significant abnormalities on EGD and coloscopy. She was referred to surgery who recommended NM study and possible EUS with GI. She has been unable to tolerate any solid foods. She sometimes takes a little bit of soup and has tried protein shakes. She feels full immediately. She does endorse some pain in her epigastric region ever since having her EGD performed. She occasionally has vomiting. Denies regurgitation of food. She continues to have worsening weakness and falls at home. She had two falls yesterday due to weakness. She denies significant dizziness. She is unable to walk due to her weakness and sister is concerned this is related to her poor nutrition.      In the ED VSS. Labs notable for Potassium of 3.0 which was repleted in the ED. No focal signs concerning for stroke. She was admitted to medicine for further management.      Overview/Hospital Course:  While on the floor PT/OT were consulted and recommended SNF. Nutrition consulted. Discussed with GI outpatient workup and no indication for further inpatient workup. She continued to be altered while in the hospital. CT spine/head ordered for concern of fall showing ischemic changes and signficant stenosis and  narrowing. She developed worsening hand weakness and numbness/tinglign with abnormal coordiation. Some of this was prior to admission but weakness significantly worsened. MRI ordered and showed old infarcts. She continued to refuse food and did not have abdominal complaints just would not eat. Started on IVF due to hypoglycemia. Acute encephalopathy workup initiated and negative aside from low folate. UA ordered but not performed. At this point if nutrition becomes suboptimal will have to consider other means of nutrition. Son with concern of worsening memory and possible dementia wanting placement in [place]. Started on supplements for poor nutrition including thiamine and folate to see if this will help with mental status     Plan is NH with hospice services.    1/18: Extensive discussion with family who is torn between pursuing additional cares or going comfort measures/hospice route. Haldol added for agitation, Continue current plan for now.     Interval History: Pt w/ continued confusion/agitation overnight. On exam she is unable to provide additional history. Her son, is at bedside and states that his mom has had a progressive declines over the last year. He is interested in hospice/comfort measures but family is more reluctant.  Also discussed with pt's granddaughter and sister.  Family in agreement to try a medicine to help with agitation despite risks of using such medicines in Dementia/Elderly patients.     Pt continues to refuse foods. She also has no IV access at this point after she removed her IV. Bedside nurses are unable to place at this time. Pt is agitated and fidgeting so IV placement at this time would be difficult.     Family remains torn regarding feeding tubes and overall goals of care.     Review of Systems limited due to pt's mental status.   Objective:     Vital Signs (Most Recent):  Temp: 98.5 °F (36.9 °C) (01/18/25 1208)  Pulse: 82 (01/18/25 1208)  Resp: 18 (01/18/25 1208)  BP: (!)  103/54 (01/18/25 1208)  SpO2: 96 % (01/18/25 1208) Vital Signs (24h Range):  Temp:  [97.7 °F (36.5 °C)-99 °F (37.2 °C)] 98.5 °F (36.9 °C)  Pulse:  [] 82  Resp:  [18] 18  SpO2:  [93 %-98 %] 96 %  BP: (103-141)/(54-82) 103/54     Weight: 54.9 kg (121 lb 0.5 oz)  Body mass index is 22.87 kg/m².    Intake/Output Summary (Last 24 hours) at 1/18/2025 1235  Last data filed at 1/18/2025 0927  Gross per 24 hour   Intake 490 ml   Output 750 ml   Net -260 ml      Physical Exam  Vitals and nursing note reviewed.   Constitutional:       General: She is awake.      Appearance: She is ill-appearing.   HENT:      Head: Normocephalic and atraumatic.      Nose: Nose normal.      Mouth/Throat:      Mouth: Mucous membranes are moist.   Eyes:      General: No scleral icterus.  Cardiovascular:      Rate and Rhythm: Normal rate and regular rhythm.      Pulses: Normal pulses.      Heart sounds: No murmur heard.     No friction rub. No gallop.   Pulmonary:      Effort: No respiratory distress.      Breath sounds: Normal breath sounds. No wheezing, rhonchi or rales.   Abdominal:      Palpations: Abdomen is soft.      Tenderness: There is no abdominal tenderness. There is no guarding or rebound.   Musculoskeletal:         General: No deformity.      Cervical back: Neck supple. No rigidity.   Skin:     General: Skin is warm and dry.      Capillary Refill: Capillary refill takes less than 2 seconds.   Neurological:      Mental Status: She is disoriented and confused.   Psychiatric:         Behavior: Behavior is agitated.         Computed MELD 3.0 unavailable. One or more values for this score either were not found within the given timeframe or did not fit some other criterion.  Computed MELD-Na unavailable. One or more values for this score either were not found within the given timeframe or did not fit some other criterion.      Significant Labs:  CBC:  Recent Labs   Lab 01/18/25  0538   WBC 17.21*   HGB 9.5*   HCT 28.4*     "    CMP:  Recent Labs   Lab 01/17/25  0815 01/18/25  0538   * 145   K 3.2* 3.0*   * 118*   CO2 20* 19*   GLU 68* 59*   BUN 6* 6*   CREATININE 0.5 0.6   CALCIUM 7.8* 7.8*   PROT 5.5* 5.4*   ALBUMIN 1.6* 1.5*   BILITOT 1.5* 1.5*   ALKPHOS 522* 513*   AST 50* 55*   ALT 60* 58*   ANIONGAP 9 8     PTINR:  No results for input(s): "INR" in the last 48 hours.    Significant Procedures:   Dobutamine Stress Test with Color Flow: No results found for this or any previous visit.    None    Assessment and Plan     * Palliative care encounter  Plan is NH with Hospice as discussed above      Hypophosphatemia  Patient's most recent phosphorus results are listed below.   Recent Labs     01/18/25  0538   PHOS 2.0*     Plan  - Will treat hypophosphatemia with oral replacement  - Patient's hypophosphatemia is stable      Hypomagnesemia  Patient has Abnormal Magnesium: hypomagnesemia. Will continue to monitor electrolytes closely. Will replace the affected electrolytes and repeat labs to be done after interventions completed. The patient's magnesium results have been reviewed and are listed below.  Recent Labs   Lab 01/18/25  0538   MG 1.4*        Anemia  Anemia is likely due to Iron deficiency. Most recent hemoglobin and hematocrit are listed below.  Recent Labs     01/18/25  0538   HGB 9.5*   HCT 28.4*     Plan  - Monitor serial CBC: Daily  - Transfuse PRBC if patient becomes hemodynamically unstable, symptomatic or H/H drops below 7/21.  - Patient has not received any PRBC transfusions to date  - Patient's anemia is currently stable      Swelling of right upper extremity  CT UE with Nonspecific subcutaneous edema throughout the distal arm and forearm, possibly sterile or infectious in nature.  No fluid collection.  No osteomyelitis.     Continue to monitor.     Hyperbilirubinemia  - elevation in bilirubin with concomitant elevations in LFTs  - RUQ U/S significant for biliary sludge, mild/moderate " intrahepatic/extrahepatic biliary ductla dilation. Unchanged compare to previous MRCP.         Pseudomonas urinary tract infection  - urinalysis concerning for UTI and found to have pseudomonas  - Blood cultures: NGTD   - close assessment to see if mentation improves  - Levaquin ordered given issues with IV access.       Cognitive impairment  -history of progressive confusion and forgetfulness prior to admission.   -CT head: Sequela of chronic microvascular ischemic change. Remote lacunar type infarct right thalamus; MRI C-spine: Multilevel cervical spondylosis, worst at C3-C4, contributing to moderate spinal canal stenosis as well as moderate left neural foraminal narrowing.    -HIV/RPR negative. Folate low, repleting. B12 high. Thiamine low and repleting. 2/2 to dementia with poor nutritional intake; TSH wnl on 1/1/2025  - exhausted reversible causes of poor mentation and with imaging, appears to be age-related decline accelerated by some degree of vascular dementia. Likely some component of acute delirum due to hospital stay/UTI in setting of advanced dementia.   - administering fluids with dextrose and electrolytes given poor oral intake; discussed pitfalls of G-tube placement. Family undecided on tube feedings at this time.   - plan for Nursing Home with hospice services - awaiting Insurance approval.   - Consider consult to Neurology at discharge to characterize dementia and potential interventions to slow progress - if not opting for hospice  - Pseudomonas found in urine; Continue to treat and assess for change in mentation/ cognition  -Haldol added PRN for agitation.     Hypoglycemia  -Intermittent hypoglycemia over hospital stay. Likely related to poor PO intake.   -previously needed amp of D50  -monitor BG closely  -Will resume D5NS if IV access is restored.       Numbness and tingling in both hands  -noted by patient before admission with abnormal finger to nose and weakness   -CT head and spine reviewed.  There is significant stenosis/narrowing of C6 and C7 which could be contributing to symptoms. Findings appear chronic and not acute. MRI ordered and notable for cervical stenosis and lacunar infarcts. Limited visiblity due to movement   -CT head notable for remote lacunar infarcts, hx of stroke in 2005 currently on ASA   -continue PT/OT         Severe protein-calorie malnutrition  Nutrition consulted. Most recent weight and BMI monitored-     Measurements:  Wt Readings from Last 1 Encounters:   12/27/24 54.9 kg (121 lb 0.5 oz)   Body mass index is 22.87 kg/m².    Patient has been screened and assessed by RD.    Malnutrition Type:  Context: chronic illness  Level: severe    Malnutrition Characteristic Summary:  Weight Loss (Malnutrition): greater than 5% in 1 month  Energy Intake (Malnutrition): less than 75% for greater than or equal to 1 month  Subcutaneous Fat (Malnutrition): severe depletion  Muscle Mass (Malnutrition): severe depletion    Interventions/Recommendations (treatment strategy):  Please prioritize patient's comfort and preferences over strict nutritional goals at this time. Offer small, frequent meals, texture modifications, and respect the patient's desire to eat or not eat. Also ensure proper hydration with small sips of fluids periodically.    -Will plan to resume IV D5/NS if IV access restored.   -Family to encourage PO intake as above.     Hypokalemia  Patient's most recent potassium results are listed below.   Recent Labs     01/17/25  0815 01/18/25  0538   K 3.2* 3.0*       Plan  - Replete potassium per protocol  - Monitor potassium Daily  - Patient's hypokalemia is improving  - K+ in fluids    Debility  Patient with Acute debility due to age-related physical debility and other reduced mobility. The patient's latest AMPAC (Activity Measure for Post Acute Care) Score is listed below.    AM-PAC Score - How much help does the patient need for each activity listed  Basic Mobility Total Score:  7  Turning over in bed (including adjusting bedclothes, sheets and blankets)?: A lot  Sitting down on and standing up from a chair with arms (e.g., wheelchair, bedside commode, etc.): Unable  Moving from lying on back to sitting on the side of the bed?: Unable  Moving to and from a bed to a chair (including a wheelchair)?: Unable  Need to walk in hospital room?: Unable  Climbing 3-5 steps with a railing?: Unable    Plan  - Progressive mobility protocol initated  - PT/OT consulted  - Fall precautions in place  - Plan for NH with hospice services -pending insurance(Medicaid/Medicare)   - Palliative care following  -Pt is unable to be safely discharged at this time due to lack of continuous home care.          Diabetes mellitus  Sliding scale held, accuchecks for hypoglycemia     Patient's FSGs are controlled on current medication regimen.  Last A1c reviewed-   Lab Results   Component Value Date    HGBA1C 5.5 10/11/2024     Most recent fingerstick glucose reviewed-   Recent Labs   Lab 01/17/25  1538 01/17/25  1939 01/18/25  0738 01/18/25  1210   POCTGLUCOSE 85 93 73 82       Current correctional scale   hold on correction scale give A1C is 5.5  Antihyperglycemics (From admission, onward)      None          Hold Oral hypoglycemics while patient is in the hospital; holding all given low intake    Unintentional weight loss  Nutrition consulted. Most recent weight and BMI monitored-     Measurements:  Wt Readings from Last 1 Encounters:   12/27/24 54.9 kg (121 lb 0.5 oz)   Body mass index is 22.87 kg/m².    RD consultation   Extensive outpatient evaluation recommended by GI and General surgery   Will add supplementation to meals; soft bite-sized per rec's  No plan for feeding tube at this time as family is undecided.       Please prioritize patient's comfort and preferences over strict nutritional goals at this time. Offer small, frequent meals, texture modifications, and respect the patient's desire to eat or not eat.  Also ensure proper hydration with small sips of fluids periodically.      Hyperlipidemia  - continue ASA and statin      Benign essential hypertension  Patient's blood pressure range in the last 24 hours was: BP  Min: 103/54  Max: 141/82.The patient's inpatient anti-hypertensive regimen is listed below:  Current Antihypertensives  lisinopriL tablet 10 mg, Daily, Oral  NIFEdipine 24 hr tablet 30 mg, Daily, Oral    Plan  - BP is controlled, no changes needed to their regimen  - will continue home regimen      VTE Risk Mitigation (From admission, onward)           Ordered     enoxaparin injection 40 mg  Daily         12/26/24 1700     IP VTE HIGH RISK PATIENT  Once         12/26/24 1547     Place sequential compression device  Until discontinued         12/26/24 1547                    Discharge Planning   RITA: 1/21/2025     Code Status: DNR   Medical Readiness for Discharge Date:   Discharge Plan A: New Nursing Home placement - [facility] care facility (with hospice services)   Discharge Delays: (!) Patient and Family Barriers (Family has not decided on facility.)                    Justin Romero MD  Department of Hospital Medicine   [facility] - Internal Medicine Telemetry

## 2025-01-18 NOTE — ASSESSMENT & PLAN NOTE
-history of progressive confusion and forgetfulness prior to admission.   -CT head: Sequela of chronic microvascular ischemic change. Remote lacunar type infarct right thalamus; MRI C-spine: Multilevel cervical spondylosis, worst at C3-C4, contributing to moderate spinal canal stenosis as well as moderate left neural foraminal narrowing.    -HIV/RPR negative. Folate low, repleting. B12 high. Thiamine low and repleting. 2/2 to dementia with poor nutritional intake; TSH wnl on 1/1/2025  - exhausted reversible causes of poor mentation and with imaging, appears to be age-related decline accelerated by some degree of vascular dementia. Likely some component of acute delirum due to hospital stay/UTI in setting of advanced dementia.   - administering fluids with dextrose and electrolytes given poor oral intake; discussed pitfalls of G-tube placement. Family undecided on tube feedings at this time.   - plan for Nursing Home with hospice services - awaiting Insurance approval.   - Consider consult to Neurology at discharge to characterize dementia and potential interventions to slow progress - if not opting for hospice  - Pseudomonas found in urine; Continue to treat and assess for change in mentation/ cognition  -Haldol added PRN for agitation.

## 2025-01-18 NOTE — ASSESSMENT & PLAN NOTE
- urinalysis concerning for UTI and found to have pseudomonas  - Blood cultures: NGTD   - close assessment to see if mentation improves  - Levaquin ordered given issues with IV access.

## 2025-01-18 NOTE — ASSESSMENT & PLAN NOTE
Patient's blood pressure range in the last 24 hours was: BP  Min: 103/54  Max: 141/82.The patient's inpatient anti-hypertensive regimen is listed below:  Current Antihypertensives  lisinopriL tablet 10 mg, Daily, Oral  NIFEdipine 24 hr tablet 30 mg, Daily, Oral    Plan  - BP is controlled, no changes needed to their regimen  - will continue home regimen

## 2025-01-18 NOTE — ASSESSMENT & PLAN NOTE
-Intermittent hypoglycemia over hospital stay. Likely related to poor PO intake.   -previously needed amp of D50  -monitor BG closely  -Will resume D5NS if IV access is restored.

## 2025-01-18 NOTE — ASSESSMENT & PLAN NOTE
-history of progressive confusion and forgetfulness prior to admission.   -CT head: Sequela of chronic microvascular ischemic change. Remote lacunar type infarct right thalamus; MRI C-spine: Multilevel cervical spondylosis, worst at C3-C4, contributing to moderate spinal canal stenosis as well as moderate left neural foraminal narrowing.    -HIV/RPR negative. Folate low, repleting. B12 high. Thiamine low and repleting. 2/2 to dementia with poor nutritional intake; TSH wnl on 1/1/2025  - exhausted reversible causes of poor mentation and with imaging, appears to be age-related decline accelerated by some degree of vascular dementia. Likely some component of acute delirum due to hospital stay/UTI in setting of advanced dementia.   - discussed pitfalls of G-tube placement. Family undecided on tube feedings at this time.   - plan for Nursing Home with hospice services - awaiting Insurance approval.   - Consider consult to Neurology at discharge to characterize dementia and potential interventions to slow progress - if not opting for hospice  - Pseudomonas found in urine; Continue to treat and assess for change in mentation/ cognition  -Haldol added PRN for agitation. Discussed risk of death and worsening condition with pt's son and sister.

## 2025-01-18 NOTE — SUBJECTIVE & OBJECTIVE
Interval History: Pt w/ continued confusion/agitation overnight. On exam she is unable to provide additional history. Her son, is at bedside and states that his mom has had a progressive declines over the last year. He is interested in hospice/comfort measures but family is more reluctant.  Also discussed with pt's granddaughter and sister.  Family in agreement to try a medicine to help with agitation despite risks of using such medicines in Dementia/Elderly patients.     Pt continues to refuse foods. She also has no IV access at this point after she removed her IV. Bedside nurses are unable to place at this time. Pt is agitated and fidgeting so IV placement at this time would be difficult.     Family remains torn regarding feeding tubes and overall goals of care.     Review of Systems limited due to pt's mental status.   Objective:     Vital Signs (Most Recent):  Temp: 98.5 °F (36.9 °C) (01/18/25 1208)  Pulse: 82 (01/18/25 1208)  Resp: 18 (01/18/25 1208)  BP: (!) 103/54 (01/18/25 1208)  SpO2: 96 % (01/18/25 1208) Vital Signs (24h Range):  Temp:  [97.7 °F (36.5 °C)-99 °F (37.2 °C)] 98.5 °F (36.9 °C)  Pulse:  [] 82  Resp:  [18] 18  SpO2:  [93 %-98 %] 96 %  BP: (103-141)/(54-82) 103/54     Weight: 54.9 kg (121 lb 0.5 oz)  Body mass index is 22.87 kg/m².    Intake/Output Summary (Last 24 hours) at 1/18/2025 1235  Last data filed at 1/18/2025 0927  Gross per 24 hour   Intake 490 ml   Output 750 ml   Net -260 ml      Physical Exam  Vitals and nursing note reviewed.   Constitutional:       General: She is awake.      Appearance: She is ill-appearing.   HENT:      Head: Normocephalic and atraumatic.      Nose: Nose normal.      Mouth/Throat:      Mouth: Mucous membranes are moist.   Eyes:      General: No scleral icterus.  Cardiovascular:      Rate and Rhythm: Normal rate and regular rhythm.      Pulses: Normal pulses.      Heart sounds: No murmur heard.     No friction rub. No gallop.   Pulmonary:      Effort: No  "respiratory distress.      Breath sounds: Normal breath sounds. No wheezing, rhonchi or rales.   Abdominal:      Palpations: Abdomen is soft.      Tenderness: There is no abdominal tenderness. There is no guarding or rebound.   Musculoskeletal:         General: No deformity.      Cervical back: Neck supple. No rigidity.   Skin:     General: Skin is warm and dry.      Capillary Refill: Capillary refill takes less than 2 seconds.   Neurological:      Mental Status: She is disoriented and confused.   Psychiatric:         Behavior: Behavior is agitated.         Computed MELD 3.0 unavailable. One or more values for this score either were not found within the given timeframe or did not fit some other criterion.  Computed MELD-Na unavailable. One or more values for this score either were not found within the given timeframe or did not fit some other criterion.      Significant Labs:  CBC:  Recent Labs   Lab 01/18/25  0538   WBC 17.21*   HGB 9.5*   HCT 28.4*        CMP:  Recent Labs   Lab 01/17/25  0815 01/18/25  0538   * 145   K 3.2* 3.0*   * 118*   CO2 20* 19*   GLU 68* 59*   BUN 6* 6*   CREATININE 0.5 0.6   CALCIUM 7.8* 7.8*   PROT 5.5* 5.4*   ALBUMIN 1.6* 1.5*   BILITOT 1.5* 1.5*   ALKPHOS 522* 513*   AST 50* 55*   ALT 60* 58*   ANIONGAP 9 8     PTINR:  No results for input(s): "INR" in the last 48 hours.    Significant Procedures:   Dobutamine Stress Test with Color Flow: No results found for this or any previous visit.    None  "

## 2025-01-18 NOTE — ASSESSMENT & PLAN NOTE
Anemia is likely due to Iron deficiency. Most recent hemoglobin and hematocrit are listed below.  Recent Labs     01/18/25  0538   HGB 9.5*   HCT 28.4*     Plan  - Monitor serial CBC: Daily  - Transfuse PRBC if patient becomes hemodynamically unstable, symptomatic or H/H drops below 7/21.  - Patient has not received any PRBC transfusions to date  - Patient's anemia is currently stable

## 2025-01-18 NOTE — ASSESSMENT & PLAN NOTE
CT UE with Nonspecific subcutaneous edema throughout the distal arm and forearm, possibly sterile or infectious in nature.  No fluid collection.  No osteomyelitis.     Continue to monitor.

## 2025-01-18 NOTE — ASSESSMENT & PLAN NOTE
Nutrition consulted. Most recent weight and BMI monitored-     Measurements:  Wt Readings from Last 1 Encounters:   12/27/24 54.9 kg (121 lb 0.5 oz)   Body mass index is 22.87 kg/m².    Patient has been screened and assessed by RD.    Malnutrition Type:  Context: chronic illness  Level: severe    Malnutrition Characteristic Summary:  Weight Loss (Malnutrition): greater than 5% in 1 month  Energy Intake (Malnutrition): less than 75% for greater than or equal to 1 month  Subcutaneous Fat (Malnutrition): severe depletion  Muscle Mass (Malnutrition): severe depletion    Interventions/Recommendations (treatment strategy):  Please prioritize patient's comfort and preferences over strict nutritional goals at this time. Offer small, frequent meals, texture modifications, and respect the patient's desire to eat or not eat. Also ensure proper hydration with small sips of fluids periodically.    -Will plan to resume IV D5/NS if IV access restored.   -Family to encourage PO intake as above.

## 2025-01-18 NOTE — CONSULTS
"SUZE consulted for PIV placement using US. Per nursing communication by Dr. Romero, "Pt okay to have no IV". Please reconsult SUZE if needed.   "

## 2025-01-19 LAB
ALBUMIN SERPL BCP-MCNC: 1.5 G/DL (ref 3.5–5.2)
ALP SERPL-CCNC: 525 U/L (ref 40–150)
ALT SERPL W/O P-5'-P-CCNC: 58 U/L (ref 10–44)
ANION GAP SERPL CALC-SCNC: 7 MMOL/L (ref 8–16)
AST SERPL-CCNC: 54 U/L (ref 10–40)
BACTERIA BLD CULT: NORMAL
BACTERIA BLD CULT: NORMAL
BILIRUB SERPL-MCNC: 1.5 MG/DL (ref 0.1–1)
BUN SERPL-MCNC: 5 MG/DL (ref 8–23)
CALCIUM SERPL-MCNC: 7.9 MG/DL (ref 8.7–10.5)
CHLORIDE SERPL-SCNC: 115 MMOL/L (ref 95–110)
CO2 SERPL-SCNC: 22 MMOL/L (ref 23–29)
CREAT SERPL-MCNC: 0.5 MG/DL (ref 0.5–1.4)
ERYTHROCYTE [DISTWIDTH] IN BLOOD BY AUTOMATED COUNT: 23.7 % (ref 11.5–14.5)
EST. GFR  (NO RACE VARIABLE): >60 ML/MIN/1.73 M^2
GLUCOSE SERPL-MCNC: 103 MG/DL (ref 70–110)
HCT VFR BLD AUTO: 28.1 % (ref 37–48.5)
HGB BLD-MCNC: 9.3 G/DL (ref 12–16)
MAGNESIUM SERPL-MCNC: 1.4 MG/DL (ref 1.6–2.6)
MCH RBC QN AUTO: 28 PG (ref 27–31)
MCHC RBC AUTO-ENTMCNC: 33.1 G/DL (ref 32–36)
MCV RBC AUTO: 85 FL (ref 82–98)
OHS QRS DURATION: 60 MS
OHS QTC CALCULATION: 414 MS
PHOSPHATE SERPL-MCNC: 1.9 MG/DL (ref 2.7–4.5)
PLATELET # BLD AUTO: 356 K/UL (ref 150–450)
PMV BLD AUTO: 10.3 FL (ref 9.2–12.9)
POCT GLUCOSE: 110 MG/DL (ref 70–110)
POCT GLUCOSE: 128 MG/DL (ref 70–110)
POCT GLUCOSE: 62 MG/DL (ref 70–110)
POCT GLUCOSE: 66 MG/DL (ref 70–110)
POCT GLUCOSE: 92 MG/DL (ref 70–110)
POTASSIUM SERPL-SCNC: 3.3 MMOL/L (ref 3.5–5.1)
PROT SERPL-MCNC: 5.3 G/DL (ref 6–8.4)
RBC # BLD AUTO: 3.32 M/UL (ref 4–5.4)
SODIUM SERPL-SCNC: 144 MMOL/L (ref 136–145)
WBC # BLD AUTO: 18.98 K/UL (ref 3.9–12.7)

## 2025-01-19 PROCEDURE — 93005 ELECTROCARDIOGRAM TRACING: CPT

## 2025-01-19 PROCEDURE — 36415 COLL VENOUS BLD VENIPUNCTURE: CPT | Performed by: STUDENT IN AN ORGANIZED HEALTH CARE EDUCATION/TRAINING PROGRAM

## 2025-01-19 PROCEDURE — 25000003 PHARM REV CODE 250: Performed by: STUDENT IN AN ORGANIZED HEALTH CARE EDUCATION/TRAINING PROGRAM

## 2025-01-19 PROCEDURE — 63600175 PHARM REV CODE 636 W HCPCS: Performed by: STUDENT IN AN ORGANIZED HEALTH CARE EDUCATION/TRAINING PROGRAM

## 2025-01-19 PROCEDURE — 11000001 HC ACUTE MED/SURG PRIVATE ROOM

## 2025-01-19 PROCEDURE — 80053 COMPREHEN METABOLIC PANEL: CPT | Performed by: STUDENT IN AN ORGANIZED HEALTH CARE EDUCATION/TRAINING PROGRAM

## 2025-01-19 PROCEDURE — 25000003 PHARM REV CODE 250: Performed by: INTERNAL MEDICINE

## 2025-01-19 PROCEDURE — 84100 ASSAY OF PHOSPHORUS: CPT | Performed by: INTERNAL MEDICINE

## 2025-01-19 PROCEDURE — 85027 COMPLETE CBC AUTOMATED: CPT | Performed by: INTERNAL MEDICINE

## 2025-01-19 PROCEDURE — S4991 NICOTINE PATCH NONLEGEND: HCPCS | Performed by: STUDENT IN AN ORGANIZED HEALTH CARE EDUCATION/TRAINING PROGRAM

## 2025-01-19 PROCEDURE — 83735 ASSAY OF MAGNESIUM: CPT | Performed by: INTERNAL MEDICINE

## 2025-01-19 RX ORDER — SODIUM,POTASSIUM PHOSPHATES 280-250MG
2 POWDER IN PACKET (EA) ORAL ONCE
Status: COMPLETED | OUTPATIENT
Start: 2025-01-19 | End: 2025-01-19

## 2025-01-19 RX ORDER — LANOLIN ALCOHOL/MO/W.PET/CERES
400 CREAM (GRAM) TOPICAL ONCE
Status: COMPLETED | OUTPATIENT
Start: 2025-01-19 | End: 2025-01-19

## 2025-01-19 RX ADMIN — Medication 16 G: at 08:01

## 2025-01-19 RX ADMIN — Medication 1 TABLET: at 08:01

## 2025-01-19 RX ADMIN — LEVOFLOXACIN 750 MG: 750 TABLET, FILM COATED ORAL at 08:01

## 2025-01-19 RX ADMIN — Medication 100 MG: at 08:01

## 2025-01-19 RX ADMIN — NICOTINE 1 PATCH: 7 PATCH, EXTENDED RELEASE TRANSDERMAL at 08:01

## 2025-01-19 RX ADMIN — Medication 400 MG: at 12:01

## 2025-01-19 RX ADMIN — ACETAMINOPHEN 650 MG: 325 TABLET ORAL at 08:01

## 2025-01-19 RX ADMIN — Medication 200 ML: at 06:01

## 2025-01-19 RX ADMIN — Medication 200 ML: at 08:01

## 2025-01-19 RX ADMIN — LISINOPRIL 10 MG: 10 TABLET ORAL at 08:01

## 2025-01-19 RX ADMIN — POTASSIUM BICARBONATE 40 MEQ: 391 TABLET, EFFERVESCENT ORAL at 10:01

## 2025-01-19 RX ADMIN — Medication 16 G: at 04:01

## 2025-01-19 RX ADMIN — Medication 200 ML: at 04:01

## 2025-01-19 RX ADMIN — POTASSIUM & SODIUM PHOSPHATES POWDER PACK 280-160-250 MG 2 PACKET: 280-160-250 PACK at 12:01

## 2025-01-19 RX ADMIN — NIFEDIPINE 30 MG: 30 TABLET, FILM COATED, EXTENDED RELEASE ORAL at 08:01

## 2025-01-19 RX ADMIN — FERROUS SULFATE TAB 325 MG (65 MG ELEMENTAL FE) 1 EACH: 325 (65 FE) TAB at 08:01

## 2025-01-19 RX ADMIN — FOLIC ACID 1 MG: 1 TABLET ORAL at 08:01

## 2025-01-19 RX ADMIN — ATORVASTATIN CALCIUM 40 MG: 40 TABLET, FILM COATED ORAL at 08:01

## 2025-01-19 RX ADMIN — ENOXAPARIN SODIUM 40 MG: 40 INJECTION SUBCUTANEOUS at 04:01

## 2025-01-19 RX ADMIN — Medication 200 ML: at 12:01

## 2025-01-19 RX ADMIN — ASPIRIN 81 MG: 81 TABLET, COATED ORAL at 08:01

## 2025-01-19 NOTE — ASSESSMENT & PLAN NOTE
-Intermittent hypoglycemia over hospital stay. Likely related to poor PO intake.   -previously needed amp of D50  -monitor BG closely  -Can resume D5NS if needed  -Encourage PO intake

## 2025-01-19 NOTE — PLAN OF CARE
Problem: Adult Inpatient Plan of Care  Goal: Plan of Care Review  Outcome: Progressing  Flowsheets (Taken 1/18/2025 1812)  Plan of Care Reviewed With: patient  Goal: Patient-Specific Goal (Individualized)  Outcome: Progressing  Goal: Absence of Hospital-Acquired Illness or Injury  Outcome: Progressing  Goal: Optimal Comfort and Wellbeing  Outcome: Progressing  Goal: Readiness for Transition of Care  Outcome: Progressing     Problem: Confusion Acute  Goal: Optimal Cognitive Function  Outcome: Progressing   Pt awake and alert, Denies any pain and discomfort during this shift, VS stable, scheduled meds given springer catheter remains in place, care performed. Repositioned pt throughout this shift, educated pt and family on the use of call light, instructed the pt to call for assitance if needed, call light within reach.bed is in the lowest position, bed alarm set, fall monitoring in place. No acute events noted during this shift, family remains at bedside. Plan of care ongoing.

## 2025-01-19 NOTE — ASSESSMENT & PLAN NOTE
Patient's most recent phosphorus results are listed below.   Recent Labs     01/18/25  0538   PHOS 2.0*       Plan  - Will treat hypophosphatemia with oral replacement  - Patient's hypophosphatemia is stable

## 2025-01-19 NOTE — ASSESSMENT & PLAN NOTE
Anemia is likely due to Iron deficiency. Most recent hemoglobin and hematocrit are listed below.  Recent Labs     01/18/25  0538 01/19/25  0935   HGB 9.5* 9.3*   HCT 28.4* 28.1*       Plan  - Monitor serial CBC: Daily  - Transfuse PRBC if patient becomes hemodynamically unstable, symptomatic or H/H drops below 7/21.  - Patient has not received any PRBC transfusions to date  - Patient's anemia is currently stable

## 2025-01-19 NOTE — PROGRESS NOTES
[facility] - Internal Medicine [facility] Medicine  Progress Note    Patient Name: Bhavna Lim  MRN: [id number]  Patient Class: IP- Inpatient   Admission Date: 12/26/2024  Length of Stay: 21 days  Attending Physician: Justin Romero MD  Primary Care Provider: Suzi Green MD        Subjective     Principal Problem:Palliative care encounter        HPI:  Mrs. Bhavna Lim is a 79 year old F with a history of HTN and HLD who presents to the ED for weakness and fatigue. She has been having weight loss and poor po intake over the last few months. She has been seen by GI and evaluated with EGD and Colonoscopy as well as MRCP which showed cholelithiasis with gallbladder distention and intrahepatic and extrahepatic biliary dilation. No significant abnormalities on EGD and coloscopy. She was referred to surgery who recommended NM study and possible EUS with GI. She has been unable to tolerate any solid foods. She sometimes takes a little bit of soup and has tried protein shakes. She feels full immediately. She does endorse some pain in her epigastric region ever since having her EGD performed. She occasionally has vomiting. Denies regurgitation of food. She continues to have worsening weakness and falls at home. She had two falls yesterday due to weakness. She denies significant dizziness. She is unable to walk due to her weakness and sister is concerned this is related to her poor nutrition.      In the ED VSS. Labs notable for Potassium of 3.0 which was repleted in the ED. No focal signs concerning for stroke. She was admitted to medicine for further management.      Overview/Hospital Course:  While on the floor PT/OT were consulted and recommended SNF. Nutrition consulted. Discussed with GI outpatient workup and no indication for further inpatient workup. She continued to be altered while in the hospital. CT spine/head ordered for concern of fall showing ischemic changes and signficant stenosis and  narrowing. She developed worsening hand weakness and numbness/tinglign with abnormal coordiation. Some of this was prior to admission but weakness significantly worsened. MRI ordered and showed old infarcts. She continued to refuse food and did not have abdominal complaints just would not eat. Started on IVF due to hypoglycemia. Acute encephalopathy workup initiated and negative aside from low folate. UA ordered but not performed. At this point if nutrition becomes suboptimal will have to consider other means of nutrition. Son with concern of worsening memory and possible dementia wanting placement in [place]. Started on supplements for poor nutrition including thiamine and folate to see if this will help with mental status     Plan is NH with hospice services.    1/18: Extensive discussion with family who is torn between pursuing additional cares or going comfort measures/hospice route. Haldol added for agitation, Continue current plan for now.   1/19: Mental status improved but pt remains disoriented.     Interval History: Pt received one dose of Haldol overnight for agitation. On exam she appears comfortable and denies complaints. She is more interactive today than yesterday. Oriented to name and place. States year is 1945.     Review of Systems   Constitutional:  Positive for fatigue. Negative for chills and fever.   HENT:  Negative for congestion, ear pain, rhinorrhea, sinus pain and voice change.    Eyes:  Negative for pain and visual disturbance.   Respiratory:  Negative for cough, shortness of breath and wheezing.    Cardiovascular:  Negative for chest pain, palpitations and leg swelling.   Gastrointestinal:  Negative for abdominal pain, constipation, diarrhea, nausea and vomiting.   Genitourinary:  Negative for difficulty urinating.   Musculoskeletal:  Negative for arthralgias, back pain and gait problem.   Skin:  Negative for pallor, rash and wound.   Neurological:  Negative for dizziness,  light-headedness and headaches.   Psychiatric/Behavioral:  Negative for agitation and behavioral problems.      Objective:     Vital Signs (Most Recent):  Temp: 98.1 °F (36.7 °C) (01/19/25 0819)  Pulse: 90 (01/19/25 0819)  Resp: 17 (01/19/25 0819)  BP: (!) 149/63 (01/19/25 0833)  SpO2: 97 % (01/19/25 0819) Vital Signs (24h Range):  Temp:  [97.7 °F (36.5 °C)-99.6 °F (37.6 °C)] 98.1 °F (36.7 °C)  Pulse:  [] 90  Resp:  [17-18] 17  SpO2:  [92 %-97 %] 97 %  BP: (103-149)/(54-92) 149/63     Weight: 54.9 kg (121 lb 0.5 oz)  Body mass index is 22.87 kg/m².    Intake/Output Summary (Last 24 hours) at 1/19/2025 0936  Last data filed at 1/19/2025 0834  Gross per 24 hour   Intake 400 ml   Output 750 ml   Net -350 ml      Physical Exam  Vitals and nursing note reviewed.   Constitutional:       General: She is not in acute distress.  HENT:      Head: Normocephalic and atraumatic.      Nose: Nose normal.      Mouth/Throat:      Mouth: Mucous membranes are moist.   Eyes:      General: No scleral icterus.     Extraocular Movements: Extraocular movements intact.   Cardiovascular:      Rate and Rhythm: Normal rate and regular rhythm.      Heart sounds: No murmur heard.     No friction rub. No gallop.   Pulmonary:      Effort: Pulmonary effort is normal. No respiratory distress.   Abdominal:      General: There is no distension.      Palpations: Abdomen is soft.      Tenderness: There is no abdominal tenderness. There is no guarding or rebound.   Musculoskeletal:         General: Normal range of motion.      Cervical back: Neck supple. No tenderness.   Skin:     General: Skin is warm and dry.      Capillary Refill: Capillary refill takes less than 2 seconds.   Neurological:      General: No focal deficit present.      Mental Status: She is alert. She is disoriented.         Computed MELD 3.0 unavailable. One or more values for this score either were not found within the given timeframe or did not fit some other  "criterion.  Computed MELD-Na unavailable. One or more values for this score either were not found within the given timeframe or did not fit some other criterion.      Significant Labs:  CBC:  Recent Labs   Lab 01/18/25  0538   WBC 17.21*   HGB 9.5*   HCT 28.4*        CMP:  Recent Labs   Lab 01/18/25  0538      K 3.0*   *   CO2 19*   GLU 59*   BUN 6*   CREATININE 0.6   CALCIUM 7.8*   PROT 5.4*   ALBUMIN 1.5*   BILITOT 1.5*   ALKPHOS 513*   AST 55*   ALT 58*   ANIONGAP 8     PTINR:  No results for input(s): "INR" in the last 48 hours.    Significant Procedures:   Dobutamine Stress Test with Color Flow: No results found for this or any previous visit.    None    Assessment and Plan     * Palliative care encounter  Plan is NH with Hospice as discussed above      Hypophosphatemia  Patient's most recent phosphorus results are listed below.   Recent Labs     01/18/25  0538   PHOS 2.0*       Plan  - Will treat hypophosphatemia with oral replacement  - Patient's hypophosphatemia is stable      Hypomagnesemia  Patient has Abnormal Magnesium: hypomagnesemia. Will continue to monitor electrolytes closely. Will replace the affected electrolytes and repeat labs to be done after interventions completed. The patient's magnesium results have been reviewed and are listed below.  No results for input(s): "MG" in the last 24 hours.       Anemia  Anemia is likely due to Iron deficiency. Most recent hemoglobin and hematocrit are listed below.  Recent Labs     01/18/25  0538 01/19/25  0935   HGB 9.5* 9.3*   HCT 28.4* 28.1*       Plan  - Monitor serial CBC: Daily  - Transfuse PRBC if patient becomes hemodynamically unstable, symptomatic or H/H drops below 7/21.  - Patient has not received any PRBC transfusions to date  - Patient's anemia is currently stable      Swelling of right upper extremity  CT UE with Nonspecific subcutaneous edema throughout the distal arm and forearm, possibly sterile or infectious in nature.  No " fluid collection.  No osteomyelitis.     Continue to monitor.     Hyperbilirubinemia  - elevation in bilirubin with concomitant elevations in LFTs  - RUQ U/S significant for biliary sludge, mild/moderate intrahepatic/extrahepatic biliary ductla dilation. Unchanged compare to previous MRCP.         Pseudomonas urinary tract infection  - urinalysis concerning for UTI and found to have pseudomonas  - Blood cultures: NGTD   - close assessment to see if mentation improves  - Levaquin ordered given issues with IV access.       Cognitive impairment  -history of progressive confusion and forgetfulness prior to admission.   -CT head: Sequela of chronic microvascular ischemic change. Remote lacunar type infarct right thalamus; MRI C-spine: Multilevel cervical spondylosis, worst at C3-C4, contributing to moderate spinal canal stenosis as well as moderate left neural foraminal narrowing.    -HIV/RPR negative. Folate low, repleting. B12 high. Thiamine low and repleting. 2/2 to dementia with poor nutritional intake; TSH wnl on 1/1/2025  - exhausted reversible causes of poor mentation and with imaging, appears to be age-related decline accelerated by some degree of vascular dementia. Likely some component of acute delirum due to hospital stay/UTI in setting of advanced dementia.   - discussed pitfalls of G-tube placement. Family undecided on tube feedings at this time.   - plan for Nursing Home with hospice services - awaiting Insurance approval.   - Consider consult to Neurology at discharge to characterize dementia and potential interventions to slow progress - if not opting for hospice  - Pseudomonas found in urine; Continue to treat and assess for change in mentation/ cognition  -Haldol added PRN for agitation. Discussed risk of death and worsening condition with pt's son and sister.     Hypoglycemia  -Intermittent hypoglycemia over hospital stay. Likely related to poor PO intake.   -previously needed amp of D50  -monitor BG  closely  -Can resume D5NS if needed  -Encourage PO intake         Numbness and tingling in both hands  -noted by patient before admission with abnormal finger to nose and weakness   -CT head and spine reviewed. There is significant stenosis/narrowing of C6 and C7 which could be contributing to symptoms. Findings appear chronic and not acute. MRI ordered and notable for cervical stenosis and lacunar infarcts. Limited visiblity due to movement   -CT head notable for remote lacunar infarcts, hx of stroke in 2005 currently on ASA   -continue PT/OT         Severe protein-calorie malnutrition  Nutrition consulted. Most recent weight and BMI monitored-     Measurements:  Wt Readings from Last 1 Encounters:   12/27/24 54.9 kg (121 lb 0.5 oz)   Body mass index is 22.87 kg/m².    Patient has been screened and assessed by RD.    Malnutrition Type:  Context: chronic illness  Level: severe    Malnutrition Characteristic Summary:  Weight Loss (Malnutrition): greater than 5% in 1 month  Energy Intake (Malnutrition): less than 75% for greater than or equal to 1 month  Subcutaneous Fat (Malnutrition): severe depletion  Muscle Mass (Malnutrition): severe depletion    Interventions/Recommendations (treatment strategy):  Please prioritize patient's comfort and preferences over strict nutritional goals at this time. Offer small, frequent meals, texture modifications, and respect the patient's desire to eat or not eat. Also ensure proper hydration with small sips of fluids periodically.    -encourage PO intake    Hypokalemia  Patient's most recent potassium results are listed below.   Recent Labs     01/17/25  0815 01/18/25  0538   K 3.2* 3.0*       Plan  - Replete potassium per protocol  - Monitor potassium Daily  - Patient's hypokalemia is improving  - K+ in fluids    Debility  Patient with Acute debility due to age-related physical debility and other reduced mobility. The patient's latest AMPAC (Activity Measure for Post Acute Care)  Score is listed below.    AM-PAC Score - How much help does the patient need for each activity listed  Basic Mobility Total Score: 7  Turning over in bed (including adjusting bedclothes, sheets and blankets)?: A lot  Sitting down on and standing up from a chair with arms (e.g., wheelchair, bedside commode, etc.): Unable  Moving from lying on back to sitting on the side of the bed?: Unable  Moving to and from a bed to a chair (including a wheelchair)?: Unable  Need to walk in hospital room?: Unable  Climbing 3-5 steps with a railing?: Unable    Plan  - Progressive mobility protocol initated  - PT/OT consulted  - Fall precautions in place  - Plan for NH with hospice services -pending insurance(Medicaid/Medicare)   - Palliative care following  -Pt is unable to be safely discharged at this time due to lack of continuous home care.          Diabetes mellitus  Sliding scale held, accuchecks for hypoglycemia     Patient's FSGs are controlled on current medication regimen.  Last A1c reviewed-   Lab Results   Component Value Date    HGBA1C 5.5 10/11/2024     Most recent fingerstick glucose reviewed-   Recent Labs   Lab 01/18/25  1609 01/18/25  2045 01/19/25  0818 01/19/25  0942   POCTGLUCOSE 75 92 66* 128*       Current correctional scale   hold on correction scale give A1C is 5.5  Antihyperglycemics (From admission, onward)      None          Hold Oral hypoglycemics while patient is in the hospital; holding all given low intake    Unintentional weight loss  Nutrition consulted. Most recent weight and BMI monitored-     Measurements:  Wt Readings from Last 1 Encounters:   12/27/24 54.9 kg (121 lb 0.5 oz)   Body mass index is 22.87 kg/m².    RD consultation   Extensive outpatient evaluation recommended by GI and General surgery   Will add supplementation to meals; soft bite-sized per rec's  No plan for feeding tube at this time as family is undecided.       Please prioritize patient's comfort and preferences over strict  nutritional goals at this time. Offer small, frequent meals, texture modifications, and respect the patient's desire to eat or not eat. Also ensure proper hydration with small sips of fluids periodically.      Hyperlipidemia  - continue ASA and statin      Benign essential hypertension  Patient's blood pressure range in the last 24 hours was: BP  Min: 103/54  Max: 149/63.The patient's inpatient anti-hypertensive regimen is listed below:  Current Antihypertensives  lisinopriL tablet 10 mg, Daily, Oral  NIFEdipine 24 hr tablet 30 mg, Daily, Oral    Plan  - BP is controlled, no changes needed to their regimen  - will continue home regimen      VTE Risk Mitigation (From admission, onward)           Ordered     enoxaparin injection 40 mg  Daily         12/26/24 1700     IP VTE HIGH RISK PATIENT  Once         12/26/24 1547     Place sequential compression device  Until discontinued         12/26/24 1547                    Discharge Planning   RITA: 1/21/2025     Code Status: DNR   Medical Readiness for Discharge Date:   Discharge Plan A: New Nursing Home placement - senior care care facility (with hospice services)   Discharge Delays: (!) Patient and Family Barriers (Family has not decided on facility.)                    Justin Romero MD  Department of Hospital Medicine   [facility] - Internal Medicine Telemetry

## 2025-01-19 NOTE — SUBJECTIVE & OBJECTIVE
Interval History: Pt received one dose of Haldol overnight for agitation. On exam she appears comfortable and denies complaints. She is more interactive today than yesterday. Oriented to name and place. States year is 1945.     Review of Systems   Constitutional:  Positive for fatigue. Negative for chills and fever.   HENT:  Negative for congestion, ear pain, rhinorrhea, sinus pain and voice change.    Eyes:  Negative for pain and visual disturbance.   Respiratory:  Negative for cough, shortness of breath and wheezing.    Cardiovascular:  Negative for chest pain, palpitations and leg swelling.   Gastrointestinal:  Negative for abdominal pain, constipation, diarrhea, nausea and vomiting.   Genitourinary:  Negative for difficulty urinating.   Musculoskeletal:  Negative for arthralgias, back pain and gait problem.   Skin:  Negative for pallor, rash and wound.   Neurological:  Negative for dizziness, light-headedness and headaches.   Psychiatric/Behavioral:  Negative for agitation and behavioral problems.      Objective:     Vital Signs (Most Recent):  Temp: 98.1 °F (36.7 °C) (01/19/25 0819)  Pulse: 90 (01/19/25 0819)  Resp: 17 (01/19/25 0819)  BP: (!) 149/63 (01/19/25 0833)  SpO2: 97 % (01/19/25 0819) Vital Signs (24h Range):  Temp:  [97.7 °F (36.5 °C)-99.6 °F (37.6 °C)] 98.1 °F (36.7 °C)  Pulse:  [] 90  Resp:  [17-18] 17  SpO2:  [92 %-97 %] 97 %  BP: (103-149)/(54-92) 149/63     Weight: 54.9 kg (121 lb 0.5 oz)  Body mass index is 22.87 kg/m².    Intake/Output Summary (Last 24 hours) at 1/19/2025 0977  Last data filed at 1/19/2025 0834  Gross per 24 hour   Intake 400 ml   Output 750 ml   Net -350 ml      Physical Exam  Vitals and nursing note reviewed.   Constitutional:       General: She is not in acute distress.  HENT:      Head: Normocephalic and atraumatic.      Nose: Nose normal.      Mouth/Throat:      Mouth: Mucous membranes are moist.   Eyes:      General: No scleral icterus.     Extraocular Movements:  "Extraocular movements intact.   Cardiovascular:      Rate and Rhythm: Normal rate and regular rhythm.      Heart sounds: No murmur heard.     No friction rub. No gallop.   Pulmonary:      Effort: Pulmonary effort is normal. No respiratory distress.   Abdominal:      General: There is no distension.      Palpations: Abdomen is soft.      Tenderness: There is no abdominal tenderness. There is no guarding or rebound.   Musculoskeletal:         General: Normal range of motion.      Cervical back: Neck supple. No tenderness.   Skin:     General: Skin is warm and dry.      Capillary Refill: Capillary refill takes less than 2 seconds.   Neurological:      General: No focal deficit present.      Mental Status: She is alert. She is disoriented.         Computed MELD 3.0 unavailable. One or more values for this score either were not found within the given timeframe or did not fit some other criterion.  Computed MELD-Na unavailable. One or more values for this score either were not found within the given timeframe or did not fit some other criterion.      Significant Labs:  CBC:  Recent Labs   Lab 01/18/25  0538   WBC 17.21*   HGB 9.5*   HCT 28.4*        CMP:  Recent Labs   Lab 01/18/25  0538      K 3.0*   *   CO2 19*   GLU 59*   BUN 6*   CREATININE 0.6   CALCIUM 7.8*   PROT 5.4*   ALBUMIN 1.5*   BILITOT 1.5*   ALKPHOS 513*   AST 55*   ALT 58*   ANIONGAP 8     PTINR:  No results for input(s): "INR" in the last 48 hours.    Significant Procedures:   Dobutamine Stress Test with Color Flow: No results found for this or any previous visit.    None  "

## 2025-01-19 NOTE — ASSESSMENT & PLAN NOTE
Sliding scale held, accuchecks for hypoglycemia     Patient's FSGs are controlled on current medication regimen.  Last A1c reviewed-   Lab Results   Component Value Date    HGBA1C 5.5 10/11/2024     Most recent fingerstick glucose reviewed-   Recent Labs   Lab 01/18/25  1609 01/18/25  2045 01/19/25  0818 01/19/25  0942   POCTGLUCOSE 75 92 66* 128*       Current correctional scale   hold on correction scale give A1C is 5.5  Antihyperglycemics (From admission, onward)    None        Hold Oral hypoglycemics while patient is in the hospital; holding all given low intake

## 2025-01-19 NOTE — ASSESSMENT & PLAN NOTE
Nutrition consulted. Most recent weight and BMI monitored-     Measurements:  Wt Readings from Last 1 Encounters:   12/27/24 54.9 kg (121 lb 0.5 oz)   Body mass index is 22.87 kg/m².    Patient has been screened and assessed by RD.    Malnutrition Type:  Context: chronic illness  Level: severe    Malnutrition Characteristic Summary:  Weight Loss (Malnutrition): greater than 5% in 1 month  Energy Intake (Malnutrition): less than 75% for greater than or equal to 1 month  Subcutaneous Fat (Malnutrition): severe depletion  Muscle Mass (Malnutrition): severe depletion    Interventions/Recommendations (treatment strategy):  Please prioritize patient's comfort and preferences over strict nutritional goals at this time. Offer small, frequent meals, texture modifications, and respect the patient's desire to eat or not eat. Also ensure proper hydration with small sips of fluids periodically.    -encourage PO intake

## 2025-01-19 NOTE — ASSESSMENT & PLAN NOTE
Patient's blood pressure range in the last 24 hours was: BP  Min: 103/54  Max: 149/63.The patient's inpatient anti-hypertensive regimen is listed below:  Current Antihypertensives  lisinopriL tablet 10 mg, Daily, Oral  NIFEdipine 24 hr tablet 30 mg, Daily, Oral    Plan  - BP is controlled, no changes needed to their regimen  - will continue home regimen

## 2025-01-20 LAB
ESTIMATED AVG GLUCOSE: 85 MG/DL (ref 68–131)
HBA1C MFR BLD: 4.6 % (ref 4–5.6)
MAGNESIUM SERPL-MCNC: 1.3 MG/DL (ref 1.6–2.6)
POCT GLUCOSE: 103 MG/DL (ref 70–110)
POCT GLUCOSE: 122 MG/DL (ref 70–110)
POCT GLUCOSE: 61 MG/DL (ref 70–110)
POCT GLUCOSE: 63 MG/DL (ref 70–110)

## 2025-01-20 PROCEDURE — 25000242 PHARM REV CODE 250 ALT 637 W/ HCPCS: Performed by: INTERNAL MEDICINE

## 2025-01-20 PROCEDURE — 11000001 HC ACUTE MED/SURG PRIVATE ROOM

## 2025-01-20 PROCEDURE — 83036 HEMOGLOBIN GLYCOSYLATED A1C: CPT | Performed by: INTERNAL MEDICINE

## 2025-01-20 PROCEDURE — 94640 AIRWAY INHALATION TREATMENT: CPT

## 2025-01-20 PROCEDURE — 25000003 PHARM REV CODE 250: Performed by: INTERNAL MEDICINE

## 2025-01-20 PROCEDURE — 63600175 PHARM REV CODE 636 W HCPCS: Performed by: STUDENT IN AN ORGANIZED HEALTH CARE EDUCATION/TRAINING PROGRAM

## 2025-01-20 PROCEDURE — 25000003 PHARM REV CODE 250: Performed by: STUDENT IN AN ORGANIZED HEALTH CARE EDUCATION/TRAINING PROGRAM

## 2025-01-20 PROCEDURE — S4991 NICOTINE PATCH NONLEGEND: HCPCS | Performed by: STUDENT IN AN ORGANIZED HEALTH CARE EDUCATION/TRAINING PROGRAM

## 2025-01-20 PROCEDURE — 36415 COLL VENOUS BLD VENIPUNCTURE: CPT | Performed by: INTERNAL MEDICINE

## 2025-01-20 PROCEDURE — 83735 ASSAY OF MAGNESIUM: CPT | Performed by: INTERNAL MEDICINE

## 2025-01-20 PROCEDURE — 94761 N-INVAS EAR/PLS OXIMETRY MLT: CPT

## 2025-01-20 RX ORDER — LANOLIN ALCOHOL/MO/W.PET/CERES
400 CREAM (GRAM) TOPICAL ONCE
Status: COMPLETED | OUTPATIENT
Start: 2025-01-20 | End: 2025-01-20

## 2025-01-20 RX ORDER — TALC
6 POWDER (GRAM) TOPICAL NIGHTLY
Status: DISCONTINUED | OUTPATIENT
Start: 2025-01-20 | End: 2025-02-13 | Stop reason: HOSPADM

## 2025-01-20 RX ORDER — IPRATROPIUM BROMIDE AND ALBUTEROL SULFATE 2.5; .5 MG/3ML; MG/3ML
3 SOLUTION RESPIRATORY (INHALATION) EVERY 6 HOURS PRN
Status: DISCONTINUED | OUTPATIENT
Start: 2025-01-20 | End: 2025-01-26

## 2025-01-20 RX ADMIN — Medication 400 MG: at 05:01

## 2025-01-20 RX ADMIN — LEVOFLOXACIN 750 MG: 750 TABLET, FILM COATED ORAL at 10:01

## 2025-01-20 RX ADMIN — IPRATROPIUM BROMIDE AND ALBUTEROL SULFATE 3 ML: 2.5; .5 SOLUTION RESPIRATORY (INHALATION) at 08:01

## 2025-01-20 RX ADMIN — LISINOPRIL 10 MG: 10 TABLET ORAL at 10:01

## 2025-01-20 RX ADMIN — Medication 1 TABLET: at 10:01

## 2025-01-20 RX ADMIN — NIFEDIPINE 30 MG: 30 TABLET, FILM COATED, EXTENDED RELEASE ORAL at 10:01

## 2025-01-20 RX ADMIN — Medication 6 MG: at 10:01

## 2025-01-20 RX ADMIN — NICOTINE 1 PATCH: 7 PATCH, EXTENDED RELEASE TRANSDERMAL at 10:01

## 2025-01-20 RX ADMIN — MICONAZOLE NITRATE: 20 OINTMENT TOPICAL at 10:01

## 2025-01-20 RX ADMIN — ENOXAPARIN SODIUM 40 MG: 40 INJECTION SUBCUTANEOUS at 05:01

## 2025-01-20 RX ADMIN — Medication 100 MG: at 10:01

## 2025-01-20 RX ADMIN — ASPIRIN 81 MG: 81 TABLET, COATED ORAL at 10:01

## 2025-01-20 RX ADMIN — ATORVASTATIN CALCIUM 40 MG: 40 TABLET, FILM COATED ORAL at 10:01

## 2025-01-20 RX ADMIN — FOLIC ACID 1 MG: 1 TABLET ORAL at 10:01

## 2025-01-20 RX ADMIN — Medication 200 ML: at 09:01

## 2025-01-20 RX ADMIN — ACETAMINOPHEN 650 MG: 325 TABLET ORAL at 05:01

## 2025-01-20 RX ADMIN — FERROUS SULFATE TAB 325 MG (65 MG ELEMENTAL FE) 1 EACH: 325 (65 FE) TAB at 09:01

## 2025-01-20 NOTE — PLAN OF CARE
AAOx4; POC reviewed & verbalizes understanding.  Admit DX: Palliative Care Encounter  Afebrile. No deficits noted  Coarse, wet & expiratory wheezing noted, MD notified   IV access & IVF: ok to keep out  BM: 1/20/25....3BM today, loose  : Indwelling catheter, catheter care performed. Dark urine  Appetite: poor  Bed alarm set; fall precautions maintained.   Bed locked in lowest position, side rails up x2, call light within reach, environment clear. Encouraged pt to call nurse with any concerns.  Safety measures maintained

## 2025-01-20 NOTE — PLAN OF CARE
Problem: Adult Inpatient Plan of Care  Goal: Plan of Care Review  Outcome: Progressing     Problem: Adult Inpatient Plan of Care  Goal: Patient-Specific Goal (Individualized)  Outcome: Progressing     Problem: Diabetes Comorbidity  Goal: Blood Glucose Level Within Targeted Range  Outcome: Progressing     Problem: Confusion Acute  Goal: Optimal Cognitive Function  Outcome: Progressing     Problem: Fall Injury Risk  Goal: Absence of Fall and Fall-Related Injury  Outcome: Progressing

## 2025-01-20 NOTE — ASSESSMENT & PLAN NOTE
Patient's blood pressure range in the last 24 hours was: BP  Min: 121/72  Max: 132/92.The patient's inpatient anti-hypertensive regimen is listed below:  Current Antihypertensives  lisinopriL tablet 10 mg, Daily, Oral  NIFEdipine 24 hr tablet 30 mg, Daily, Oral    Plan  - BP is controlled, no changes needed to their regimen  - will continue home regimen

## 2025-01-20 NOTE — PROGRESS NOTES
[facility] - [facility] Medicine  Progress Note    Patient Name: Bhavna Lim  MRN: [id number]  Patient Class: IP- Inpatient   Admission Date: 12/26/2024  Length of Stay: 22 days  Attending Physician: Justin Romero MD  Primary Care Provider: Suzi Green MD        Subjective     Principal Problem:Palliative care encounter        HPI:  Mrs. Bhavna Lim is a 79 year old F with a history of HTN and HLD who presents to the ED for weakness and fatigue. She has been having weight loss and poor po intake over the last few months. She has been seen by GI and evaluated with EGD and Colonoscopy as well as MRCP which showed cholelithiasis with gallbladder distention and intrahepatic and extrahepatic biliary dilation. No significant abnormalities on EGD and coloscopy. She was referred to surgery who recommended NM study and possible EUS with GI. She has been unable to tolerate any solid foods. She sometimes takes a little bit of soup and has tried protein shakes. She feels full immediately. She does endorse some pain in her epigastric region ever since having her EGD performed. She occasionally has vomiting. Denies regurgitation of food. She continues to have worsening weakness and falls at home. She had two falls yesterday due to weakness. She denies significant dizziness. She is unable to walk due to her weakness and sister is concerned this is related to her poor nutrition.      In the ED VSS. Labs notable for Potassium of 3.0 which was repleted in the ED. No focal signs concerning for stroke. She was admitted to medicine for further management.      Overview/Hospital Course:  While on the floor PT/OT were consulted and recommended SNF. Nutrition consulted. Discussed with GI outpatient workup and no indication for further inpatient workup. She continued to be altered while in the hospital. CT spine/head ordered for concern of fall showing ischemic changes and signficant stenosis and  narrowing. She developed worsening hand weakness and numbness/tinglign with abnormal coordiation. Some of this was prior to admission but weakness significantly worsened. MRI ordered and showed old infarcts. She continued to refuse food and did not have abdominal complaints just would not eat. Started on IVF due to hypoglycemia. Acute encephalopathy workup initiated and negative aside from low folate. UA ordered but not performed. At this point if nutrition becomes suboptimal will have to consider other means of nutrition. Son with concern of worsening memory and possible dementia wanting placement in [place]. Started on supplements for poor nutrition including thiamine and folate to see if this will help with mental status     Plan is NH with hospice services.    1/18: Extensive discussion with family who is torn between pursuing additional cares or going comfort measures/hospice route. Haldol added for agitation, Continue current plan for now.   1/19: Mental status improved but pt remains disoriented.   1/20: Awaiting placement @ NH w/ hospice services. Medicaid/Medicare pending    Interval History: No acute events overnight. Son and Sister at bedside and provide additional history.     The patient's family note that the patient remains mildly agitated at night and is not sleeping well.     Mental status improved and near baseline.     Pt allowed to remain sleeping during exam.     Review of Systems   Reason unable to perform ROS: Mental status.      Objective:     Vital Signs (Most Recent):  Temp: 98.1 °F (36.7 °C) (01/20/25 0918)  Pulse: 108 (01/20/25 0918)  Resp: 18 (01/20/25 0918)  BP: 131/88 (01/20/25 0918)  SpO2: 95 % (01/20/25 0918) Vital Signs (24h Range):  Temp:  [97.8 °F (36.6 °C)-98.3 °F (36.8 °C)] 98.1 °F (36.7 °C)  Pulse:  [104-112] 108  Resp:  [17-18] 18  SpO2:  [94 %-96 %] 95 %  BP: (121-132)/(72-92) 131/88     Weight: 54.9 kg (121 lb 0.5 oz)  Body mass index is 22.87 kg/m².    Intake/Output  "Summary (Last 24 hours) at 1/20/2025 1013  Last data filed at 1/20/2025 0647  Gross per 24 hour   Intake 330 ml   Output 600 ml   Net -270 ml      Physical Exam  Vitals and nursing note reviewed.   Constitutional:       General: She is not in acute distress.     Appearance: She is ill-appearing.      Comments: Resting comfortably     HENT:      Head: Normocephalic and atraumatic.      Nose: Nose normal.   Cardiovascular:      Rate and Rhythm: Normal rate and regular rhythm.      Heart sounds: No murmur heard.     No friction rub. No gallop.   Pulmonary:      Effort: No respiratory distress.   Abdominal:      General: There is no distension.      Palpations: Abdomen is soft.      Tenderness: There is no abdominal tenderness. There is no guarding or rebound.   Musculoskeletal:      Cervical back: Neck supple. No rigidity.   Skin:     General: Skin is dry.      Capillary Refill: Capillary refill takes less than 2 seconds.         Computed MELD 3.0 unavailable. One or more values for this score either were not found within the given timeframe or did not fit some other criterion.  Computed MELD-Na unavailable. One or more values for this score either were not found within the given timeframe or did not fit some other criterion.      Significant Labs:  CBC:  Recent Labs   Lab 01/19/25  0935   WBC 18.98*   HGB 9.3*   HCT 28.1*        CMP:  Recent Labs   Lab 01/19/25  0935      K 3.3*   *   CO2 22*      BUN 5*   CREATININE 0.5   CALCIUM 7.9*   PROT 5.3*   ALBUMIN 1.5*   BILITOT 1.5*   ALKPHOS 525*   AST 54*   ALT 58*   ANIONGAP 7*     PTINR:  No results for input(s): "INR" in the last 48 hours.    Significant Procedures:   Dobutamine Stress Test with Color Flow: No results found for this or any previous visit.    None    Assessment and Plan     * Palliative care encounter  Plan is NH with Hospice as discussed above      Hypophosphatemia  Patient's most recent phosphorus results are listed below. "   Recent Labs     01/18/25  0538 01/19/25  0935   PHOS 2.0* 1.9*       Plan  - Will treat hypophosphatemia with oral replacement  - Patient's hypophosphatemia is stable      Hypomagnesemia  Patient has Abnormal Magnesium: hypomagnesemia. Will continue to monitor electrolytes closely. Will replace the affected electrolytes and repeat labs to be done after interventions completed. The patient's magnesium results have been reviewed and are listed below.  Recent Labs   Lab 01/20/25  0513   MG 1.3*          Anemia  Anemia is likely due to Iron deficiency. Most recent hemoglobin and hematocrit are listed below.  Recent Labs     01/18/25  0538 01/19/25  0935   HGB 9.5* 9.3*   HCT 28.4* 28.1*       Plan  - Monitor serial CBC: Daily  - Transfuse PRBC if patient becomes hemodynamically unstable, symptomatic or H/H drops below 7/21.  - Patient has not received any PRBC transfusions to date  - Patient's anemia is currently stable      Swelling of right upper extremity  CT UE with Nonspecific subcutaneous edema throughout the distal arm and forearm, possibly sterile or infectious in nature.  No fluid collection.  No osteomyelitis.     Continue to monitor.     Hyperbilirubinemia  - elevation in bilirubin with concomitant elevations in LFTs  - RUQ U/S significant for biliary sludge, mild/moderate intrahepatic/extrahepatic biliary ductla dilation. Unchanged compare to previous MRCP.         Pseudomonas urinary tract infection  - urinalysis concerning for UTI and found to have pseudomonas  - Blood cultures: NGTD   - close assessment to see if mentation improves  -Finished Levaquin on 1/20    Cognitive impairment  -history of progressive confusion and forgetfulness prior to admission.   -CT head: Sequela of chronic microvascular ischemic change. Remote lacunar type infarct right thalamus; MRI C-spine: Multilevel cervical spondylosis, worst at C3-C4, contributing to moderate spinal canal stenosis as well as moderate left neural  foraminal narrowing.    -HIV/RPR negative. Folate low, repleting. B12 high. Thiamine low and repleting. 2/2 to dementia with poor nutritional intake; TSH wnl on 1/1/2025  - exhausted reversible causes of poor mentation and with imaging, appears to be age-related decline accelerated by some degree of vascular dementia. Likely some component of acute delirum due to hospital stay/UTI in setting of advanced dementia.     Plan:   - discussed pitfalls of G-tube placement. No plan for placement at this time.    - plan for discharge to Nursing Home with hospice services - awaiting Insurance approval.   -Haldol added PRN for agitation. Discussed risk of death and worsening condition with pt's son and sister.     Hypoglycemia  -Intermittent hypoglycemia over hospital stay. Likely related to poor PO intake.   -previously needed amp of D50  -monitor BG closely  -Can resume D5NS if needed  -Encourage PO intake         Numbness and tingling in both hands  -noted by patient before admission with abnormal finger to nose and weakness   -CT head and spine reviewed. There is significant stenosis/narrowing of C6 and C7 which could be contributing to symptoms. Findings appear chronic and not acute. MRI ordered and notable for cervical stenosis and lacunar infarcts. Limited visiblity due to movement   -CT head notable for remote lacunar infarcts, hx of stroke in 2005 currently on ASA   -continue PT/OT         Severe protein-calorie malnutrition  Nutrition consulted. Most recent weight and BMI monitored-     Measurements:  Wt Readings from Last 1 Encounters:   12/27/24 54.9 kg (121 lb 0.5 oz)   Body mass index is 22.87 kg/m².    Patient has been screened and assessed by RD.    Malnutrition Type:  Context: chronic illness  Level: severe    Malnutrition Characteristic Summary:  Weight Loss (Malnutrition): greater than 5% in 1 month  Energy Intake (Malnutrition): less than 75% for greater than or equal to 1 month  Subcutaneous Fat  (Malnutrition): severe depletion  Muscle Mass (Malnutrition): severe depletion    Interventions/Recommendations (treatment strategy):  Please prioritize patient's comfort and preferences over strict nutritional goals at this time. Offer small, frequent meals, texture modifications, and respect the patient's desire to eat or not eat. Also ensure proper hydration with small sips of fluids periodically.    -encourage PO intake    Hypokalemia  Patient's most recent potassium results are listed below.   Recent Labs     01/18/25  0538 01/19/25  0935   K 3.0* 3.3*       Plan  - Replete potassium per protocol  - Monitor potassium Daily  - Patient's hypokalemia is improving  - K+ in fluids    Debility  Patient with Acute debility due to age-related physical debility and other reduced mobility. The patient's latest AMPAC (Activity Measure for Post Acute Care) Score is listed below.    AM-PAC Score - How much help does the patient need for each activity listed  Basic Mobility Total Score: 7  Turning over in bed (including adjusting bedclothes, sheets and blankets)?: A lot  Sitting down on and standing up from a chair with arms (e.g., wheelchair, bedside commode, etc.): Unable  Moving from lying on back to sitting on the side of the bed?: Unable  Moving to and from a bed to a chair (including a wheelchair)?: Unable  Need to walk in hospital room?: Unable  Climbing 3-5 steps with a railing?: Unable    Plan  - Progressive mobility protocol initated  - PT/OT consulted  - Fall precautions in place  - Plan for NH with hospice services -pending insurance(Medicaid/Medicare)   - Palliative care following  -Pt is unable to be safely discharged at this time due to lack of continuous home care.          Diabetes mellitus  Sliding scale held, accuchecks for hypoglycemia     Patient's FSGs are controlled on current medication regimen.  Last A1c reviewed-   Lab Results   Component Value Date    HGBA1C 4.6 01/20/2025     Most recent  fingerstick glucose reviewed-   Recent Labs   Lab 01/19/25  1622 01/19/25  1719 01/19/25 2005 01/20/25  0916   POCTGLUCOSE 62* 110 92 63*       Current correctional scale   hold on correction scale give A1C is 5.5  Antihyperglycemics (From admission, onward)      None          Hold Oral hypoglycemics while patient is in the hospital; holding all given low intake    Unintentional weight loss  Nutrition consulted. Most recent weight and BMI monitored-     Measurements:  Wt Readings from Last 1 Encounters:   12/27/24 54.9 kg (121 lb 0.5 oz)   Body mass index is 22.87 kg/m².    RD consultation   Extensive outpatient evaluation recommended by GI and General surgery   Will add supplementation to meals; soft bite-sized per rec's  No plan for feeding tube at this time as family is able to convince pt to eat intermittently.       Please prioritize patient's comfort and preferences over strict nutritional goals at this time. Offer small, frequent meals, texture modifications, and respect the patient's desire to eat or not eat. Also ensure proper hydration with small sips of fluids periodically.      Hyperlipidemia  - continue ASA and statin      Benign essential hypertension  Patient's blood pressure range in the last 24 hours was: BP  Min: 121/72  Max: 132/92.The patient's inpatient anti-hypertensive regimen is listed below:  Current Antihypertensives  lisinopriL tablet 10 mg, Daily, Oral  NIFEdipine 24 hr tablet 30 mg, Daily, Oral    Plan  - BP is controlled, no changes needed to their regimen  - will continue home regimen      VTE Risk Mitigation (From admission, onward)           Ordered     enoxaparin injection 40 mg  Daily         12/26/24 1700     IP VTE HIGH RISK PATIENT  Once         12/26/24 1547     Place sequential compression device  Until discontinued         12/26/24 1547                    Discharge Planning   RITA: 1/21/2025     Code Status: DNR   Medical Readiness for Discharge Date:   Discharge Plan A: New  Nursing Home placement - [facility] care facility (with hospice services)   Discharge Delays: (!) Patient and Family Barriers (Family has not decided on facility.)            Please place Justification for DME        Justin Romero MD  Department of Hospital Medicine   [facility] - Internal Medicine Telemetry

## 2025-01-20 NOTE — ASSESSMENT & PLAN NOTE
Patient's most recent phosphorus results are listed below.   Recent Labs     01/18/25  0538 01/19/25  0935   PHOS 2.0* 1.9*       Plan  - Will treat hypophosphatemia with oral replacement  - Patient's hypophosphatemia is stable

## 2025-01-20 NOTE — ASSESSMENT & PLAN NOTE
Patient's most recent potassium results are listed below.   Recent Labs     01/18/25  0538 01/19/25  0935   K 3.0* 3.3*       Plan  - Replete potassium per protocol  - Monitor potassium Daily  - Patient's hypokalemia is improving  - K+ in fluids

## 2025-01-20 NOTE — ASSESSMENT & PLAN NOTE
-history of progressive confusion and forgetfulness prior to admission.   -CT head: Sequela of chronic microvascular ischemic change. Remote lacunar type infarct right thalamus; MRI C-spine: Multilevel cervical spondylosis, worst at C3-C4, contributing to moderate spinal canal stenosis as well as moderate left neural foraminal narrowing.    -HIV/RPR negative. Folate low, repleting. B12 high. Thiamine low and repleting. 2/2 to dementia with poor nutritional intake; TSH wnl on 1/1/2025  - exhausted reversible causes of poor mentation and with imaging, appears to be age-related decline accelerated by some degree of vascular dementia. Likely some component of acute delirum due to hospital stay/UTI in setting of advanced dementia.     Plan:   - discussed pitfalls of G-tube placement. No plan for placement at this time.    - plan for discharge to Nursing Home with hospice services - awaiting Insurance approval.   -Haldol added PRN for agitation. Discussed risk of death and worsening condition with pt's son and sister.

## 2025-01-20 NOTE — ASSESSMENT & PLAN NOTE
Patient has Abnormal Magnesium: hypomagnesemia. Will continue to monitor electrolytes closely. Will replace the affected electrolytes and repeat labs to be done after interventions completed. The patient's magnesium results have been reviewed and are listed below.  Recent Labs   Lab 01/20/25  0513   MG 1.3*

## 2025-01-20 NOTE — ASSESSMENT & PLAN NOTE
Nutrition consulted. Most recent weight and BMI monitored-     Measurements:  Wt Readings from Last 1 Encounters:   12/27/24 54.9 kg (121 lb 0.5 oz)   Body mass index is 22.87 kg/m².    RD consultation   Extensive outpatient evaluation recommended by GI and General surgery   Will add supplementation to meals; soft bite-sized per rec's  No plan for feeding tube at this time as family is able to convince pt to eat intermittently.       Please prioritize patient's comfort and preferences over strict nutritional goals at this time. Offer small, frequent meals, texture modifications, and respect the patient's desire to eat or not eat. Also ensure proper hydration with small sips of fluids periodically.

## 2025-01-20 NOTE — ASSESSMENT & PLAN NOTE
- urinalysis concerning for UTI and found to have pseudomonas  - Blood cultures: NGTD   - close assessment to see if mentation improves  -Finished Levaquin on 1/20

## 2025-01-20 NOTE — SUBJECTIVE & OBJECTIVE
Interval History: No acute events overnight. Son and Sister at bedside and provide additional history.     The patient's family note that the patient remains mildly agitated at night and is not sleeping well.     Mental status improved and near baseline.     Pt allowed to remain sleeping during exam.     Review of Systems   Reason unable to perform ROS: Mental status.      Objective:     Vital Signs (Most Recent):  Temp: 98.1 °F (36.7 °C) (01/20/25 0918)  Pulse: 108 (01/20/25 0918)  Resp: 18 (01/20/25 0918)  BP: 131/88 (01/20/25 0918)  SpO2: 95 % (01/20/25 0918) Vital Signs (24h Range):  Temp:  [97.8 °F (36.6 °C)-98.3 °F (36.8 °C)] 98.1 °F (36.7 °C)  Pulse:  [104-112] 108  Resp:  [17-18] 18  SpO2:  [94 %-96 %] 95 %  BP: (121-132)/(72-92) 131/88     Weight: 54.9 kg (121 lb 0.5 oz)  Body mass index is 22.87 kg/m².    Intake/Output Summary (Last 24 hours) at 1/20/2025 1013  Last data filed at 1/20/2025 0647  Gross per 24 hour   Intake 330 ml   Output 600 ml   Net -270 ml      Physical Exam  Vitals and nursing note reviewed.   Constitutional:       General: She is not in acute distress.     Appearance: She is ill-appearing.      Comments: Resting comfortably     HENT:      Head: Normocephalic and atraumatic.      Nose: Nose normal.   Cardiovascular:      Rate and Rhythm: Normal rate and regular rhythm.      Heart sounds: No murmur heard.     No friction rub. No gallop.   Pulmonary:      Effort: No respiratory distress.   Abdominal:      General: There is no distension.      Palpations: Abdomen is soft.      Tenderness: There is no abdominal tenderness. There is no guarding or rebound.   Musculoskeletal:      Cervical back: Neck supple. No rigidity.   Skin:     General: Skin is dry.      Capillary Refill: Capillary refill takes less than 2 seconds.         Computed MELD 3.0 unavailable. One or more values for this score either were not found within the given timeframe or did not fit some other criterion.  Computed  "MELD-Na unavailable. One or more values for this score either were not found within the given timeframe or did not fit some other criterion.      Significant Labs:  CBC:  Recent Labs   Lab 01/19/25  0935   WBC 18.98*   HGB 9.3*   HCT 28.1*        CMP:  Recent Labs   Lab 01/19/25  0935      K 3.3*   *   CO2 22*      BUN 5*   CREATININE 0.5   CALCIUM 7.9*   PROT 5.3*   ALBUMIN 1.5*   BILITOT 1.5*   ALKPHOS 525*   AST 54*   ALT 58*   ANIONGAP 7*     PTINR:  No results for input(s): "INR" in the last 48 hours.    Significant Procedures:   Dobutamine Stress Test with Color Flow: No results found for this or any previous visit.    None  "

## 2025-01-20 NOTE — ASSESSMENT & PLAN NOTE
Sliding scale held, accuchecks for hypoglycemia     Patient's FSGs are controlled on current medication regimen.  Last A1c reviewed-   Lab Results   Component Value Date    HGBA1C 4.6 01/20/2025     Most recent fingerstick glucose reviewed-   Recent Labs   Lab 01/19/25  1622 01/19/25  1719 01/19/25 2005 01/20/25  0916   POCTGLUCOSE 62* 110 92 63*       Current correctional scale   hold on correction scale give A1C is 5.5  Antihyperglycemics (From admission, onward)    None        Hold Oral hypoglycemics while patient is in the hospital; holding all given low intake

## 2025-01-21 PROBLEM — Z51.5 COMFORT MEASURES ONLY STATUS: Status: ACTIVE | Noted: 2025-01-21

## 2025-01-21 LAB
ALBUMIN SERPL BCP-MCNC: 1.5 G/DL (ref 3.5–5.2)
ALP SERPL-CCNC: 554 U/L (ref 40–150)
ALT SERPL W/O P-5'-P-CCNC: 56 U/L (ref 10–44)
ANION GAP SERPL CALC-SCNC: 9 MMOL/L (ref 8–16)
AST SERPL-CCNC: 60 U/L (ref 10–40)
BILIRUB SERPL-MCNC: 1.1 MG/DL (ref 0.1–1)
BUN SERPL-MCNC: 7 MG/DL (ref 8–23)
CALCIUM SERPL-MCNC: 7.7 MG/DL (ref 8.7–10.5)
CHLORIDE SERPL-SCNC: 113 MMOL/L (ref 95–110)
CO2 SERPL-SCNC: 23 MMOL/L (ref 23–29)
CREAT SERPL-MCNC: 0.6 MG/DL (ref 0.5–1.4)
ERYTHROCYTE [DISTWIDTH] IN BLOOD BY AUTOMATED COUNT: 23.9 % (ref 11.5–14.5)
EST. GFR  (NO RACE VARIABLE): >60 ML/MIN/1.73 M^2
GLUCOSE SERPL-MCNC: 73 MG/DL (ref 70–110)
HCT VFR BLD AUTO: 25.3 % (ref 37–48.5)
HGB BLD-MCNC: 8.7 G/DL (ref 12–16)
MAGNESIUM SERPL-MCNC: 1.5 MG/DL (ref 1.6–2.6)
MCH RBC QN AUTO: 29.1 PG (ref 27–31)
MCHC RBC AUTO-ENTMCNC: 34.4 G/DL (ref 32–36)
MCV RBC AUTO: 85 FL (ref 82–98)
PHOSPHATE SERPL-MCNC: 2.4 MG/DL (ref 2.7–4.5)
PLATELET # BLD AUTO: 343 K/UL (ref 150–450)
PMV BLD AUTO: 11.5 FL (ref 9.2–12.9)
POCT GLUCOSE: 100 MG/DL (ref 70–110)
POCT GLUCOSE: 120 MG/DL (ref 70–110)
POCT GLUCOSE: 61 MG/DL (ref 70–110)
POCT GLUCOSE: 69 MG/DL (ref 70–110)
POCT GLUCOSE: 74 MG/DL (ref 70–110)
POCT GLUCOSE: 82 MG/DL (ref 70–110)
POTASSIUM SERPL-SCNC: 3.3 MMOL/L (ref 3.5–5.1)
PROT SERPL-MCNC: 5.3 G/DL (ref 6–8.4)
RBC # BLD AUTO: 2.99 M/UL (ref 4–5.4)
SODIUM SERPL-SCNC: 145 MMOL/L (ref 136–145)
WBC # BLD AUTO: 16.06 K/UL (ref 3.9–12.7)

## 2025-01-21 PROCEDURE — 11000001 HC ACUTE MED/SURG PRIVATE ROOM

## 2025-01-21 PROCEDURE — 80053 COMPREHEN METABOLIC PANEL: CPT | Performed by: STUDENT IN AN ORGANIZED HEALTH CARE EDUCATION/TRAINING PROGRAM

## 2025-01-21 PROCEDURE — 25000003 PHARM REV CODE 250: Performed by: STUDENT IN AN ORGANIZED HEALTH CARE EDUCATION/TRAINING PROGRAM

## 2025-01-21 PROCEDURE — 25000003 PHARM REV CODE 250: Performed by: INTERNAL MEDICINE

## 2025-01-21 PROCEDURE — 83735 ASSAY OF MAGNESIUM: CPT | Performed by: INTERNAL MEDICINE

## 2025-01-21 PROCEDURE — 84100 ASSAY OF PHOSPHORUS: CPT | Performed by: INTERNAL MEDICINE

## 2025-01-21 PROCEDURE — 85027 COMPLETE CBC AUTOMATED: CPT | Performed by: INTERNAL MEDICINE

## 2025-01-21 PROCEDURE — S4991 NICOTINE PATCH NONLEGEND: HCPCS | Performed by: STUDENT IN AN ORGANIZED HEALTH CARE EDUCATION/TRAINING PROGRAM

## 2025-01-21 PROCEDURE — 63600175 PHARM REV CODE 636 W HCPCS: Performed by: STUDENT IN AN ORGANIZED HEALTH CARE EDUCATION/TRAINING PROGRAM

## 2025-01-21 PROCEDURE — 36415 COLL VENOUS BLD VENIPUNCTURE: CPT | Performed by: STUDENT IN AN ORGANIZED HEALTH CARE EDUCATION/TRAINING PROGRAM

## 2025-01-21 RX ADMIN — Medication 100 MG: at 08:01

## 2025-01-21 RX ADMIN — LISINOPRIL 10 MG: 10 TABLET ORAL at 08:01

## 2025-01-21 RX ADMIN — MICONAZOLE NITRATE: 20 OINTMENT TOPICAL at 09:01

## 2025-01-21 RX ADMIN — FERROUS SULFATE TAB 325 MG (65 MG ELEMENTAL FE) 1 EACH: 325 (65 FE) TAB at 08:01

## 2025-01-21 RX ADMIN — Medication 200 ML: at 03:01

## 2025-01-21 RX ADMIN — ASPIRIN 81 MG: 81 TABLET, COATED ORAL at 08:01

## 2025-01-21 RX ADMIN — NIFEDIPINE 30 MG: 30 TABLET, FILM COATED, EXTENDED RELEASE ORAL at 08:01

## 2025-01-21 RX ADMIN — ENOXAPARIN SODIUM 40 MG: 40 INJECTION SUBCUTANEOUS at 06:01

## 2025-01-21 RX ADMIN — Medication 200 ML: at 08:01

## 2025-01-21 RX ADMIN — FOLIC ACID 1 MG: 1 TABLET ORAL at 08:01

## 2025-01-21 RX ADMIN — MICONAZOLE NITRATE: 20 OINTMENT TOPICAL at 08:01

## 2025-01-21 RX ADMIN — Medication 1 TABLET: at 08:01

## 2025-01-21 RX ADMIN — ATORVASTATIN CALCIUM 40 MG: 40 TABLET, FILM COATED ORAL at 08:01

## 2025-01-21 RX ADMIN — Medication 1 TABLET: at 09:01

## 2025-01-21 RX ADMIN — Medication 16 G: at 03:01

## 2025-01-21 RX ADMIN — Medication 200 ML: at 04:01

## 2025-01-21 RX ADMIN — NICOTINE 1 PATCH: 7 PATCH, EXTENDED RELEASE TRANSDERMAL at 08:01

## 2025-01-21 RX ADMIN — Medication 6 MG: at 09:01

## 2025-01-21 NOTE — PLAN OF CARE
CM received a call from patient's sister inquiring about discharge needs and staus. Reviewed chart and updated on current plan, new [facility] NH with hospice. Accepting/choice facility is [facility]. No paperwork has been started with facility. CM requested sister to respond to CM email with facility selection. Since patient only has BC/BS commercial, family needs assistance applying for Medicare/Medicaid. Patient does not draw SS check only a residential check. Best option, SNF placement through commercial and transition to [facility]. Explained there is a chance patient will not qualify for part/all plans and would need to discharge home. CM asked sister to contact covering , Stefan, for further details (I.e.,medical readiness). CM listened to concerns and answered all questions to sister's satisfaction.       Nettie Garcias RN  Weekend  - [facility]  [phone]

## 2025-01-21 NOTE — PLAN OF CARE
Problem: Adult Inpatient Plan of Care  Goal: Plan of Care Review  Outcome: Progressing  Goal: Patient-Specific Goal (Individualized)  Outcome: Progressing  Goal: Absence of Hospital-Acquired Illness or Injury  Outcome: Progressing  Goal: Optimal Comfort and Wellbeing  Outcome: Progressing  Goal: Readiness for Transition of Care  Outcome: Progressing     Problem: Diabetes Comorbidity  Goal: Blood Glucose Level Within Targeted Range  Outcome: Progressing     Problem: Skin Injury Risk Increased  Goal: Skin Health and Integrity  Outcome: Progressing     Problem: Confusion Acute  Goal: Optimal Cognitive Function  Outcome: Progressing     Problem: Wound  Goal: Optimal Coping  Outcome: Progressing  Goal: Optimal Functional Ability  Outcome: Progressing  Goal: Absence of Infection Signs and Symptoms  Outcome: Progressing  Goal: Improved Oral Intake  Outcome: Progressing  Goal: Optimal Pain Control and Function  Outcome: Progressing  Goal: Skin Health and Integrity  Outcome: Progressing  Goal: Optimal Wound Healing  Outcome: Progressing     Problem: Infection  Goal: Absence of Infection Signs and Symptoms  Outcome: Progressing     Problem: Coping Ineffective  Goal: Effective Coping  Outcome: Progressing     Problem: Fall Injury Risk  Goal: Absence of Fall and Fall-Related Injury  Outcome: Progressing

## 2025-01-21 NOTE — SUBJECTIVE & OBJECTIVE
Interval History: NAEON. VSS. Patient seen and examined at bedside this morning. Pending morning labs. Patient has no complaints at this time. Pending NH with hospice placement.     Review of Systems   Constitutional:  Negative for chills and fever.   Respiratory:  Negative for chest tightness and shortness of breath.    Cardiovascular:  Negative for chest pain and leg swelling.   Gastrointestinal:  Negative for abdominal pain and nausea.   Neurological:  Negative for dizziness and weakness.     Objective:     Vital Signs (Most Recent):  Temp: 98.2 °F (36.8 °C) (01/21/25 0800)  Pulse: 97 (01/21/25 0800)  Resp: 18 (01/21/25 0800)  BP: 132/77 (01/21/25 0800)  SpO2: 96 % (01/21/25 0800) Vital Signs (24h Range):  Temp:  [98.2 °F (36.8 °C)-98.7 °F (37.1 °C)] 98.2 °F (36.8 °C)  Pulse:  [] 97  Resp:  [18] 18  SpO2:  [94 %-99 %] 96 %  BP: (122-132)/(72-82) 132/77     Weight: 54.9 kg (121 lb 0.5 oz)  Body mass index is 22.87 kg/m².    Intake/Output Summary (Last 24 hours) at 1/21/2025 1016  Last data filed at 1/21/2025 0635  Gross per 24 hour   Intake 670 ml   Output 800 ml   Net -130 ml      Physical Exam  Vitals and nursing note reviewed.   Constitutional:       General: She is not in acute distress.     Appearance: Normal appearance. She is ill-appearing.   HENT:      Head: Normocephalic and atraumatic.      Right Ear: External ear normal.      Left Ear: External ear normal.   Eyes:      Extraocular Movements: Extraocular movements intact.      Conjunctiva/sclera: Conjunctivae normal.      Pupils: Pupils are equal, round, and reactive to light.   Cardiovascular:      Rate and Rhythm: Normal rate and regular rhythm.      Pulses: Normal pulses.      Heart sounds: Normal heart sounds. No murmur heard.  Pulmonary:      Effort: Pulmonary effort is normal. No respiratory distress.      Breath sounds: Normal breath sounds.   Abdominal:      General: Abdomen is flat. Bowel sounds are normal.      Palpations: Abdomen is  soft.      Tenderness: There is no abdominal tenderness.   Musculoskeletal:         General: Normal range of motion.      Cervical back: Normal range of motion and neck supple. No tenderness.      Right lower leg: No edema.      Left lower leg: No edema.   Skin:     General: Skin is warm and dry.      Capillary Refill: Capillary refill takes less than 2 seconds.      Coloration: Skin is not jaundiced.   Neurological:      General: No focal deficit present.      Mental Status: She is alert and oriented to person, place, and time. Mental status is at baseline.   Psychiatric:         Mood and Affect: Mood normal.         Behavior: Behavior normal.         Significant Procedures:   Dobutamine Stress Test with Color Flow: No results found for this or any previous visit.

## 2025-01-21 NOTE — PROGRESS NOTES
[facility] - Internal Medicine [facility] Medicine  Progress Note    Patient Name: Bhavna Lim  MRN: [id number]  Patient Class: IP- Inpatient   Admission Date: 12/26/2024  Length of Stay: 23 days  Attending Physician: Negrita Douglas MD  Primary Care Provider: Suzi Green MD        Subjective     Principal Problem:Palliative care encounter        HPI:  Mrs. Bhavna Lim is a 79 year old F with a history of HTN and HLD who presents to the ED for weakness and fatigue. She has been having weight loss and poor po intake over the last few months. She has been seen by GI and evaluated with EGD and Colonoscopy as well as MRCP which showed cholelithiasis with gallbladder distention and intrahepatic and extrahepatic biliary dilation. No significant abnormalities on EGD and coloscopy. She was referred to surgery who recommended NM study and possible EUS with GI. She has been unable to tolerate any solid foods. She sometimes takes a little bit of soup and has tried protein shakes. She feels full immediately. She does endorse some pain in her epigastric region ever since having her EGD performed. She occasionally has vomiting. Denies regurgitation of food. She continues to have worsening weakness and falls at home. She had two falls yesterday due to weakness. She denies significant dizziness. She is unable to walk due to her weakness and sister is concerned this is related to her poor nutrition.      In the ED VSS. Labs notable for Potassium of 3.0 which was repleted in the ED. No focal signs concerning for stroke. She was admitted to medicine for further management.      Overview/Hospital Course:  While on the floor PT/OT were consulted and recommended SNF. Nutrition consulted. Discussed with GI outpatient workup and no indication for further inpatient workup. She continued to be altered while in the hospital. CT spine/head ordered for concern of fall showing ischemic changes and signficant stenosis  and narrowing. She developed worsening hand weakness and numbness/tinglign with abnormal coordiation. Some of this was prior to admission but weakness significantly worsened. MRI ordered and showed old infarcts. She continued to refuse food and did not have abdominal complaints just would not eat. Started on IVF due to hypoglycemia. Acute encephalopathy workup initiated and negative aside from low folate. UA ordered but not performed. At this point if nutrition becomes suboptimal will have to consider other means of nutrition. Son with concern of worsening memory and possible dementia wanting placement in [place]. Started on supplements for poor nutrition including thiamine and folate to see if this will help with mental status     Plan is NH with hospice services.    1/18: Extensive discussion with family who is torn between pursuing additional cares or going comfort measures/hospice route. Haldol added for agitation, Continue current plan for now.   1/19: Mental status improved but pt remains disoriented.   1/20: Awaiting placement @ NH w/ hospice services. Medicaid/Medicare pending  1/21: Awaiting placement with hospice services. No complaints today     Interval History: NESTOR. CONIS. Patient seen and examined at bedside this morning. Pending morning labs. Patient has no complaints at this time. Pending NH with hospice placement.     Review of Systems   Constitutional:  Negative for chills and fever.   Respiratory:  Negative for chest tightness and shortness of breath.    Cardiovascular:  Negative for chest pain and leg swelling.   Gastrointestinal:  Negative for abdominal pain and nausea.   Neurological:  Negative for dizziness and weakness.     Objective:     Vital Signs (Most Recent):  Temp: 98.2 °F (36.8 °C) (01/21/25 0800)  Pulse: 97 (01/21/25 0800)  Resp: 18 (01/21/25 0800)  BP: 132/77 (01/21/25 0800)  SpO2: 96 % (01/21/25 0800) Vital Signs (24h Range):  Temp:  [98.2 °F (36.8 °C)-98.7 °F (37.1 °C)] 98.2 °F  (36.8 °C)  Pulse:  [] 97  Resp:  [18] 18  SpO2:  [94 %-99 %] 96 %  BP: (122-132)/(72-82) 132/77     Weight: 54.9 kg (121 lb 0.5 oz)  Body mass index is 22.87 kg/m².    Intake/Output Summary (Last 24 hours) at 1/21/2025 1016  Last data filed at 1/21/2025 0635  Gross per 24 hour   Intake 670 ml   Output 800 ml   Net -130 ml      Physical Exam  Vitals and nursing note reviewed.   Constitutional:       General: She is not in acute distress.     Appearance: Normal appearance. She is ill-appearing.   HENT:      Head: Normocephalic and atraumatic.      Right Ear: External ear normal.      Left Ear: External ear normal.   Eyes:      Extraocular Movements: Extraocular movements intact.      Conjunctiva/sclera: Conjunctivae normal.      Pupils: Pupils are equal, round, and reactive to light.   Cardiovascular:      Rate and Rhythm: Normal rate and regular rhythm.      Pulses: Normal pulses.      Heart sounds: Normal heart sounds. No murmur heard.  Pulmonary:      Effort: Pulmonary effort is normal. No respiratory distress.      Breath sounds: Normal breath sounds.   Abdominal:      General: Abdomen is flat. Bowel sounds are normal.      Palpations: Abdomen is soft.      Tenderness: There is no abdominal tenderness.   Musculoskeletal:         General: Normal range of motion.      Cervical back: Normal range of motion and neck supple. No tenderness.      Right lower leg: No edema.      Left lower leg: No edema.   Skin:     General: Skin is warm and dry.      Capillary Refill: Capillary refill takes less than 2 seconds.      Coloration: Skin is not jaundiced.   Neurological:      General: No focal deficit present.      Mental Status: She is alert and oriented to person, place, and time. Mental status is at baseline.   Psychiatric:         Mood and Affect: Mood normal.         Behavior: Behavior normal.         Significant Procedures:   Dobutamine Stress Test with Color Flow: No results found for this or any previous  visit.      Assessment and Plan     * Palliative care encounter  Plan is NH with Hospice as discussed above      Hypophosphatemia  Patient's most recent phosphorus results are listed below.   Recent Labs     01/18/25  0538 01/19/25  0935   PHOS 2.0* 1.9*       Plan  - Will treat hypophosphatemia with oral replacement  - Patient's hypophosphatemia is stable      Hypomagnesemia  Patient has Abnormal Magnesium: hypomagnesemia. Will continue to monitor electrolytes closely. Will replace the affected electrolytes and repeat labs to be done after interventions completed. The patient's magnesium results have been reviewed and are listed below.  Recent Labs   Lab 01/20/25  0513   MG 1.3*          Anemia  Anemia is likely due to Iron deficiency. Most recent hemoglobin and hematocrit are listed below.  Recent Labs     01/18/25  0538 01/19/25  0935   HGB 9.5* 9.3*   HCT 28.4* 28.1*       Plan  - Monitor serial CBC: Daily  - Transfuse PRBC if patient becomes hemodynamically unstable, symptomatic or H/H drops below 7/21.  - Patient has not received any PRBC transfusions to date  - Patient's anemia is currently stable      Swelling of right upper extremity  CT UE with Nonspecific subcutaneous edema throughout the distal arm and forearm, possibly sterile or infectious in nature.  No fluid collection.  No osteomyelitis.     Continue to monitor.     Hyperbilirubinemia  - elevation in bilirubin with concomitant elevations in LFTs  - RUQ U/S significant for biliary sludge, mild/moderate intrahepatic/extrahepatic biliary ductla dilation. Unchanged compare to previous MRCP.         Pseudomonas urinary tract infection  - urinalysis concerning for UTI and found to have pseudomonas  - Blood cultures: NGTD   - close assessment to see if mentation improves  -Finished Levaquin on 1/20    Cognitive impairment  -history of progressive confusion and forgetfulness prior to admission.   -CT head: Sequela of chronic microvascular ischemic change.  Remote lacunar type infarct right thalamus; MRI C-spine: Multilevel cervical spondylosis, worst at C3-C4, contributing to moderate spinal canal stenosis as well as moderate left neural foraminal narrowing.    -HIV/RPR negative. Folate low, repleting. B12 high. Thiamine low and repleting. 2/2 to dementia with poor nutritional intake; TSH wnl on 1/1/2025  - exhausted reversible causes of poor mentation and with imaging, appears to be age-related decline accelerated by some degree of vascular dementia. Likely some component of acute delirum due to hospital stay/UTI in setting of advanced dementia.     Plan:   - discussed pitfalls of G-tube placement. No plan for placement at this time.    - plan for discharge to Nursing Home with hospice services - awaiting Insurance approval.   -Haldol added PRN for agitation. Discussed risk of death and worsening condition with pt's son and sister.     Hypoglycemia  -Intermittent hypoglycemia over hospital stay. Likely related to poor PO intake.   -previously needed amp of D50  -monitor BG closely  -Can resume D5NS if needed  -Encourage PO intake         Numbness and tingling in both hands  -noted by patient before admission with abnormal finger to nose and weakness   -CT head and spine reviewed. There is significant stenosis/narrowing of C6 and C7 which could be contributing to symptoms. Findings appear chronic and not acute. MRI ordered and notable for cervical stenosis and lacunar infarcts. Limited visiblity due to movement   -CT head notable for remote lacunar infarcts, hx of stroke in 2005 currently on ASA   -continue PT/OT         Severe protein-calorie malnutrition  Nutrition consulted. Most recent weight and BMI monitored-     Measurements:  Wt Readings from Last 1 Encounters:   12/27/24 54.9 kg (121 lb 0.5 oz)   Body mass index is 22.87 kg/m².    Patient has been screened and assessed by RD.    Malnutrition Type:  Context: chronic illness  Level: severe    Malnutrition  Characteristic Summary:  Weight Loss (Malnutrition): greater than 5% in 1 month  Energy Intake (Malnutrition): less than 75% for greater than or equal to 1 month  Subcutaneous Fat (Malnutrition): severe depletion  Muscle Mass (Malnutrition): severe depletion    Interventions/Recommendations (treatment strategy):  Please prioritize patient's comfort and preferences over strict nutritional goals at this time. Offer small, frequent meals, texture modifications, and respect the patient's desire to eat or not eat. Also ensure proper hydration with small sips of fluids periodically.    -encourage PO intake    Hypokalemia  Patient's most recent potassium results are listed below.   Recent Labs     01/18/25  0538 01/19/25  0935   K 3.0* 3.3*       Plan  - Replete potassium per protocol  - Monitor potassium Daily  - Patient's hypokalemia is improving  - K+ in fluids    Debility  Patient with Acute debility due to age-related physical debility and other reduced mobility. The patient's latest AMPAC (Activity Measure for Post Acute Care) Score is listed below.    AM-PAC Score - How much help does the patient need for each activity listed  Basic Mobility Total Score: 7  Turning over in bed (including adjusting bedclothes, sheets and blankets)?: A lot  Sitting down on and standing up from a chair with arms (e.g., wheelchair, bedside commode, etc.): Unable  Moving from lying on back to sitting on the side of the bed?: Unable  Moving to and from a bed to a chair (including a wheelchair)?: Unable  Need to walk in hospital room?: Unable  Climbing 3-5 steps with a railing?: Unable    Plan  - Progressive mobility protocol initated  - PT/OT consulted  - Fall precautions in place  - Plan for NH with hospice services -pending insurance(Medicaid/Medicare)   - Palliative care following  -Pt is unable to be safely discharged at this time due to lack of continuous home care.          Diabetes mellitus  Sliding scale held, accuchecks for  hypoglycemia     Patient's FSGs are controlled on current medication regimen.  Last A1c reviewed-   Lab Results   Component Value Date    HGBA1C 4.6 01/20/2025     Most recent fingerstick glucose reviewed-   Recent Labs   Lab 01/19/25  1622 01/19/25  1719 01/19/25 2005 01/20/25  0916   POCTGLUCOSE 62* 110 92 63*       Current correctional scale   hold on correction scale give A1C is 5.5  Antihyperglycemics (From admission, onward)      None          Hold Oral hypoglycemics while patient is in the hospital; holding all given low intake    Unintentional weight loss  Nutrition consulted. Most recent weight and BMI monitored-     Measurements:  Wt Readings from Last 1 Encounters:   12/27/24 54.9 kg (121 lb 0.5 oz)   Body mass index is 22.87 kg/m².    RD consultation   Extensive outpatient evaluation recommended by GI and General surgery   Will add supplementation to meals; soft bite-sized per rec's  No plan for feeding tube at this time as family is able to convince pt to eat intermittently.       Please prioritize patient's comfort and preferences over strict nutritional goals at this time. Offer small, frequent meals, texture modifications, and respect the patient's desire to eat or not eat. Also ensure proper hydration with small sips of fluids periodically.      Hyperlipidemia  - continue ASA and statin      Benign essential hypertension  Patient's blood pressure range in the last 24 hours was: BP  Min: 121/72  Max: 132/92.The patient's inpatient anti-hypertensive regimen is listed below:  Current Antihypertensives  lisinopriL tablet 10 mg, Daily, Oral  NIFEdipine 24 hr tablet 30 mg, Daily, Oral    Plan  - BP is controlled, no changes needed to their regimen  - will continue home regimen      VTE Risk Mitigation (From admission, onward)           Ordered     enoxaparin injection 40 mg  Daily         12/26/24 1700     IP VTE HIGH RISK PATIENT  Once         12/26/24 1547     Place sequential compression device  Until  discontinued         12/26/24 1547                    Discharge Planning   RITA: 1/28/2025     Code Status: DNR   Medical Readiness for Discharge Date: 1/20/2025  Discharge Plan A: New Nursing Home placement - half-way care facility (with hospice services)   Discharge Delays: (!) Patient and Family Barriers (Family has not decided on facility.)                    Ana Cristina Lucio PA-C  Department of Hospital Medicine   [facility] - Internal Medicine Telemetry

## 2025-01-22 LAB
POCT GLUCOSE: 120 MG/DL (ref 70–110)
POCT GLUCOSE: 73 MG/DL (ref 70–110)
POCT GLUCOSE: 73 MG/DL (ref 70–110)
POCT GLUCOSE: 90 MG/DL (ref 70–110)

## 2025-01-22 PROCEDURE — 25000003 PHARM REV CODE 250: Performed by: STUDENT IN AN ORGANIZED HEALTH CARE EDUCATION/TRAINING PROGRAM

## 2025-01-22 PROCEDURE — 11000001 HC ACUTE MED/SURG PRIVATE ROOM

## 2025-01-22 PROCEDURE — 63600175 PHARM REV CODE 636 W HCPCS: Performed by: STUDENT IN AN ORGANIZED HEALTH CARE EDUCATION/TRAINING PROGRAM

## 2025-01-22 PROCEDURE — S4991 NICOTINE PATCH NONLEGEND: HCPCS | Performed by: STUDENT IN AN ORGANIZED HEALTH CARE EDUCATION/TRAINING PROGRAM

## 2025-01-22 PROCEDURE — 25000003 PHARM REV CODE 250: Performed by: INTERNAL MEDICINE

## 2025-01-22 PROCEDURE — 25000003 PHARM REV CODE 250: Performed by: PHYSICIAN ASSISTANT

## 2025-01-22 RX ORDER — LANOLIN ALCOHOL/MO/W.PET/CERES
400 CREAM (GRAM) TOPICAL ONCE
Status: COMPLETED | OUTPATIENT
Start: 2025-01-22 | End: 2025-01-22

## 2025-01-22 RX ADMIN — Medication 400 MG: at 09:01

## 2025-01-22 RX ADMIN — ASPIRIN 81 MG: 81 TABLET, COATED ORAL at 08:01

## 2025-01-22 RX ADMIN — NICOTINE 1 PATCH: 7 PATCH, EXTENDED RELEASE TRANSDERMAL at 08:01

## 2025-01-22 RX ADMIN — MICONAZOLE NITRATE: 20 OINTMENT TOPICAL at 08:01

## 2025-01-22 RX ADMIN — LISINOPRIL 10 MG: 10 TABLET ORAL at 08:01

## 2025-01-22 RX ADMIN — ENOXAPARIN SODIUM 40 MG: 40 INJECTION SUBCUTANEOUS at 05:01

## 2025-01-22 RX ADMIN — FOLIC ACID 1 MG: 1 TABLET ORAL at 08:01

## 2025-01-22 RX ADMIN — Medication 100 MG: at 08:01

## 2025-01-22 RX ADMIN — Medication 1 TABLET: at 08:01

## 2025-01-22 RX ADMIN — NIFEDIPINE 30 MG: 30 TABLET, FILM COATED, EXTENDED RELEASE ORAL at 08:01

## 2025-01-22 RX ADMIN — ATORVASTATIN CALCIUM 40 MG: 40 TABLET, FILM COATED ORAL at 08:01

## 2025-01-22 RX ADMIN — FERROUS SULFATE TAB 325 MG (65 MG ELEMENTAL FE) 1 EACH: 325 (65 FE) TAB at 08:01

## 2025-01-22 RX ADMIN — Medication 6 MG: at 08:01

## 2025-01-22 NOTE — ASSESSMENT & PLAN NOTE
Patient's most recent phosphorus results are listed below.   Recent Labs     01/21/25  0911   PHOS 2.4*       Plan  - Will treat hypophosphatemia with oral replacement  - Patient's hypophosphatemia is stable

## 2025-01-22 NOTE — ASSESSMENT & PLAN NOTE
Patient has Abnormal Magnesium: hypomagnesemia. Will continue to monitor electrolytes closely. Will replace the affected electrolytes and repeat labs to be done after interventions completed. The patient's magnesium results have been reviewed and are listed below.          no

## 2025-01-22 NOTE — PROGRESS NOTES
[facility] - Internal Medicine Telemetry  Adult Nutrition  Progress Note    SUMMARY       Recommendations   Please prioritize patient's comfort and preferences over strict nutritional goals at this time. Offer small, frequent meals, texture modifications, and respect the patient's desire to eat or not eat. Also ensure proper hydration with small sips of fluids periodically.    Goals: Meet % of EEN/EPN by RD f/u date  Nutrition Goal Status: goal not met  Communication of RD Recs: POC    Assessment and Plan    Endocrine  Severe protein-calorie malnutrition  Malnutrition Type:  Context: chronic illness  Level: severe    Related to (etiology):   Decreased ability to consume sufficient energy 2/2 poor intake PTA    Signs and Symptoms (as evidenced by):   Malnutrition Characteristic Summary:  Weight Loss (Malnutrition): greater than 5% in 1 month  Energy Intake (Malnutrition): less than 75% for greater than or equal to 1 month  Subcutaneous Fat (Malnutrition): severe depletion  Muscle Mass (Malnutrition): severe depletion    Interventions (treatment strategy):  1. Continue texture modified diet- encourage intake as needed   2. Continue Boost supplements   3. RD following    Nutrition Diagnosis Status:   Continues         Malnutrition Assessment  Malnutrition Context: chronic illness  Malnutrition Level: severe          Weight Loss (Malnutrition): greater than 5% in 1 month  Energy Intake (Malnutrition): less than 75% for greater than or equal to 1 month  Subcutaneous Fat (Malnutrition): severe depletion  Muscle Mass (Malnutrition): severe depletion   Orbital Region (Subcutaneous Fat Loss): severe depletion  Upper Arm Region (Subcutaneous Fat Loss): severe depletion   [place] Region (Muscle Loss): severe depletion  Clavicle Bone Region (Muscle Loss): severe depletion                 Reason for Assessment    Reason For Assessment: RD follow-up  Diagnosis: other (see comments) (FTT)  General Information Comments: YAYA  "follow-up: RD charting remotely- pt consuming 0-25% of food along with supplemenal intake- noted that pt is going home to hospice.  Nutrition Discharge Planning: Pt's comfort- liberalize as needed   Nutrition Related Social Determinants of Health: SDOH: Unable to assess at this time.    Nutrition/Diet History    Spiritual, Cultural Beliefs, Buddhism Practices, Values that Affect Care: no  Food Allergies: NKFA    Anthropometrics    Height: 5' 1" (154.9 cm)  Height (inches): 61 in  Height Method: Measured  Weight: 54.9 kg (121 lb 0.5 oz)  Weight (lb): 121.03 lb  Weight Method: Bed Scale  Ideal Body Weight (IBW), Female: 105 lb  % Ideal Body Weight, Female (lb): 115.27 %  BMI (Calculated): 22.9  BMI Grade: 18.5-24.9 - normal       Lab/Procedures/Meds    Pertinent Labs Reviewed: reviewed  Pertinent Labs Comments: Potassium 2.7, GFR 68  Pertinent Medications Reviewed: reviewed  Pertinent Medications Comments: mitazipine    Estimated/Assessed Needs    Weight Used For Calorie Calculations: 54.9 kg (121 lb 0.5 oz)  Energy Calorie Requirements (kcal): 1647  Energy Need Method: Kcal/kg (30 kcal/kg)  Protein Requirements: 66-82 g (1.2-1.5 g/kg)  Weight Used For Protein Calculations: 54.9 kg (121 lb 0.5 oz)        RDA Method (mL): 1647         Nutrition Prescription Ordered    Current Diet Order: soft and bite sized diet    Evaluation of Received Nutrient/Fluid Intake    I/O: -  Energy Calories Required: not meeting needs  Protein Required: not meeting needs  Fluid Required: not meeting needs  Total Fluid Intake (mL/kg): 1 ml or fluid per MD  Tolerance: tolerating  % Intake of Estimated Energy Needs: 25 - 50 %  % Meal Intake: 0 - 25 %    Nutrition Risk    Level of Risk/Frequency of Follow-up: low ((1-2x/week))     Monitor and Evaluation    Food and Nutrient Intake: energy intake, food and beverage intake  Food and Nutrient Adminstration: diet order  Knowledge/Beliefs/Attitudes: food and nutrition knowledge/skill, beliefs and " attitudes  Physical Activity and Function: nutrition-related ADLs and IADLs, factors affecting access to physical activity  Anthropometric Measurements: height/length, weight, weight change, body mass index, growth pattern indices/percentile ranks  Biochemical Data, Medical Tests and Procedures: electrolyte and renal panel, gastrointestinal profile, glucose/endocrine profile, inflammatory profile, lipid profile  Nutrition-Focused Physical Findings: overall appearance, extremities, muscles and bones, head and eyes, skin     Nutrition Follow-Up    RD Follow-up?: Yes

## 2025-01-22 NOTE — PROGRESS NOTES
[facility] - Internal Medicine [facility] Medicine  Progress Note    Patient Name: Bhavna Lim  MRN: [id number]  Patient Class: IP- Inpatient   Admission Date: 12/26/2024  Length of Stay: 24 days  Attending Physician: Negrita Douglas MD  Primary Care Provider: Suzi Green MD        Subjective     Principal Problem:Palliative care encounter        HPI:  Mrs. Bhavna Lim is a 79 year old F with a history of HTN and HLD who presents to the ED for weakness and fatigue. She has been having weight loss and poor po intake over the last few months. She has been seen by GI and evaluated with EGD and Colonoscopy as well as MRCP which showed cholelithiasis with gallbladder distention and intrahepatic and extrahepatic biliary dilation. No significant abnormalities on EGD and coloscopy. She was referred to surgery who recommended NM study and possible EUS with GI. She has been unable to tolerate any solid foods. She sometimes takes a little bit of soup and has tried protein shakes. She feels full immediately. She does endorse some pain in her epigastric region ever since having her EGD performed. She occasionally has vomiting. Denies regurgitation of food. She continues to have worsening weakness and falls at home. She had two falls yesterday due to weakness. She denies significant dizziness. She is unable to walk due to her weakness and sister is concerned this is related to her poor nutrition.      In the ED VSS. Labs notable for Potassium of 3.0 which was repleted in the ED. No focal signs concerning for stroke. She was admitted to medicine for further management.      Overview/Hospital Course:  While on the floor PT/OT were consulted and recommended SNF. Nutrition consulted. Discussed with GI outpatient workup and no indication for further inpatient workup. She continued to be altered while in the hospital. CT spine/head ordered for concern of fall showing ischemic changes and signficant stenosis  and narrowing. She developed worsening hand weakness and numbness/tinglign with abnormal coordiation. Some of this was prior to admission but weakness significantly worsened. MRI ordered and showed old infarcts. She continued to refuse food and did not have abdominal complaints just would not eat. Started on IVF due to hypoglycemia. Acute encephalopathy workup initiated and negative aside from low folate. UA ordered but not performed. At this point if nutrition becomes suboptimal will have to consider other means of nutrition. Son with concern of worsening memory and possible dementia wanting placement in [place]. Started on supplements for poor nutrition including thiamine and folate to see if this will help with mental status     Plan is NH with hospice services.    1/18: Extensive discussion with family who is torn between pursuing additional cares or going comfort measures/hospice route. Haldol added for agitation, Continue current plan for now.   1/19: Mental status improved but pt remains disoriented.   1/20 to present: Awaiting placement @ NH w/ hospice services. Medicaid/Medicare pending    Interval History: NESTOR. VSS. Patient seen and examined at bedside this morning while nursing assisting patient with breakfast. Mg 1.5 yesterday, will replete. Continue K phos.  Patient has no complaints at this time. Pending NH with hospice placement.     Review of Systems   Constitutional:  Negative for chills and fever.   Respiratory:  Negative for chest tightness and shortness of breath.    Cardiovascular:  Negative for chest pain and leg swelling.   Gastrointestinal:  Negative for abdominal pain and nausea.   Neurological:  Negative for dizziness and weakness.     Objective:     Vital Signs (Most Recent):  Temp: 97.9 °F (36.6 °C) (01/22/25 0804)  Pulse: 85 (01/22/25 0804)  Resp: 18 (01/22/25 0804)  BP: 122/81 (01/22/25 0804)  SpO2: 96 % (01/22/25 0804) Vital Signs (24h Range):  Temp:  [97.9 °F (36.6 °C)-98.4 °F  (36.9 °C)] 97.9 °F (36.6 °C)  Pulse:  [] 85  Resp:  [16-18] 18  SpO2:  [94 %-100 %] 96 %  BP: (103-140)/(72-82) 122/81     Weight: 54.9 kg (121 lb 0.5 oz)  Body mass index is 22.87 kg/m².    Intake/Output Summary (Last 24 hours) at 1/22/2025 1010  Last data filed at 1/22/2025 0545  Gross per 24 hour   Intake 360 ml   Output 1050 ml   Net -690 ml      Physical Exam  Vitals and nursing note reviewed.   Constitutional:       General: She is not in acute distress.     Appearance: Normal appearance. She is ill-appearing.   HENT:      Head: Normocephalic and atraumatic.      Right Ear: External ear normal.      Left Ear: External ear normal.   Eyes:      Extraocular Movements: Extraocular movements intact.      Conjunctiva/sclera: Conjunctivae normal.      Pupils: Pupils are equal, round, and reactive to light.   Cardiovascular:      Rate and Rhythm: Normal rate and regular rhythm.      Pulses: Normal pulses.      Heart sounds: Normal heart sounds. No murmur heard.  Pulmonary:      Effort: Pulmonary effort is normal. No respiratory distress.      Breath sounds: Normal breath sounds.   Abdominal:      General: Abdomen is flat. Bowel sounds are normal.      Palpations: Abdomen is soft.      Tenderness: There is no abdominal tenderness.   Musculoskeletal:         General: Normal range of motion.      Cervical back: Normal range of motion and neck supple. No tenderness.      Right lower leg: No edema.      Left lower leg: No edema.   Skin:     General: Skin is warm and dry.      Capillary Refill: Capillary refill takes less than 2 seconds.      Coloration: Skin is not jaundiced.   Neurological:      General: No focal deficit present.      Mental Status: She is alert and oriented to person, place, and time. Mental status is at baseline.   Psychiatric:         Mood and Affect: Mood normal.         Behavior: Behavior normal.         Significant Procedures:   Dobutamine Stress Test with Color Flow: No results found for this  or any previous visit.      Assessment and Plan     * Palliative care encounter  Plan is NH with Hospice as discussed above      Hypophosphatemia  Patient's most recent phosphorus results are listed below.   Recent Labs     01/21/25  0911   PHOS 2.4*       Plan  - Will treat hypophosphatemia with oral replacement  - Patient's hypophosphatemia is stable      Hypomagnesemia  Patient has Abnormal Magnesium: hypomagnesemia. Will continue to monitor electrolytes closely. Will replace the affected electrolytes and repeat labs to be done after interventions completed. The patient's magnesium results have been reviewed and are listed below.           Anemia  Anemia is likely due to Iron deficiency. Most recent hemoglobin and hematocrit are listed below.  Recent Labs     01/21/25  0911   HGB 8.7*   HCT 25.3*       Plan  - Monitor serial CBC: Daily  - Transfuse PRBC if patient becomes hemodynamically unstable, symptomatic or H/H drops below 7/21.  - Patient has not received any PRBC transfusions to date  - Patient's anemia is currently stable      Swelling of right upper extremity  CT UE with Nonspecific subcutaneous edema throughout the distal arm and forearm, possibly sterile or infectious in nature.  No fluid collection.  No osteomyelitis.     Continue to monitor.     Hyperbilirubinemia  - elevation in bilirubin with concomitant elevations in LFTs  - RUQ U/S significant for biliary sludge, mild/moderate intrahepatic/extrahepatic biliary ductla dilation. Unchanged compare to previous MRCP.         Pseudomonas urinary tract infection  - urinalysis concerning for UTI and found to have pseudomonas  - Blood cultures: NGTD   - close assessment to see if mentation improves  -Finished Levaquin on 1/20    Cognitive impairment  -history of progressive confusion and forgetfulness prior to admission.   -CT head: Sequela of chronic microvascular ischemic change. Remote lacunar type infarct right thalamus; MRI C-spine: Multilevel  cervical spondylosis, worst at C3-C4, contributing to moderate spinal canal stenosis as well as moderate left neural foraminal narrowing.    -HIV/RPR negative. Folate low, repleting. B12 high. Thiamine low and repleting. 2/2 to dementia with poor nutritional intake; TSH wnl on 1/1/2025  - exhausted reversible causes of poor mentation and with imaging, appears to be age-related decline accelerated by some degree of vascular dementia. Likely some component of acute delirum due to hospital stay/UTI in setting of advanced dementia.     Plan:   - discussed pitfalls of G-tube placement. No plan for placement at this time.    - plan for discharge to Nursing Home with hospice services - awaiting Insurance approval.   -Haldol added PRN for agitation. Discussed risk of death and worsening condition with pt's son and sister.     Hypoglycemia  -Intermittent hypoglycemia over hospital stay. Likely related to poor PO intake.   -previously needed amp of D50  -monitor BG closely  -Can resume D5NS if needed  -Encourage PO intake         Numbness and tingling in both hands  -noted by patient before admission with abnormal finger to nose and weakness   -CT head and spine reviewed. There is significant stenosis/narrowing of C6 and C7 which could be contributing to symptoms. Findings appear chronic and not acute. MRI ordered and notable for cervical stenosis and lacunar infarcts. Limited visiblity due to movement   -CT head notable for remote lacunar infarcts, hx of stroke in 2005 currently on ASA   -continue PT/OT         Severe protein-calorie malnutrition  Nutrition consulted. Most recent weight and BMI monitored-     Measurements:  Wt Readings from Last 1 Encounters:   12/27/24 54.9 kg (121 lb 0.5 oz)   Body mass index is 22.87 kg/m².    Patient has been screened and assessed by RD.    Malnutrition Type:  Context: chronic illness  Level: severe    Malnutrition Characteristic Summary:  Weight Loss (Malnutrition): greater than 5% in 1  month  Energy Intake (Malnutrition): less than 75% for greater than or equal to 1 month  Subcutaneous Fat (Malnutrition): severe depletion  Muscle Mass (Malnutrition): severe depletion    Interventions/Recommendations (treatment strategy):  Please prioritize patient's comfort and preferences over strict nutritional goals at this time. Offer small, frequent meals, texture modifications, and respect the patient's desire to eat or not eat. Also ensure proper hydration with small sips of fluids periodically.    -encourage PO intake    Hypokalemia  Patient's most recent potassium results are listed below.   Recent Labs     01/21/25  0911   K 3.3*       Plan  - Replete potassium per protocol  - Monitor potassium Daily  - Patient's hypokalemia is improving  - K+ in fluids    Debility  Patient with Acute debility due to age-related physical debility and other reduced mobility. The patient's latest AMPAC (Activity Measure for Post Acute Care) Score is listed below.    AM-PAC Score - How much help does the patient need for each activity listed  Basic Mobility Total Score: 7  Turning over in bed (including adjusting bedclothes, sheets and blankets)?: A lot  Sitting down on and standing up from a chair with arms (e.g., wheelchair, bedside commode, etc.): Unable  Moving from lying on back to sitting on the side of the bed?: Unable  Moving to and from a bed to a chair (including a wheelchair)?: Unable  Need to walk in hospital room?: Unable  Climbing 3-5 steps with a railing?: Unable    Plan  - Progressive mobility protocol initated  - PT/OT consulted  - Fall precautions in place  - Plan for NH with hospice services -pending insurance(Medicaid/Medicare)   - Palliative care following  -Pt is unable to be safely discharged at this time due to lack of continuous home care.          Diabetes mellitus  Sliding scale held, accuchecks for hypoglycemia     Patient's FSGs are controlled on current medication regimen.  Last A1c reviewed-    Lab Results   Component Value Date    HGBA1C 4.6 01/20/2025     Most recent fingerstick glucose reviewed-   Recent Labs   Lab 01/21/25  1057 01/21/25  1606 01/21/25 2004 01/22/25  0805   POCTGLUCOSE 74 82 100 120*       Current correctional scale   hold on correction scale give A1C is 5.5  Antihyperglycemics (From admission, onward)      None          Hold Oral hypoglycemics while patient is in the hospital; holding all given low intake    Unintentional weight loss  Nutrition consulted. Most recent weight and BMI monitored-     Measurements:  Wt Readings from Last 1 Encounters:   12/27/24 54.9 kg (121 lb 0.5 oz)   Body mass index is 22.87 kg/m².    RD consultation   Extensive outpatient evaluation recommended by GI and General surgery   Will add supplementation to meals; soft bite-sized per rec's  No plan for feeding tube at this time as family is able to convince pt to eat intermittently.       Please prioritize patient's comfort and preferences over strict nutritional goals at this time. Offer small, frequent meals, texture modifications, and respect the patient's desire to eat or not eat. Also ensure proper hydration with small sips of fluids periodically.      Hyperlipidemia  - continue ASA and statin      Benign essential hypertension  Patient's blood pressure range in the last 24 hours was: BP  Min: 121/72  Max: 132/92.The patient's inpatient anti-hypertensive regimen is listed below:  Current Antihypertensives  lisinopriL tablet 10 mg, Daily, Oral  NIFEdipine 24 hr tablet 30 mg, Daily, Oral    Plan  - BP is controlled, no changes needed to their regimen  - will continue home regimen      VTE Risk Mitigation (From admission, onward)           Ordered     enoxaparin injection 40 mg  Daily         12/26/24 1700     IP VTE HIGH RISK PATIENT  Once         12/26/24 1547     Place sequential compression device  Until discontinued         12/26/24 1547                    Discharge Planning   RITA: 1/28/2025      Code Status: DNR   Medical Readiness for Discharge Date: 1/20/2025  Discharge Plan A: New Nursing Home placement - [facility] care facility (with hospice services)   Discharge Delays: (!) Patient and Family Barriers (Family has not decided on facility.)                    Ana Cristina Lucio PA-C  Department of Hospital Medicine   [facility] - Internal Medicine Telemetry

## 2025-01-22 NOTE — SUBJECTIVE & OBJECTIVE
Interval History: NAEON. VSS. Patient seen and examined at bedside this morning while nursing assisting patient with breakfast. Mg 1.5 yesterday, will replete. Continue K phos.  Patient has no complaints at this time. Pending NH with hospice placement.     Review of Systems   Constitutional:  Negative for chills and fever.   Respiratory:  Negative for chest tightness and shortness of breath.    Cardiovascular:  Negative for chest pain and leg swelling.   Gastrointestinal:  Negative for abdominal pain and nausea.   Neurological:  Negative for dizziness and weakness.     Objective:     Vital Signs (Most Recent):  Temp: 97.9 °F (36.6 °C) (01/22/25 0804)  Pulse: 85 (01/22/25 0804)  Resp: 18 (01/22/25 0804)  BP: 122/81 (01/22/25 0804)  SpO2: 96 % (01/22/25 0804) Vital Signs (24h Range):  Temp:  [97.9 °F (36.6 °C)-98.4 °F (36.9 °C)] 97.9 °F (36.6 °C)  Pulse:  [] 85  Resp:  [16-18] 18  SpO2:  [94 %-100 %] 96 %  BP: (103-140)/(72-82) 122/81     Weight: 54.9 kg (121 lb 0.5 oz)  Body mass index is 22.87 kg/m².    Intake/Output Summary (Last 24 hours) at 1/22/2025 1010  Last data filed at 1/22/2025 0545  Gross per 24 hour   Intake 360 ml   Output 1050 ml   Net -690 ml      Physical Exam  Vitals and nursing note reviewed.   Constitutional:       General: She is not in acute distress.     Appearance: Normal appearance. She is ill-appearing.   HENT:      Head: Normocephalic and atraumatic.      Right Ear: External ear normal.      Left Ear: External ear normal.   Eyes:      Extraocular Movements: Extraocular movements intact.      Conjunctiva/sclera: Conjunctivae normal.      Pupils: Pupils are equal, round, and reactive to light.   Cardiovascular:      Rate and Rhythm: Normal rate and regular rhythm.      Pulses: Normal pulses.      Heart sounds: Normal heart sounds. No murmur heard.  Pulmonary:      Effort: Pulmonary effort is normal. No respiratory distress.      Breath sounds: Normal breath sounds.   Abdominal:       General: Abdomen is flat. Bowel sounds are normal.      Palpations: Abdomen is soft.      Tenderness: There is no abdominal tenderness.   Musculoskeletal:         General: Normal range of motion.      Cervical back: Normal range of motion and neck supple. No tenderness.      Right lower leg: No edema.      Left lower leg: No edema.   Skin:     General: Skin is warm and dry.      Capillary Refill: Capillary refill takes less than 2 seconds.      Coloration: Skin is not jaundiced.   Neurological:      General: No focal deficit present.      Mental Status: She is alert and oriented to person, place, and time. Mental status is at baseline.   Psychiatric:         Mood and Affect: Mood normal.         Behavior: Behavior normal.         Significant Procedures:   Dobutamine Stress Test with Color Flow: No results found for this or any previous visit.

## 2025-01-22 NOTE — ASSESSMENT & PLAN NOTE
Sliding scale held, accuchecks for hypoglycemia     Patient's FSGs are controlled on current medication regimen.  Last A1c reviewed-   Lab Results   Component Value Date    HGBA1C 4.6 01/20/2025     Most recent fingerstick glucose reviewed-   Recent Labs   Lab 01/21/25  1057 01/21/25  1606 01/21/25 2004 01/22/25  0805   POCTGLUCOSE 74 82 100 120*       Current correctional scale   hold on correction scale give A1C is 5.5  Antihyperglycemics (From admission, onward)    None        Hold Oral hypoglycemics while patient is in the hospital; holding all given low intake

## 2025-01-22 NOTE — ASSESSMENT & PLAN NOTE
Patient's most recent potassium results are listed below.   Recent Labs     01/21/25  0911   K 3.3*       Plan  - Replete potassium per protocol  - Monitor potassium Daily  - Patient's hypokalemia is improving  - K+ in fluids

## 2025-01-22 NOTE — ASSESSMENT & PLAN NOTE
Anemia is likely due to Iron deficiency. Most recent hemoglobin and hematocrit are listed below.  Recent Labs     01/21/25  0911   HGB 8.7*   HCT 25.3*       Plan  - Monitor serial CBC: Daily  - Transfuse PRBC if patient becomes hemodynamically unstable, symptomatic or H/H drops below 7/21.  - Patient has not received any PRBC transfusions to date  - Patient's anemia is currently stable

## 2025-01-23 LAB
ALBUMIN SERPL BCP-MCNC: 1.5 G/DL (ref 3.5–5.2)
ALP SERPL-CCNC: 588 U/L (ref 40–150)
ALT SERPL W/O P-5'-P-CCNC: 46 U/L (ref 10–44)
ANION GAP SERPL CALC-SCNC: 6 MMOL/L (ref 8–16)
AST SERPL-CCNC: 52 U/L (ref 10–40)
BILIRUB SERPL-MCNC: 1.4 MG/DL (ref 0.1–1)
BUN SERPL-MCNC: 5 MG/DL (ref 8–23)
CALCIUM SERPL-MCNC: 7.7 MG/DL (ref 8.7–10.5)
CHLORIDE SERPL-SCNC: 114 MMOL/L (ref 95–110)
CO2 SERPL-SCNC: 25 MMOL/L (ref 23–29)
CREAT SERPL-MCNC: 0.4 MG/DL (ref 0.5–1.4)
EST. GFR  (NO RACE VARIABLE): >60 ML/MIN/1.73 M^2
GLUCOSE SERPL-MCNC: 55 MG/DL (ref 70–110)
POCT GLUCOSE: 50 MG/DL (ref 70–110)
POCT GLUCOSE: 58 MG/DL (ref 70–110)
POCT GLUCOSE: 81 MG/DL (ref 70–110)
POCT GLUCOSE: 91 MG/DL (ref 70–110)
POCT GLUCOSE: 93 MG/DL (ref 70–110)
POCT GLUCOSE: 96 MG/DL (ref 70–110)
POTASSIUM SERPL-SCNC: 3 MMOL/L (ref 3.5–5.1)
PROT SERPL-MCNC: 5.3 G/DL (ref 6–8.4)
SODIUM SERPL-SCNC: 145 MMOL/L (ref 136–145)

## 2025-01-23 PROCEDURE — 25000003 PHARM REV CODE 250: Performed by: PHYSICIAN ASSISTANT

## 2025-01-23 PROCEDURE — S4991 NICOTINE PATCH NONLEGEND: HCPCS | Performed by: STUDENT IN AN ORGANIZED HEALTH CARE EDUCATION/TRAINING PROGRAM

## 2025-01-23 PROCEDURE — 80053 COMPREHEN METABOLIC PANEL: CPT | Performed by: STUDENT IN AN ORGANIZED HEALTH CARE EDUCATION/TRAINING PROGRAM

## 2025-01-23 PROCEDURE — 25000003 PHARM REV CODE 250: Performed by: INTERNAL MEDICINE

## 2025-01-23 PROCEDURE — 11000001 HC ACUTE MED/SURG PRIVATE ROOM

## 2025-01-23 PROCEDURE — 63600175 PHARM REV CODE 636 W HCPCS: Performed by: STUDENT IN AN ORGANIZED HEALTH CARE EDUCATION/TRAINING PROGRAM

## 2025-01-23 PROCEDURE — 25000003 PHARM REV CODE 250: Performed by: STUDENT IN AN ORGANIZED HEALTH CARE EDUCATION/TRAINING PROGRAM

## 2025-01-23 PROCEDURE — 36415 COLL VENOUS BLD VENIPUNCTURE: CPT | Performed by: STUDENT IN AN ORGANIZED HEALTH CARE EDUCATION/TRAINING PROGRAM

## 2025-01-23 RX ADMIN — Medication 6 MG: at 09:01

## 2025-01-23 RX ADMIN — LISINOPRIL 10 MG: 10 TABLET ORAL at 09:01

## 2025-01-23 RX ADMIN — MICONAZOLE NITRATE: 20 OINTMENT TOPICAL at 09:01

## 2025-01-23 RX ADMIN — FOLIC ACID 1 MG: 1 TABLET ORAL at 09:01

## 2025-01-23 RX ADMIN — ASPIRIN 81 MG: 81 TABLET, COATED ORAL at 09:01

## 2025-01-23 RX ADMIN — NICOTINE 1 PATCH: 7 PATCH, EXTENDED RELEASE TRANSDERMAL at 09:01

## 2025-01-23 RX ADMIN — Medication 100 MG: at 09:01

## 2025-01-23 RX ADMIN — POTASSIUM BICARBONATE 25 MEQ: 978 TABLET, EFFERVESCENT ORAL at 10:01

## 2025-01-23 RX ADMIN — FERROUS SULFATE TAB 325 MG (65 MG ELEMENTAL FE) 1 EACH: 325 (65 FE) TAB at 09:01

## 2025-01-23 RX ADMIN — Medication 1 TABLET: at 09:01

## 2025-01-23 RX ADMIN — ENOXAPARIN SODIUM 40 MG: 40 INJECTION SUBCUTANEOUS at 04:01

## 2025-01-23 RX ADMIN — Medication 16 G: at 09:01

## 2025-01-23 RX ADMIN — NIFEDIPINE 30 MG: 30 TABLET, FILM COATED, EXTENDED RELEASE ORAL at 09:01

## 2025-01-23 RX ADMIN — ATORVASTATIN CALCIUM 40 MG: 40 TABLET, FILM COATED ORAL at 09:01

## 2025-01-23 NOTE — PLAN OF CARE
Problem: Adult Inpatient Plan of Care  Goal: Plan of Care Review  Outcome: Progressing  Goal: Patient-Specific Goal (Individualized)  Outcome: Progressing  Goal: Absence of Hospital-Acquired Illness or Injury  Outcome: Progressing  Goal: Optimal Comfort and Wellbeing  Outcome: Progressing  Goal: Readiness for Transition of Care  Outcome: Progressing     Problem: Diabetes Comorbidity  Goal: Blood Glucose Level Within Targeted Range  Outcome: Progressing     Problem: Skin Injury Risk Increased  Goal: Skin Health and Integrity  Outcome: Progressing     Problem: Confusion Acute  Goal: Optimal Cognitive Function  Outcome: Progressing     Problem: Wound  Goal: Optimal Coping  Outcome: Progressing  Goal: Optimal Functional Ability  Outcome: Progressing  Goal: Absence of Infection Signs and Symptoms  Outcome: Progressing  Goal: Improved Oral Intake  Outcome: Progressing  Goal: Optimal Pain Control and Function  Outcome: Progressing  Goal: Skin Health and Integrity  Outcome: Progressing  Goal: Optimal Wound Healing  Outcome: Progressing     Problem: Infection  Goal: Absence of Infection Signs and Symptoms  Outcome: Progressing     Problem: Coping Ineffective  Goal: Effective Coping  Outcome: Progressing     Problem: Fall Injury Risk  Goal: Absence of Fall and Fall-Related Injury  Outcome: Progressing    No significant events this shift, Pt Alert but only orient to herself , VS stable. Medication given. Patient expresses no concerns at this time, all needs have been met, Side rails up x 2, bed locked and low, call light within reach, POC ongoing.

## 2025-01-23 NOTE — NURSING
Pt glucose 50, 16 grams oral glucose given, MD notified.  Glucose rechecked at 0940, came up to 96.

## 2025-01-23 NOTE — ASSESSMENT & PLAN NOTE
Patient's most recent potassium results are listed below.   Recent Labs     01/21/25  0911 01/23/25  0730   K 3.3* 3.0*       Plan  - Replete potassium per protocol  - Monitor potassium Daily  - Patient's hypokalemia is stable  - replete

## 2025-01-23 NOTE — ASSESSMENT & PLAN NOTE
Sliding scale held, accuchecks for hypoglycemia     Patient's FSGs are controlled on current medication regimen.  Last A1c reviewed-   Lab Results   Component Value Date    HGBA1C 4.6 01/20/2025     Most recent fingerstick glucose reviewed-   Recent Labs   Lab 01/22/25  1951 01/23/25  0758 01/23/25  0913 01/23/25  0939   POCTGLUCOSE 73 58* 50* 96       Current correctional scale   hold on correction scale give A1C is 5.5  Antihyperglycemics (From admission, onward)    None        Hold Oral hypoglycemics while patient is in the hospital; holding all given low intake

## 2025-01-23 NOTE — PROGRESS NOTES
[facility] - Internal Medicine [facility] Medicine  Progress Note    Patient Name: Bhavna Lim  MRN: [id number]  Patient Class: IP- Inpatient   Admission Date: 12/26/2024  Length of Stay: 25 days  Attending Physician: Negrita Douglas MD  Primary Care Provider: Suzi Green MD        Subjective     Principal Problem:Palliative care encounter        HPI:  Mrs. Bhavna Lim is a 79 year old F with a history of HTN and HLD who presents to the ED for weakness and fatigue. She has been having weight loss and poor po intake over the last few months. She has been seen by GI and evaluated with EGD and Colonoscopy as well as MRCP which showed cholelithiasis with gallbladder distention and intrahepatic and extrahepatic biliary dilation. No significant abnormalities on EGD and coloscopy. She was referred to surgery who recommended NM study and possible EUS with GI. She has been unable to tolerate any solid foods. She sometimes takes a little bit of soup and has tried protein shakes. She feels full immediately. She does endorse some pain in her epigastric region ever since having her EGD performed. She occasionally has vomiting. Denies regurgitation of food. She continues to have worsening weakness and falls at home. She had two falls yesterday due to weakness. She denies significant dizziness. She is unable to walk due to her weakness and sister is concerned this is related to her poor nutrition.      In the ED VSS. Labs notable for Potassium of 3.0 which was repleted in the ED. No focal signs concerning for stroke. She was admitted to medicine for further management.      Overview/Hospital Course:  While on the floor PT/OT were consulted and recommended SNF. Nutrition consulted. Discussed with GI outpatient workup and no indication for further inpatient workup. She continued to be altered while in the hospital. CT spine/head ordered for concern of fall showing ischemic changes and signficant stenosis  and narrowing. She developed worsening hand weakness and numbness/tinglign with abnormal coordiation. Some of this was prior to admission but weakness significantly worsened. MRI ordered and showed old infarcts. She continued to refuse food and did not have abdominal complaints just would not eat. Started on IVF due to hypoglycemia. Acute encephalopathy workup initiated and negative aside from low folate. UA ordered but not performed. At this point if nutrition becomes suboptimal will have to consider other means of nutrition. Son with concern of worsening memory and possible dementia wanting placement in [place]. Started on supplements for poor nutrition including thiamine and folate to see if this will help with mental status     Plan is NH with hospice services.    1/18: Extensive discussion with family who is torn between pursuing additional cares or going comfort measures/hospice route. Haldol added for agitation, Continue current plan for now.   1/19: Mental status improved but pt remains disoriented.   1/20 to 1/22: Awaiting placement @ NH w/ hospice services. Medicaid/Medicare pending  1/23: Glucose 50, improved with dextrose. K 3.0, replete. Pending NH placement.     Interval History: NAEON. VSS. Patient seen and examined at bedside this morning. POCT glucose 50 this morning, improved to 96 with dextrose and coca-cola. K 3.0, will replete in addition to Kphos.  Patient has no complaints at this time. Pending NH with hospice placement.     Review of Systems   Constitutional:  Negative for chills and fever.   Respiratory:  Negative for chest tightness and shortness of breath.    Cardiovascular:  Negative for chest pain and leg swelling.   Gastrointestinal:  Negative for abdominal pain and nausea.   Neurological:  Negative for dizziness and weakness.     Objective:     Vital Signs (Most Recent):  Temp: 97.5 °F (36.4 °C) (01/23/25 0756)  Pulse: 85 (01/23/25 0756)  Resp: 18 (01/23/25 0756)  BP: 127/82 (01/23/25  0756)  SpO2: (!) 92 % (01/23/25 0756) Vital Signs (24h Range):  Temp:  [97.5 °F (36.4 °C)-98.4 °F (36.9 °C)] 97.5 °F (36.4 °C)  Pulse:  [] 85  Resp:  [18] 18  SpO2:  [92 %-97 %] 92 %  BP: (109-128)/(63-82) 127/82     Weight: 54.9 kg (121 lb 0.5 oz)  Body mass index is 22.87 kg/m².    Intake/Output Summary (Last 24 hours) at 1/23/2025 1021  Last data filed at 1/23/2025 1019  Gross per 24 hour   Intake 222 ml   Output 150 ml   Net 72 ml      Physical Exam  Vitals and nursing note reviewed.   Constitutional:       General: She is not in acute distress.     Appearance: Normal appearance. She is ill-appearing.   HENT:      Head: Normocephalic and atraumatic.      Right Ear: External ear normal.      Left Ear: External ear normal.   Eyes:      Extraocular Movements: Extraocular movements intact.      Conjunctiva/sclera: Conjunctivae normal.      Pupils: Pupils are equal, round, and reactive to light.   Cardiovascular:      Rate and Rhythm: Normal rate and regular rhythm.      Pulses: Normal pulses.      Heart sounds: Normal heart sounds. No murmur heard.  Pulmonary:      Effort: Pulmonary effort is normal. No respiratory distress.      Breath sounds: Normal breath sounds.   Abdominal:      General: Abdomen is flat. Bowel sounds are normal.      Palpations: Abdomen is soft.      Tenderness: There is no abdominal tenderness.   Musculoskeletal:         General: Normal range of motion.      Cervical back: Normal range of motion and neck supple. No tenderness.      Right lower leg: No edema.      Left lower leg: No edema.   Skin:     General: Skin is warm and dry.      Capillary Refill: Capillary refill takes less than 2 seconds.      Coloration: Skin is not jaundiced.   Neurological:      General: No focal deficit present.      Mental Status: She is alert and oriented to person, place, and time. Mental status is at baseline.   Psychiatric:         Mood and Affect: Mood normal.         Behavior: Behavior normal.          Significant Procedures:   Dobutamine Stress Test with Color Flow: No results found for this or any previous visit.      Assessment and Plan     * Palliative care encounter  Plan is NH with Hospice as discussed above      Hypophosphatemia  Patient's most recent phosphorus results are listed below.   Recent Labs     01/21/25  0911   PHOS 2.4*       Plan  - Will treat hypophosphatemia with oral replacement  - Patient's hypophosphatemia is stable      Hypomagnesemia  Patient has Abnormal Magnesium: hypomagnesemia. Will continue to monitor electrolytes closely. Will replace the affected electrolytes and repeat labs to be done after interventions completed. The patient's magnesium results have been reviewed and are listed below.           Anemia  Anemia is likely due to Iron deficiency. Most recent hemoglobin and hematocrit are listed below.  Recent Labs     01/21/25  0911   HGB 8.7*   HCT 25.3*       Plan  - Monitor serial CBC: Daily  - Transfuse PRBC if patient becomes hemodynamically unstable, symptomatic or H/H drops below 7/21.  - Patient has not received any PRBC transfusions to date  - Patient's anemia is currently stable      Swelling of right upper extremity  CT UE with Nonspecific subcutaneous edema throughout the distal arm and forearm, possibly sterile or infectious in nature.  No fluid collection.  No osteomyelitis.     Continue to monitor.     Hyperbilirubinemia  - elevation in bilirubin with concomitant elevations in LFTs  - RUQ U/S significant for biliary sludge, mild/moderate intrahepatic/extrahepatic biliary ductla dilation. Unchanged compare to previous MRCP.         Pseudomonas urinary tract infection  - urinalysis concerning for UTI and found to have pseudomonas  - Blood cultures: NGTD   - close assessment to see if mentation improves  -Finished Levaquin on 1/20    Cognitive impairment  -history of progressive confusion and forgetfulness prior to admission.   -CT head: Sequela of chronic  microvascular ischemic change. Remote lacunar type infarct right thalamus; MRI C-spine: Multilevel cervical spondylosis, worst at C3-C4, contributing to moderate spinal canal stenosis as well as moderate left neural foraminal narrowing.    -HIV/RPR negative. Folate low, repleting. B12 high. Thiamine low and repleting. 2/2 to dementia with poor nutritional intake; TSH wnl on 1/1/2025  - exhausted reversible causes of poor mentation and with imaging, appears to be age-related decline accelerated by some degree of vascular dementia. Likely some component of acute delirum due to hospital stay/UTI in setting of advanced dementia.     Plan:   - discussed pitfalls of G-tube placement. No plan for placement at this time.    - plan for discharge to Nursing Home with hospice services - awaiting Insurance approval.   -Haldol added PRN for agitation. Discussed risk of death and worsening condition with pt's son and sister.     Hypoglycemia  -Intermittent hypoglycemia over hospital stay. Likely related to poor PO intake.   -previously needed amp of D50  -monitor BG closely  -Can resume D5NS if needed  -Encourage PO intake         Numbness and tingling in both hands  -noted by patient before admission with abnormal finger to nose and weakness   -CT head and spine reviewed. There is significant stenosis/narrowing of C6 and C7 which could be contributing to symptoms. Findings appear chronic and not acute. MRI ordered and notable for cervical stenosis and lacunar infarcts. Limited visiblity due to movement   -CT head notable for remote lacunar infarcts, hx of stroke in 2005 currently on ASA   -continue PT/OT         Severe protein-calorie malnutrition  Nutrition consulted. Most recent weight and BMI monitored-     Measurements:  Wt Readings from Last 1 Encounters:   12/27/24 54.9 kg (121 lb 0.5 oz)   Body mass index is 22.87 kg/m².    Patient has been screened and assessed by RD.    Malnutrition Type:  Context: chronic  illness  Level: severe    Malnutrition Characteristic Summary:  Weight Loss (Malnutrition): greater than 5% in 1 month  Energy Intake (Malnutrition): less than 75% for greater than or equal to 1 month  Subcutaneous Fat (Malnutrition): severe depletion  Muscle Mass (Malnutrition): severe depletion    Interventions/Recommendations (treatment strategy):  Please prioritize patient's comfort and preferences over strict nutritional goals at this time. Offer small, frequent meals, texture modifications, and respect the patient's desire to eat or not eat. Also ensure proper hydration with small sips of fluids periodically.    -encourage PO intake    Hypokalemia  Patient's most recent potassium results are listed below.   Recent Labs     01/21/25  0911 01/23/25  0730   K 3.3* 3.0*       Plan  - Replete potassium per protocol  - Monitor potassium Daily  - Patient's hypokalemia is stable  - replete    Debility  Patient with Acute debility due to age-related physical debility and other reduced mobility. The patient's latest AMPAC (Activity Measure for Post Acute Care) Score is listed below.    AM-PAC Score - How much help does the patient need for each activity listed  Basic Mobility Total Score: 7  Turning over in bed (including adjusting bedclothes, sheets and blankets)?: A lot  Sitting down on and standing up from a chair with arms (e.g., wheelchair, bedside commode, etc.): Unable  Moving from lying on back to sitting on the side of the bed?: Unable  Moving to and from a bed to a chair (including a wheelchair)?: Unable  Need to walk in hospital room?: Unable  Climbing 3-5 steps with a railing?: Unable    Plan  - Progressive mobility protocol initated  - PT/OT consulted  - Fall precautions in place  - Plan for NH with hospice services -pending insurance(Medicaid/Medicare)   - Palliative care following  -Pt is unable to be safely discharged at this time due to lack of continuous home care.          Diabetes mellitus  Sliding  scale held, accuchecks for hypoglycemia     Patient's FSGs are controlled on current medication regimen.  Last A1c reviewed-   Lab Results   Component Value Date    HGBA1C 4.6 01/20/2025     Most recent fingerstick glucose reviewed-   Recent Labs   Lab 01/22/25  1951 01/23/25  0758 01/23/25  0913 01/23/25  0939   POCTGLUCOSE 73 58* 50* 96       Current correctional scale   hold on correction scale give A1C is 5.5  Antihyperglycemics (From admission, onward)      None          Hold Oral hypoglycemics while patient is in the hospital; holding all given low intake    Unintentional weight loss  Nutrition consulted. Most recent weight and BMI monitored-     Measurements:  Wt Readings from Last 1 Encounters:   12/27/24 54.9 kg (121 lb 0.5 oz)   Body mass index is 22.87 kg/m².    RD consultation   Extensive outpatient evaluation recommended by GI and General surgery   Will add supplementation to meals; soft bite-sized per rec's  No plan for feeding tube at this time as family is able to convince pt to eat intermittently.       Please prioritize patient's comfort and preferences over strict nutritional goals at this time. Offer small, frequent meals, texture modifications, and respect the patient's desire to eat or not eat. Also ensure proper hydration with small sips of fluids periodically.      Hyperlipidemia  - continue ASA and statin      Benign essential hypertension  Patient's blood pressure range in the last 24 hours was: BP  Min: 121/72  Max: 132/92.The patient's inpatient anti-hypertensive regimen is listed below:  Current Antihypertensives  lisinopriL tablet 10 mg, Daily, Oral  NIFEdipine 24 hr tablet 30 mg, Daily, Oral    Plan  - BP is controlled, no changes needed to their regimen  - will continue home regimen      VTE Risk Mitigation (From admission, onward)           Ordered     enoxaparin injection 40 mg  Daily         12/26/24 1700     IP VTE HIGH RISK PATIENT  Once         12/26/24 1547     Place sequential  compression device  Until discontinued         12/26/24 1547                    Discharge Planning   RITA: 1/28/2025     Code Status: DNR   Medical Readiness for Discharge Date: 1/20/2025  Discharge Plan A: [facility] placement [facility]with [facility] services)   Discharge Delays: (!) Patient and Family Barriers (Family has not decided on facility.)                    Ana Cristina Lucio PA-C  Department of Hospital Medicine   [facility] - Internal Medicine Telemetry

## 2025-01-23 NOTE — SUBJECTIVE & OBJECTIVE
Interval History: NAEON. VSS. Patient seen and examined at bedside this morning. POCT glucose 50 this morning, improved to 96 with dextrose and coca-cola. K 3.0, will replete in addition to Kphos.  Patient has no complaints at this time. Pending NH with hospice placement.     Review of Systems   Constitutional:  Negative for chills and fever.   Respiratory:  Negative for chest tightness and shortness of breath.    Cardiovascular:  Negative for chest pain and leg swelling.   Gastrointestinal:  Negative for abdominal pain and nausea.   Neurological:  Negative for dizziness and weakness.     Objective:     Vital Signs (Most Recent):  Temp: 97.5 °F (36.4 °C) (01/23/25 0756)  Pulse: 85 (01/23/25 0756)  Resp: 18 (01/23/25 0756)  BP: 127/82 (01/23/25 0756)  SpO2: (!) 92 % (01/23/25 0756) Vital Signs (24h Range):  Temp:  [97.5 °F (36.4 °C)-98.4 °F (36.9 °C)] 97.5 °F (36.4 °C)  Pulse:  [] 85  Resp:  [18] 18  SpO2:  [92 %-97 %] 92 %  BP: (109-128)/(63-82) 127/82     Weight: 54.9 kg (121 lb 0.5 oz)  Body mass index is 22.87 kg/m².    Intake/Output Summary (Last 24 hours) at 1/23/2025 1021  Last data filed at 1/23/2025 1019  Gross per 24 hour   Intake 222 ml   Output 150 ml   Net 72 ml      Physical Exam  Vitals and nursing note reviewed.   Constitutional:       General: She is not in acute distress.     Appearance: Normal appearance. She is ill-appearing.   HENT:      Head: Normocephalic and atraumatic.      Right Ear: External ear normal.      Left Ear: External ear normal.   Eyes:      Extraocular Movements: Extraocular movements intact.      Conjunctiva/sclera: Conjunctivae normal.      Pupils: Pupils are equal, round, and reactive to light.   Cardiovascular:      Rate and Rhythm: Normal rate and regular rhythm.      Pulses: Normal pulses.      Heart sounds: Normal heart sounds. No murmur heard.  Pulmonary:      Effort: Pulmonary effort is normal. No respiratory distress.      Breath sounds: Normal breath sounds.    Abdominal:      General: Abdomen is flat. Bowel sounds are normal.      Palpations: Abdomen is soft.      Tenderness: There is no abdominal tenderness.   Musculoskeletal:         General: Normal range of motion.      Cervical back: Normal range of motion and neck supple. No tenderness.      Right lower leg: No edema.      Left lower leg: No edema.   Skin:     General: Skin is warm and dry.      Capillary Refill: Capillary refill takes less than 2 seconds.      Coloration: Skin is not jaundiced.   Neurological:      General: No focal deficit present.      Mental Status: She is alert and oriented to person, place, and time. Mental status is at baseline.   Psychiatric:         Mood and Affect: Mood normal.         Behavior: Behavior normal.         Significant Procedures:   Dobutamine Stress Test with Color Flow: No results found for this or any previous visit.

## 2025-01-24 LAB
BASOPHILS # BLD AUTO: 0.05 K/UL (ref 0–0.2)
BASOPHILS NFR BLD: 0.4 % (ref 0–1.9)
DIFFERENTIAL METHOD BLD: ABNORMAL
EOSINOPHIL # BLD AUTO: 0.1 K/UL (ref 0–0.5)
EOSINOPHIL NFR BLD: 0.7 % (ref 0–8)
ERYTHROCYTE [DISTWIDTH] IN BLOOD BY AUTOMATED COUNT: 25.3 % (ref 11.5–14.5)
HCT VFR BLD AUTO: 26 % (ref 37–48.5)
HGB BLD-MCNC: 8.8 G/DL (ref 12–16)
IMM GRANULOCYTES # BLD AUTO: 0.06 K/UL (ref 0–0.04)
IMM GRANULOCYTES NFR BLD AUTO: 0.4 % (ref 0–0.5)
LYMPHOCYTES # BLD AUTO: 1.5 K/UL (ref 1–4.8)
LYMPHOCYTES NFR BLD: 10.2 % (ref 18–48)
MAGNESIUM SERPL-MCNC: 1.6 MG/DL (ref 1.6–2.6)
MCH RBC QN AUTO: 28.9 PG (ref 27–31)
MCHC RBC AUTO-ENTMCNC: 33.8 G/DL (ref 32–36)
MCV RBC AUTO: 85 FL (ref 82–98)
MONOCYTES # BLD AUTO: 1.3 K/UL (ref 0.3–1)
MONOCYTES NFR BLD: 9.3 % (ref 4–15)
NEUTROPHILS # BLD AUTO: 11.2 K/UL (ref 1.8–7.7)
NEUTROPHILS NFR BLD: 79 % (ref 38–73)
NRBC BLD-RTO: 0 /100 WBC
PHOSPHATE SERPL-MCNC: 2.8 MG/DL (ref 2.7–4.5)
PLATELET # BLD AUTO: 321 K/UL (ref 150–450)
PMV BLD AUTO: 10.4 FL (ref 9.2–12.9)
POCT GLUCOSE: 114 MG/DL (ref 70–110)
POCT GLUCOSE: 122 MG/DL (ref 70–110)
POCT GLUCOSE: 132 MG/DL (ref 70–110)
POCT GLUCOSE: 59 MG/DL (ref 70–110)
POCT GLUCOSE: 70 MG/DL (ref 70–110)
POCT GLUCOSE: 77 MG/DL (ref 70–110)
RBC # BLD AUTO: 3.05 M/UL (ref 4–5.4)
WBC # BLD AUTO: 14.22 K/UL (ref 3.9–12.7)

## 2025-01-24 PROCEDURE — 84100 ASSAY OF PHOSPHORUS: CPT | Performed by: PHYSICIAN ASSISTANT

## 2025-01-24 PROCEDURE — 36415 COLL VENOUS BLD VENIPUNCTURE: CPT | Performed by: PHYSICIAN ASSISTANT

## 2025-01-24 PROCEDURE — 85025 COMPLETE CBC W/AUTO DIFF WBC: CPT

## 2025-01-24 PROCEDURE — 63600175 PHARM REV CODE 636 W HCPCS

## 2025-01-24 PROCEDURE — 83735 ASSAY OF MAGNESIUM: CPT | Performed by: PHYSICIAN ASSISTANT

## 2025-01-24 PROCEDURE — 63600175 PHARM REV CODE 636 W HCPCS: Performed by: STUDENT IN AN ORGANIZED HEALTH CARE EDUCATION/TRAINING PROGRAM

## 2025-01-24 PROCEDURE — 25000003 PHARM REV CODE 250: Performed by: INTERNAL MEDICINE

## 2025-01-24 PROCEDURE — S4991 NICOTINE PATCH NONLEGEND: HCPCS | Performed by: STUDENT IN AN ORGANIZED HEALTH CARE EDUCATION/TRAINING PROGRAM

## 2025-01-24 PROCEDURE — 25000003 PHARM REV CODE 250: Performed by: STUDENT IN AN ORGANIZED HEALTH CARE EDUCATION/TRAINING PROGRAM

## 2025-01-24 PROCEDURE — 11000001 HC ACUTE MED/SURG PRIVATE ROOM

## 2025-01-24 RX ADMIN — NICOTINE 1 PATCH: 7 PATCH, EXTENDED RELEASE TRANSDERMAL at 08:01

## 2025-01-24 RX ADMIN — Medication 6 MG: at 09:01

## 2025-01-24 RX ADMIN — SODIUM CHLORIDE, POTASSIUM CHLORIDE, SODIUM LACTATE AND CALCIUM CHLORIDE 500 ML: 600; 310; 30; 20 INJECTION, SOLUTION INTRAVENOUS at 01:01

## 2025-01-24 RX ADMIN — Medication 16 G: at 01:01

## 2025-01-24 RX ADMIN — Medication 16 G: at 07:01

## 2025-01-24 RX ADMIN — FOLIC ACID 1 MG: 1 TABLET ORAL at 08:01

## 2025-01-24 RX ADMIN — MICONAZOLE NITRATE: 20 OINTMENT TOPICAL at 09:01

## 2025-01-24 RX ADMIN — LISINOPRIL 10 MG: 10 TABLET ORAL at 08:01

## 2025-01-24 RX ADMIN — Medication 1 TABLET: at 08:01

## 2025-01-24 RX ADMIN — NIFEDIPINE 30 MG: 30 TABLET, FILM COATED, EXTENDED RELEASE ORAL at 08:01

## 2025-01-24 RX ADMIN — Medication 1 TABLET: at 09:01

## 2025-01-24 RX ADMIN — FERROUS SULFATE TAB 325 MG (65 MG ELEMENTAL FE) 1 EACH: 325 (65 FE) TAB at 08:01

## 2025-01-24 RX ADMIN — ENOXAPARIN SODIUM 40 MG: 40 INJECTION SUBCUTANEOUS at 04:01

## 2025-01-24 RX ADMIN — ASPIRIN 81 MG: 81 TABLET, COATED ORAL at 08:01

## 2025-01-24 RX ADMIN — Medication 100 MG: at 08:01

## 2025-01-24 RX ADMIN — ATORVASTATIN CALCIUM 40 MG: 40 TABLET, FILM COATED ORAL at 08:01

## 2025-01-24 NOTE — PROGRESS NOTES
This writer received a call from pt's sister Karla RENTERIA  (She is no longer present at hospital). She stated that she was instructed to speak to Stefan to find out the role of hospital /.  She asked who is suppose to help with her sister's placement. This writer explained that Stefan is a coworker in the department but that pt is assigned to this writer.  She stated that she has been in contact with Mr. Higgins of [employer] and he said that the referral can be sent to him.  This writer asked for clarification of Mr. Higgins's role. Per sister, he  is the . This writer explained that we send referrals to the facility's admissions department. She inquired whether or not the facility has received everything that they need from us ([facility]) in order for them to help them apply for Medicare/Medicaid.  This writer informed her that updated clinicals were  sent to Emilie in admissions and that Emilie will reach out to her to guide her through their process for admission.

## 2025-01-24 NOTE — PROGRESS NOTES
CM received email from pt's sister stating that family has chosen [facility].  This writer contacted Emilie of admissions to confirm if family has been in contact with facility to begin process.  Process has not begun.  Will send updated clinicals and speak to family.

## 2025-01-24 NOTE — PROGRESS NOTES
CM received call from Magda.  She stated that pt is clinically accepted for placement and that she has spoke to family and provided them with a list of needed financial documents.     (Noted 1/21 CM note regarding option to enter facility of choice as skilled.  This writer clarified with Magda that that is an option if team thinks it is appropriate for pt and approved by insurance.  Facility would then be able to transition her to halfway. Per MDR, pt no longer appropriate for SNF therapy.  Per provider, family is all on  board with NH with hospice services.  Will inform Magda to plan for  halfway with hospice service).

## 2025-01-24 NOTE — PLAN OF CARE
Problem: Adult Inpatient Plan of Care  Goal: Plan of Care Review  Outcome: Progressing  Goal: Patient-Specific Goal (Individualized)  Outcome: Progressing  Goal: Absence of Hospital-Acquired Illness or Injury  Outcome: Progressing  Goal: Optimal Comfort and Wellbeing  Outcome: Progressing  Goal: Readiness for Transition of Care  Outcome: Progressing     Problem: Diabetes Comorbidity  Goal: Blood Glucose Level Within Targeted Range  Outcome: Progressing     Problem: Skin Injury Risk Increased  Goal: Skin Health and Integrity  Outcome: Progressing     Problem: Confusion Acute  Goal: Optimal Cognitive Function  Outcome: Progressing     Problem: Wound  Goal: Optimal Coping  Outcome: Progressing  Goal: Optimal Functional Ability  Outcome: Progressing  Goal: Absence of Infection Signs and Symptoms  Outcome: Progressing  Goal: Improved Oral Intake  Outcome: Progressing  Goal: Optimal Pain Control and Function  Outcome: Progressing  Goal: Skin Health and Integrity  Outcome: Progressing  Goal: Optimal Wound Healing  Outcome: Progressing     Problem: Infection  Goal: Absence of Infection Signs and Symptoms  Outcome: Progressing     Problem: Coping Ineffective  Goal: Effective Coping  Outcome: Progressing     Problem: Fall Injury Risk  Goal: Absence of Fall and Fall-Related Injury  Outcome: Progressing    Pt had an uneventful night. VSS. Pt remains confused Pt denied any pain or discomfort  Pt is free of falls and injuries. Bed in lowest position and call light within reach. Safety maintained

## 2025-01-25 LAB
ALBUMIN SERPL BCP-MCNC: 1.4 G/DL (ref 3.5–5.2)
ALP SERPL-CCNC: 539 U/L (ref 40–150)
ALT SERPL W/O P-5'-P-CCNC: 40 U/L (ref 10–44)
ANION GAP SERPL CALC-SCNC: 7 MMOL/L (ref 8–16)
AST SERPL-CCNC: 42 U/L (ref 10–40)
BILIRUB SERPL-MCNC: 1.1 MG/DL (ref 0.1–1)
BUN SERPL-MCNC: 6 MG/DL (ref 8–23)
CALCIUM SERPL-MCNC: 7.6 MG/DL (ref 8.7–10.5)
CHLORIDE SERPL-SCNC: 113 MMOL/L (ref 95–110)
CO2 SERPL-SCNC: 27 MMOL/L (ref 23–29)
CREAT SERPL-MCNC: 0.4 MG/DL (ref 0.5–1.4)
EST. GFR  (NO RACE VARIABLE): >60 ML/MIN/1.73 M^2
GLUCOSE SERPL-MCNC: 74 MG/DL (ref 70–110)
POCT GLUCOSE: 138 MG/DL (ref 70–110)
POCT GLUCOSE: 82 MG/DL (ref 70–110)
POCT GLUCOSE: 86 MG/DL (ref 70–110)
POCT GLUCOSE: 87 MG/DL (ref 70–110)
POTASSIUM SERPL-SCNC: 3.2 MMOL/L (ref 3.5–5.1)
PROT SERPL-MCNC: 5.1 G/DL (ref 6–8.4)
SODIUM SERPL-SCNC: 147 MMOL/L (ref 136–145)

## 2025-01-25 PROCEDURE — 25000003 PHARM REV CODE 250: Performed by: INTERNAL MEDICINE

## 2025-01-25 PROCEDURE — 36415 COLL VENOUS BLD VENIPUNCTURE: CPT | Performed by: STUDENT IN AN ORGANIZED HEALTH CARE EDUCATION/TRAINING PROGRAM

## 2025-01-25 PROCEDURE — 25000003 PHARM REV CODE 250: Performed by: STUDENT IN AN ORGANIZED HEALTH CARE EDUCATION/TRAINING PROGRAM

## 2025-01-25 PROCEDURE — 11000001 HC ACUTE MED/SURG PRIVATE ROOM

## 2025-01-25 PROCEDURE — 80053 COMPREHEN METABOLIC PANEL: CPT | Performed by: STUDENT IN AN ORGANIZED HEALTH CARE EDUCATION/TRAINING PROGRAM

## 2025-01-25 PROCEDURE — S4991 NICOTINE PATCH NONLEGEND: HCPCS | Performed by: STUDENT IN AN ORGANIZED HEALTH CARE EDUCATION/TRAINING PROGRAM

## 2025-01-25 PROCEDURE — 63600175 PHARM REV CODE 636 W HCPCS: Performed by: STUDENT IN AN ORGANIZED HEALTH CARE EDUCATION/TRAINING PROGRAM

## 2025-01-25 RX ADMIN — ATORVASTATIN CALCIUM 40 MG: 40 TABLET, FILM COATED ORAL at 09:01

## 2025-01-25 RX ADMIN — Medication 1 TABLET: at 09:01

## 2025-01-25 RX ADMIN — ACETAMINOPHEN 650 MG: 325 TABLET ORAL at 09:01

## 2025-01-25 RX ADMIN — MICONAZOLE NITRATE: 20 OINTMENT TOPICAL at 09:01

## 2025-01-25 RX ADMIN — FOLIC ACID 1 MG: 1 TABLET ORAL at 09:01

## 2025-01-25 RX ADMIN — NICOTINE 1 PATCH: 7 PATCH, EXTENDED RELEASE TRANSDERMAL at 09:01

## 2025-01-25 RX ADMIN — FERROUS SULFATE TAB 325 MG (65 MG ELEMENTAL FE) 1 EACH: 325 (65 FE) TAB at 09:01

## 2025-01-25 RX ADMIN — LISINOPRIL 10 MG: 10 TABLET ORAL at 09:01

## 2025-01-25 RX ADMIN — NIFEDIPINE 30 MG: 30 TABLET, FILM COATED, EXTENDED RELEASE ORAL at 09:01

## 2025-01-25 RX ADMIN — Medication 6 MG: at 09:01

## 2025-01-25 RX ADMIN — ENOXAPARIN SODIUM 40 MG: 40 INJECTION SUBCUTANEOUS at 04:01

## 2025-01-25 RX ADMIN — ASPIRIN 81 MG: 81 TABLET, COATED ORAL at 09:01

## 2025-01-25 RX ADMIN — Medication 100 MG: at 09:01

## 2025-01-25 NOTE — PLAN OF CARE
Problem: Adult Inpatient Plan of Care  Goal: Plan of Care Review  Outcome: Progressing  Goal: Patient-Specific Goal (Individualized)  Outcome: Progressing  Goal: Absence of Hospital-Acquired Illness or Injury  Outcome: Progressing  Goal: Optimal Comfort and Wellbeing  Outcome: Progressing  Goal: Readiness for Transition of Care  Outcome: Progressing     Problem: Diabetes Comorbidity  Goal: Blood Glucose Level Within Targeted Range  Outcome: Progressing     Problem: Skin Injury Risk Increased  Goal: Skin Health and Integrity  Outcome: Progressing     Problem: Confusion Acute  Goal: Optimal Cognitive Function  Outcome: Progressing     Problem: Wound  Goal: Optimal Coping  Outcome: Progressing  Goal: Optimal Functional Ability  Outcome: Progressing  Goal: Absence of Infection Signs and Symptoms  Outcome: Progressing  Goal: Improved Oral Intake  Outcome: Progressing  Goal: Optimal Pain Control and Function  Outcome: Progressing  Goal: Skin Health and Integrity  Outcome: Progressing  Goal: Optimal Wound Healing  Outcome: Progressing     Problem: Infection  Goal: Absence of Infection Signs and Symptoms  Outcome: Progressing     Problem: Coping Ineffective  Goal: Effective Coping  Outcome: Progressing     Problem: Fall Injury Risk  Goal: Absence of Fall and Fall-Related Injury  Outcome: Progressing   Pt had an uneventful night. VSS. Pt remains confused.  Pt is free of falls and injuries. Bed in lowest position and call light within reach. Safety maintained

## 2025-01-25 NOTE — PROGRESS NOTES
[facility] - Internal Medicine [facility] Medicine  Progress Note    Patient Name: Bhavna Lim  MRN: [id number]  Patient Class: IP- Inpatient   Admission Date: 12/26/2024  Length of Stay: 26 days  Attending Physician: Rimma Mohamud MD  Primary Care Provider: Suzi Green MD        Subjective     Principal Problem:Palliative care encounter        HPI:  Mrs. Bhavna Lim is a 79 year old F with a history of HTN and HLD who presents to the ED for weakness and fatigue. She has been having weight loss and poor po intake over the last few months. She has been seen by GI and evaluated with EGD and Colonoscopy as well as MRCP which showed cholelithiasis with gallbladder distention and intrahepatic and extrahepatic biliary dilation. No significant abnormalities on EGD and coloscopy. She was referred to surgery who recommended NM study and possible EUS with GI. She has been unable to tolerate any solid foods. She sometimes takes a little bit of soup and has tried protein shakes. She feels full immediately. She does endorse some pain in her epigastric region ever since having her EGD performed. She occasionally has vomiting. Denies regurgitation of food. She continues to have worsening weakness and falls at home. She had two falls yesterday due to weakness. She denies significant dizziness. She is unable to walk due to her weakness and sister is concerned this is related to her poor nutrition.      In the ED VSS. Labs notable for Potassium of 3.0 which was repleted in the ED. No focal signs concerning for stroke. She was admitted to medicine for further management.      Overview/Hospital Course:  While on the floor PT/OT were consulted and recommended SNF. Nutrition consulted. Discussed with GI outpatient workup and no indication for further inpatient workup. She continued to be altered while in the hospital. CT spine/head ordered for concern of fall showing ischemic changes and signficant stenosis  and narrowing. She developed worsening hand weakness and numbness/tinglign with abnormal coordiation. Some of this was prior to admission but weakness significantly worsened. MRI ordered and showed old infarcts. She continued to refuse food and did not have abdominal complaints just would not eat. Started on IVF due to hypoglycemia. Acute encephalopathy workup initiated and negative aside from low folate. UA ordered but not performed. At this point if nutrition becomes suboptimal will have to consider other means of nutrition. Son with concern of worsening memory and possible dementia wanting placement in [place]. Started on supplements for poor nutrition including thiamine and folate to see if this will help with mental status     Plan is NH with hospice services.    1/18: Extensive discussion with family who is torn between pursuing additional cares or going comfort measures/hospice route. Haldol added for agitation, Continue current plan for now.   1/19: Mental status improved but pt remains disoriented.   1/20 to 1/22: Awaiting placement @ NH w/ hospice services. Medicaid/Medicare pending  1/23: Glucose 50, improved with dextrose. K 3.0, replete. Pending NH placement.   1/24: 500cc IVF given poor PO intake past days. Discussed with family. Pending NH with hospice.     Interval History:     500cc IVF after discussing with family. Pending NH with hospice.     Review of Systems  Objective:     Vital Signs (Most Recent):  Temp: 98.1 °F (36.7 °C) (01/24/25 2005)  Pulse: 95 (01/24/25 2005)  Resp: 18 (01/24/25 2005)  BP: 130/71 (01/24/25 2005)  SpO2: (!) 94 % (01/24/25 2005) Vital Signs (24h Range):  Temp:  [97.6 °F (36.4 °C)-98.6 °F (37 °C)] 98.1 °F (36.7 °C)  Pulse:  [] 95  Resp:  [18] 18  SpO2:  [92 %-95 %] 94 %  BP: (108-130)/(57-71) 130/71     Weight: 54.9 kg (121 lb 0.5 oz)  Body mass index is 22.87 kg/m².    Intake/Output Summary (Last 24 hours) at 1/24/2025 2223  Last data filed at 1/24/2025 1302  Gross  per 24 hour   Intake 360 ml   Output --   Net 360 ml      Physical Exam    Constitutional:       General: She is not in acute distress.     Appearance: Normal appearance. She is ill-appearing.   HENT:      Head: Normocephalic and atraumatic.      Right Ear: External ear normal.      Left Ear: External ear normal.   Eyes:      Extraocular Movements: Extraocular movements intact.      Conjunctiva/sclera: Conjunctivae normal.      Pupils: Pupils are equal, round, and reactive to light.   Cardiovascular:      Rate and Rhythm: Normal rate and regular rhythm.      Pulses: Normal pulses.      Heart sounds: Normal heart sounds. No murmur heard.  Pulmonary:      Effort: Pulmonary effort is normal. No respiratory distress.      Breath sounds: Normal breath sounds.   Abdominal:      General: Abdomen is flat. Bowel sounds are normal.      Palpations: Abdomen is soft.      Tenderness: There is no abdominal tenderness.   Musculoskeletal:         General: Normal range of motion.      Cervical back: Normal range of motion and neck supple. No tenderness.      Right lower leg: No edema.      Left lower leg: No edema.   Skin:     General: Skin is warm and dry.      Capillary Refill: Capillary refill takes less than 2 seconds.      Coloration: Skin is not jaundiced.   Neurological:      General: No focal deficit present.      Mental Status: She is alert and oriented to person, place, and time. Mental status is at baseline.   Psychiatric:         Mood and Affect: Mood normal.         Behavior: Behavior normal.        Computed MELD 3.0 unavailable. One or more values for this score either were not found within the given timeframe or did not fit some other criterion.  Computed MELD-Na unavailable. One or more values for this score either were not found within the given timeframe or did not fit some other criterion.      Significant Labs:  CBC:  Recent Labs   Lab 01/24/25  1337   WBC 14.22*   HGB 8.8*   HCT 26.0*        CMP:  Recent  "Labs   Lab 01/23/25  0730      K 3.0*   *   CO2 25   GLU 55*   BUN 5*   CREATININE 0.4*   CALCIUM 7.7*   PROT 5.3*   ALBUMIN 1.5*   BILITOT 1.4*   ALKPHOS 588*   AST 52*   ALT 46*   ANIONGAP 6*     PTINR:  No results for input(s): "INR" in the last 48 hours.    Significant Procedures:   Dobutamine Stress Test with Color Flow: No results found for this or any previous visit.    None    Assessment and Plan     * Palliative care encounter  Plan is NH with Hospice as discussed above      Hypophosphatemia  Patient's most recent phosphorus results are listed below.   Recent Labs     01/21/25  0911   PHOS 2.4*       Plan  - Will treat hypophosphatemia with oral replacement  - Patient's hypophosphatemia is stable      Hypomagnesemia  Patient has Abnormal Magnesium: hypomagnesemia. Will continue to monitor electrolytes closely. Will replace the affected electrolytes and repeat labs to be done after interventions completed. The patient's magnesium results have been reviewed and are listed below.           Anemia  Anemia is likely due to Iron deficiency. Most recent hemoglobin and hematocrit are listed below.  Recent Labs     01/21/25  0911   HGB 8.7*   HCT 25.3*       Plan  - Monitor serial CBC: Daily  - Transfuse PRBC if patient becomes hemodynamically unstable, symptomatic or H/H drops below 7/21.  - Patient has not received any PRBC transfusions to date  - Patient's anemia is currently stable      Swelling of right upper extremity  CT UE with Nonspecific subcutaneous edema throughout the distal arm and forearm, possibly sterile or infectious in nature.  No fluid collection.  No osteomyelitis.     Continue to monitor.     Hyperbilirubinemia  - elevation in bilirubin with concomitant elevations in LFTs  - RUQ U/S significant for biliary sludge, mild/moderate intrahepatic/extrahepatic biliary ductla dilation. Unchanged compare to previous MRCP.         Pseudomonas urinary tract infection  - urinalysis concerning " for UTI and found to have pseudomonas  - Blood cultures: NGTD   - close assessment to see if mentation improves  -Finished Levaquin on 1/20    Cognitive impairment  -history of progressive confusion and forgetfulness prior to admission.   -CT head: Sequela of chronic microvascular ischemic change. Remote lacunar type infarct right thalamus; MRI C-spine: Multilevel cervical spondylosis, worst at C3-C4, contributing to moderate spinal canal stenosis as well as moderate left neural foraminal narrowing.    -HIV/RPR negative. Folate low, repleting. B12 high. Thiamine low and repleting. 2/2 to dementia with poor nutritional intake; TSH wnl on 1/1/2025  - exhausted reversible causes of poor mentation and with imaging, appears to be age-related decline accelerated by some degree of vascular dementia. Likely some component of acute delirum due to hospital stay/UTI in setting of advanced dementia.     Plan:   - discussed pitfalls of G-tube placement. No plan for placement at this time.    - plan for discharge to Nursing Home with hospice services - awaiting Insurance approval.   -Haldol added PRN for agitation. Discussed risk of death and worsening condition with pt's son and sister.     Hypoglycemia  -Intermittent hypoglycemia over hospital stay. Likely related to poor PO intake.   -previously needed amp of D50  -monitor BG closely  -Can resume D5NS if needed  -Encourage PO intake         Numbness and tingling in both hands  -noted by patient before admission with abnormal finger to nose and weakness   -CT head and spine reviewed. There is significant stenosis/narrowing of C6 and C7 which could be contributing to symptoms. Findings appear chronic and not acute. MRI ordered and notable for cervical stenosis and lacunar infarcts. Limited visiblity due to movement   -CT head notable for remote lacunar infarcts, hx of stroke in 2005 currently on ASA   -continue PT/OT         Severe protein-calorie malnutrition  Nutrition  consulted. Most recent weight and BMI monitored-     Measurements:  Wt Readings from Last 1 Encounters:   12/27/24 54.9 kg (121 lb 0.5 oz)   Body mass index is 22.87 kg/m².    Patient has been screened and assessed by RD.    Malnutrition Type:  Context: chronic illness  Level: severe    Malnutrition Characteristic Summary:  Weight Loss (Malnutrition): greater than 5% in 1 month  Energy Intake (Malnutrition): less than 75% for greater than or equal to 1 month  Subcutaneous Fat (Malnutrition): severe depletion  Muscle Mass (Malnutrition): severe depletion    Interventions/Recommendations (treatment strategy):  Please prioritize patient's comfort and preferences over strict nutritional goals at this time. Offer small, frequent meals, texture modifications, and respect the patient's desire to eat or not eat. Also ensure proper hydration with small sips of fluids periodically.    -encourage PO intake    Hypokalemia  Patient's most recent potassium results are listed below.   Recent Labs     01/21/25  0911 01/23/25  0730   K 3.3* 3.0*       Plan  - Replete potassium per protocol  - Monitor potassium Daily  - Patient's hypokalemia is stable  - replete    Debility  Patient with Acute debility due to age-related physical debility and other reduced mobility. The patient's latest AMPAC (Activity Measure for Post Acute Care) Score is listed below.    AM-PAC Score - How much help does the patient need for each activity listed  Basic Mobility Total Score: 7  Turning over in bed (including adjusting bedclothes, sheets and blankets)?: A lot  Sitting down on and standing up from a chair with arms (e.g., wheelchair, bedside commode, etc.): Unable  Moving from lying on back to sitting on the side of the bed?: Unable  Moving to and from a bed to a chair (including a wheelchair)?: Unable  Need to walk in hospital room?: Unable  Climbing 3-5 steps with a railing?: Unable    Plan  - Progressive mobility protocol initated  - PT/OT  consulted  - Fall precautions in place  - Plan for NH with hospice services -pending insurance(Medicaid/Medicare)   - Palliative care following  -Pt is unable to be safely discharged at this time due to lack of continuous home care.          Diabetes mellitus  Sliding scale held, accuchecks for hypoglycemia     Patient's FSGs are controlled on current medication regimen.  Last A1c reviewed-   Lab Results   Component Value Date    HGBA1C 4.6 01/20/2025     Most recent fingerstick glucose reviewed-   Recent Labs   Lab 01/22/25  1951 01/23/25  0758 01/23/25  0913 01/23/25  0939   POCTGLUCOSE 73 58* 50* 96       Current correctional scale   hold on correction scale give A1C is 5.5  Antihyperglycemics (From admission, onward)      None          Hold Oral hypoglycemics while patient is in the hospital; holding all given low intake    Unintentional weight loss  Nutrition consulted. Most recent weight and BMI monitored-     Measurements:  Wt Readings from Last 1 Encounters:   12/27/24 54.9 kg (121 lb 0.5 oz)   Body mass index is 22.87 kg/m².    RD consultation   Extensive outpatient evaluation recommended by GI and General surgery   Will add supplementation to meals; soft bite-sized per rec's  No plan for feeding tube at this time as family is able to convince pt to eat intermittently.       Please prioritize patient's comfort and preferences over strict nutritional goals at this time. Offer small, frequent meals, texture modifications, and respect the patient's desire to eat or not eat. Also ensure proper hydration with small sips of fluids periodically.      Hyperlipidemia  - continue ASA and statin      Benign essential hypertension  Patient's blood pressure range in the last 24 hours was: BP  Min: 121/72  Max: 132/92.The patient's inpatient anti-hypertensive regimen is listed below:  Current Antihypertensives  lisinopriL tablet 10 mg, Daily, Oral  NIFEdipine 24 hr tablet 30 mg, Daily, Oral    Plan  - BP is controlled,  no changes needed to their regimen  - will continue home regimen      VTE Risk Mitigation (From admission, onward)           Ordered     enoxaparin injection 40 mg  Daily         12/26/24 1700     IP VTE HIGH RISK PATIENT  Once         12/26/24 1547     Place sequential compression device  Until discontinued         12/26/24 1547                    Discharge Planning   RITA: 1/28/2025     Code Status: DNR   Medical Readiness for Discharge Date: 1/20/2025  Discharge Plan A: New Nursing Home placement - detention care facility (with hospice services)   Discharge Delays: (!) Patient and Family Barriers (Family has not decided on facility.)                    Rimma Mohamud MD  Department of Hospital Medicine   [facility] - Internal Medicine Telemetry

## 2025-01-25 NOTE — SUBJECTIVE & OBJECTIVE
Interval History:     500cc IVF after discussing with family. Pending NH with hospice.     Review of Systems  Objective:     Vital Signs (Most Recent):  Temp: 98.1 °F (36.7 °C) (01/24/25 2005)  Pulse: 95 (01/24/25 2005)  Resp: 18 (01/24/25 2005)  BP: 130/71 (01/24/25 2005)  SpO2: (!) 94 % (01/24/25 2005) Vital Signs (24h Range):  Temp:  [97.6 °F (36.4 °C)-98.6 °F (37 °C)] 98.1 °F (36.7 °C)  Pulse:  [] 95  Resp:  [18] 18  SpO2:  [92 %-95 %] 94 %  BP: (108-130)/(57-71) 130/71     Weight: 54.9 kg (121 lb 0.5 oz)  Body mass index is 22.87 kg/m².    Intake/Output Summary (Last 24 hours) at 1/24/2025 2225  Last data filed at 1/24/2025 1302  Gross per 24 hour   Intake 360 ml   Output --   Net 360 ml      Physical Exam    Constitutional:       General: She is not in acute distress.     Appearance: Normal appearance. She is ill-appearing.   HENT:      Head: Normocephalic and atraumatic.      Right Ear: External ear normal.      Left Ear: External ear normal.   Eyes:      Extraocular Movements: Extraocular movements intact.      Conjunctiva/sclera: Conjunctivae normal.      Pupils: Pupils are equal, round, and reactive to light.   Cardiovascular:      Rate and Rhythm: Normal rate and regular rhythm.      Pulses: Normal pulses.      Heart sounds: Normal heart sounds. No murmur heard.  Pulmonary:      Effort: Pulmonary effort is normal. No respiratory distress.      Breath sounds: Normal breath sounds.   Abdominal:      General: Abdomen is flat. Bowel sounds are normal.      Palpations: Abdomen is soft.      Tenderness: There is no abdominal tenderness.   Musculoskeletal:         General: Normal range of motion.      Cervical back: Normal range of motion and neck supple. No tenderness.      Right lower leg: No edema.      Left lower leg: No edema.   Skin:     General: Skin is warm and dry.      Capillary Refill: Capillary refill takes less than 2 seconds.      Coloration: Skin is not jaundiced.   Neurological:       "General: No focal deficit present.      Mental Status: She is alert and oriented to person, place, and time. Mental status is at baseline.   Psychiatric:         Mood and Affect: Mood normal.         Behavior: Behavior normal.        Computed MELD 3.0 unavailable. One or more values for this score either were not found within the given timeframe or did not fit some other criterion.  Computed MELD-Na unavailable. One or more values for this score either were not found within the given timeframe or did not fit some other criterion.      Significant Labs:  CBC:  Recent Labs   Lab 01/24/25  1337   WBC 14.22*   HGB 8.8*   HCT 26.0*        CMP:  Recent Labs   Lab 01/23/25  0730      K 3.0*   *   CO2 25   GLU 55*   BUN 5*   CREATININE 0.4*   CALCIUM 7.7*   PROT 5.3*   ALBUMIN 1.5*   BILITOT 1.4*   ALKPHOS 588*   AST 52*   ALT 46*   ANIONGAP 6*     PTINR:  No results for input(s): "INR" in the last 48 hours.    Significant Procedures:   Dobutamine Stress Test with Color Flow: No results found for this or any previous visit.    None  "

## 2025-01-26 LAB
MAGNESIUM SERPL-MCNC: 1.6 MG/DL (ref 1.6–2.6)
PHOSPHATE SERPL-MCNC: 2.7 MG/DL (ref 2.7–4.5)
POCT GLUCOSE: 153 MG/DL (ref 70–110)
POCT GLUCOSE: 77 MG/DL (ref 70–110)
POCT GLUCOSE: 86 MG/DL (ref 70–110)
POCT GLUCOSE: 86 MG/DL (ref 70–110)

## 2025-01-26 PROCEDURE — 83735 ASSAY OF MAGNESIUM: CPT | Performed by: PHYSICIAN ASSISTANT

## 2025-01-26 PROCEDURE — 11000001 HC ACUTE MED/SURG PRIVATE ROOM

## 2025-01-26 PROCEDURE — 63600175 PHARM REV CODE 636 W HCPCS: Performed by: STUDENT IN AN ORGANIZED HEALTH CARE EDUCATION/TRAINING PROGRAM

## 2025-01-26 PROCEDURE — 25000003 PHARM REV CODE 250: Performed by: STUDENT IN AN ORGANIZED HEALTH CARE EDUCATION/TRAINING PROGRAM

## 2025-01-26 PROCEDURE — S4991 NICOTINE PATCH NONLEGEND: HCPCS | Performed by: STUDENT IN AN ORGANIZED HEALTH CARE EDUCATION/TRAINING PROGRAM

## 2025-01-26 PROCEDURE — 25000003 PHARM REV CODE 250

## 2025-01-26 PROCEDURE — 25000003 PHARM REV CODE 250: Performed by: INTERNAL MEDICINE

## 2025-01-26 PROCEDURE — 84100 ASSAY OF PHOSPHORUS: CPT | Performed by: PHYSICIAN ASSISTANT

## 2025-01-26 PROCEDURE — 36415 COLL VENOUS BLD VENIPUNCTURE: CPT | Performed by: PHYSICIAN ASSISTANT

## 2025-01-26 RX ADMIN — Medication 6 MG: at 09:01

## 2025-01-26 RX ADMIN — FOLIC ACID 1 MG: 1 TABLET ORAL at 11:01

## 2025-01-26 RX ADMIN — ATORVASTATIN CALCIUM 40 MG: 40 TABLET, FILM COATED ORAL at 11:01

## 2025-01-26 RX ADMIN — NIFEDIPINE 30 MG: 30 TABLET, FILM COATED, EXTENDED RELEASE ORAL at 11:01

## 2025-01-26 RX ADMIN — POTASSIUM BICARBONATE 40 MEQ: 391 TABLET, EFFERVESCENT ORAL at 11:01

## 2025-01-26 RX ADMIN — MICONAZOLE NITRATE: 20 OINTMENT TOPICAL at 09:01

## 2025-01-26 RX ADMIN — LISINOPRIL 10 MG: 10 TABLET ORAL at 11:01

## 2025-01-26 RX ADMIN — Medication 1 TABLET: at 09:01

## 2025-01-26 RX ADMIN — Medication 1 TABLET: at 11:01

## 2025-01-26 RX ADMIN — NICOTINE 1 PATCH: 7 PATCH, EXTENDED RELEASE TRANSDERMAL at 11:01

## 2025-01-26 RX ADMIN — FERROUS SULFATE TAB 325 MG (65 MG ELEMENTAL FE) 1 EACH: 325 (65 FE) TAB at 11:01

## 2025-01-26 RX ADMIN — Medication 100 MG: at 11:01

## 2025-01-26 RX ADMIN — ENOXAPARIN SODIUM 40 MG: 40 INJECTION SUBCUTANEOUS at 05:01

## 2025-01-26 RX ADMIN — ASPIRIN 81 MG: 81 TABLET, COATED ORAL at 11:01

## 2025-01-26 RX ADMIN — ACETAMINOPHEN 650 MG: 325 TABLET ORAL at 09:01

## 2025-01-26 NOTE — ASSESSMENT & PLAN NOTE
Sliding scale held, accuchecks for hypoglycemia     Patient's FSGs are controlled on current medication regimen.  Last A1c reviewed-   Lab Results   Component Value Date    HGBA1C 4.6 01/20/2025     Most recent fingerstick glucose reviewed-   Recent Labs   Lab 01/25/25  0754 01/25/25  1110 01/25/25  1526 01/25/25  2132   POCTGLUCOSE 82 138* 87 86       Current correctional scale   hold on correction scale give A1C is 5.5  Antihyperglycemics (From admission, onward)    None        Hold Oral hypoglycemics while patient is in the hospital; holding all given low intake

## 2025-01-26 NOTE — SUBJECTIVE & OBJECTIVE
Interval History:     Patient sleeping on interview.     Review of Systems  Objective:     Vital Signs (Most Recent):  Temp: 97.9 °F (36.6 °C) (01/25/25 1945)  Pulse: 97 (01/25/25 1945)  Resp: 18 (01/25/25 1945)  BP: 134/81 (01/25/25 1945)  SpO2: 99 % (01/25/25 1945) Vital Signs (24h Range):  Temp:  [97.4 °F (36.3 °C)-98.4 °F (36.9 °C)] 97.9 °F (36.6 °C)  Pulse:  [] 97  Resp:  [16-18] 18  SpO2:  [90 %-99 %] 99 %  BP: ()/(59-81) 134/81     Weight: 54.9 kg (121 lb 0.5 oz)  Body mass index is 22.87 kg/m².    Intake/Output Summary (Last 24 hours) at 1/25/2025 2213  Last data filed at 1/25/2025 1715  Gross per 24 hour   Intake 240 ml   Output 400 ml   Net -160 ml      Physical Exam  Constitutional:       General: She is not in acute distress.     Appearance: Normal appearance. She is ill-appearing.   HENT:      Head: Normocephalic and atraumatic.      Right Ear: External ear normal.      Left Ear: External ear normal.   Eyes:      Extraocular Movements: Extraocular movements intact.      Conjunctiva/sclera: Conjunctivae normal.      Pupils: Pupils are equal, round, and reactive to light.   Cardiovascular:      Rate and Rhythm: Normal rate and regular rhythm.      Pulses: Normal pulses.      Heart sounds: Normal heart sounds. No murmur heard.  Pulmonary:      Effort: Pulmonary effort is normal. No respiratory distress.      Breath sounds: Normal breath sounds.   Abdominal:      General: Abdomen is flat. Bowel sounds are normal.      Palpations: Abdomen is soft.      Tenderness: There is no abdominal tenderness.   Musculoskeletal:         General: Normal range of motion.      Cervical back: Normal range of motion and neck supple. No tenderness.      Right lower leg: No edema.      Left lower leg: No edema.   Skin:     General: Skin is warm and dry.      Capillary Refill: Capillary refill takes less than 2 seconds.      Coloration: Skin is not jaundiced.   Neurological:      General: No focal deficit present.     "  Mental Status: She is alert and oriented to person, place, and time. Mental status is at baseline.   Psychiatric:         Mood and Affect: Mood normal.         Behavior: Behavior normal    Computed MELD 3.0 unavailable. One or more values for this score either were not found within the given timeframe or did not fit some other criterion.  Computed MELD-Na unavailable. One or more values for this score either were not found within the given timeframe or did not fit some other criterion.      Significant Labs:  CBC:  Recent Labs   Lab 01/24/25  1337   WBC 14.22*   HGB 8.8*   HCT 26.0*        CMP:  Recent Labs   Lab 01/25/25  0714   *   K 3.2*   *   CO2 27   GLU 74   BUN 6*   CREATININE 0.4*   CALCIUM 7.6*   PROT 5.1*   ALBUMIN 1.4*   BILITOT 1.1*   ALKPHOS 539*   AST 42*   ALT 40   ANIONGAP 7*     PTINR:  No results for input(s): "INR" in the last 48 hours.    Significant Procedures:   Dobutamine Stress Test with Color Flow: No results found for this or any previous visit.    None  "

## 2025-01-26 NOTE — PLAN OF CARE
Problem: Adult Inpatient Plan of Care  Goal: Plan of Care Review  Outcome: Progressing  Goal: Patient-Specific Goal (Individualized)  Outcome: Progressing  Goal: Absence of Hospital-Acquired Illness or Injury  Outcome: Progressing  Goal: Optimal Comfort and Wellbeing  Outcome: Progressing  Goal: Readiness for Transition of Care  Outcome: Progressing     Problem: Diabetes Comorbidity  Goal: Blood Glucose Level Within Targeted Range  Outcome: Progressing     Problem: Skin Injury Risk Increased  Goal: Skin Health and Integrity  Outcome: Progressing     Problem: Confusion Acute  Goal: Optimal Cognitive Function  Outcome: Progressing     Problem: Wound  Goal: Optimal Coping  Outcome: Progressing  Goal: Optimal Functional Ability  Outcome: Progressing  Goal: Absence of Infection Signs and Symptoms  Outcome: Progressing  Goal: Improved Oral Intake  Outcome: Progressing  Goal: Optimal Pain Control and Function  Outcome: Progressing  Goal: Skin Health and Integrity  Outcome: Progressing  Goal: Optimal Wound Healing  Outcome: Progressing     Problem: Infection  Goal: Absence of Infection Signs and Symptoms  Outcome: Progressing     Problem: Coping Ineffective  Goal: Effective Coping  Outcome: Progressing     Problem: Fall Injury Risk  Goal: Absence of Fall and Fall-Related Injury  Outcome: Progressing   Pt had an uneventful night. VSS. Pt given tyenol pain. Pt given night time snack .   Pt is free of falls and injuries. Bed in lowest position and call light within reach. Safety maintained

## 2025-01-26 NOTE — PROGRESS NOTES
[facility] - Internal Medicine [facility] Medicine  Progress Note    Patient Name: Bhavna Lim  MRN: [id number]  Patient Class: IP- Inpatient   Admission Date: 12/26/2024  Length of Stay: 27 days  Attending Physician: Rimma Mohamud MD  Primary Care Provider: Suzi Green MD        Subjective     Principal Problem:Palliative care encounter        HPI:  Mrs. Bhavna Lim is a 79 year old F with a history of HTN and HLD who presents to the ED for weakness and fatigue. She has been having weight loss and poor po intake over the last few months. She has been seen by GI and evaluated with EGD and Colonoscopy as well as MRCP which showed cholelithiasis with gallbladder distention and intrahepatic and extrahepatic biliary dilation. No significant abnormalities on EGD and coloscopy. She was referred to surgery who recommended NM study and possible EUS with GI. She has been unable to tolerate any solid foods. She sometimes takes a little bit of soup and has tried protein shakes. She feels full immediately. She does endorse some pain in her epigastric region ever since having her EGD performed. She occasionally has vomiting. Denies regurgitation of food. She continues to have worsening weakness and falls at home. She had two falls yesterday due to weakness. She denies significant dizziness. She is unable to walk due to her weakness and sister is concerned this is related to her poor nutrition.      In the ED VSS. Labs notable for Potassium of 3.0 which was repleted in the ED. No focal signs concerning for stroke. She was admitted to medicine for further management.      Overview/Hospital Course:  While on the floor PT/OT were consulted and recommended SNF. Nutrition consulted. Discussed with GI outpatient workup and no indication for further inpatient workup. She continued to be altered while in the hospital. CT spine/head ordered for concern of fall showing ischemic changes and signficant stenosis  and narrowing. She developed worsening hand weakness and numbness/tinglign with abnormal coordiation. Some of this was prior to admission but weakness significantly worsened. MRI ordered and showed old infarcts. She continued to refuse food and did not have abdominal complaints just would not eat. Started on IVF due to hypoglycemia. Acute encephalopathy workup initiated and negative aside from low folate. UA ordered but not performed. At this point if nutrition becomes suboptimal will have to consider other means of nutrition. Son with concern of worsening memory and possible dementia wanting placement in [place]. Started on supplements for poor nutrition including thiamine and folate to see if this will help with mental status     Plan is NH with hospice services.    1/18: Extensive discussion with family who is torn between pursuing additional cares or going comfort measures/hospice route. Haldol added for agitation, Continue current plan for now.   1/19: Mental status improved but pt remains disoriented.   1/20 to 1/22: Awaiting placement @ NH w/ hospice services. Medicaid/Medicare pending  1/23: Glucose 50, improved with dextrose. K 3.0, replete. Pending NH placement.   1/24: 500cc IVF given poor PO intake past days. Discussed with family. Pending NH with hospice.     Interval History:     Patient sleeping on interview.     Review of Systems  Objective:     Vital Signs (Most Recent):  Temp: 97.9 °F (36.6 °C) (01/25/25 1945)  Pulse: 97 (01/25/25 1945)  Resp: 18 (01/25/25 1945)  BP: 134/81 (01/25/25 1945)  SpO2: 99 % (01/25/25 1945) Vital Signs (24h Range):  Temp:  [97.4 °F (36.3 °C)-98.4 °F (36.9 °C)] 97.9 °F (36.6 °C)  Pulse:  [] 97  Resp:  [16-18] 18  SpO2:  [90 %-99 %] 99 %  BP: ()/(59-81) 134/81     Weight: 54.9 kg (121 lb 0.5 oz)  Body mass index is 22.87 kg/m².    Intake/Output Summary (Last 24 hours) at 1/25/2025 4860  Last data filed at 1/25/2025 1715  Gross per 24 hour   Intake 240 ml    Output 400 ml   Net -160 ml      Physical Exam  Constitutional:       General: She is not in acute distress.     Appearance: Normal appearance. She is ill-appearing.   HENT:      Head: Normocephalic and atraumatic.      Right Ear: External ear normal.      Left Ear: External ear normal.   Eyes:      Extraocular Movements: Extraocular movements intact.      Conjunctiva/sclera: Conjunctivae normal.      Pupils: Pupils are equal, round, and reactive to light.   Cardiovascular:      Rate and Rhythm: Normal rate and regular rhythm.      Pulses: Normal pulses.      Heart sounds: Normal heart sounds. No murmur heard.  Pulmonary:      Effort: Pulmonary effort is normal. No respiratory distress.      Breath sounds: Normal breath sounds.   Abdominal:      General: Abdomen is flat. Bowel sounds are normal.      Palpations: Abdomen is soft.      Tenderness: There is no abdominal tenderness.   Musculoskeletal:         General: Normal range of motion.      Cervical back: Normal range of motion and neck supple. No tenderness.      Right lower leg: No edema.      Left lower leg: No edema.   Skin:     General: Skin is warm and dry.      Capillary Refill: Capillary refill takes less than 2 seconds.      Coloration: Skin is not jaundiced.   Neurological:      General: No focal deficit present.      Mental Status: She is alert and oriented to person, place, and time. Mental status is at baseline.   Psychiatric:         Mood and Affect: Mood normal.         Behavior: Behavior normal    Computed MELD 3.0 unavailable. One or more values for this score either were not found within the given timeframe or did not fit some other criterion.  Computed MELD-Na unavailable. One or more values for this score either were not found within the given timeframe or did not fit some other criterion.      Significant Labs:  CBC:  Recent Labs   Lab 01/24/25  1337   WBC 14.22*   HGB 8.8*   HCT 26.0*        CMP:  Recent Labs   Lab 01/25/25  0714  "  *   K 3.2*   *   CO2 27   GLU 74   BUN 6*   CREATININE 0.4*   CALCIUM 7.6*   PROT 5.1*   ALBUMIN 1.4*   BILITOT 1.1*   ALKPHOS 539*   AST 42*   ALT 40   ANIONGAP 7*     PTINR:  No results for input(s): "INR" in the last 48 hours.    Significant Procedures:   Dobutamine Stress Test with Color Flow: No results found for this or any previous visit.    None    Assessment and Plan     * Palliative care encounter  Plan is NH with Hospice as discussed above      Comfort measures only status        Hypophosphatemia  Patient's most recent phosphorus results are listed below.   Recent Labs     01/24/25  1337   PHOS 2.8       Plan  - Will treat hypophosphatemia with oral replacement  - Patient's hypophosphatemia is stable      Hypomagnesemia  Patient has Abnormal Magnesium: hypomagnesemia. Will continue to monitor electrolytes closely. Will replace the affected electrolytes and repeat labs to be done after interventions completed. The patient's magnesium results have been reviewed and are listed below.           Anemia  Anemia is likely due to Iron deficiency. Most recent hemoglobin and hematocrit are listed below.  Recent Labs     01/24/25  1337   HGB 8.8*   HCT 26.0*       Plan  - Monitor serial CBC: Daily  - Transfuse PRBC if patient becomes hemodynamically unstable, symptomatic or H/H drops below 7/21.  - Patient has not received any PRBC transfusions to date  - Patient's anemia is currently stable      Swelling of right upper extremity  CT UE with Nonspecific subcutaneous edema throughout the distal arm and forearm, possibly sterile or infectious in nature.  No fluid collection.  No osteomyelitis.     Continue to monitor.     Hyperbilirubinemia  - elevation in bilirubin with concomitant elevations in LFTs  - RUQ U/S significant for biliary sludge, mild/moderate intrahepatic/extrahepatic biliary ductla dilation. Unchanged compare to previous MRCP.         Pseudomonas urinary tract infection  - urinalysis " concerning for UTI and found to have pseudomonas  - Blood cultures: NGTD   - close assessment to see if mentation improves  -Finished Levaquin on 1/20    Cognitive impairment  -history of progressive confusion and forgetfulness prior to admission.   -CT head: Sequela of chronic microvascular ischemic change. Remote lacunar type infarct right thalamus; MRI C-spine: Multilevel cervical spondylosis, worst at C3-C4, contributing to moderate spinal canal stenosis as well as moderate left neural foraminal narrowing.    -HIV/RPR negative. Folate low, repleting. B12 high. Thiamine low and repleting. 2/2 to dementia with poor nutritional intake; TSH wnl on 1/1/2025  - exhausted reversible causes of poor mentation and with imaging, appears to be age-related decline accelerated by some degree of vascular dementia. Likely some component of acute delirum due to hospital stay/UTI in setting of advanced dementia.     Plan:   - discussed pitfalls of G-tube placement. No plan for placement at this time.    - plan for discharge to Nursing Home with hospice services - awaiting Insurance approval.   -Haldol added PRN for agitation. Discussed risk of death and worsening condition with pt's son and sister.     Hypoglycemia  -Intermittent hypoglycemia over hospital stay. Likely related to poor PO intake.   -previously needed amp of D50  -monitor BG closely  -Can resume D5NS if needed  -Encourage PO intake         Numbness and tingling in both hands  -noted by patient before admission with abnormal finger to nose and weakness   -CT head and spine reviewed. There is significant stenosis/narrowing of C6 and C7 which could be contributing to symptoms. Findings appear chronic and not acute. MRI ordered and notable for cervical stenosis and lacunar infarcts. Limited visiblity due to movement   -CT head notable for remote lacunar infarcts, hx of stroke in 2005 currently on ASA   -continue PT/OT         Severe protein-calorie  malnutrition  Nutrition consulted. Most recent weight and BMI monitored-     Measurements:  Wt Readings from Last 1 Encounters:   12/27/24 54.9 kg (121 lb 0.5 oz)   Body mass index is 22.87 kg/m².    Patient has been screened and assessed by RD.    Malnutrition Type:  Context: chronic illness  Level: severe    Malnutrition Characteristic Summary:  Weight Loss (Malnutrition): greater than 5% in 1 month  Energy Intake (Malnutrition): less than 75% for greater than or equal to 1 month  Subcutaneous Fat (Malnutrition): severe depletion  Muscle Mass (Malnutrition): severe depletion    Interventions/Recommendations (treatment strategy):  Please prioritize patient's comfort and preferences over strict nutritional goals at this time. Offer small, frequent meals, texture modifications, and respect the patient's desire to eat or not eat. Also ensure proper hydration with small sips of fluids periodically.    -encourage PO intake    Hypokalemia  Patient's most recent potassium results are listed below.   Recent Labs     01/23/25  0730 01/25/25  0714   K 3.0* 3.2*       Plan  - Replete potassium per protocol  - Monitor potassium Daily  - Patient's hypokalemia is stable  - replete    Debility  Patient with Acute debility due to age-related physical debility and other reduced mobility. The patient's latest AMPAC (Activity Measure for Post Acute Care) Score is listed below.    AM-PAC Score - How much help does the patient need for each activity listed  Basic Mobility Total Score: 7  Turning over in bed (including adjusting bedclothes, sheets and blankets)?: A lot  Sitting down on and standing up from a chair with arms (e.g., wheelchair, bedside commode, etc.): Unable  Moving from lying on back to sitting on the side of the bed?: Unable  Moving to and from a bed to a chair (including a wheelchair)?: Unable  Need to walk in hospital room?: Unable  Climbing 3-5 steps with a railing?: Unable    Plan  - Progressive mobility protocol  initated  - PT/OT consulted  - Fall precautions in place  - Plan for NH with hospice services -pending insurance(Medicaid/Medicare)   - Palliative care following  -Pt is unable to be safely discharged at this time due to lack of continuous home care.          Diabetes mellitus  Sliding scale held, accuchecks for hypoglycemia     Patient's FSGs are controlled on current medication regimen.  Last A1c reviewed-   Lab Results   Component Value Date    HGBA1C 4.6 01/20/2025     Most recent fingerstick glucose reviewed-   Recent Labs   Lab 01/25/25  0754 01/25/25  1110 01/25/25  1526 01/25/25  2132   POCTGLUCOSE 82 138* 87 86       Current correctional scale   hold on correction scale give A1C is 5.5  Antihyperglycemics (From admission, onward)      None          Hold Oral hypoglycemics while patient is in the hospital; holding all given low intake    Unintentional weight loss  Nutrition consulted. Most recent weight and BMI monitored-     Measurements:  Wt Readings from Last 1 Encounters:   12/27/24 54.9 kg (121 lb 0.5 oz)   Body mass index is 22.87 kg/m².    RD consultation   Extensive outpatient evaluation recommended by GI and General surgery   Will add supplementation to meals; soft bite-sized per rec's  No plan for feeding tube at this time as family is able to convince pt to eat intermittently.       Please prioritize patient's comfort and preferences over strict nutritional goals at this time. Offer small, frequent meals, texture modifications, and respect the patient's desire to eat or not eat. Also ensure proper hydration with small sips of fluids periodically.      Hyperlipidemia  - continue ASA and statin      Benign essential hypertension  Patient's blood pressure range in the last 24 hours was: BP  Min: 92/59  Max: 134/81.The patient's inpatient anti-hypertensive regimen is listed below:  Current Antihypertensives  lisinopriL tablet 10 mg, Daily, Oral  NIFEdipine 24 hr tablet 30 mg, Daily, Oral    Plan  - BP  is controlled, no changes needed to their regimen  - will continue home regimen      VTE Risk Mitigation (From admission, onward)           Ordered     enoxaparin injection 40 mg  Daily         12/26/24 1700     IP VTE HIGH RISK PATIENT  Once         12/26/24 1547     Place sequential compression device  Until discontinued         12/26/24 1547                    Discharge Planning   RITA: 1/28/2025     Code Status: DNR   Medical Readiness for Discharge Date: 1/20/2025  Discharge Plan A: New Nursing Home placement - care home care facility (with hospice services)   Discharge Delays: (!) Patient and Family Barriers (Family has not decided on facility.)                    Rimma Moahmud MD  Department of Hospital Medicine   [facility] - Internal Medicine Telemetry

## 2025-01-26 NOTE — ASSESSMENT & PLAN NOTE
Patient has Abnormal Magnesium: hypomagnesemia. Will continue to monitor electrolytes closely. Will replace the affected electrolytes and repeat labs to be done after interventions completed. The patient's magnesium results have been reviewed and are listed below.

## 2025-01-26 NOTE — ASSESSMENT & PLAN NOTE
Patient's most recent potassium results are listed below.   Recent Labs     01/23/25  0730 01/25/25  0714   K 3.0* 3.2*       Plan  - Replete potassium per protocol  - Monitor potassium Daily  - Patient's hypokalemia is stable  - replete

## 2025-01-26 NOTE — ASSESSMENT & PLAN NOTE
Patient's most recent phosphorus results are listed below.   Recent Labs     01/24/25  1337   PHOS 2.8       Plan  - Will treat hypophosphatemia with oral replacement  - Patient's hypophosphatemia is stable

## 2025-01-26 NOTE — ASSESSMENT & PLAN NOTE
Patient's blood pressure range in the last 24 hours was: BP  Min: 92/59  Max: 134/81.The patient's inpatient anti-hypertensive regimen is listed below:  Current Antihypertensives  lisinopriL tablet 10 mg, Daily, Oral  NIFEdipine 24 hr tablet 30 mg, Daily, Oral    Plan  - BP is controlled, no changes needed to their regimen  - will continue home regimen

## 2025-01-26 NOTE — ASSESSMENT & PLAN NOTE
Anemia is likely due to Iron deficiency. Most recent hemoglobin and hematocrit are listed below.  Recent Labs     01/24/25  1337   HGB 8.8*   HCT 26.0*       Plan  - Monitor serial CBC: Daily  - Transfuse PRBC if patient becomes hemodynamically unstable, symptomatic or H/H drops below 7/21.  - Patient has not received any PRBC transfusions to date  - Patient's anemia is currently stable

## 2025-01-27 LAB
ALBUMIN SERPL BCP-MCNC: 1.5 G/DL (ref 3.5–5.2)
ALP SERPL-CCNC: 493 U/L (ref 40–150)
ALT SERPL W/O P-5'-P-CCNC: 40 U/L (ref 10–44)
ANION GAP SERPL CALC-SCNC: 8 MMOL/L (ref 8–16)
AST SERPL-CCNC: 42 U/L (ref 10–40)
BILIRUB SERPL-MCNC: 0.9 MG/DL (ref 0.1–1)
BUN SERPL-MCNC: 8 MG/DL (ref 8–23)
CALCIUM SERPL-MCNC: 7.7 MG/DL (ref 8.7–10.5)
CHLORIDE SERPL-SCNC: 114 MMOL/L (ref 95–110)
CO2 SERPL-SCNC: 27 MMOL/L (ref 23–29)
CREAT SERPL-MCNC: 0.5 MG/DL (ref 0.5–1.4)
EST. GFR  (NO RACE VARIABLE): >60 ML/MIN/1.73 M^2
GLUCOSE SERPL-MCNC: 73 MG/DL (ref 70–110)
POCT GLUCOSE: 101 MG/DL (ref 70–110)
POCT GLUCOSE: 75 MG/DL (ref 70–110)
POCT GLUCOSE: 82 MG/DL (ref 70–110)
POCT GLUCOSE: 88 MG/DL (ref 70–110)
POTASSIUM SERPL-SCNC: 3.3 MMOL/L (ref 3.5–5.1)
PROT SERPL-MCNC: 5.4 G/DL (ref 6–8.4)
SODIUM SERPL-SCNC: 149 MMOL/L (ref 136–145)

## 2025-01-27 PROCEDURE — 36415 COLL VENOUS BLD VENIPUNCTURE: CPT | Performed by: STUDENT IN AN ORGANIZED HEALTH CARE EDUCATION/TRAINING PROGRAM

## 2025-01-27 PROCEDURE — 80053 COMPREHEN METABOLIC PANEL: CPT | Performed by: STUDENT IN AN ORGANIZED HEALTH CARE EDUCATION/TRAINING PROGRAM

## 2025-01-27 PROCEDURE — 25000003 PHARM REV CODE 250: Performed by: STUDENT IN AN ORGANIZED HEALTH CARE EDUCATION/TRAINING PROGRAM

## 2025-01-27 PROCEDURE — 25000003 PHARM REV CODE 250: Performed by: INTERNAL MEDICINE

## 2025-01-27 PROCEDURE — 11000001 HC ACUTE MED/SURG PRIVATE ROOM

## 2025-01-27 PROCEDURE — S4991 NICOTINE PATCH NONLEGEND: HCPCS | Performed by: STUDENT IN AN ORGANIZED HEALTH CARE EDUCATION/TRAINING PROGRAM

## 2025-01-27 RX ORDER — SODIUM CHLORIDE, SODIUM LACTATE, POTASSIUM CHLORIDE, CALCIUM CHLORIDE 600; 310; 30; 20 MG/100ML; MG/100ML; MG/100ML; MG/100ML
INJECTION, SOLUTION INTRAVENOUS CONTINUOUS
Status: ACTIVE | OUTPATIENT
Start: 2025-01-27 | End: 2025-01-27

## 2025-01-27 RX ADMIN — MICONAZOLE NITRATE: 20 OINTMENT TOPICAL at 09:01

## 2025-01-27 RX ADMIN — Medication 1 TABLET: at 09:01

## 2025-01-27 RX ADMIN — NIFEDIPINE 30 MG: 30 TABLET, FILM COATED, EXTENDED RELEASE ORAL at 11:01

## 2025-01-27 RX ADMIN — FOLIC ACID 1 MG: 1 TABLET ORAL at 11:01

## 2025-01-27 RX ADMIN — Medication 100 MG: at 11:01

## 2025-01-27 RX ADMIN — LISINOPRIL 10 MG: 10 TABLET ORAL at 11:01

## 2025-01-27 RX ADMIN — ASPIRIN 81 MG: 81 TABLET, COATED ORAL at 11:01

## 2025-01-27 RX ADMIN — NICOTINE 1 PATCH: 7 PATCH, EXTENDED RELEASE TRANSDERMAL at 11:01

## 2025-01-27 RX ADMIN — ATORVASTATIN CALCIUM 40 MG: 40 TABLET, FILM COATED ORAL at 11:01

## 2025-01-27 RX ADMIN — Medication 6 MG: at 09:01

## 2025-01-27 RX ADMIN — FERROUS SULFATE TAB 325 MG (65 MG ELEMENTAL FE) 1 EACH: 325 (65 FE) TAB at 11:01

## 2025-01-27 NOTE — ASSESSMENT & PLAN NOTE
Patient's most recent phosphorus results are listed below.   Recent Labs     01/26/25  1103   PHOS 2.7       Plan  - Will treat hypophosphatemia with oral replacement  - Patient's hypophosphatemia is stable

## 2025-01-27 NOTE — PROGRESS NOTES
[facility] - Internal Medicine [facility] Medicine  Progress Note    Patient Name: Bhavna Lim  MRN: [id number]  Patient Class: IP- Inpatient   Admission Date: 12/26/2024  Length of Stay: 29 days  Attending Physician: Rimma Mohamud MD  Primary Care Provider: Suzi Green MD        Subjective     Principal Problem:Palliative care encounter        HPI:  Mrs. Bhavna Lim is a 79 year old F with a history of HTN and HLD who presents to the ED for weakness and fatigue. She has been having weight loss and poor po intake over the last few months. She has been seen by GI and evaluated with EGD and Colonoscopy as well as MRCP which showed cholelithiasis with gallbladder distention and intrahepatic and extrahepatic biliary dilation. No significant abnormalities on EGD and coloscopy. She was referred to surgery who recommended NM study and possible EUS with GI. She has been unable to tolerate any solid foods. She sometimes takes a little bit of soup and has tried protein shakes. She feels full immediately. She does endorse some pain in her epigastric region ever since having her EGD performed. She occasionally has vomiting. Denies regurgitation of food. She continues to have worsening weakness and falls at home. She had two falls yesterday due to weakness. She denies significant dizziness. She is unable to walk due to her weakness and sister is concerned this is related to her poor nutrition.      In the ED VSS. Labs notable for Potassium of 3.0 which was repleted in the ED. No focal signs concerning for stroke. She was admitted to medicine for further management.      Overview/Hospital Course:  While on the floor PT/OT were consulted and recommended SNF. Nutrition consulted. Discussed with GI outpatient workup and no indication for further inpatient workup. She continued to be altered while in the hospital. CT spine/head ordered for concern of fall showing ischemic changes and signficant stenosis  and narrowing. She developed worsening hand weakness and numbness/tinglign with abnormal coordiation. Some of this was prior to admission but weakness significantly worsened. MRI ordered and showed old infarcts. She continued to refuse food and did not have abdominal complaints just would not eat. Started on IVF due to hypoglycemia. Acute encephalopathy workup initiated and negative aside from low folate. UA ordered but not performed. At this point if nutrition becomes suboptimal will have to consider other means of nutrition. Son with concern of worsening memory and possible dementia wanting placement in [place]. Started on supplements for poor nutrition including thiamine and folate to see if this will help with mental status. Family had extensive discussion with provider care team and decided to pursue NH with hospice services. She has had intermitted problems with hypoglycemia and electrolyte disturbances.     Plan is NH with hospice services.      Interval History:     Unhappy. Refusing medications. Some hypernatremia likely 2/2 to decreased PO intake and dehydration. Will stop trending labs given comfort care measures.     Review of Systems  Objective:     Vital Signs (Most Recent):  Temp: 98.1 °F (36.7 °C) (01/27/25 1627)  Pulse: 100 (01/27/25 1627)  Resp: 18 (01/27/25 1627)  BP: 126/79 (01/27/25 1627)  SpO2: (!) 93 % (01/27/25 1627) Vital Signs (24h Range):  Temp:  [97.6 °F (36.4 °C)-99 °F (37.2 °C)] 98.1 °F (36.7 °C)  Pulse:  [] 100  Resp:  [18] 18  SpO2:  [93 %-97 %] 93 %  BP: (105-127)/(68-79) 126/79     Weight: 54.9 kg (121 lb 0.5 oz)  Body mass index is 22.87 kg/m².    Intake/Output Summary (Last 24 hours) at 1/27/2025 1640  Last data filed at 1/26/2025 2300  Gross per 24 hour   Intake 240 ml   Output 600 ml   Net -360 ml      Physical Exam    Physical Exam  Gen: in NAD, appears stated age, propped up in bed.  Neuro: AAO to self. Able to move all limbs spontaneously. No gross CN defect.   HEENT:  "NTNC, MMM   CVS: RRR, no m/r/g; S1/S2 auscultated with no S3 or S4; capillary refill < 2 sec  Resp: lungs CTAB, no w/r/r; no belabored breathing or accessory muscle use appreciated   Abd: BS+ in all 4 quadrants; NTND, soft to palpation; no organomegaly appreciated   Extrem: no UE or LE edema BL      Computed MELD 3.0 unavailable. One or more values for this score either were not found within the given timeframe or did not fit some other criterion.  Computed MELD-Na unavailable. One or more values for this score either were not found within the given timeframe or did not fit some other criterion.      Significant Labs:  CBC:  No results for input(s): "WBC", "HGB", "HCT", "PLT" in the last 48 hours.  CMP:  Recent Labs   Lab 01/27/25  0456   *   K 3.3*   *   CO2 27   GLU 73   BUN 8   CREATININE 0.5   CALCIUM 7.7*   PROT 5.4*   ALBUMIN 1.5*   BILITOT 0.9   ALKPHOS 493*   AST 42*   ALT 40   ANIONGAP 8     PTINR:  No results for input(s): "INR" in the last 48 hours.    Significant Procedures:   Dobutamine Stress Test with Color Flow: No results found for this or any previous visit.    None    Assessment and Plan     * Palliative care encounter  Plan is NH with Hospice as discussed above      Cognitive impairment  History of progressive confusion and forgetfulness prior to admission. CT head: Sequela of chronic microvascular ischemic change. Remote lacunar type infarct right thalamus;   -HIV/RPR negative. Folate low, repleting. B12 high. Thiamine low and repleting. 2/2 to dementia with poor nutritional intake; TSH wnl on 1/1/2025  - exhausted reversible causes of poor mentation and with imaging, appears to be age-related decline accelerated by some degree of vascular dementia. Likely some component of acute delirum due to hospital stay/UTI in setting of advanced dementia.     Plan:   - discussed pitfalls of G-tube placement. No plan for placement at this time.   - plan for discharge to Nursing Home with hospice " services   -Haldol added PRN for agitation. Discussed risk of death and worsening condition with pt's son and sister.     Debility  Patient with Acute debility due to age-related physical debility and other reduced mobility. The patient's latest AMPAC (Activity Measure for Post Acute Care) Score is listed below.    AM-PAC Score - How much help does the patient need for each activity listed  Basic Mobility Total Score: 7  Turning over in bed (including adjusting bedclothes, sheets and blankets)?: A lot  Sitting down on and standing up from a chair with arms (e.g., wheelchair, bedside commode, etc.): Unable  Moving from lying on back to sitting on the side of the bed?: Unable  Moving to and from a bed to a chair (including a wheelchair)?: Unable  Need to walk in hospital room?: Unable  Climbing 3-5 steps with a railing?: Unable    Plan  - Progressive mobility protocol initated  - PT/OT consulted  - Fall precautions in place  - Plan for NH with hospice services -pending insurance(Medicaid/Medicare)   - Palliative care following  - Pt is unable to be safely discharged to home at this time due to lack of continuous home care.          Unintentional weight loss  Nutrition consulted. Most recent weight and BMI monitored-     Measurements:  Wt Readings from Last 1 Encounters:   12/27/24 54.9 kg (121 lb 0.5 oz)   Body mass index is 22.87 kg/m².    RD consultation   Extensive outpatient evaluation recommended by GI and General surgery   Will add supplementation to meals; soft bite-sized per rec's  No plan for feeding tube at this time as family is able to convince pt to eat intermittently.       Please prioritize patient's comfort and preferences over strict nutritional goals at this time. Offer small, frequent meals, texture modifications, and respect the patient's desire to eat or not eat. Also ensure proper hydration with small sips of fluids periodically.      Comfort measures only  "status        Hypophosphatemia  Patient's most recent phosphorus results are listed below.   Recent Labs     01/26/25  1103   PHOS 2.7       Plan  - Will treat hypophosphatemia with oral replacement  - Patient's hypophosphatemia is stable      Hypomagnesemia  Patient has Abnormal Magnesium: hypomagnesemia. Will continue to monitor electrolytes closely. Will replace the affected electrolytes and repeat labs to be done after interventions completed. The patient's magnesium results have been reviewed and are listed below.           Anemia  Anemia is likely due to Iron deficiency. Most recent hemoglobin and hematocrit are listed below.  No results for input(s): "HGB", "HCT" in the last 72 hours.    Plan  - Monitor serial CBC: Daily  - Transfuse PRBC if patient becomes hemodynamically unstable, symptomatic or H/H drops below 7/21.  - Patient has not received any PRBC transfusions to date  - Patient's anemia is currently stable      Swelling of right upper extremity  CT UE with Nonspecific subcutaneous edema throughout the distal arm and forearm, possibly sterile or infectious in nature.  No fluid collection.  No osteomyelitis.     Continue to monitor.     Hyperbilirubinemia  - elevation in bilirubin with concomitant elevations in LFTs  - RUQ U/S significant for biliary sludge, mild/moderate intrahepatic/extrahepatic biliary ductla dilation. Unchanged compare to previous MRCP.         Pseudomonas urinary tract infection  - urinalysis concerning for UTI and found to have pseudomonas. Now s/p levaquin on 1/20.   - Blood cultures: NGTD       Hypoglycemia  -Intermittent hypoglycemia over hospital stay. Likely related to poor PO intake.   -previously needed amp of D50  -monitor BG closely  -Can resume D5NS if needed  -Encourage PO intake         Numbness and tingling in both hands  -noted by patient before admission with abnormal finger to nose and weakness   -CT head and spine reviewed. There is significant stenosis/narrowing " of C6 and C7 which could be contributing to symptoms. Findings appear chronic and not acute. MRI ordered and notable for cervical stenosis and lacunar infarcts. Limited visiblity due to movement   -CT head notable for remote lacunar infarcts, hx of stroke in 2005 currently on ASA   - PT/OT discontinued in light of impending NH with hospice initiation         Severe protein-calorie malnutrition  Nutrition consulted. Most recent weight and BMI monitored-     Measurements:  Wt Readings from Last 1 Encounters:   12/27/24 54.9 kg (121 lb 0.5 oz)   Body mass index is 22.87 kg/m².    Patient has been screened and assessed by RD.    Malnutrition Type:  Context: chronic illness  Level: severe    Malnutrition Characteristic Summary:  Weight Loss (Malnutrition): greater than 5% in 1 month  Energy Intake (Malnutrition): less than 75% for greater than or equal to 1 month  Subcutaneous Fat (Malnutrition): severe depletion  Muscle Mass (Malnutrition): severe depletion    Interventions/Recommendations (treatment strategy):  Please prioritize patient's comfort and preferences over strict nutritional goals at this time. Offer small, frequent meals, texture modifications, and respect the patient's desire to eat or not eat. Also ensure proper hydration with small sips of fluids periodically.    - encourage PO intake    Hypokalemia  Patient's most recent potassium results are listed below.   Recent Labs     01/25/25  0714 01/27/25  0456   K 3.2* 3.3*       Plan  - Replete potassium per protocol  - Monitor potassium Daily  - Patient's hypokalemia is stable  - replete    Diabetes mellitus  Sliding scale held, accuchecks for hypoglycemia     Patient's FSGs are controlled on current medication regimen.  Last A1c reviewed-   Lab Results   Component Value Date    HGBA1C 4.6 01/20/2025     Most recent fingerstick glucose reviewed-   Recent Labs   Lab 01/26/25  2117 01/27/25  0838 01/27/25  1157 01/27/25  1624   POCTGLUCOSE 77 88 82 75        Current correctional scale   hold on correction scale give A1C is 5.5  Antihyperglycemics (From admission, onward)      None          Hold Oral hypoglycemics while patient is in the hospital; holding all given low intake    Hyperlipidemia  - continue ASA and statin      Benign essential hypertension  Patient's blood pressure range in the last 24 hours was: BP  Min: 105/69  Max: 127/77.The patient's inpatient anti-hypertensive regimen is listed below:  Current Antihypertensives  lisinopriL tablet 10 mg, Daily, Oral  NIFEdipine 24 hr tablet 30 mg, Daily, Oral    Plan  - BP is controlled, no changes needed to their regimen  - will continue home regimen      VTE Risk Mitigation (From admission, onward)           Ordered     enoxaparin injection 40 mg  Daily         12/26/24 1700     IP VTE HIGH RISK PATIENT  Once         12/26/24 1547     Place sequential compression device  Until discontinued         12/26/24 1547                    Discharge Planning   RITA: 1/28/2025     Code Status: DNR   Medical Readiness for Discharge Date: 1/20/2025  Discharge Plan A: New Nursing Home placement - [facility] care facility (with hospice services)   Discharge Delays: (!) Patient and Family Barriers (Family has not decided on facility.)                    Rimma Mohamud MD  Department of Hospital Medicine   [facility] - Internal Medicine Telemetry

## 2025-01-27 NOTE — ASSESSMENT & PLAN NOTE
Nutrition consulted. Most recent weight and BMI monitored-     Measurements:  Wt Readings from Last 1 Encounters:   12/27/24 54.9 kg (121 lb 0.5 oz)   Body mass index is 22.87 kg/m².    Patient has been screened and assessed by RD.    Malnutrition Type:  Context: chronic illness  Level: severe    Malnutrition Characteristic Summary:  Weight Loss (Malnutrition): greater than 5% in 1 month  Energy Intake (Malnutrition): less than 75% for greater than or equal to 1 month  Subcutaneous Fat (Malnutrition): severe depletion  Muscle Mass (Malnutrition): severe depletion    Interventions/Recommendations (treatment strategy):  Please prioritize patient's comfort and preferences over strict nutritional goals at this time. Offer small, frequent meals, texture modifications, and respect the patient's desire to eat or not eat. Also ensure proper hydration with small sips of fluids periodically.    - encourage PO intake

## 2025-01-27 NOTE — SUBJECTIVE & OBJECTIVE
"Interval History:     Unhappy. Refusing medications. Some hypernatremia likely 2/2 to decreased PO intake and dehydration. Will stop trending labs given comfort care measures.     Review of Systems  Objective:     Vital Signs (Most Recent):  Temp: 98.1 °F (36.7 °C) (01/27/25 1627)  Pulse: 100 (01/27/25 1627)  Resp: 18 (01/27/25 1627)  BP: 126/79 (01/27/25 1627)  SpO2: (!) 93 % (01/27/25 1627) Vital Signs (24h Range):  Temp:  [97.6 °F (36.4 °C)-99 °F (37.2 °C)] 98.1 °F (36.7 °C)  Pulse:  [] 100  Resp:  [18] 18  SpO2:  [93 %-97 %] 93 %  BP: (105-127)/(68-79) 126/79     Weight: 54.9 kg (121 lb 0.5 oz)  Body mass index is 22.87 kg/m².    Intake/Output Summary (Last 24 hours) at 1/27/2025 1640  Last data filed at 1/26/2025 2300  Gross per 24 hour   Intake 240 ml   Output 600 ml   Net -360 ml      Physical Exam    Physical Exam  Gen: in NAD, appears stated age, propped up in bed.  Neuro: AAO to self. Able to move all limbs spontaneously. No gross CN defect.   HEENT: NTNC, MMM   CVS: RRR, no m/r/g; S1/S2 auscultated with no S3 or S4; capillary refill < 2 sec  Resp: lungs CTAB, no w/r/r; no belabored breathing or accessory muscle use appreciated   Abd: BS+ in all 4 quadrants; NTND, soft to palpation; no organomegaly appreciated   Extrem: no UE or LE edema BL      Computed MELD 3.0 unavailable. One or more values for this score either were not found within the given timeframe or did not fit some other criterion.  Computed MELD-Na unavailable. One or more values for this score either were not found within the given timeframe or did not fit some other criterion.      Significant Labs:  CBC:  No results for input(s): "WBC", "HGB", "HCT", "PLT" in the last 48 hours.  CMP:  Recent Labs   Lab 01/27/25  0456   *   K 3.3*   *   CO2 27   GLU 73   BUN 8   CREATININE 0.5   CALCIUM 7.7*   PROT 5.4*   ALBUMIN 1.5*   BILITOT 0.9   ALKPHOS 493*   AST 42*   ALT 40   ANIONGAP 8     PTINR:  No results for input(s): "INR" in " the last 48 hours.    Significant Procedures:   Dobutamine Stress Test with Color Flow: No results found for this or any previous visit.    None

## 2025-01-27 NOTE — ASSESSMENT & PLAN NOTE
-noted by patient before admission with abnormal finger to nose and weakness   -CT head and spine reviewed. There is significant stenosis/narrowing of C6 and C7 which could be contributing to symptoms. Findings appear chronic and not acute. MRI ordered and notable for cervical stenosis and lacunar infarcts. Limited visiblity due to movement   -CT head notable for remote lacunar infarcts, hx of stroke in 2005 currently on ASA   - PT/OT discontinued in light of impending NH with hospice initiation

## 2025-01-27 NOTE — PROGRESS NOTES
[facility] - Internal Medicine [facility] Medicine  Progress Note    Patient Name: Bhavna Lim  MRN: [id number]  Patient Class: IP- Inpatient   Admission Date: 12/26/2024  Length of Stay: 28 days  Attending Physician: Rimma Mohamud MD  Primary Care Provider: Suzi Green MD        Subjective     Principal Problem:Palliative care encounter        HPI:  Mrs. Bhavna Lim is a 79 year old F with a history of HTN and HLD who presents to the ED for weakness and fatigue. She has been having weight loss and poor po intake over the last few months. She has been seen by GI and evaluated with EGD and Colonoscopy as well as MRCP which showed cholelithiasis with gallbladder distention and intrahepatic and extrahepatic biliary dilation. No significant abnormalities on EGD and coloscopy. She was referred to surgery who recommended NM study and possible EUS with GI. She has been unable to tolerate any solid foods. She sometimes takes a little bit of soup and has tried protein shakes. She feels full immediately. She does endorse some pain in her epigastric region ever since having her EGD performed. She occasionally has vomiting. Denies regurgitation of food. She continues to have worsening weakness and falls at home. She had two falls yesterday due to weakness. She denies significant dizziness. She is unable to walk due to her weakness and sister is concerned this is related to her poor nutrition.      In the ED VSS. Labs notable for Potassium of 3.0 which was repleted in the ED. No focal signs concerning for stroke. She was admitted to medicine for further management.      Overview/Hospital Course:  While on the floor PT/OT were consulted and recommended SNF. Nutrition consulted. Discussed with GI outpatient workup and no indication for further inpatient workup. She continued to be altered while in the hospital. CT spine/head ordered for concern of fall showing ischemic changes and signficant stenosis  and narrowing. She developed worsening hand weakness and numbness/tinglign with abnormal coordiation. Some of this was prior to admission but weakness significantly worsened. MRI ordered and showed old infarcts. She continued to refuse food and did not have abdominal complaints just would not eat. Started on IVF due to hypoglycemia. Acute encephalopathy workup initiated and negative aside from low folate. UA ordered but not performed. At this point if nutrition becomes suboptimal will have to consider other means of nutrition. Son with concern of worsening memory and possible dementia wanting placement in [place]. Started on supplements for poor nutrition including thiamine and folate to see if this will help with mental status     Plan is NH with hospice services.    1/18: Extensive discussion with family who is torn between pursuing additional cares or going comfort measures/hospice route. Haldol added for agitation, Continue current plan for now.   1/19: Mental status improved but pt remains disoriented.   1/20 to 1/22: Awaiting placement @ NH w/ hospice services. Medicaid/Medicare pending  1/23: Glucose 50, improved with dextrose. K 3.0, replete. Pending NH placement.   1/24: 500cc IVF given poor PO intake past days. Discussed with family. Pending NH with hospice.     Interval History:     Patient much more talkative this morning. No complaints. Sweet.     Review of Systems  Objective:     Vital Signs (Most Recent):  Temp: 99 °F (37.2 °C) (01/26/25 2054)  Pulse: 93 (01/26/25 2054)  Resp: 18 (01/26/25 2054)  BP: 105/69 (01/26/25 2054)  SpO2: (!) 94 % (01/26/25 2054) Vital Signs (24h Range):  Temp:  [97.6 °F (36.4 °C)-99 °F (37.2 °C)] 99 °F (37.2 °C)  Pulse:  [] 93  Resp:  [18] 18  SpO2:  [93 %-100 %] 94 %  BP: ()/(52-82) 105/69     Weight: 54.9 kg (121 lb 0.5 oz)  Body mass index is 22.87 kg/m².    Intake/Output Summary (Last 24 hours) at 1/26/2025 2212  Last data filed at 1/25/2025 2224  Gross per  "24 hour   Intake 120 ml   Output --   Net 120 ml      Physical Exam    Constitutional:       General: She is not in acute distress.     Appearance: Normal appearance. She is ill-appearing.   HENT:      Head: Normocephalic and atraumatic.      Right Ear: External ear normal.      Left Ear: External ear normal.   Eyes:      Extraocular Movements: Extraocular movements intact.      Conjunctiva/sclera: Conjunctivae normal.      Pupils: Pupils are equal, round, and reactive to light.   Cardiovascular:      Rate and Rhythm: Normal rate and regular rhythm.      Pulses: Normal pulses.      Heart sounds: Normal heart sounds. No murmur heard.  Pulmonary:      Effort: Pulmonary effort is normal. No respiratory distress.      Breath sounds: Normal breath sounds.   Abdominal:      General: Abdomen is flat. Bowel sounds are normal.      Palpations: Abdomen is soft.      Tenderness: There is no abdominal tenderness.   Musculoskeletal:         General: Normal range of motion.      Cervical back: Normal range of motion and neck supple. No tenderness.      Right lower leg: No edema.      Left lower leg: No edema.   Skin:     General: Skin is warm and dry.      Capillary Refill: Capillary refill takes less than 2 seconds.      Coloration: Skin is not jaundiced.   Neurological:      General: No focal deficit present.      Mental Status: She is alert and oriented to person, place, and time. Mental status is at baseline.   Psychiatric:         Mood and Affect: Mood normal.         Behavior: Behavior normal    Computed MELD 3.0 unavailable. One or more values for this score either were not found within the given timeframe or did not fit some other criterion.  Computed MELD-Na unavailable. One or more values for this score either were not found within the given timeframe or did not fit some other criterion.      Significant Labs:  CBC:  No results for input(s): "WBC", "HGB", "HCT", "PLT" in the last 48 hours.  CMP:  Recent Labs   Lab " "01/25/25  0714   *   K 3.2*   *   CO2 27   GLU 74   BUN 6*   CREATININE 0.4*   CALCIUM 7.6*   PROT 5.1*   ALBUMIN 1.4*   BILITOT 1.1*   ALKPHOS 539*   AST 42*   ALT 40   ANIONGAP 7*     PTINR:  No results for input(s): "INR" in the last 48 hours.    Significant Procedures:   Dobutamine Stress Test with Color Flow: No results found for this or any previous visit.    None    Assessment and Plan     * Palliative care encounter  Plan is NH with Hospice as discussed above      Comfort measures only status        Hypophosphatemia  Patient's most recent phosphorus results are listed below.   Recent Labs     01/24/25  1337   PHOS 2.8       Plan  - Will treat hypophosphatemia with oral replacement  - Patient's hypophosphatemia is stable      Hypomagnesemia  Patient has Abnormal Magnesium: hypomagnesemia. Will continue to monitor electrolytes closely. Will replace the affected electrolytes and repeat labs to be done after interventions completed. The patient's magnesium results have been reviewed and are listed below.           Anemia  Anemia is likely due to Iron deficiency. Most recent hemoglobin and hematocrit are listed below.  Recent Labs     01/24/25  1337   HGB 8.8*   HCT 26.0*       Plan  - Monitor serial CBC: Daily  - Transfuse PRBC if patient becomes hemodynamically unstable, symptomatic or H/H drops below 7/21.  - Patient has not received any PRBC transfusions to date  - Patient's anemia is currently stable      Swelling of right upper extremity  CT UE with Nonspecific subcutaneous edema throughout the distal arm and forearm, possibly sterile or infectious in nature.  No fluid collection.  No osteomyelitis.     Continue to monitor.     Hyperbilirubinemia  - elevation in bilirubin with concomitant elevations in LFTs  - RUQ U/S significant for biliary sludge, mild/moderate intrahepatic/extrahepatic biliary ductla dilation. Unchanged compare to previous MRCP.         Pseudomonas urinary tract infection  - " urinalysis concerning for UTI and found to have pseudomonas  - Blood cultures: NGTD   - close assessment to see if mentation improves  -Finished Levaquin on 1/20    Cognitive impairment  -history of progressive confusion and forgetfulness prior to admission.   -CT head: Sequela of chronic microvascular ischemic change. Remote lacunar type infarct right thalamus; MRI C-spine: Multilevel cervical spondylosis, worst at C3-C4, contributing to moderate spinal canal stenosis as well as moderate left neural foraminal narrowing.    -HIV/RPR negative. Folate low, repleting. B12 high. Thiamine low and repleting. 2/2 to dementia with poor nutritional intake; TSH wnl on 1/1/2025  - exhausted reversible causes of poor mentation and with imaging, appears to be age-related decline accelerated by some degree of vascular dementia. Likely some component of acute delirum due to hospital stay/UTI in setting of advanced dementia.     Plan:   - discussed pitfalls of G-tube placement. No plan for placement at this time.    - plan for discharge to Nursing Home with hospice services - awaiting Insurance approval.   -Haldol added PRN for agitation. Discussed risk of death and worsening condition with pt's son and sister.     Hypoglycemia  -Intermittent hypoglycemia over hospital stay. Likely related to poor PO intake.   -previously needed amp of D50  -monitor BG closely  -Can resume D5NS if needed  -Encourage PO intake         Numbness and tingling in both hands  -noted by patient before admission with abnormal finger to nose and weakness   -CT head and spine reviewed. There is significant stenosis/narrowing of C6 and C7 which could be contributing to symptoms. Findings appear chronic and not acute. MRI ordered and notable for cervical stenosis and lacunar infarcts. Limited visiblity due to movement   -CT head notable for remote lacunar infarcts, hx of stroke in 2005 currently on ASA   -continue PT/OT         Severe protein-calorie  malnutrition  Nutrition consulted. Most recent weight and BMI monitored-     Measurements:  Wt Readings from Last 1 Encounters:   12/27/24 54.9 kg (121 lb 0.5 oz)   Body mass index is 22.87 kg/m².    Patient has been screened and assessed by RD.    Malnutrition Type:  Context: chronic illness  Level: severe    Malnutrition Characteristic Summary:  Weight Loss (Malnutrition): greater than 5% in 1 month  Energy Intake (Malnutrition): less than 75% for greater than or equal to 1 month  Subcutaneous Fat (Malnutrition): severe depletion  Muscle Mass (Malnutrition): severe depletion    Interventions/Recommendations (treatment strategy):  Please prioritize patient's comfort and preferences over strict nutritional goals at this time. Offer small, frequent meals, texture modifications, and respect the patient's desire to eat or not eat. Also ensure proper hydration with small sips of fluids periodically.    -encourage PO intake    Hypokalemia  Patient's most recent potassium results are listed below.   Recent Labs     01/23/25  0730 01/25/25  0714   K 3.0* 3.2*       Plan  - Replete potassium per protocol  - Monitor potassium Daily  - Patient's hypokalemia is stable  - replete    Debility  Patient with Acute debility due to age-related physical debility and other reduced mobility. The patient's latest AMPAC (Activity Measure for Post Acute Care) Score is listed below.    AM-PAC Score - How much help does the patient need for each activity listed  Basic Mobility Total Score: 7  Turning over in bed (including adjusting bedclothes, sheets and blankets)?: A lot  Sitting down on and standing up from a chair with arms (e.g., wheelchair, bedside commode, etc.): Unable  Moving from lying on back to sitting on the side of the bed?: Unable  Moving to and from a bed to a chair (including a wheelchair)?: Unable  Need to walk in hospital room?: Unable  Climbing 3-5 steps with a railing?: Unable    Plan  - Progressive mobility protocol  initated  - PT/OT consulted  - Fall precautions in place  - Plan for NH with hospice services -pending insurance(Medicaid/Medicare)   - Palliative care following  -Pt is unable to be safely discharged at this time due to lack of continuous home care.          Diabetes mellitus  Sliding scale held, accuchecks for hypoglycemia     Patient's FSGs are controlled on current medication regimen.  Last A1c reviewed-   Lab Results   Component Value Date    HGBA1C 4.6 01/20/2025     Most recent fingerstick glucose reviewed-   Recent Labs   Lab 01/25/25  0754 01/25/25  1110 01/25/25  1526 01/25/25  2132   POCTGLUCOSE 82 138* 87 86       Current correctional scale   hold on correction scale give A1C is 5.5  Antihyperglycemics (From admission, onward)      None          Hold Oral hypoglycemics while patient is in the hospital; holding all given low intake    Unintentional weight loss  Nutrition consulted. Most recent weight and BMI monitored-     Measurements:  Wt Readings from Last 1 Encounters:   12/27/24 54.9 kg (121 lb 0.5 oz)   Body mass index is 22.87 kg/m².    RD consultation   Extensive outpatient evaluation recommended by GI and General surgery   Will add supplementation to meals; soft bite-sized per rec's  No plan for feeding tube at this time as family is able to convince pt to eat intermittently.       Please prioritize patient's comfort and preferences over strict nutritional goals at this time. Offer small, frequent meals, texture modifications, and respect the patient's desire to eat or not eat. Also ensure proper hydration with small sips of fluids periodically.      Hyperlipidemia  - continue ASA and statin      Benign essential hypertension  Patient's blood pressure range in the last 24 hours was: BP  Min: 92/59  Max: 134/81.The patient's inpatient anti-hypertensive regimen is listed below:  Current Antihypertensives  lisinopriL tablet 10 mg, Daily, Oral  NIFEdipine 24 hr tablet 30 mg, Daily, Oral    Plan  - BP  is controlled, no changes needed to their regimen  - will continue home regimen      VTE Risk Mitigation (From admission, onward)           Ordered     enoxaparin injection 40 mg  Daily         12/26/24 1700     IP VTE HIGH RISK PATIENT  Once         12/26/24 1547     Place sequential compression device  Until discontinued         12/26/24 1547                    Discharge Planning   RITA: 1/27/2025     Code Status: DNR   Medical Readiness for Discharge Date: 1/20/2025  Discharge Plan A: New Nursing Home placement - halfway care facility (with hospice services)   Discharge Delays: (!) Patient and Family Barriers (Family has not decided on facility.)                    Rimma Mohamud MD  Department of Hospital Medicine   [facility] - Internal Medicine Telemetry

## 2025-01-27 NOTE — ASSESSMENT & PLAN NOTE
Sliding scale held, accuchecks for hypoglycemia     Patient's FSGs are controlled on current medication regimen.  Last A1c reviewed-   Lab Results   Component Value Date    HGBA1C 4.6 01/20/2025     Most recent fingerstick glucose reviewed-   Recent Labs   Lab 01/26/25  2117 01/27/25  0838 01/27/25  1157 01/27/25  1624   POCTGLUCOSE 77 88 82 75       Current correctional scale   hold on correction scale give A1C is 5.5  Antihyperglycemics (From admission, onward)    None        Hold Oral hypoglycemics while patient is in the hospital; holding all given low intake

## 2025-01-27 NOTE — ASSESSMENT & PLAN NOTE
Patient's most recent potassium results are listed below.   Recent Labs     01/25/25  0714 01/27/25  0456   K 3.2* 3.3*       Plan  - Replete potassium per protocol  - Monitor potassium Daily  - Patient's hypokalemia is stable  - replete

## 2025-01-27 NOTE — ASSESSMENT & PLAN NOTE
History of progressive confusion and forgetfulness prior to admission. CT head: Sequela of chronic microvascular ischemic change. Remote lacunar type infarct right thalamus;   -HIV/RPR negative. Folate low, repleting. B12 high. Thiamine low and repleting. 2/2 to dementia with poor nutritional intake; TSH wnl on 1/1/2025  - exhausted reversible causes of poor mentation and with imaging, appears to be age-related decline accelerated by some degree of vascular dementia. Likely some component of acute delirum due to hospital stay/UTI in setting of advanced dementia.     Plan:   - discussed pitfalls of G-tube placement. No plan for placement at this time.   - plan for discharge to Nursing Home with hospice services   -Haldol added PRN for agitation. Discussed risk of death and worsening condition with pt's son and sister.

## 2025-01-27 NOTE — ASSESSMENT & PLAN NOTE
Patient's blood pressure range in the last 24 hours was: BP  Min: 105/69  Max: 127/77.The patient's inpatient anti-hypertensive regimen is listed below:  Current Antihypertensives  lisinopriL tablet 10 mg, Daily, Oral  NIFEdipine 24 hr tablet 30 mg, Daily, Oral    Plan  - BP is controlled, no changes needed to their regimen  - will continue home regimen

## 2025-01-27 NOTE — ASSESSMENT & PLAN NOTE
"Anemia is likely due to Iron deficiency. Most recent hemoglobin and hematocrit are listed below.  No results for input(s): "HGB", "HCT" in the last 72 hours.    Plan  - Monitor serial CBC: Daily  - Transfuse PRBC if patient becomes hemodynamically unstable, symptomatic or H/H drops below 7/21.  - Patient has not received any PRBC transfusions to date  - Patient's anemia is currently stable    "

## 2025-01-27 NOTE — PLAN OF CARE
AAOx4; POC reviewed & verbalizes understanding.  Admit DX: Palliative Care Encounter  Afebrile. No acute events on shift. No deficits noted  IV access & IVF:  ok to leave out  BM:  1/26/25, incontinent  : springer catheter, catheter care performed  Appetite: very poor  Bed alarm set; fall precautions maintained.   Bed locked in lowest position, side rails up x2, call light within reach, environment clear. Encouraged pt to call nurse with any concerns.  Safety measures maintained

## 2025-01-27 NOTE — ASSESSMENT & PLAN NOTE
Patient with Acute debility due to age-related physical debility and other reduced mobility. The patient's latest AMPAC (Activity Measure for Post Acute Care) Score is listed below.    AM-PAC Score - How much help does the patient need for each activity listed  Basic Mobility Total Score: 7  Turning over in bed (including adjusting bedclothes, sheets and blankets)?: A lot  Sitting down on and standing up from a chair with arms (e.g., wheelchair, bedside commode, etc.): Unable  Moving from lying on back to sitting on the side of the bed?: Unable  Moving to and from a bed to a chair (including a wheelchair)?: Unable  Need to walk in hospital room?: Unable  Climbing 3-5 steps with a railing?: Unable    Plan  - Progressive mobility protocol initated  - PT/OT consulted  - Fall precautions in place  - Plan for NH with hospice services -pending insurance(Medicaid/Medicare)   - Palliative care following  - Pt is unable to be safely discharged to home at this time due to lack of continuous home care.

## 2025-01-27 NOTE — PLAN OF CARE
Problem: Adult Inpatient Plan of Care  Goal: Plan of Care Review  Outcome: Progressing  Goal: Patient-Specific Goal (Individualized)  Outcome: Progressing  Goal: Absence of Hospital-Acquired Illness or Injury  Outcome: Progressing  Goal: Optimal Comfort and Wellbeing  Outcome: Progressing  Goal: Readiness for Transition of Care  Outcome: Progressing     Problem: Diabetes Comorbidity  Goal: Blood Glucose Level Within Targeted Range  Outcome: Progressing     Problem: Skin Injury Risk Increased  Goal: Skin Health and Integrity  Outcome: Progressing     Problem: Confusion Acute  Goal: Optimal Cognitive Function  Outcome: Progressing     Problem: Wound  Goal: Optimal Coping  Outcome: Progressing  Goal: Optimal Functional Ability  Outcome: Progressing  Goal: Absence of Infection Signs and Symptoms  Outcome: Progressing  Goal: Improved Oral Intake  Outcome: Progressing  Goal: Optimal Pain Control and Function  Outcome: Progressing  Goal: Skin Health and Integrity  Outcome: Progressing  Goal: Optimal Wound Healing  Outcome: Progressing     Problem: Infection  Goal: Absence of Infection Signs and Symptoms  Outcome: Progressing     Problem: Coping Ineffective  Goal: Effective Coping  Outcome: Progressing     Problem: Fall Injury Risk  Goal: Absence of Fall and Fall-Related Injury  Outcome: Progressing    Pt had an uneventful night. VSS. Pt given prn tyneol. Pt up most of the night  Pt is free of falls and injuries. Bed in lowest position and call light within reach. Safety maintained

## 2025-01-27 NOTE — ASSESSMENT & PLAN NOTE
- urinalysis concerning for UTI and found to have pseudomonas. Now s/p levaquin on 1/20.   - Blood cultures: NGTD

## 2025-01-27 NOTE — SUBJECTIVE & OBJECTIVE
Interval History:     Patient much more talkative this morning. No complaints. Sweet.     Review of Systems  Objective:     Vital Signs (Most Recent):  Temp: 99 °F (37.2 °C) (01/26/25 2054)  Pulse: 93 (01/26/25 2054)  Resp: 18 (01/26/25 2054)  BP: 105/69 (01/26/25 2054)  SpO2: (!) 94 % (01/26/25 2054) Vital Signs (24h Range):  Temp:  [97.6 °F (36.4 °C)-99 °F (37.2 °C)] 99 °F (37.2 °C)  Pulse:  [] 93  Resp:  [18] 18  SpO2:  [93 %-100 %] 94 %  BP: ()/(52-82) 105/69     Weight: 54.9 kg (121 lb 0.5 oz)  Body mass index is 22.87 kg/m².    Intake/Output Summary (Last 24 hours) at 1/26/2025 2212  Last data filed at 1/25/2025 2224  Gross per 24 hour   Intake 120 ml   Output --   Net 120 ml      Physical Exam    Constitutional:       General: She is not in acute distress.     Appearance: Normal appearance. She is ill-appearing.   HENT:      Head: Normocephalic and atraumatic.      Right Ear: External ear normal.      Left Ear: External ear normal.   Eyes:      Extraocular Movements: Extraocular movements intact.      Conjunctiva/sclera: Conjunctivae normal.      Pupils: Pupils are equal, round, and reactive to light.   Cardiovascular:      Rate and Rhythm: Normal rate and regular rhythm.      Pulses: Normal pulses.      Heart sounds: Normal heart sounds. No murmur heard.  Pulmonary:      Effort: Pulmonary effort is normal. No respiratory distress.      Breath sounds: Normal breath sounds.   Abdominal:      General: Abdomen is flat. Bowel sounds are normal.      Palpations: Abdomen is soft.      Tenderness: There is no abdominal tenderness.   Musculoskeletal:         General: Normal range of motion.      Cervical back: Normal range of motion and neck supple. No tenderness.      Right lower leg: No edema.      Left lower leg: No edema.   Skin:     General: Skin is warm and dry.      Capillary Refill: Capillary refill takes less than 2 seconds.      Coloration: Skin is not jaundiced.   Neurological:      General: No  "focal deficit present.      Mental Status: She is alert and oriented to person, place, and time. Mental status is at baseline.   Psychiatric:         Mood and Affect: Mood normal.         Behavior: Behavior normal    Computed MELD 3.0 unavailable. One or more values for this score either were not found within the given timeframe or did not fit some other criterion.  Computed MELD-Na unavailable. One or more values for this score either were not found within the given timeframe or did not fit some other criterion.      Significant Labs:  CBC:  No results for input(s): "WBC", "HGB", "HCT", "PLT" in the last 48 hours.  CMP:  Recent Labs   Lab 01/25/25  0714   *   K 3.2*   *   CO2 27   GLU 74   BUN 6*   CREATININE 0.4*   CALCIUM 7.6*   PROT 5.1*   ALBUMIN 1.4*   BILITOT 1.1*   ALKPHOS 539*   AST 42*   ALT 40   ANIONGAP 7*     PTINR:  No results for input(s): "INR" in the last 48 hours.    Significant Procedures:   Dobutamine Stress Test with Color Flow: No results found for this or any previous visit.    None  "

## 2025-01-27 NOTE — PLAN OF CARE
AAOx4; POC reviewed & verbalizes understanding.  Admit DX: Palliative Care Encounter  Afebrile. No acute events on shift. No deficits noted  IV access & IVF: no IV access  BM: 1/27/25 (2 BM brown, soft & creamy)  : Cerda catheter in place, cathter care performed  Appetite: poor  Bed alarm set; fall precautions maintained.   Bed locked in lowest position, side rails up x2, call light within reach, environment clear. Encouraged pt to call nurse with any concerns.  Safety measures maintained

## 2025-01-28 LAB
MAGNESIUM SERPL-MCNC: 1.6 MG/DL (ref 1.6–2.6)
PHOSPHATE SERPL-MCNC: 2.8 MG/DL (ref 2.7–4.5)
POCT GLUCOSE: 144 MG/DL (ref 70–110)
POCT GLUCOSE: 67 MG/DL (ref 70–110)
POCT GLUCOSE: 89 MG/DL (ref 70–110)
POCT GLUCOSE: 95 MG/DL (ref 70–110)

## 2025-01-28 PROCEDURE — 25000003 PHARM REV CODE 250: Performed by: INTERNAL MEDICINE

## 2025-01-28 PROCEDURE — 63600175 PHARM REV CODE 636 W HCPCS: Performed by: STUDENT IN AN ORGANIZED HEALTH CARE EDUCATION/TRAINING PROGRAM

## 2025-01-28 PROCEDURE — 25000003 PHARM REV CODE 250: Performed by: STUDENT IN AN ORGANIZED HEALTH CARE EDUCATION/TRAINING PROGRAM

## 2025-01-28 PROCEDURE — S4991 NICOTINE PATCH NONLEGEND: HCPCS | Performed by: STUDENT IN AN ORGANIZED HEALTH CARE EDUCATION/TRAINING PROGRAM

## 2025-01-28 PROCEDURE — 84100 ASSAY OF PHOSPHORUS: CPT | Performed by: PHYSICIAN ASSISTANT

## 2025-01-28 PROCEDURE — 83735 ASSAY OF MAGNESIUM: CPT | Performed by: PHYSICIAN ASSISTANT

## 2025-01-28 PROCEDURE — 11000001 HC ACUTE MED/SURG PRIVATE ROOM

## 2025-01-28 PROCEDURE — 36415 COLL VENOUS BLD VENIPUNCTURE: CPT | Performed by: PHYSICIAN ASSISTANT

## 2025-01-28 RX ADMIN — MICONAZOLE NITRATE: 20 OINTMENT TOPICAL at 09:01

## 2025-01-28 RX ADMIN — Medication 16 G: at 08:01

## 2025-01-28 RX ADMIN — LISINOPRIL 10 MG: 10 TABLET ORAL at 08:01

## 2025-01-28 RX ADMIN — Medication 6 MG: at 09:01

## 2025-01-28 RX ADMIN — ACETAMINOPHEN 650 MG: 325 TABLET ORAL at 09:01

## 2025-01-28 RX ADMIN — FERROUS SULFATE TAB 325 MG (65 MG ELEMENTAL FE) 1 EACH: 325 (65 FE) TAB at 09:01

## 2025-01-28 RX ADMIN — FOLIC ACID 1 MG: 1 TABLET ORAL at 08:01

## 2025-01-28 RX ADMIN — Medication 1 TABLET: at 08:01

## 2025-01-28 RX ADMIN — ATORVASTATIN CALCIUM 40 MG: 40 TABLET, FILM COATED ORAL at 08:01

## 2025-01-28 RX ADMIN — ASPIRIN 81 MG: 81 TABLET, COATED ORAL at 08:01

## 2025-01-28 RX ADMIN — NIFEDIPINE 30 MG: 30 TABLET, FILM COATED, EXTENDED RELEASE ORAL at 08:01

## 2025-01-28 RX ADMIN — NICOTINE 1 PATCH: 7 PATCH, EXTENDED RELEASE TRANSDERMAL at 08:01

## 2025-01-28 RX ADMIN — Medication 100 MG: at 08:01

## 2025-01-28 RX ADMIN — Medication 1 TABLET: at 09:01

## 2025-01-28 RX ADMIN — ENOXAPARIN SODIUM 40 MG: 40 INJECTION SUBCUTANEOUS at 04:01

## 2025-01-28 NOTE — PLAN OF CARE
Problem: Adult Inpatient Plan of Care  Goal: Plan of Care Review  Outcome: Progressing  Goal: Patient-Specific Goal (Individualized)  Outcome: Progressing  Goal: Absence of Hospital-Acquired Illness or Injury  Outcome: Progressing  Goal: Optimal Comfort and Wellbeing  Outcome: Progressing     Problem: Diabetes Comorbidity  Goal: Blood Glucose Level Within Targeted Range  Outcome: Progressing     Problem: Skin Injury Risk Increased  Goal: Skin Health and Integrity  Outcome: Progressing     POC reviewed with patient. All questions and concerns addressed. Confusion noted. Fall/safety precautions implemented and maintained. Cerda care performed. Blood glucose monitored. No acute events noted this shift. Please see flowsheet for full assessment and vitals. Bed locked in lowest position. Side rails up x2. Call bell within reach.

## 2025-01-28 NOTE — NURSING
Patient glucose 67 this morning, patient asymptomatic, up and talking, will admin glucose tabs. MD aware.    Patient positioned supine Patient prepped and draped per unit standard.    Safety straps applied:Yes

## 2025-01-28 NOTE — PROGRESS NOTES
ALONDRA spoke to sister Karla this a.m.  She stated that her plan is to provide Ashia with financial documents today.  This writer spoke to Bijan.  She stated that financial statements, life insurance policies, pension letter were provided and is now reviewed by the business office.

## 2025-01-28 NOTE — NURSING
BP 84/59 with MAP of 68, patient awake and alert, talking. MD notified, awaiting clarification on orders as patient is on comfort measures only.

## 2025-01-28 NOTE — PLAN OF CARE
Problem: Adult Inpatient Plan of Care  Goal: Plan of Care Review  Outcome: Adequate for Care Transition  Goal: Patient-Specific Goal (Individualized)  Outcome: Adequate for Care Transition  Goal: Absence of Hospital-Acquired Illness or Injury  Outcome: Adequate for Care Transition  Goal: Optimal Comfort and Wellbeing  Outcome: Adequate for Care Transition  Goal: Readiness for Transition of Care  Outcome: Adequate for Care Transition     Problem: Diabetes Comorbidity  Goal: Blood Glucose Level Within Targeted Range  Outcome: Adequate for Care Transition     Problem: Skin Injury Risk Increased  Goal: Skin Health and Integrity  Outcome: Adequate for Care Transition     Problem: Confusion Acute  Goal: Optimal Cognitive Function  Outcome: Adequate for Care Transition     Problem: Infection  Goal: Absence of Infection Signs and Symptoms  Outcome: Adequate for Care Transition     Problem: Coping Ineffective  Goal: Effective Coping  Outcome: Adequate for Care Transition     Problem: Fall Injury Risk  Goal: Absence of Fall and Fall-Related Injury  Outcome: Adequate for Care Transition

## 2025-01-29 LAB
POCT GLUCOSE: 79 MG/DL (ref 70–110)
POCT GLUCOSE: 95 MG/DL (ref 70–110)
POCT GLUCOSE: 96 MG/DL (ref 70–110)
POCT GLUCOSE: 98 MG/DL (ref 70–110)

## 2025-01-29 PROCEDURE — 25000003 PHARM REV CODE 250: Performed by: INTERNAL MEDICINE

## 2025-01-29 PROCEDURE — 63600175 PHARM REV CODE 636 W HCPCS: Performed by: STUDENT IN AN ORGANIZED HEALTH CARE EDUCATION/TRAINING PROGRAM

## 2025-01-29 PROCEDURE — 11000001 HC ACUTE MED/SURG PRIVATE ROOM

## 2025-01-29 PROCEDURE — 25000003 PHARM REV CODE 250: Performed by: STUDENT IN AN ORGANIZED HEALTH CARE EDUCATION/TRAINING PROGRAM

## 2025-01-29 PROCEDURE — S4991 NICOTINE PATCH NONLEGEND: HCPCS | Performed by: STUDENT IN AN ORGANIZED HEALTH CARE EDUCATION/TRAINING PROGRAM

## 2025-01-29 RX ADMIN — FOLIC ACID 1 MG: 1 TABLET ORAL at 09:01

## 2025-01-29 RX ADMIN — MICONAZOLE NITRATE: 20 OINTMENT TOPICAL at 09:01

## 2025-01-29 RX ADMIN — Medication 100 MG: at 09:01

## 2025-01-29 RX ADMIN — ENOXAPARIN SODIUM 40 MG: 40 INJECTION SUBCUTANEOUS at 04:01

## 2025-01-29 RX ADMIN — ATORVASTATIN CALCIUM 40 MG: 40 TABLET, FILM COATED ORAL at 09:01

## 2025-01-29 RX ADMIN — LISINOPRIL 10 MG: 10 TABLET ORAL at 09:01

## 2025-01-29 RX ADMIN — Medication 1 TABLET: at 09:01

## 2025-01-29 RX ADMIN — ASPIRIN 81 MG: 81 TABLET, COATED ORAL at 09:01

## 2025-01-29 RX ADMIN — NICOTINE 1 PATCH: 7 PATCH, EXTENDED RELEASE TRANSDERMAL at 09:01

## 2025-01-29 RX ADMIN — NIFEDIPINE 30 MG: 30 TABLET, FILM COATED, EXTENDED RELEASE ORAL at 09:01

## 2025-01-29 RX ADMIN — FERROUS SULFATE TAB 325 MG (65 MG ELEMENTAL FE) 1 EACH: 325 (65 FE) TAB at 09:01

## 2025-01-29 RX ADMIN — Medication 6 MG: at 09:01

## 2025-01-29 NOTE — SUBJECTIVE & OBJECTIVE
"Interval History:     Thinks she is at home. Would like to dress herself.     Ok to refuse meds. Will discuss with family about stopping them.    Review of Systems  Objective:     Vital Signs (Most Recent):  Temp: 97.5 °F (36.4 °C) (01/28/25 2116)  Pulse: 106 (01/28/25 2116)  Resp: 18 (01/28/25 2116)  BP: 128/67 (01/28/25 2116)  SpO2: (!) 94 % (01/28/25 2116) Vital Signs (24h Range):  Temp:  [97.5 °F (36.4 °C)-98.2 °F (36.8 °C)] 97.5 °F (36.4 °C)  Pulse:  [] 106  Resp:  [17-18] 18  SpO2:  [93 %-100 %] 94 %  BP: ()/(59-75) 128/67     Weight: 54.9 kg (121 lb 0.5 oz)  Body mass index is 22.87 kg/m².    Intake/Output Summary (Last 24 hours) at 1/28/2025 2241  Last data filed at 1/28/2025 2122  Gross per 24 hour   Intake 1240 ml   Output 820 ml   Net 420 ml      Physical Exam    Gen: in NAD, appears stated age, propped up in bed.  Neuro: AAO to self. Able to move all limbs spontaneously. No gross CN defect.   HEENT: NTNC, MMM   CVS: RRR, no m/r/g; S1/S2 auscultated with no S3 or S4; capillary refill < 2 sec  Resp: lungs CTAB, no w/r/r; no belabored breathing or accessory muscle use appreciated   Abd: BS+ in all 4 quadrants; NTND, soft to palpation; no organomegaly appreciated   Extrem: no UE or LE edema BL    Computed MELD 3.0 unavailable. One or more values for this score either were not found within the given timeframe or did not fit some other criterion.  Computed MELD-Na unavailable. One or more values for this score either were not found within the given timeframe or did not fit some other criterion.      Significant Labs:  CBC:  No results for input(s): "WBC", "HGB", "HCT", "PLT" in the last 48 hours.  CMP:  Recent Labs   Lab 01/27/25  0456   *   K 3.3*   *   CO2 27   GLU 73   BUN 8   CREATININE 0.5   CALCIUM 7.7*   PROT 5.4*   ALBUMIN 1.5*   BILITOT 0.9   ALKPHOS 493*   AST 42*   ALT 40   ANIONGAP 8     PTINR:  No results for input(s): "INR" in the last 48 hours.    Significant Procedures: "   Dobutamine Stress Test with Color Flow: No results found for this or any previous visit.    None

## 2025-01-29 NOTE — PLAN OF CARE
Problem: Adult Inpatient Plan of Care  Goal: Plan of Care Review  1/29/2025 1149 by Ramonita Sanderson RN  Outcome: Progressing  1/29/2025 1149 by Ramonita Sanderson RN  Outcome: Progressing  Goal: Patient-Specific Goal (Individualized)  1/29/2025 1149 by Ramonita Sanderson RN  Outcome: Progressing  1/29/2025 1149 by Ramonita Sanderson RN  Outcome: Progressing  Goal: Absence of Hospital-Acquired Illness or Injury  1/29/2025 1149 by Ramonita Sanderson RN  Outcome: Progressing  1/29/2025 1149 by Ramonita Sanderson RN  Outcome: Progressing  Goal: Optimal Comfort and Wellbeing  1/29/2025 1149 by Ramonita Sanderson RN  Outcome: Progressing  1/29/2025 1149 by Ramonita Sanderson RN  Outcome: Progressing  Goal: Readiness for Transition of Care  1/29/2025 1149 by Ramonita Sanderson RN  Outcome: Progressing  1/29/2025 1149 by Ramonita Sanderson RN  Outcome: Progressing     Problem: Diabetes Comorbidity  Goal: Blood Glucose Level Within Targeted Range  1/29/2025 1149 by Ramonita Sanderson RN  Outcome: Progressing  1/29/2025 1149 by Ramonita Sanderson RN  Outcome: Progressing     Problem: Skin Injury Risk Increased  Goal: Skin Health and Integrity  1/29/2025 1149 by Ramonita Sanderson RN  Outcome: Progressing  1/29/2025 1149 by Ramonita Sanderson RN  Outcome: Progressing     Problem: Confusion Acute  Goal: Optimal Cognitive Function  1/29/2025 1149 by Ramonita Sanderson RN  Outcome: Progressing  1/29/2025 1149 by Ramonita Sanderson RN  Outcome: Progressing     Problem: Wound  Goal: Optimal Coping  1/29/2025 1149 by Ramonita Sanderson RN  Outcome: Progressing  1/29/2025 1149 by Ramonita Sanderson RN  Outcome: Progressing  Goal: Optimal Functional Ability  1/29/2025 1149 by Ramonita Sanderson RN  Outcome: Progressing  1/29/2025 1149 by Ramonita Sanderson RN  Outcome: Progressing  Goal: Absence of Infection Signs and Symptoms  1/29/2025 1149 by Ramonita Sanderson, RN  Outcome: Progressing  1/29/2025 1149 by Ramonita Sanderson, RN  Outcome: Progressing  Goal: Improved Oral  Intake  1/29/2025 1149 by Ramonita Sanderson RN  Outcome: Progressing  1/29/2025 1149 by Ramonita Sanderson RN  Outcome: Progressing  Goal: Optimal Pain Control and Function  1/29/2025 1149 by Ramonita Sanderson RN  Outcome: Progressing  1/29/2025 1149 by Ramonita Sanderson RN  Outcome: Progressing  Goal: Skin Health and Integrity  1/29/2025 1149 by Ramonita Sanderson RN  Outcome: Progressing  1/29/2025 1149 by Ramonita Sanderson RN  Outcome: Progressing  Goal: Optimal Wound Healing  1/29/2025 1149 by Ramonita Sanderson RN  Outcome: Progressing  1/29/2025 1149 by Ramonita Sanderson RN  Outcome: Progressing     Problem: Infection  Goal: Absence of Infection Signs and Symptoms  1/29/2025 1149 by Ramonita Sanderson RN  Outcome: Progressing  1/29/2025 1149 by Ramonita Sanderson RN  Outcome: Progressing     Problem: Coping Ineffective  Goal: Effective Coping  1/29/2025 1149 by Ramonita Sanderson RN  Outcome: Progressing  1/29/2025 1149 by Ramonita Sanderson RN  Outcome: Progressing     Problem: Fall Injury Risk  Goal: Absence of Fall and Fall-Related Injury  1/29/2025 1149 by Ramonita Sanderson RN  Outcome: Progressing  1/29/2025 1149 by Ramonita Sanderson RN  Outcome: Progressing     Problem: Confusion Chronic  Goal: Optimal Cognitive Function  1/29/2025 1149 by Ramonita Sanderson RN  Outcome: Progressing  1/29/2025 1149 by Ramonita Sanderson RN  Outcome: Progressing     Problem: Palliative Care  Goal: Enhanced Quality of Life  Outcome: Progressing

## 2025-01-29 NOTE — PLAN OF CARE
Problem: Adult Inpatient Plan of Care  Goal: Plan of Care Review  Outcome: Progressing  Goal: Patient-Specific Goal (Individualized)  Outcome: Progressing  Goal: Absence of Hospital-Acquired Illness or Injury  Outcome: Progressing  Goal: Optimal Comfort and Wellbeing  Outcome: Progressing  Goal: Readiness for Transition of Care  Outcome: Progressing     Problem: Diabetes Comorbidity  Goal: Blood Glucose Level Within Targeted Range  Outcome: Progressing     Problem: Skin Injury Risk Increased  Goal: Skin Health and Integrity  Outcome: Progressing     Problem: Confusion Acute  Goal: Optimal Cognitive Function  Outcome: Progressing     Problem: Wound  Goal: Optimal Coping  Outcome: Progressing  Goal: Optimal Functional Ability  Outcome: Progressing  Goal: Absence of Infection Signs and Symptoms  Outcome: Progressing  Goal: Improved Oral Intake  Outcome: Progressing  Goal: Optimal Pain Control and Function  Outcome: Progressing  Goal: Skin Health and Integrity  Outcome: Progressing  Goal: Optimal Wound Healing  Outcome: Progressing     Problem: Infection  Goal: Absence of Infection Signs and Symptoms  Outcome: Progressing     Problem: Coping Ineffective  Goal: Effective Coping  Outcome: Progressing     Problem: Fall Injury Risk  Goal: Absence of Fall and Fall-Related Injury  Outcome: Progressing     Problem: Confusion Chronic  Goal: Optimal Cognitive Function  Outcome: Progressing

## 2025-01-29 NOTE — PROGRESS NOTES
[facility] Internal Medicine [facility]  Progress Note    Patient Name: Bhavna Lim  MRN: [id number]  Patient Class: IP- Inpatient   Admission Date: 12/26/2024  Length of Stay: 30 days  Attending Physician: Rimma Mohamud MD  Primary Care Provider: Suzi Green MD        Subjective     Principal Problem:Palliative care encounter        HPI:  Mrs. Bhavna Lim is a 79 year old F with a history of HTN and HLD who presents to the ED for weakness and fatigue. She has been having weight loss and poor po intake over the last few months. She has been seen by GI and evaluated with EGD and Colonoscopy as well as MRCP which showed cholelithiasis with gallbladder distention and intrahepatic and extrahepatic biliary dilation. No significant abnormalities on EGD and coloscopy. She was referred to surgery who recommended NM study and possible EUS with GI. She has been unable to tolerate any solid foods. She sometimes takes a little bit of soup and has tried protein shakes. She feels full immediately. She does endorse some pain in her epigastric region ever since having her EGD performed. She occasionally has vomiting. Denies regurgitation of food. She continues to have worsening weakness and falls at home. She had two falls yesterday due to weakness. She denies significant dizziness. She is unable to walk due to her weakness and sister is concerned this is related to her poor nutrition.      In the ED VSS. Labs notable for Potassium of 3.0 which was repleted in the ED. No focal signs concerning for stroke. She was admitted to medicine for further management.      Overview/Hospital Course:  While on the floor PT/OT were consulted and recommended SNF. Nutrition consulted. Discussed with GI outpatient workup and no indication for further inpatient workup. She continued to be altered while in the hospital. CT spine/head ordered for concern of fall showing ischemic changes and signficant stenosis  and narrowing. She developed worsening hand weakness and numbness/tinglign with abnormal coordiation. Some of this was prior to admission but weakness significantly worsened. MRI ordered and showed old infarcts. She continued to refuse food and did not have abdominal complaints just would not eat. Started on IVF due to hypoglycemia. Acute encephalopathy workup initiated and negative aside from low folate. UA ordered but not performed. At this point if nutrition becomes suboptimal will have to consider other means of nutrition. Son with concern of worsening memory and possible dementia wanting placement in [place]. Started on supplements for poor nutrition including thiamine and folate to see if this will help with mental status. Family had extensive discussion with provider care team and decided to pursue NH with hospice services. She has had intermitted problems with hypoglycemia and electrolyte disturbances.     Plan is NH with hospice services.      Interval History:     Thinks she is at home. Would like to dress herself.     Ok to refuse meds. Will discuss with family about stopping them.    Review of Systems  Objective:     Vital Signs (Most Recent):  Temp: 97.5 °F (36.4 °C) (01/28/25 2116)  Pulse: 106 (01/28/25 2116)  Resp: 18 (01/28/25 2116)  BP: 128/67 (01/28/25 2116)  SpO2: (!) 94 % (01/28/25 2116) Vital Signs (24h Range):  Temp:  [97.5 °F (36.4 °C)-98.2 °F (36.8 °C)] 97.5 °F (36.4 °C)  Pulse:  [] 106  Resp:  [17-18] 18  SpO2:  [93 %-100 %] 94 %  BP: ()/(59-75) 128/67     Weight: 54.9 kg (121 lb 0.5 oz)  Body mass index is 22.87 kg/m².    Intake/Output Summary (Last 24 hours) at 1/28/2025 2241  Last data filed at 1/28/2025 2122  Gross per 24 hour   Intake 1240 ml   Output 820 ml   Net 420 ml      Physical Exam    Gen: in NAD, appears stated age, propped up in bed.  Neuro: AAO to self. Able to move all limbs spontaneously. No gross CN defect.   HEENT: NTNC, MMM   CVS: RRR, no m/r/g; S1/S2  "auscultated with no S3 or S4; capillary refill < 2 sec  Resp: lungs CTAB, no w/r/r; no belabored breathing or accessory muscle use appreciated   Abd: BS+ in all 4 quadrants; NTND, soft to palpation; no organomegaly appreciated   Extrem: no UE or LE edema BL    Computed MELD 3.0 unavailable. One or more values for this score either were not found within the given timeframe or did not fit some other criterion.  Computed MELD-Na unavailable. One or more values for this score either were not found within the given timeframe or did not fit some other criterion.      Significant Labs:  CBC:  No results for input(s): "WBC", "HGB", "HCT", "PLT" in the last 48 hours.  CMP:  Recent Labs   Lab 01/27/25  0456   *   K 3.3*   *   CO2 27   GLU 73   BUN 8   CREATININE 0.5   CALCIUM 7.7*   PROT 5.4*   ALBUMIN 1.5*   BILITOT 0.9   ALKPHOS 493*   AST 42*   ALT 40   ANIONGAP 8     PTINR:  No results for input(s): "INR" in the last 48 hours.    Significant Procedures:   Dobutamine Stress Test with Color Flow: No results found for this or any previous visit.    None    Assessment and Plan     * Palliative care encounter  Plan is NH with Hospice as discussed above      Cognitive impairment  History of progressive confusion and forgetfulness prior to admission. CT head: Sequela of chronic microvascular ischemic change. Remote lacunar type infarct right thalamus;   -HIV/RPR negative. Folate low, repleting. B12 high. Thiamine low and repleting. 2/2 to dementia with poor nutritional intake; TSH wnl on 1/1/2025  - exhausted reversible causes of poor mentation and with imaging, appears to be age-related decline accelerated by some degree of vascular dementia. Likely some component of acute delirum due to hospital stay/UTI in setting of advanced dementia.     Plan:   - discussed pitfalls of G-tube placement. No plan for placement at this time.   - plan for discharge to Nursing Home with hospice services   -Haldol added PRN for " agitation. Discussed risk of death and worsening condition with pt's son and sister.     Debility  Patient with Acute debility due to age-related physical debility and other reduced mobility. The patient's latest AMPAC (Activity Measure for Post Acute Care) Score is listed below.    AM-PAC Score - How much help does the patient need for each activity listed  Basic Mobility Total Score: 7  Turning over in bed (including adjusting bedclothes, sheets and blankets)?: A lot  Sitting down on and standing up from a chair with arms (e.g., wheelchair, bedside commode, etc.): Unable  Moving from lying on back to sitting on the side of the bed?: Unable  Moving to and from a bed to a chair (including a wheelchair)?: Unable  Need to walk in hospital room?: Unable  Climbing 3-5 steps with a railing?: Unable    Plan  - Progressive mobility protocol initated  - PT/OT consulted  - Fall precautions in place  - Plan for NH with hospice services -pending insurance(Medicaid/Medicare)   - Palliative care following  - Pt is unable to be safely discharged to home at this time due to lack of continuous home care.          Unintentional weight loss  Nutrition consulted. Most recent weight and BMI monitored-     Measurements:  Wt Readings from Last 1 Encounters:   12/27/24 54.9 kg (121 lb 0.5 oz)   Body mass index is 22.87 kg/m².    RD consultation   Extensive outpatient evaluation recommended by GI and General surgery   Will add supplementation to meals; soft bite-sized per rec's  No plan for feeding tube at this time as family is able to convince pt to eat intermittently.       Please prioritize patient's comfort and preferences over strict nutritional goals at this time. Offer small, frequent meals, texture modifications, and respect the patient's desire to eat or not eat. Also ensure proper hydration with small sips of fluids periodically.      Comfort measures only status        Hypophosphatemia  Patient's most recent phosphorus results  "are listed below.   Recent Labs     01/26/25  1103   PHOS 2.7       Plan  - Will treat hypophosphatemia with oral replacement  - Patient's hypophosphatemia is stable      Hypomagnesemia  Patient has Abnormal Magnesium: hypomagnesemia. Will continue to monitor electrolytes closely. Will replace the affected electrolytes and repeat labs to be done after interventions completed. The patient's magnesium results have been reviewed and are listed below.           Anemia  Anemia is likely due to Iron deficiency. Most recent hemoglobin and hematocrit are listed below.  No results for input(s): "HGB", "HCT" in the last 72 hours.    Plan  - Monitor serial CBC: Daily  - Transfuse PRBC if patient becomes hemodynamically unstable, symptomatic or H/H drops below 7/21.  - Patient has not received any PRBC transfusions to date  - Patient's anemia is currently stable      Swelling of right upper extremity  CT UE with Nonspecific subcutaneous edema throughout the distal arm and forearm, possibly sterile or infectious in nature.  No fluid collection.  No osteomyelitis.     Continue to monitor.     Hyperbilirubinemia  - elevation in bilirubin with concomitant elevations in LFTs  - RUQ U/S significant for biliary sludge, mild/moderate intrahepatic/extrahepatic biliary ductla dilation. Unchanged compare to previous MRCP.         Pseudomonas urinary tract infection  - urinalysis concerning for UTI and found to have pseudomonas. Now s/p levaquin on 1/20.   - Blood cultures: NGTD       Hypoglycemia  -Intermittent hypoglycemia over hospital stay. Likely related to poor PO intake.   -previously needed amp of D50  -monitor BG closely  -Can resume D5NS if needed  -Encourage PO intake         Numbness and tingling in both hands  -noted by patient before admission with abnormal finger to nose and weakness   -CT head and spine reviewed. There is significant stenosis/narrowing of C6 and C7 which could be contributing to symptoms. Findings appear " chronic and not acute. MRI ordered and notable for cervical stenosis and lacunar infarcts. Limited visiblity due to movement   -CT head notable for remote lacunar infarcts, hx of stroke in 2005 currently on ASA   - PT/OT discontinued in light of impending NH with hospice initiation         Severe protein-calorie malnutrition  Nutrition consulted. Most recent weight and BMI monitored-     Measurements:  Wt Readings from Last 1 Encounters:   12/27/24 54.9 kg (121 lb 0.5 oz)   Body mass index is 22.87 kg/m².    Patient has been screened and assessed by RD.    Malnutrition Type:  Context: chronic illness  Level: severe    Malnutrition Characteristic Summary:  Weight Loss (Malnutrition): greater than 5% in 1 month  Energy Intake (Malnutrition): less than 75% for greater than or equal to 1 month  Subcutaneous Fat (Malnutrition): severe depletion  Muscle Mass (Malnutrition): severe depletion    Interventions/Recommendations (treatment strategy):  Please prioritize patient's comfort and preferences over strict nutritional goals at this time. Offer small, frequent meals, texture modifications, and respect the patient's desire to eat or not eat. Also ensure proper hydration with small sips of fluids periodically.    - encourage PO intake    Hypokalemia  Patient's most recent potassium results are listed below.   Recent Labs     01/25/25  0714 01/27/25  0456   K 3.2* 3.3*       Plan  - Replete potassium per protocol  - Monitor potassium Daily  - Patient's hypokalemia is stable  - replete    Diabetes mellitus  Sliding scale held, accuchecks for hypoglycemia     Patient's FSGs are controlled on current medication regimen.  Last A1c reviewed-   Lab Results   Component Value Date    HGBA1C 4.6 01/20/2025     Most recent fingerstick glucose reviewed-   Recent Labs   Lab 01/26/25  2117 01/27/25  0838 01/27/25  1157 01/27/25  1624   POCTGLUCOSE 77 88 82 75       Current correctional scale   hold on correction scale give A1C is  5.5  Antihyperglycemics (From admission, onward)      None          Hold Oral hypoglycemics while patient is in the hospital; holding all given low intake    Hyperlipidemia  - continue ASA and statin      Benign essential hypertension  Patient's blood pressure range in the last 24 hours was: BP  Min: 105/69  Max: 127/77.The patient's inpatient anti-hypertensive regimen is listed below:  Current Antihypertensives  lisinopriL tablet 10 mg, Daily, Oral  NIFEdipine 24 hr tablet 30 mg, Daily, Oral    Plan  - BP is controlled, no changes needed to their regimen  - will continue home regimen      VTE Risk Mitigation (From admission, onward)           Ordered     enoxaparin injection 40 mg  Daily         12/26/24 1700     IP VTE HIGH RISK PATIENT  Once         12/26/24 1547     Place sequential compression device  Until discontinued         12/26/24 1547                    Discharge Planning   RITA: 1/30/2025     Code Status: DNR   Medical Readiness for Discharge Date: 1/20/2025  Discharge Plan A: New Nursing Home placement - [facility] care facility (with hospice services)   Discharge Delays: (!) Patient and Family Barriers (Family has not decided on facility.)                    Rimma Mohamud MD  Department of Hospital Medicine   [facility] - Internal Medicine Telemetry

## 2025-01-30 LAB
POCT GLUCOSE: 123 MG/DL (ref 70–110)
POCT GLUCOSE: 73 MG/DL (ref 70–110)
POCT GLUCOSE: 76 MG/DL (ref 70–110)
POCT GLUCOSE: 86 MG/DL (ref 70–110)

## 2025-01-30 PROCEDURE — S4991 NICOTINE PATCH NONLEGEND: HCPCS | Performed by: STUDENT IN AN ORGANIZED HEALTH CARE EDUCATION/TRAINING PROGRAM

## 2025-01-30 PROCEDURE — 11000001 HC ACUTE MED/SURG PRIVATE ROOM

## 2025-01-30 PROCEDURE — 63600175 PHARM REV CODE 636 W HCPCS: Performed by: STUDENT IN AN ORGANIZED HEALTH CARE EDUCATION/TRAINING PROGRAM

## 2025-01-30 PROCEDURE — 25000003 PHARM REV CODE 250: Performed by: INTERNAL MEDICINE

## 2025-01-30 PROCEDURE — 25000003 PHARM REV CODE 250: Performed by: STUDENT IN AN ORGANIZED HEALTH CARE EDUCATION/TRAINING PROGRAM

## 2025-01-30 RX ADMIN — Medication 100 MG: at 09:01

## 2025-01-30 RX ADMIN — NICOTINE 1 PATCH: 7 PATCH, EXTENDED RELEASE TRANSDERMAL at 09:01

## 2025-01-30 RX ADMIN — Medication 1 TABLET: at 09:01

## 2025-01-30 RX ADMIN — FERROUS SULFATE TAB 325 MG (65 MG ELEMENTAL FE) 1 EACH: 325 (65 FE) TAB at 09:01

## 2025-01-30 RX ADMIN — ATORVASTATIN CALCIUM 40 MG: 40 TABLET, FILM COATED ORAL at 09:01

## 2025-01-30 RX ADMIN — MICONAZOLE NITRATE: 20 OINTMENT TOPICAL at 09:01

## 2025-01-30 RX ADMIN — ENOXAPARIN SODIUM 40 MG: 40 INJECTION SUBCUTANEOUS at 04:01

## 2025-01-30 RX ADMIN — Medication 1 TABLET: at 08:01

## 2025-01-30 RX ADMIN — ASPIRIN 81 MG: 81 TABLET, COATED ORAL at 09:01

## 2025-01-30 RX ADMIN — FOLIC ACID 1 MG: 1 TABLET ORAL at 09:01

## 2025-01-30 RX ADMIN — NIFEDIPINE 30 MG: 30 TABLET, FILM COATED, EXTENDED RELEASE ORAL at 09:01

## 2025-01-30 RX ADMIN — LISINOPRIL 10 MG: 10 TABLET ORAL at 09:01

## 2025-01-30 RX ADMIN — Medication 6 MG: at 08:01

## 2025-01-30 RX ADMIN — MICONAZOLE NITRATE: 20 OINTMENT TOPICAL at 08:01

## 2025-01-30 NOTE — SUBJECTIVE & OBJECTIVE
"Interval History: no acute events overnight. Patient still demented. Awaiting placement    Review of Systems   Unable to perform ROS: Dementia     Objective:     Vital Signs (Most Recent):  Temp: 97.8 °F (36.6 °C) (01/30/25 1145)  Pulse: 97 (01/30/25 0748)  Resp: 17 (01/30/25 1145)  BP: 134/83 (01/30/25 1145)  SpO2: (!) 92 % (01/30/25 1145) Vital Signs (24h Range):  Temp:  [97.8 °F (36.6 °C)-98.8 °F (37.1 °C)] 97.8 °F (36.6 °C)  Pulse:  [96-99] 97  Resp:  [17-18] 17  SpO2:  [92 %-94 %] 92 %  BP: (132-146)/(75-83) 134/83     Weight: 54.9 kg (121 lb 0.5 oz)  Body mass index is 22.87 kg/m².    Intake/Output Summary (Last 24 hours) at 1/30/2025 1445  Last data filed at 1/30/2025 0454  Gross per 24 hour   Intake 287 ml   Output 260 ml   Net 27 ml      Physical Exam  HENT:      Head: Normocephalic and atraumatic.   Cardiovascular:      Rate and Rhythm: Normal rate.      Pulses: Normal pulses.      Heart sounds: No murmur heard.  Pulmonary:      Effort: Pulmonary effort is normal. No respiratory distress.   Abdominal:      General: Abdomen is flat. Bowel sounds are normal. There is no distension.   Neurological:      Mental Status: She is alert. Mental status is at baseline.         Computed MELD 3.0 unavailable. One or more values for this score either were not found within the given timeframe or did not fit some other criterion.  Computed MELD-Na unavailable. One or more values for this score either were not found within the given timeframe or did not fit some other criterion.      Significant Labs:  CBC:  No results for input(s): "WBC", "HGB", "HCT", "PLT" in the last 48 hours.  CMP:  No results for input(s): "NA", "K", "CL", "CO2", "GLU", "BUN", "CREATININE", "CALCIUM", "PROT", "ALBUMIN", "BILITOT", "ALKPHOS", "AST", "ALT", "ANIONGAP", "EGFRNONAA" in the last 48 hours.    Invalid input(s): "ESTGFAFRICA"  PTINR:  No results for input(s): "INR" in the last 48 hours.    Significant Procedures:   Dobutamine Stress Test " with Color Flow: No results found for this or any previous visit.

## 2025-01-30 NOTE — ASSESSMENT & PLAN NOTE
Patient's most recent potassium results are listed below.   Recent Labs     01/27/25  0456   K 3.3*       Plan  - Replete potassium per protocol  - Monitor potassium Daily  - Patient's hypokalemia is stable  - replete

## 2025-01-30 NOTE — ASSESSMENT & PLAN NOTE
Patient's blood pressure range in the last 24 hours was: BP  Min: 128/76  Max: 146/82.The patient's inpatient anti-hypertensive regimen is listed below:  Current Antihypertensives  lisinopriL tablet 10 mg, Daily, Oral  NIFEdipine 24 hr tablet 30 mg, Daily, Oral    Plan  - BP is controlled, no changes needed to their regimen  - will continue home regimen

## 2025-01-30 NOTE — ASSESSMENT & PLAN NOTE
Plan is NH with Hospice  - patient medically stable on comfort measures. Pending placement at accepting facility

## 2025-01-30 NOTE — PROGRESS NOTES
[facility] Internal [facility] Medicine  Progress Note    Patient Name: Bhavna Lim  MRN: [id number]  Patient Class: IP- Inpatient   Admission Date: 12/26/2024  Length of Stay: 32 days  Attending Physician: Sukhwinder Levin DO  Primary Care Provider: Suzi Green MD        Subjective     Principal Problem:Palliative care encounter        HPI:  Mrs. Bhavna Lim is a 79 year old F with a history of HTN and HLD who presents to the ED for weakness and fatigue. She has been having weight loss and poor po intake over the last few months. She has been seen by GI and evaluated with EGD and Colonoscopy as well as MRCP which showed cholelithiasis with gallbladder distention and intrahepatic and extrahepatic biliary dilation. No significant abnormalities on EGD and coloscopy. She was referred to surgery who recommended NM study and possible EUS with GI. She has been unable to tolerate any solid foods. She sometimes takes a little bit of soup and has tried protein shakes. She feels full immediately. She does endorse some pain in her epigastric region ever since having her EGD performed. She occasionally has vomiting. Denies regurgitation of food. She continues to have worsening weakness and falls at home. She had two falls yesterday due to weakness. She denies significant dizziness. She is unable to walk due to her weakness and sister is concerned this is related to her poor nutrition.      In the ED VSS. Labs notable for Potassium of 3.0 which was repleted in the ED. No focal signs concerning for stroke. She was admitted to medicine for further management.      Overview/Hospital Course:  While on the floor PT/OT were consulted and recommended SNF. Nutrition consulted. Discussed with GI outpatient workup and no indication for further inpatient workup. She continued to be altered while in the hospital. CT spine/head ordered for concern of fall showing ischemic changes and signficant stenosis  and narrowing. She developed worsening hand weakness and numbness/tinglign with abnormal coordiation. Some of this was prior to admission but weakness significantly worsened. MRI ordered and showed old infarcts. She continued to refuse food and did not have abdominal complaints just would not eat. Started on IVF due to hypoglycemia. Acute encephalopathy workup initiated and negative aside from low folate. UA ordered but not performed. At this point if nutrition becomes suboptimal will have to consider other means of nutrition. Son with concern of worsening memory and possible dementia wanting placement in [place]. Started on supplements for poor nutrition including thiamine and folate to see if this will help with mental status. Family had extensive discussion with provider care team and decided to pursue NH with hospice services and sister (lives locally) would pursue mPOA. She has had intermitted problems with hypoglycemia and electrolyte disturbances.     Plan is NH with hospice services. She has been waiting for NH for > 1 week.       Interval History: no acute events overnight. Patient still demented. Awaiting placement    Review of Systems   Unable to perform ROS: Dementia     Objective:     Vital Signs (Most Recent):  Temp: 97.8 °F (36.6 °C) (01/30/25 1145)  Pulse: 97 (01/30/25 0748)  Resp: 17 (01/30/25 1145)  BP: 134/83 (01/30/25 1145)  SpO2: (!) 92 % (01/30/25 1145) Vital Signs (24h Range):  Temp:  [97.8 °F (36.6 °C)-98.8 °F (37.1 °C)] 97.8 °F (36.6 °C)  Pulse:  [96-99] 97  Resp:  [17-18] 17  SpO2:  [92 %-94 %] 92 %  BP: (132-146)/(75-83) 134/83     Weight: 54.9 kg (121 lb 0.5 oz)  Body mass index is 22.87 kg/m².    Intake/Output Summary (Last 24 hours) at 1/30/2025 1445  Last data filed at 1/30/2025 0457  Gross per 24 hour   Intake 287 ml   Output 260 ml   Net 27 ml      Physical Exam  HENT:      Head: Normocephalic and atraumatic.   Cardiovascular:      Rate and Rhythm: Normal rate.      Pulses: Normal  "pulses.      Heart sounds: No murmur heard.  Pulmonary:      Effort: Pulmonary effort is normal. No respiratory distress.   Abdominal:      General: Abdomen is flat. Bowel sounds are normal. There is no distension.   Neurological:      Mental Status: She is alert. Mental status is at baseline.         Computed MELD 3.0 unavailable. One or more values for this score either were not found within the given timeframe or did not fit some other criterion.  Computed MELD-Na unavailable. One or more values for this score either were not found within the given timeframe or did not fit some other criterion.      Significant Labs:  CBC:  No results for input(s): "WBC", "HGB", "HCT", "PLT" in the last 48 hours.  CMP:  No results for input(s): "NA", "K", "CL", "CO2", "GLU", "BUN", "CREATININE", "CALCIUM", "PROT", "ALBUMIN", "BILITOT", "ALKPHOS", "AST", "ALT", "ANIONGAP", "EGFRNONAA" in the last 48 hours.    Invalid input(s): "ESTGFAFR[place]"  PTINR:  No results for input(s): "INR" in the last 48 hours.    Significant Procedures:   Dobutamine Stress Test with Color Flow: No results found for this or any previous visit.        Assessment and Plan     * Palliative care encounter  Plan is NH with Hospice as discussed above      Comfort measures only status        Hypophosphatemia  Patient's most recent phosphorus results are listed below.   Recent Labs     01/28/25  0800   PHOS 2.8       Plan  No longer monitoring, outside patients goals of care      Hypomagnesemia  Patient has Abnormal Magnesium: hypomagnesemia. Will continue to monitor electrolytes closely. Will replace the affected electrolytes and repeat labs to be done after interventions completed. The patient's magnesium results have been reviewed and are listed below.           Anemia  Anemia is likely due to Iron deficiency. Most recent hemoglobin and hematocrit are listed below.  No results for input(s): "HGB", "HCT" in the last 72 hours.    Plan  - Monitor serial CBC: " Daily  - Transfuse PRBC if patient becomes hemodynamically unstable, symptomatic or H/H drops below 7/21.  - Patient has not received any PRBC transfusions to date  - Patient's anemia is currently stable      Swelling of right upper extremity  CT UE with Nonspecific subcutaneous edema throughout the distal arm and forearm, possibly sterile or infectious in nature.  No fluid collection.  No osteomyelitis.     Continue to monitor.     Hyperbilirubinemia  - elevation in bilirubin with concomitant elevations in LFTs  - RUQ U/S significant for biliary sludge, mild/moderate intrahepatic/extrahepatic biliary ductla dilation. Unchanged compare to previous MRCP.         Pseudomonas urinary tract infection  - urinalysis concerning for UTI and found to have pseudomonas. Now s/p levaquin on 1/20.   - Blood cultures: NGTD       Cognitive impairment  History of progressive confusion and forgetfulness prior to admission. CT head: Sequela of chronic microvascular ischemic change. Remote lacunar type infarct right thalamus;   -HIV/RPR negative. Folate low, repleting. B12 high. Thiamine low and repleting. 2/2 to dementia with poor nutritional intake; TSH wnl on 1/1/2025  - exhausted reversible causes of poor mentation and with imaging, appears to be age-related decline accelerated by some degree of vascular dementia. Likely some component of acute delirum due to hospital stay/UTI in setting of advanced dementia.     Plan:   - discussed pitfalls of G-tube placement. No plan for placement at this time.   - plan for discharge to Nursing Home with hospice services   -Haldol added PRN for agitation. Discussed risk of death and worsening condition with pt's son and sister.     Hypoglycemia  -Intermittent hypoglycemia over hospital stay. Likely related to poor PO intake.   -previously needed amp of D50  -monitor BG closely  -Can resume D5NS if needed  -Encourage PO intake         Numbness and tingling in both hands  -noted by patient before  "admission with abnormal finger to nose and weakness   -CT head and spine reviewed. There is significant stenosis/narrowing of C6 and C7 which could be contributing to symptoms. Findings appear chronic and not acute. MRI ordered and notable for cervical stenosis and lacunar infarcts. Limited visiblity due to movement   -CT head notable for remote lacunar infarcts, hx of stroke in 2005 currently on ASA   - PT/OT discontinued in light of impending NH with hospice initiation         Severe protein-calorie malnutrition  Nutrition consulted. Most recent weight and BMI monitored-     Measurements:  Wt Readings from Last 1 Encounters:   12/27/24 54.9 kg (121 lb 0.5 oz)   Body mass index is 22.87 kg/m².    Patient has been screened and assessed by RD.    Malnutrition Type:  Context: chronic illness  Level: severe    Malnutrition Characteristic Summary:  Weight Loss (Malnutrition): greater than 5% in 1 month  Energy Intake (Malnutrition): less than 75% for greater than or equal to 1 month  Subcutaneous Fat (Malnutrition): severe depletion  Muscle Mass (Malnutrition): severe depletion    Interventions/Recommendations (treatment strategy):  Please prioritize patient's comfort and preferences over strict nutritional goals at this time. Offer small, frequent meals, texture modifications, and respect the patient's desire to eat or not eat. Also ensure proper hydration with small sips of fluids periodically.    - encourage PO intake    Hypokalemia  Patient's most recent potassium results are listed below.   No results for input(s): "K" in the last 72 hours.    Plan  - no longer monitoring, outside patients goals of care    Debility  Patient with Acute debility due to age-related physical debility and other reduced mobility. The patient's latest AMPAC (Activity Measure for Post Acute Care) Score is listed below.    AM-PAC Score - How much help does the patient need for each activity listed  Basic Mobility Total Score: 7  Turning " over in bed (including adjusting bedclothes, sheets and blankets)?: A lot  Sitting down on and standing up from a chair with arms (e.g., wheelchair, bedside commode, etc.): Unable  Moving from lying on back to sitting on the side of the bed?: Unable  Moving to and from a bed to a chair (including a wheelchair)?: Unable  Need to walk in hospital room?: Unable  Climbing 3-5 steps with a railing?: Unable    Plan  - Progressive mobility protocol initated  - PT/OT consulted  - Fall precautions in place  - Plan for NH with hospice services -pending insurance(Medicaid/Medicare)   - Palliative care following  - Pt is unable to be safely discharged to home at this time due to lack of continuous home care.          Diabetes mellitus  Sliding scale held, accuchecks for hypoglycemia     Patient's FSGs are controlled on current medication regimen.  Last A1c reviewed-   Lab Results   Component Value Date    HGBA1C 4.6 01/20/2025     Most recent fingerstick glucose reviewed-   Recent Labs   Lab 01/29/25  0806 01/29/25  1056 01/29/25  1528 01/29/25  1943   POCTGLUCOSE 79 96 95 98       Current correctional scale   hold on correction scale give A1C is 5.5  Antihyperglycemics (From admission, onward)      None          Hold Oral hypoglycemics while patient is in the hospital; holding all given low intake    Unintentional weight loss  Nutrition consulted. Most recent weight and BMI monitored-     Measurements:  Wt Readings from Last 1 Encounters:   12/27/24 54.9 kg (121 lb 0.5 oz)   Body mass index is 22.87 kg/m².    RD consultation   Extensive outpatient evaluation recommended by GI and General surgery   Will add supplementation to meals; soft bite-sized per rec's  No plan for feeding tube at this time as family is able to convince pt to eat intermittently.       Please prioritize patient's comfort and preferences over strict nutritional goals at this time. Offer small, frequent meals, texture modifications, and respect the patient's  desire to eat or not eat. Also ensure proper hydration with small sips of fluids periodically.      Hyperlipidemia  - continue ASA and statin      Benign essential hypertension  Patient's blood pressure range in the last 24 hours was: BP  Min: 132/81  Max: 146/75.The patient's inpatient anti-hypertensive regimen is listed below:  Current Antihypertensives  lisinopriL tablet 10 mg, Daily, Oral  NIFEdipine 24 hr tablet 30 mg, Daily, Oral    Plan  - BP is controlled, no changes needed to their regimen  - will continue home regimen      VTE Risk Mitigation (From admission, onward)           Ordered     enoxaparin injection 40 mg  Daily         12/26/24 1700     IP VTE HIGH RISK PATIENT  Once         12/26/24 1547     Place sequential compression device  Until discontinued         12/26/24 1547                    Discharge Planning   RITA: 1/30/2025     Code Status: DNR   Medical Readiness for Discharge Date: 1/20/2025  Discharge Plan A: New Nursing Home placement - group home care facility (with hospice services)   Discharge Delays: (!) Patient and Family Barriers (Family has not decided on facility.)    Patient medically cleared for discharge. Pending accepting facility                Sukhwinder Levin DO  Department of Hospital Medicine   [facility] - Internal Medicine Telemetry

## 2025-01-30 NOTE — ASSESSMENT & PLAN NOTE
"Patient's most recent potassium results are listed below.   No results for input(s): "K" in the last 72 hours.    Plan  - no longer monitoring, outside patients goals of care  "

## 2025-01-30 NOTE — ASSESSMENT & PLAN NOTE
Patient's most recent phosphorus results are listed below.   Recent Labs     01/28/25  0800   PHOS 2.8       Plan  - Will treat hypophosphatemia with oral replacement  - Patient's hypophosphatemia is stable

## 2025-01-30 NOTE — ASSESSMENT & PLAN NOTE
Patient's blood pressure range in the last 24 hours was: BP  Min: 132/81  Max: 146/75.The patient's inpatient anti-hypertensive regimen is listed below:  Current Antihypertensives  lisinopriL tablet 10 mg, Daily, Oral  NIFEdipine 24 hr tablet 30 mg, Daily, Oral    Plan  - BP is controlled, no changes needed to their regimen  - will continue home regimen

## 2025-01-30 NOTE — SUBJECTIVE & OBJECTIVE
"Interval History:     Pleasantly demented.    Per CM, family has turned in all paperwork to NH and they are reviewing it.     Review of Systems  Objective:     Vital Signs (Most Recent):  Temp: 98.7 °F (37.1 °C) (01/29/25 1944)  Pulse: 96 (01/29/25 1944)  Resp: 18 (01/29/25 1944)  BP: (!) 146/82 (01/29/25 1944)  SpO2: (!) 94 % (01/29/25 1944) Vital Signs (24h Range):  Temp:  [97.6 °F (36.4 °C)-98.8 °F (37.1 °C)] 98.7 °F (37.1 °C)  Pulse:  [88-99] 96  Resp:  [18] 18  SpO2:  [94 %-98 %] 94 %  BP: (128-146)/(76-87) 146/82     Weight: 54.9 kg (121 lb 0.5 oz)  Body mass index is 22.87 kg/m².    Intake/Output Summary (Last 24 hours) at 1/29/2025 2219  Last data filed at 1/29/2025 1800  Gross per 24 hour   Intake 961 ml   Output 1260 ml   Net -299 ml      Physical Exam  Gen: in NAD, appears stated age, propped up in bed.  Neuro: AAO to self. Able to move all limbs spontaneously. No gross CN defect.   HEENT: NTNC, MMM   CVS: RRR, no m/r/g; S1/S2 auscultated with no S3 or S4; capillary refill < 2 sec  Resp: lungs CTAB, no w/r/r; no belabored breathing or accessory muscle use appreciated   Abd: BS+ in all 4 quadrants; NTND, soft to palpation; no organomegaly appreciated   Extrem: no UE or LE edema BL       Computed MELD 3.0 unavailable. One or more values for this score either were not found within the given timeframe or did not fit some other criterion.  Computed MELD-Na unavailable. One or more values for this score either were not found within the given timeframe or did not fit some other criterion.      Significant Labs:  CBC:  No results for input(s): "WBC", "HGB", "HCT", "PLT" in the last 48 hours.  CMP:  No results for input(s): "NA", "K", "CL", "CO2", "GLU", "BUN", "CREATININE", "CALCIUM", "PROT", "ALBUMIN", "BILITOT", "ALKPHOS", "AST", "ALT", "ANIONGAP", "EGFRNONAA" in the last 48 hours.    Invalid input(s): "ESTGFAFR[place]"  PTINR:  No results for input(s): "INR" in the last 48 hours.    Significant Procedures: "   Dobutamine Stress Test with Color Flow: No results found for this or any previous visit.    None

## 2025-01-30 NOTE — ASSESSMENT & PLAN NOTE
Patient's most recent phosphorus results are listed below.   Recent Labs     01/28/25  0800   PHOS 2.8       Plan  No longer monitoring, outside patients goals of care

## 2025-01-30 NOTE — PROGRESS NOTES
[facility] - Internal [facility]  Progress Note    Patient Name: Bhavna Lim  MRN: [id number]  Patient Class: IP- Inpatient   Admission Date: 12/26/2024  Length of Stay: 31 days  Attending Physician: Rimma Mohamud MD  Primary Care Provider: Suzi Green MD        Subjective     Principal Problem:Palliative care encounter        HPI:  Mrs. Bhavna Lim is a 79 year old F with a history of HTN and HLD who presents to the ED for weakness and fatigue. She has been having weight loss and poor po intake over the last few months. She has been seen by GI and evaluated with EGD and Colonoscopy as well as MRCP which showed cholelithiasis with gallbladder distention and intrahepatic and extrahepatic biliary dilation. No significant abnormalities on EGD and coloscopy. She was referred to surgery who recommended NM study and possible EUS with GI. She has been unable to tolerate any solid foods. She sometimes takes a little bit of soup and has tried protein shakes. She feels full immediately. She does endorse some pain in her epigastric region ever since having her EGD performed. She occasionally has vomiting. Denies regurgitation of food. She continues to have worsening weakness and falls at home. She had two falls yesterday due to weakness. She denies significant dizziness. She is unable to walk due to her weakness and sister is concerned this is related to her poor nutrition.      In the ED VSS. Labs notable for Potassium of 3.0 which was repleted in the ED. No focal signs concerning for stroke. She was admitted to medicine for further management.      Overview/Hospital Course:  While on the floor PT/OT were consulted and recommended SNF. Nutrition consulted. Discussed with GI outpatient workup and no indication for further inpatient workup. She continued to be altered while in the hospital. CT spine/head ordered for concern of fall showing ischemic changes and signficant stenosis  and narrowing. She developed worsening hand weakness and numbness/tinglign with abnormal coordiation. Some of this was prior to admission but weakness significantly worsened. MRI ordered and showed old infarcts. She continued to refuse food and did not have abdominal complaints just would not eat. Started on IVF due to hypoglycemia. Acute encephalopathy workup initiated and negative aside from low folate. UA ordered but not performed. At this point if nutrition becomes suboptimal will have to consider other means of nutrition. Son with concern of worsening memory and possible dementia wanting placement in [place]. Started on supplements for poor nutrition including thiamine and folate to see if this will help with mental status. Family had extensive discussion with provider care team and decided to pursue NH with hospice services and sister (lives locally) would pursue mPOA. She has had intermitted problems with hypoglycemia and electrolyte disturbances.     Plan is NH with hospice services. She has been waiting for NH for > 1 week.       Interval History:     Pleasantly demented.    Per CM, family has turned in all paperwork to NH and they are reviewing it.     Review of Systems  Objective:     Vital Signs (Most Recent):  Temp: 98.7 °F (37.1 °C) (01/29/25 1944)  Pulse: 96 (01/29/25 1944)  Resp: 18 (01/29/25 1944)  BP: (!) 146/82 (01/29/25 1944)  SpO2: (!) 94 % (01/29/25 1944) Vital Signs (24h Range):  Temp:  [97.6 °F (36.4 °C)-98.8 °F (37.1 °C)] 98.7 °F (37.1 °C)  Pulse:  [88-99] 96  Resp:  [18] 18  SpO2:  [94 %-98 %] 94 %  BP: (128-146)/(76-87) 146/82     Weight: 54.9 kg (121 lb 0.5 oz)  Body mass index is 22.87 kg/m².    Intake/Output Summary (Last 24 hours) at 1/29/2025 2219  Last data filed at 1/29/2025 1800  Gross per 24 hour   Intake 961 ml   Output 1260 ml   Net -299 ml      Physical Exam  Gen: in NAD, appears stated age, propped up in bed.  Neuro: AAO to self. Able to move all limbs spontaneously. No gross  "CN defect.   HEENT: NTNC, MMM   CVS: RRR, no m/r/g; S1/S2 auscultated with no S3 or S4; capillary refill < 2 sec  Resp: lungs CTAB, no w/r/r; no belabored breathing or accessory muscle use appreciated   Abd: BS+ in all 4 quadrants; NTND, soft to palpation; no organomegaly appreciated   Extrem: no UE or LE edema BL       Computed MELD 3.0 unavailable. One or more values for this score either were not found within the given timeframe or did not fit some other criterion.  Computed MELD-Na unavailable. One or more values for this score either were not found within the given timeframe or did not fit some other criterion.      Significant Labs:  CBC:  No results for input(s): "WBC", "HGB", "HCT", "PLT" in the last 48 hours.  CMP:  No results for input(s): "NA", "K", "CL", "CO2", "GLU", "BUN", "CREATININE", "CALCIUM", "PROT", "ALBUMIN", "BILITOT", "ALKPHOS", "AST", "ALT", "ANIONGAP", "EGFRNONAA" in the last 48 hours.    Invalid input(s): "ESTGFAFRICA"  PTINR:  No results for input(s): "INR" in the last 48 hours.    Significant Procedures:   Dobutamine Stress Test with Color Flow: No results found for this or any previous visit.    None    Assessment and Plan     * Palliative care encounter  Plan is NH with Hospice as discussed above      Cognitive impairment  History of progressive confusion and forgetfulness prior to admission. CT head: Sequela of chronic microvascular ischemic change. Remote lacunar type infarct right thalamus;   -HIV/RPR negative. Folate low, repleting. B12 high. Thiamine low and repleting. 2/2 to dementia with poor nutritional intake; TSH wnl on 1/1/2025  - exhausted reversible causes of poor mentation and with imaging, appears to be age-related decline accelerated by some degree of vascular dementia. Likely some component of acute delirum due to hospital stay/UTI in setting of advanced dementia.     Plan:   - discussed pitfalls of G-tube placement. No plan for placement at this time.   - plan for " discharge to Nursing Home with hospice services   -Haldol added PRN for agitation. Discussed risk of death and worsening condition with pt's son and sister.     Debility  Patient with Acute debility due to age-related physical debility and other reduced mobility. The patient's latest AMPAC (Activity Measure for Post Acute Care) Score is listed below.    AM-PAC Score - How much help does the patient need for each activity listed  Basic Mobility Total Score: 7  Turning over in bed (including adjusting bedclothes, sheets and blankets)?: A lot  Sitting down on and standing up from a chair with arms (e.g., wheelchair, bedside commode, etc.): Unable  Moving from lying on back to sitting on the side of the bed?: Unable  Moving to and from a bed to a chair (including a wheelchair)?: Unable  Need to walk in hospital room?: Unable  Climbing 3-5 steps with a railing?: Unable    Plan  - Progressive mobility protocol initated  - PT/OT consulted  - Fall precautions in place  - Plan for NH with hospice services -pending insurance(Medicaid/Medicare)   - Palliative care following  - Pt is unable to be safely discharged to home at this time due to lack of continuous home care.          Unintentional weight loss  Nutrition consulted. Most recent weight and BMI monitored-     Measurements:  Wt Readings from Last 1 Encounters:   12/27/24 54.9 kg (121 lb 0.5 oz)   Body mass index is 22.87 kg/m².    RD consultation   Extensive outpatient evaluation recommended by GI and General surgery   Will add supplementation to meals; soft bite-sized per rec's  No plan for feeding tube at this time as family is able to convince pt to eat intermittently.       Please prioritize patient's comfort and preferences over strict nutritional goals at this time. Offer small, frequent meals, texture modifications, and respect the patient's desire to eat or not eat. Also ensure proper hydration with small sips of fluids periodically.      Comfort measures only  "status        Hypophosphatemia  Patient's most recent phosphorus results are listed below.   Recent Labs     01/28/25  0800   PHOS 2.8       Plan  - Will treat hypophosphatemia with oral replacement  - Patient's hypophosphatemia is stable      Hypomagnesemia  Patient has Abnormal Magnesium: hypomagnesemia. Will continue to monitor electrolytes closely. Will replace the affected electrolytes and repeat labs to be done after interventions completed. The patient's magnesium results have been reviewed and are listed below.           Anemia  Anemia is likely due to Iron deficiency. Most recent hemoglobin and hematocrit are listed below.  No results for input(s): "HGB", "HCT" in the last 72 hours.    Plan  - Monitor serial CBC: Daily  - Transfuse PRBC if patient becomes hemodynamically unstable, symptomatic or H/H drops below 7/21.  - Patient has not received any PRBC transfusions to date  - Patient's anemia is currently stable      Swelling of right upper extremity  CT UE with Nonspecific subcutaneous edema throughout the distal arm and forearm, possibly sterile or infectious in nature.  No fluid collection.  No osteomyelitis.     Continue to monitor.     Hyperbilirubinemia  - elevation in bilirubin with concomitant elevations in LFTs  - RUQ U/S significant for biliary sludge, mild/moderate intrahepatic/extrahepatic biliary ductla dilation. Unchanged compare to previous MRCP.         Pseudomonas urinary tract infection  - urinalysis concerning for UTI and found to have pseudomonas. Now s/p levaquin on 1/20.   - Blood cultures: NGTD       Hypoglycemia  -Intermittent hypoglycemia over hospital stay. Likely related to poor PO intake.   -previously needed amp of D50  -monitor BG closely  -Can resume D5NS if needed  -Encourage PO intake         Numbness and tingling in both hands  -noted by patient before admission with abnormal finger to nose and weakness   -CT head and spine reviewed. There is significant stenosis/narrowing " of C6 and C7 which could be contributing to symptoms. Findings appear chronic and not acute. MRI ordered and notable for cervical stenosis and lacunar infarcts. Limited visiblity due to movement   -CT head notable for remote lacunar infarcts, hx of stroke in 2005 currently on ASA   - PT/OT discontinued in light of impending NH with hospice initiation         Severe protein-calorie malnutrition  Nutrition consulted. Most recent weight and BMI monitored-     Measurements:  Wt Readings from Last 1 Encounters:   12/27/24 54.9 kg (121 lb 0.5 oz)   Body mass index is 22.87 kg/m².    Patient has been screened and assessed by RD.    Malnutrition Type:  Context: chronic illness  Level: severe    Malnutrition Characteristic Summary:  Weight Loss (Malnutrition): greater than 5% in 1 month  Energy Intake (Malnutrition): less than 75% for greater than or equal to 1 month  Subcutaneous Fat (Malnutrition): severe depletion  Muscle Mass (Malnutrition): severe depletion    Interventions/Recommendations (treatment strategy):  Please prioritize patient's comfort and preferences over strict nutritional goals at this time. Offer small, frequent meals, texture modifications, and respect the patient's desire to eat or not eat. Also ensure proper hydration with small sips of fluids periodically.    - encourage PO intake    Hypokalemia  Patient's most recent potassium results are listed below.   Recent Labs     01/27/25  0456   K 3.3*       Plan  - Replete potassium per protocol  - Monitor potassium Daily  - Patient's hypokalemia is stable  - replete    Diabetes mellitus  Sliding scale held, accuchecks for hypoglycemia     Patient's FSGs are controlled on current medication regimen.  Last A1c reviewed-   Lab Results   Component Value Date    HGBA1C 4.6 01/20/2025     Most recent fingerstick glucose reviewed-   Recent Labs   Lab 01/29/25  0806 01/29/25  1056 01/29/25  1528 01/29/25  1943   POCTGLUCOSE 79 96 95 98       Current correctional  scale   hold on correction scale give A1C is 5.5  Antihyperglycemics (From admission, onward)      None          Hold Oral hypoglycemics while patient is in the hospital; holding all given low intake    Hyperlipidemia  - continue ASA and statin      Benign essential hypertension  Patient's blood pressure range in the last 24 hours was: BP  Min: 128/76  Max: 146/82.The patient's inpatient anti-hypertensive regimen is listed below:  Current Antihypertensives  lisinopriL tablet 10 mg, Daily, Oral  NIFEdipine 24 hr tablet 30 mg, Daily, Oral    Plan  - BP is controlled, no changes needed to their regimen  - will continue home regimen      VTE Risk Mitigation (From admission, onward)           Ordered     enoxaparin injection 40 mg  Daily         12/26/24 1700     IP VTE HIGH RISK PATIENT  Once         12/26/24 1547     Place sequential compression device  Until discontinued         12/26/24 1547                    Discharge Planning   RITA: 1/30/2025     Code Status: DNR   Medical Readiness for Discharge Date: 1/20/2025  Discharge Plan A: New Nursing Home placement - halfway care facility (with hospice services)   Discharge Delays: (!) Patient and Family Barriers (Family has not decided on facility.)                    Rimma Mohamud MD  Department of Hospital Medicine   [facility] - [facility] Medicine Telemetry

## 2025-01-30 NOTE — ASSESSMENT & PLAN NOTE
Sliding scale held, accuchecks for hypoglycemia     Patient's FSGs are controlled on current medication regimen.  Last A1c reviewed-   Lab Results   Component Value Date    HGBA1C 4.6 01/20/2025     Most recent fingerstick glucose reviewed-   Recent Labs   Lab 01/29/25  0806 01/29/25  1056 01/29/25  1528 01/29/25  1943   POCTGLUCOSE 79 96 95 98       Current correctional scale   hold on correction scale give A1C is 5.5  Antihyperglycemics (From admission, onward)    None        Hold Oral hypoglycemics while patient is in the hospital; holding all given low intake

## 2025-01-30 NOTE — PROGRESS NOTES
[facility] - Internal Medicine Telemetry    Wound Care     Patient Name:  Bhavna Lim  MRN:  [id number]  Date: 1/30/2025  Diagnosis: Palliative care encounter     History:  Past Medical History:   Diagnosis Date    Diabetes mellitus     Hyperlipidemia     Hypertension     Ingrown nail 01/09/2015    Bilateral great toe nail. No edema; tenderness or drainage.      Nail fungus 01/09/2015    To bilateral toenails X 10.      Obesity (BMI 30-39.9) 12/18/2024    Open wound of knee, leg, and ankle 02/09/2015     Social History     Socioeconomic History    Marital status: Single   Tobacco Use    Smoking status: Some Days     Types: Cigarettes   Substance and Sexual Activity    Alcohol use: Yes     Comment: rarely    Drug use: No     Social Drivers of Health     Financial Resource Strain: Low Risk  (12/27/2024)    Overall Financial Resource Strain (CARDIA)     Difficulty of Paying Living Expenses: Not hard at all   Recent Concern: Financial Resource Strain - Medium Risk (12/26/2024)    Overall Financial Resource Strain (CARDIA)     Difficulty of Paying Living Expenses: Somewhat hard   Food Insecurity: No Food Insecurity (12/27/2024)    Hunger Vital Sign     Worried About Running Out of Food in the Last Year: Never true     Ran Out of Food in the Last Year: Never true   Transportation Needs: No Transportation Needs (12/27/2024)    TRANSPORTATION NEEDS     Transportation : No   Physical Activity: Unknown (12/26/2024)    Exercise Vital Sign     Days of Exercise per Week: 0 days   Stress: Stress Concern Present (12/27/2024)    [employer] - Occupational Stress Questionnaire     Feeling of Stress : To some extent   Housing Stability: Low Risk  (12/27/2024)    Housing Stability Vital Sign     Unable to Pay for Housing in the Last Year: No     Homeless in the Last Year: No     Precautions:  Allergies as of 12/26/2024    (No Known Allergies)       [facility] Assessment Details / Treatment:    Patient seen for  wound care: New Consult   Chart reviewed for this encounter.   Labs:   WBC (K/uL)   Date Value   01/24/2025 14.22 (H)   01/21/2025 16.06 (H)     Glucose (mg/dL)   Date Value   01/27/2025 73   01/25/2025 74     Albumin (g/dL)   Date Value   01/27/2025 1.5 (L)   01/25/2025 1.4 (L)     Zac Score: 13  Chart review reveals pt has urethral catheter in place and is constipated.     Narrative:  Pt seen for WC follow-up and agreed to assessment  Chart reviewed for this encounter.   See Flow Sheet for additional documentation and media.    Pt laying in bed on waffle overlay, PCT at bedside for assessment, pt able to turn independently for assessment revealing pink scar tissue, no denuded or excoriated skin, no open wounds.   Continue with treatment as ordered.   Bilateral heels assessed revealing clean, intact, blanchable erythema, no induration, no bogginess, no open wounds noted.    RECOMMENDATIONS:  Bedside nurse assess for acute changes (purulence, increased redness/swelling, increased drainage, malodor, increased pain, pallor, necrosis) please contact physician on any acute changes.    Continue with orders. Bedside nursing to continue care & monitoring.  Bedside nursing to maintain pressure injury prevention interventions, (PIP).     Discussed POC with patient and primary nurse.   See EMR for orders & patient education.     Discussed nutrition and the role of protein in wound healing with the patient. Instructed patient to optimize protein for wound healing.     Recommendations made to primary team for above plan.    Thank you for the consult. Wound Care will sign off.  Please place a new consult if needed.   Head to toe assessment completed- no open wounds noted.      01/29/25 0900   WOCN Assessment   WOCN Total Time (mins) 30   Visit Date 01/29/25   Visit Time 0900   Consult Type New   WOCN Speciality Wound   Wound pressure;moisture;At risk for pressure Injury;skin tear   Intervention chart  review;assessed;applied;orders   Teaching complication        Wound 01/04/25 Incontinence associated dermatitis Perineum   Date First Assessed: 01/04/25   Present on Original Admission: No  Primary Wound Type: Incontinence associated dermatitis  Location: Perineum   Wound Image    Dressing Appearance Open to air   Drainage Amount None   Drainage Characteristics/Odor No odor   Appearance Intact;Pink   Tissue loss description Not applicable   Periwound Area Intact;Pink;Scar tissue   Care Cleansed with:;Soap and water   Dressing Applied;Other (comment)  ([facility])   Periwound Care Moisture barrier applied   Off Loading Other (see comments)  (waffle)   Dressing Change Due 01/29/25

## 2025-01-31 LAB
POCT GLUCOSE: 112 MG/DL (ref 70–110)
POCT GLUCOSE: 113 MG/DL (ref 70–110)
POCT GLUCOSE: 72 MG/DL (ref 70–110)

## 2025-01-31 PROCEDURE — S4991 NICOTINE PATCH NONLEGEND: HCPCS | Performed by: STUDENT IN AN ORGANIZED HEALTH CARE EDUCATION/TRAINING PROGRAM

## 2025-01-31 PROCEDURE — 11000001 HC ACUTE MED/SURG PRIVATE ROOM

## 2025-01-31 PROCEDURE — 25000003 PHARM REV CODE 250: Performed by: STUDENT IN AN ORGANIZED HEALTH CARE EDUCATION/TRAINING PROGRAM

## 2025-01-31 PROCEDURE — 25000003 PHARM REV CODE 250: Performed by: INTERNAL MEDICINE

## 2025-01-31 RX ADMIN — FERROUS SULFATE TAB 325 MG (65 MG ELEMENTAL FE) 1 EACH: 325 (65 FE) TAB at 11:01

## 2025-01-31 RX ADMIN — NICOTINE 1 PATCH: 7 PATCH, EXTENDED RELEASE TRANSDERMAL at 11:01

## 2025-01-31 RX ADMIN — NIFEDIPINE 30 MG: 30 TABLET, FILM COATED, EXTENDED RELEASE ORAL at 11:01

## 2025-01-31 RX ADMIN — ATORVASTATIN CALCIUM 40 MG: 40 TABLET, FILM COATED ORAL at 11:01

## 2025-01-31 RX ADMIN — Medication 6 MG: at 08:01

## 2025-01-31 RX ADMIN — LISINOPRIL 10 MG: 10 TABLET ORAL at 09:01

## 2025-01-31 RX ADMIN — Medication 100 MG: at 11:01

## 2025-01-31 RX ADMIN — ASPIRIN 81 MG: 81 TABLET, COATED ORAL at 11:01

## 2025-01-31 RX ADMIN — MICONAZOLE NITRATE: 20 OINTMENT TOPICAL at 08:01

## 2025-01-31 RX ADMIN — MICONAZOLE NITRATE: 20 OINTMENT TOPICAL at 09:01

## 2025-01-31 RX ADMIN — FOLIC ACID 1 MG: 1 TABLET ORAL at 11:01

## 2025-01-31 NOTE — PROGRESS NOTES
CM phoned [facility] business office and spoke to Mayelin.  (Unable to reached admissions this morning).  Per Mayelin, admissions was not available this morning, however she will follow up with them on progress of admission. CM awaiting call back.

## 2025-01-31 NOTE — PROGRESS NOTES
[facility] - Internal Medicine [facility] Medicine  Progress Note    Patient Name: Bhavna Lim  MRN: [id number]  Patient Class: IP- Inpatient   Admission Date: 12/26/2024  Length of Stay: 33 days  Attending Physician: Sukhwinder Levin DO  Primary Care Provider: Suzi Green MD        Subjective     Principal Problem:Palliative care encounter        HPI:  Mrs. Bhavna Lim is a 79 year old F with a history of HTN and HLD who presents to the ED for weakness and fatigue. She has been having weight loss and poor po intake over the last few months. She has been seen by GI and evaluated with EGD and Colonoscopy as well as MRCP which showed cholelithiasis with gallbladder distention and intrahepatic and extrahepatic biliary dilation. No significant abnormalities on EGD and coloscopy. She was referred to surgery who recommended NM study and possible EUS with GI. She has been unable to tolerate any solid foods. She sometimes takes a little bit of soup and has tried protein shakes. She feels full immediately. She does endorse some pain in her epigastric region ever since having her EGD performed. She occasionally has vomiting. Denies regurgitation of food. She continues to have worsening weakness and falls at home. She had two falls yesterday due to weakness. She denies significant dizziness. She is unable to walk due to her weakness and sister is concerned this is related to her poor nutrition.      In the ED VSS. Labs notable for Potassium of 3.0 which was repleted in the ED. No focal signs concerning for stroke. She was admitted to medicine for further management.      Overview/Hospital Course:  While on the floor PT/OT were consulted and recommended SNF. Nutrition consulted. Discussed with GI outpatient workup and no indication for further inpatient workup. She continued to be altered while in the hospital. CT spine/head ordered for concern of fall showing ischemic changes and signficant stenosis  and narrowing. She developed worsening hand weakness and numbness/tinglign with abnormal coordiation. Some of this was prior to admission but weakness significantly worsened. MRI ordered and showed old infarcts. She continued to refuse food and did not have abdominal complaints just would not eat. Started on IVF due to hypoglycemia. Acute encephalopathy workup initiated and negative aside from low folate. UA ordered but not performed. At this point if nutrition becomes suboptimal will have to consider other means of nutrition. Son with concern of worsening memory and possible dementia wanting placement in [place]. Started on supplements for poor nutrition including thiamine and folate to see if this will help with mental status. Family had extensive discussion with provider care team and decided to pursue NH with hospice services and sister (lives locally) would pursue mPOA. She has had intermitted problems with hypoglycemia and electrolyte disturbances.     Plan is NH with hospice services. She has been waiting for NH for > 1 week.     Patient remains stable for discharge with hospice services     Interval History: no acute distress, resting in bed. Pleasantly demented    Review of Systems   Unable to perform ROS: Dementia     Objective:     Vital Signs (Most Recent):  Temp: 97.6 °F (36.4 °C) (01/31/25 0757)  Pulse: 96 (01/31/25 0757)  Resp: 18 (01/31/25 0757)  BP: 122/73 (01/31/25 0757)  SpO2: 95 % (01/31/25 0757) Vital Signs (24h Range):  Temp:  [97.4 °F (36.3 °C)-97.8 °F (36.6 °C)] 97.6 °F (36.4 °C)  Pulse:  [] 96  Resp:  [17-18] 18  SpO2:  [92 %-95 %] 95 %  BP: (122-134)/(73-83) 122/73     Weight: 54.9 kg (121 lb 0.5 oz)  Body mass index is 22.87 kg/m².    Intake/Output Summary (Last 24 hours) at 1/31/2025 1019  Last data filed at 1/31/2025 0657  Gross per 24 hour   Intake --   Output 900 ml   Net -900 ml      Physical Exam  Cardiovascular:      Rate and Rhythm: Normal rate.      Pulses: Normal pulses.  "     Heart sounds: No murmur heard.  Pulmonary:      Effort: Pulmonary effort is normal. No respiratory distress.      Breath sounds: Normal breath sounds.   Abdominal:      General: Abdomen is flat.      Palpations: Abdomen is soft.   Skin:     General: Skin is warm and dry.   Neurological:      Mental Status: She is alert. Mental status is at baseline.         Computed MELD 3.0 unavailable. One or more values for this score either were not found within the given timeframe or did not fit some other criterion.  Computed MELD-Na unavailable. One or more values for this score either were not found within the given timeframe or did not fit some other criterion.      Significant Labs:  CBC:  No results for input(s): "WBC", "HGB", "HCT", "PLT" in the last 48 hours.  CMP:  No results for input(s): "NA", "K", "CL", "CO2", "GLU", "BUN", "CREATININE", "CALCIUM", "PROT", "ALBUMIN", "BILITOT", "ALKPHOS", "AST", "ALT", "ANIONGAP", "EGFRNONAA" in the last 48 hours.    Invalid input(s): "ESTGFAFRICA"  PTINR:  No results for input(s): "INR" in the last 48 hours.    Significant Procedures:   Dobutamine Stress Test with Color Flow: No results found for this or any previous visit.    None    Assessment and Plan     * Palliative care encounter  Plan is NH with Hospice  - patient medically stable on comfort measures. Pending placement at accepting facility    Comfort measures only status        Hypophosphatemia  Patient's most recent phosphorus results are listed below.   Recent Labs     01/28/25  0800   PHOS 2.8       Plan  No longer monitoring, outside patients goals of care      Hypomagnesemia  Patient has Abnormal Magnesium: hypomagnesemia. Will continue to monitor electrolytes closely. Will replace the affected electrolytes and repeat labs to be done after interventions completed. The patient's magnesium results have been reviewed and are listed below.           Anemia  Anemia is likely due to Iron deficiency. Most recent " "hemoglobin and hematocrit are listed below.  No results for input(s): "HGB", "HCT" in the last 72 hours.    Plan  - Monitor serial CBC: Daily  - Transfuse PRBC if patient becomes hemodynamically unstable, symptomatic or H/H drops below 7/21.  - Patient has not received any PRBC transfusions to date  - Patient's anemia is currently stable      Swelling of right upper extremity  CT UE with Nonspecific subcutaneous edema throughout the distal arm and forearm, possibly sterile or infectious in nature.  No fluid collection.  No osteomyelitis.     Continue to monitor.     Hyperbilirubinemia  - elevation in bilirubin with concomitant elevations in LFTs  - RUQ U/S significant for biliary sludge, mild/moderate intrahepatic/extrahepatic biliary ductla dilation. Unchanged compare to previous MRCP.         Pseudomonas urinary tract infection  - urinalysis concerning for UTI and found to have pseudomonas. Now s/p levaquin on 1/20.   - Blood cultures: NGTD       Cognitive impairment  History of progressive confusion and forgetfulness prior to admission. CT head: Sequela of chronic microvascular ischemic change. Remote lacunar type infarct right thalamus;   -HIV/RPR negative. Folate low, repleting. B12 high. Thiamine low and repleting. 2/2 to dementia with poor nutritional intake; TSH wnl on 1/1/2025  - exhausted reversible causes of poor mentation and with imaging, appears to be age-related decline accelerated by some degree of vascular dementia. Likely some component of acute delirum due to hospital stay/UTI in setting of advanced dementia.     Plan:   - discussed pitfalls of G-tube placement. No plan for placement at this time.   - plan for discharge to Nursing Home with hospice services   -Haldol added PRN for agitation. Discussed risk of death and worsening condition with pt's son and sister.     Hypoglycemia  -Intermittent hypoglycemia over hospital stay. Likely related to poor PO intake.   -previously needed amp of " "D50  -monitor BG closely  -Can resume D5NS if needed  -Encourage PO intake         Numbness and tingling in both hands  -noted by patient before admission with abnormal finger to nose and weakness   -CT head and spine reviewed. There is significant stenosis/narrowing of C6 and C7 which could be contributing to symptoms. Findings appear chronic and not acute. MRI ordered and notable for cervical stenosis and lacunar infarcts. Limited visiblity due to movement   -CT head notable for remote lacunar infarcts, hx of stroke in 2005 currently on ASA   - PT/OT discontinued in light of impending NH with hospice initiation         Severe protein-calorie malnutrition  Nutrition consulted. Most recent weight and BMI monitored-     Measurements:  Wt Readings from Last 1 Encounters:   12/27/24 54.9 kg (121 lb 0.5 oz)   Body mass index is 22.87 kg/m².    Patient has been screened and assessed by RD.    Malnutrition Type:  Context: chronic illness  Level: severe    Malnutrition Characteristic Summary:  Weight Loss (Malnutrition): greater than 5% in 1 month  Energy Intake (Malnutrition): less than 75% for greater than or equal to 1 month  Subcutaneous Fat (Malnutrition): severe depletion  Muscle Mass (Malnutrition): severe depletion    Interventions/Recommendations (treatment strategy):  Please prioritize patient's comfort and preferences over strict nutritional goals at this time. Offer small, frequent meals, texture modifications, and respect the patient's desire to eat or not eat. Also ensure proper hydration with small sips of fluids periodically.    - encourage PO intake    Hypokalemia  Patient's most recent potassium results are listed below.   No results for input(s): "K" in the last 72 hours.    Plan  - no longer monitoring, outside patients goals of care    Debility  Patient with Acute debility due to age-related physical debility and other reduced mobility. The patient's latest AMPAC (Activity Measure for Post Acute Care) " Score is listed below.    AM-PAC Score - How much help does the patient need for each activity listed  Basic Mobility Total Score: 7  Turning over in bed (including adjusting bedclothes, sheets and blankets)?: A lot  Sitting down on and standing up from a chair with arms (e.g., wheelchair, bedside commode, etc.): Unable  Moving from lying on back to sitting on the side of the bed?: Unable  Moving to and from a bed to a chair (including a wheelchair)?: Unable  Need to walk in hospital room?: Unable  Climbing 3-5 steps with a railing?: Unable    Plan  - Progressive mobility protocol initated  - PT/OT consulted  - Fall precautions in place  - Plan for NH with hospice services -pending insurance(Medicaid/Medicare)   - Palliative care following  - Pt is unable to be safely discharged to home at this time due to lack of continuous home care.          Diabetes mellitus  Sliding scale held, accuchecks for hypoglycemia     Patient's FSGs are controlled on current medication regimen.  Last A1c reviewed-   Lab Results   Component Value Date    HGBA1C 4.6 01/20/2025     Most recent fingerstick glucose reviewed-   Recent Labs   Lab 01/30/25  1146 01/30/25  1637 01/30/25  1933 01/31/25  0823   POCTGLUCOSE 86 76 123* 72       Current correctional scale   hold on correction scale give A1C is 5.5  Antihyperglycemics (From admission, onward)      None          Hold Oral hypoglycemics while patient is in the hospital; holding all given low intake  No need for further BG checks, has been stable for > 1 week. Is eating and drinking okay. Hold all DM medications    Unintentional weight loss  Nutrition consulted. Most recent weight and BMI monitored-     Measurements:  Wt Readings from Last 1 Encounters:   12/27/24 54.9 kg (121 lb 0.5 oz)   Body mass index is 22.87 kg/m².    RD consultation   Extensive outpatient evaluation recommended by GI and General surgery   Will add supplementation to meals; soft bite-sized per rec's  No plan for  feeding tube at this time as family is able to convince pt to eat intermittently.       Please prioritize patient's comfort and preferences over strict nutritional goals at this time. Offer small, frequent meals, texture modifications, and respect the patient's desire to eat or not eat. Also ensure proper hydration with small sips of fluids periodically.      Hyperlipidemia  - continue ASA and statin      Benign essential hypertension  Patient's blood pressure range in the last 24 hours was: BP  Min: 132/81  Max: 146/75.The patient's inpatient anti-hypertensive regimen is listed below:  Current Antihypertensives  lisinopriL tablet 10 mg, Daily, Oral  NIFEdipine 24 hr tablet 30 mg, Daily, Oral    Plan  - BP is controlled, no changes needed to their regimen  - will continue home regimen      VTE Risk Mitigation (From admission, onward)           Ordered     enoxaparin injection 40 mg  Daily         12/26/24 1700     IP VTE HIGH RISK PATIENT  Once         12/26/24 1547     Place sequential compression device  Until discontinued         12/26/24 1547                    Discharge Planning   RITA: 1/31/2025     Code Status: DNR   Medical Readiness for Discharge Date: 1/20/2025  Discharge Plan A: New Nursing Home placement - longterm care facility (with hospice services)   Discharge Delays: (!) Patient and Family Barriers (Family has not decided on facility.)        Patient medically stable for discharge        Sukhwinder Levin DO  Department of Hospital Medicine   [facility] - Internal Medicine Telemetry

## 2025-01-31 NOTE — ASSESSMENT & PLAN NOTE
Sliding scale held, accuchecks for hypoglycemia     Patient's FSGs are controlled on current medication regimen.  Last A1c reviewed-   Lab Results   Component Value Date    HGBA1C 4.6 01/20/2025     Most recent fingerstick glucose reviewed-   Recent Labs   Lab 01/30/25  1146 01/30/25  1637 01/30/25  1933 01/31/25  0823   POCTGLUCOSE 86 76 123* 72       Current correctional scale   hold on correction scale give A1C is 5.5  Antihyperglycemics (From admission, onward)      None          Hold Oral hypoglycemics while patient is in the hospital; holding all given low intake  No need for further BG checks, has been stable for > 1 week. Is eating and drinking okay. Hold all DM medications

## 2025-02-01 LAB
POCT GLUCOSE: 76 MG/DL (ref 70–110)
POCT GLUCOSE: 77 MG/DL (ref 70–110)
POCT GLUCOSE: 80 MG/DL (ref 70–110)

## 2025-02-01 PROCEDURE — 25000003 PHARM REV CODE 250: Performed by: STUDENT IN AN ORGANIZED HEALTH CARE EDUCATION/TRAINING PROGRAM

## 2025-02-01 PROCEDURE — S4991 NICOTINE PATCH NONLEGEND: HCPCS | Performed by: STUDENT IN AN ORGANIZED HEALTH CARE EDUCATION/TRAINING PROGRAM

## 2025-02-01 PROCEDURE — 25000003 PHARM REV CODE 250: Performed by: INTERNAL MEDICINE

## 2025-02-01 PROCEDURE — 11000001 HC ACUTE MED/SURG PRIVATE ROOM

## 2025-02-01 RX ADMIN — NICOTINE 1 PATCH: 7 PATCH, EXTENDED RELEASE TRANSDERMAL at 09:02

## 2025-02-01 RX ADMIN — NIFEDIPINE 30 MG: 30 TABLET, FILM COATED, EXTENDED RELEASE ORAL at 09:02

## 2025-02-01 RX ADMIN — MICONAZOLE NITRATE: 20 OINTMENT TOPICAL at 09:02

## 2025-02-01 RX ADMIN — LISINOPRIL 10 MG: 10 TABLET ORAL at 09:02

## 2025-02-01 RX ADMIN — Medication 6 MG: at 09:02

## 2025-02-01 RX ADMIN — ASPIRIN 81 MG: 81 TABLET, COATED ORAL at 09:02

## 2025-02-01 NOTE — SUBJECTIVE & OBJECTIVE
"Interval History: resting in bed, no complaints. Pleasantly demented    Review of Systems   Unable to perform ROS: Dementia (did answer some questions, uncelar accuracy of the answers)   Constitutional:  Negative for fatigue and fever.   Respiratory:  Negative for cough.    Cardiovascular:  Negative for chest pain.   Gastrointestinal:  Negative for abdominal pain.     Objective:     Vital Signs (Most Recent):  Temp: 98.5 °F (36.9 °C) (02/01/25 0752)  Pulse: 93 (02/01/25 0752)  Resp: 17 (02/01/25 0752)  BP: (!) 149/77 (02/01/25 0752)  SpO2: 97 % (02/01/25 0752) Vital Signs (24h Range):  Temp:  [97.3 °F (36.3 °C)-98.5 °F (36.9 °C)] 98.5 °F (36.9 °C)  Pulse:  [] 93  Resp:  [17-18] 17  SpO2:  [94 %-98 %] 97 %  BP: (112-149)/(72-88) 149/77     Weight: 54.9 kg (121 lb 0.5 oz)  Body mass index is 22.87 kg/m².    Intake/Output Summary (Last 24 hours) at 2/1/2025 1055  Last data filed at 2/1/2025 0527  Gross per 24 hour   Intake 480 ml   Output 300 ml   Net 180 ml      Physical Exam  Constitutional:       Appearance: Normal appearance.   HENT:      Head: Normocephalic.   Cardiovascular:      Rate and Rhythm: Normal rate.      Pulses: Normal pulses.      Heart sounds: Murmur heard.   Pulmonary:      Effort: Pulmonary effort is normal. No respiratory distress.   Abdominal:      General: Abdomen is flat. Bowel sounds are normal. There is no distension.   Skin:     General: Skin is warm and dry.   Neurological:      Mental Status: She is alert.         Computed MELD 3.0 unavailable. One or more values for this score either were not found within the given timeframe or did not fit some other criterion.  Computed MELD-Na unavailable. One or more values for this score either were not found within the given timeframe or did not fit some other criterion.      Significant Labs:  CBC:  No results for input(s): "WBC", "HGB", "HCT", "PLT" in the last 48 hours.  CMP:  No results for input(s): "NA", "K", "CL", "CO2", "GLU", "BUN", " ""CREATININE", "CALCIUM", "PROT", "ALBUMIN", "BILITOT", "ALKPHOS", "AST", "ALT", "ANIONGAP", "EGFRNONAA" in the last 48 hours.    Invalid input(s): "ESTGFAFRICA"  PTINR:  No results for input(s): "INR" in the last 48 hours.    Significant Procedures:   Dobutamine Stress Test with Color Flow: No results found for this or any previous visit.    None  "

## 2025-02-01 NOTE — PLAN OF CARE
01/31/25 1630   Discharge Reassessment   Assessment Type Discharge Planning Reassessment   Did the patient's condition or plan change since previous assessment? No   Discharge Plan discussed with: Sibling   Communicated RITA with patient/caregiver No   Discharge Plan A New Nursing Home placement - group home care facility  ([facility] with hospice service)   Post-Acute Status   Post-Acute Authorization Placement   Post-Acute Placement Status Pending post-acute provider review/more information requested  (Financials under review)   Discharge Delays (!) Post-Acute Set-up

## 2025-02-01 NOTE — PROGRESS NOTES
[facility] - Internal Medicine [facility] Medicine  Progress Note    Patient Name: Bhavna Lim  MRN: [id number]  Patient Class: IP- Inpatient   Admission Date: 12/26/2024  Length of Stay: 34 days  Attending Physician: Sukhwinder Levin DO  Primary Care Provider: Suzi Green MD        Subjective     Principal Problem:Palliative care encounter        HPI:  Mrs. Bhavna Lim is a 79 year old F with a history of HTN and HLD who presents to the ED for weakness and fatigue. She has been having weight loss and poor po intake over the last few months. She has been seen by GI and evaluated with EGD and Colonoscopy as well as MRCP which showed cholelithiasis with gallbladder distention and intrahepatic and extrahepatic biliary dilation. No significant abnormalities on EGD and coloscopy. She was referred to surgery who recommended NM study and possible EUS with GI. She has been unable to tolerate any solid foods. She sometimes takes a little bit of soup and has tried protein shakes. She feels full immediately. She does endorse some pain in her epigastric region ever since having her EGD performed. She occasionally has vomiting. Denies regurgitation of food. She continues to have worsening weakness and falls at home. She had two falls yesterday due to weakness. She denies significant dizziness. She is unable to walk due to her weakness and sister is concerned this is related to her poor nutrition.      In the ED VSS. Labs notable for Potassium of 3.0 which was repleted in the ED. No focal signs concerning for stroke. She was admitted to medicine for further management.      Overview/Hospital Course:  While on the floor PT/OT were consulted and recommended SNF. Nutrition consulted. Discussed with GI outpatient workup and no indication for further inpatient workup. She continued to be altered while in the hospital. CT spine/head ordered for concern of fall showing ischemic changes and signficant stenosis  and narrowing. She developed worsening hand weakness and numbness/tinglign with abnormal coordiation. Some of this was prior to admission but weakness significantly worsened. MRI ordered and showed old infarcts. She continued to refuse food and did not have abdominal complaints just would not eat. Started on IVF due to hypoglycemia. Acute encephalopathy workup initiated and negative aside from low folate. UA ordered but not performed. At this point if nutrition becomes suboptimal will have to consider other means of nutrition. Son with concern of worsening memory and possible dementia wanting placement in [place]. Started on supplements for poor nutrition including thiamine and folate to see if this will help with mental status. Family had extensive discussion with provider care team and decided to pursue NH with hospice services and sister (lives locally) would pursue mPOA. She has had intermitted problems with hypoglycemia and electrolyte disturbances.     Plan is NH with hospice services. She has been waiting for NH for > 1 week.     Patient remains stable for discharge with hospice services     Interval History: resting in bed, no complaints. Pleasantly demented    Review of Systems   Unable to perform ROS: Dementia (did answer some questions, uncelar accuracy of the answers)   Constitutional:  Negative for fatigue and fever.   Respiratory:  Negative for cough.    Cardiovascular:  Negative for chest pain.   Gastrointestinal:  Negative for abdominal pain.     Objective:     Vital Signs (Most Recent):  Temp: 98.5 °F (36.9 °C) (02/01/25 0752)  Pulse: 93 (02/01/25 0752)  Resp: 17 (02/01/25 0752)  BP: (!) 149/77 (02/01/25 0752)  SpO2: 97 % (02/01/25 0752) Vital Signs (24h Range):  Temp:  [97.3 °F (36.3 °C)-98.5 °F (36.9 °C)] 98.5 °F (36.9 °C)  Pulse:  [] 93  Resp:  [17-18] 17  SpO2:  [94 %-98 %] 97 %  BP: (112-149)/(72-88) 149/77     Weight: 54.9 kg (121 lb 0.5 oz)  Body mass index is 22.87  "kg/m².    Intake/Output Summary (Last 24 hours) at 2/1/2025 1055  Last data filed at 2/1/2025 0527  Gross per 24 hour   Intake 480 ml   Output 300 ml   Net 180 ml      Physical Exam  Constitutional:       Appearance: Normal appearance.   HENT:      Head: Normocephalic.   Cardiovascular:      Rate and Rhythm: Normal rate.      Pulses: Normal pulses.      Heart sounds: Murmur heard.   Pulmonary:      Effort: Pulmonary effort is normal. No respiratory distress.   Abdominal:      General: Abdomen is flat. Bowel sounds are normal. There is no distension.   Skin:     General: Skin is warm and dry.   Neurological:      Mental Status: She is alert.         Computed MELD 3.0 unavailable. One or more values for this score either were not found within the given timeframe or did not fit some other criterion.  Computed MELD-Na unavailable. One or more values for this score either were not found within the given timeframe or did not fit some other criterion.      Significant Labs:  CBC:  No results for input(s): "WBC", "HGB", "HCT", "PLT" in the last 48 hours.  CMP:  No results for input(s): "NA", "K", "CL", "CO2", "GLU", "BUN", "CREATININE", "CALCIUM", "PROT", "ALBUMIN", "BILITOT", "ALKPHOS", "AST", "ALT", "ANIONGAP", "EGFRNONAA" in the last 48 hours.    Invalid input(s): "ESTGFAFRICA"  PTINR:  No results for input(s): "INR" in the last 48 hours.    Significant Procedures:   Dobutamine Stress Test with Color Flow: No results found for this or any previous visit.    None    Assessment and Plan     * Palliative care encounter  Plan is NH with Hospice  - patient medically stable on comfort measures. Pending placement at accepting facility    Comfort measures only status        Hypophosphatemia  Patient's most recent phosphorus results are listed below.   Recent Labs     01/28/25  0800   PHOS 2.8       Plan  No longer monitoring, outside patients goals of care      Hypomagnesemia  Patient has Abnormal Magnesium: hypomagnesemia. Will " "continue to monitor electrolytes closely. Will replace the affected electrolytes and repeat labs to be done after interventions completed. The patient's magnesium results have been reviewed and are listed below.           Anemia  Anemia is likely due to Iron deficiency. Most recent hemoglobin and hematocrit are listed below.  No results for input(s): "HGB", "HCT" in the last 72 hours.    Plan  - Monitor serial CBC: Daily  - Transfuse PRBC if patient becomes hemodynamically unstable, symptomatic or H/H drops below 7/21.  - Patient has not received any PRBC transfusions to date  - Patient's anemia is currently stable      Swelling of right upper extremity  CT UE with Nonspecific subcutaneous edema throughout the distal arm and forearm, possibly sterile or infectious in nature.  No fluid collection.  No osteomyelitis.     Continue to monitor.     Hyperbilirubinemia  - elevation in bilirubin with concomitant elevations in LFTs  - RUQ U/S significant for biliary sludge, mild/moderate intrahepatic/extrahepatic biliary ductla dilation. Unchanged compare to previous MRCP.         Pseudomonas urinary tract infection  - urinalysis concerning for UTI and found to have pseudomonas. Now s/p levaquin on 1/20.   - Blood cultures: NGTD       Cognitive impairment  History of progressive confusion and forgetfulness prior to admission. CT head: Sequela of chronic microvascular ischemic change. Remote lacunar type infarct right thalamus;   -HIV/RPR negative. Folate low, repleting. B12 high. Thiamine low and repleting. 2/2 to dementia with poor nutritional intake; TSH wnl on 1/1/2025  - exhausted reversible causes of poor mentation and with imaging, appears to be age-related decline accelerated by some degree of vascular dementia. Likely some component of acute delirum due to hospital stay/UTI in setting of advanced dementia.     Plan:   - discussed pitfalls of G-tube placement. No plan for placement at this time.   - plan for discharge " to Nursing Home with hospice services   -Haldol added PRN for agitation. Discussed risk of death and worsening condition with pt's son and sister.     Hypoglycemia  -Intermittent hypoglycemia over hospital stay. Likely related to poor PO intake.   -previously needed amp of D50  -monitor BG closely  -Can resume D5NS if needed  -Encourage PO intake         Numbness and tingling in both hands  -noted by patient before admission with abnormal finger to nose and weakness   -CT head and spine reviewed. There is significant stenosis/narrowing of C6 and C7 which could be contributing to symptoms. Findings appear chronic and not acute. MRI ordered and notable for cervical stenosis and lacunar infarcts. Limited visiblity due to movement   -CT head notable for remote lacunar infarcts, hx of stroke in 2005 currently on ASA   - PT/OT discontinued in light of impending NH with hospice initiation         Severe protein-calorie malnutrition  Nutrition consulted. Most recent weight and BMI monitored-     Measurements:  Wt Readings from Last 1 Encounters:   12/27/24 54.9 kg (121 lb 0.5 oz)   Body mass index is 22.87 kg/m².    Patient has been screened and assessed by RD.    Malnutrition Type:  Context: chronic illness  Level: severe    Malnutrition Characteristic Summary:  Weight Loss (Malnutrition): greater than 5% in 1 month  Energy Intake (Malnutrition): less than 75% for greater than or equal to 1 month  Subcutaneous Fat (Malnutrition): severe depletion  Muscle Mass (Malnutrition): severe depletion    Interventions/Recommendations (treatment strategy):  Please prioritize patient's comfort and preferences over strict nutritional goals at this time. Offer small, frequent meals, texture modifications, and respect the patient's desire to eat or not eat. Also ensure proper hydration with small sips of fluids periodically.    - encourage PO intake    Hypokalemia  Patient's most recent potassium results are listed below.   No results  "for input(s): "K" in the last 72 hours.    Plan  - no longer monitoring, outside patients goals of care    Debility  Patient with Acute debility due to age-related physical debility and other reduced mobility. The patient's latest AMPAC (Activity Measure for Post Acute Care) Score is listed below.    AM-PAC Score - How much help does the patient need for each activity listed  Basic Mobility Total Score: 7  Turning over in bed (including adjusting bedclothes, sheets and blankets)?: A lot  Sitting down on and standing up from a chair with arms (e.g., wheelchair, bedside commode, etc.): Unable  Moving from lying on back to sitting on the side of the bed?: Unable  Moving to and from a bed to a chair (including a wheelchair)?: Unable  Need to walk in hospital room?: Unable  Climbing 3-5 steps with a railing?: Unable    Plan  - Progressive mobility protocol initated  - PT/OT consulted  - Fall precautions in place  - Plan for NH with hospice services -pending insurance(Medicaid/Medicare)   - Palliative care following  - Pt is unable to be safely discharged to home at this time due to lack of continuous home care.          Diabetes mellitus  Sliding scale held, accuchecks for hypoglycemia     Patient's FSGs are controlled on current medication regimen.  Last A1c reviewed-   Lab Results   Component Value Date    HGBA1C 4.6 01/20/2025     Most recent fingerstick glucose reviewed-   Recent Labs   Lab 01/30/25  1146 01/30/25  1637 01/30/25  1933 01/31/25  0823   POCTGLUCOSE 86 76 123* 72       Current correctional scale   hold on correction scale give A1C is 5.5  Antihyperglycemics (From admission, onward)      None          Hold Oral hypoglycemics while patient is in the hospital; holding all given low intake  No need for further BG checks, has been stable for > 1 week. Is eating and drinking okay. Hold all DM medications    Unintentional weight loss  Nutrition consulted. Most recent weight and BMI monitored- "     Measurements:  Wt Readings from Last 1 Encounters:   12/27/24 54.9 kg (121 lb 0.5 oz)   Body mass index is 22.87 kg/m².    RD consultation   Extensive outpatient evaluation recommended by GI and General surgery   Will add supplementation to meals; soft bite-sized per rec's  No plan for feeding tube at this time as family is able to convince pt to eat intermittently.       Please prioritize patient's comfort and preferences over strict nutritional goals at this time. Offer small, frequent meals, texture modifications, and respect the patient's desire to eat or not eat. Also ensure proper hydration with small sips of fluids periodically.      Hyperlipidemia  - continue ASA and statin      Benign essential hypertension  Patient's blood pressure range in the last 24 hours was: BP  Min: 132/81  Max: 146/75.The patient's inpatient anti-hypertensive regimen is listed below:  Current Antihypertensives  lisinopriL tablet 10 mg, Daily, Oral  NIFEdipine 24 hr tablet 30 mg, Daily, Oral    Plan  - BP is controlled, no changes needed to their regimen  - will continue home regimen      VTE Risk Mitigation (From admission, onward)           Ordered     IP VTE HIGH RISK PATIENT  Once         12/26/24 1547                    Discharge Planning   RITA: 2/5/2025     Code Status: DNR   Medical Readiness for Discharge Date: 1/20/2025  Discharge Plan A: New Nursing Home placement [facility] ([place] with hospice service)   Discharge Delays: (!) Post-Acute Set-up              Sukhwinder Levin DO  Department of Hospital Medicine   [facility] Telemetry

## 2025-02-02 LAB
POCT GLUCOSE: 103 MG/DL (ref 70–110)
POCT GLUCOSE: 58 MG/DL (ref 70–110)
POCT GLUCOSE: 64 MG/DL (ref 70–110)

## 2025-02-02 PROCEDURE — 25000003 PHARM REV CODE 250: Performed by: STUDENT IN AN ORGANIZED HEALTH CARE EDUCATION/TRAINING PROGRAM

## 2025-02-02 PROCEDURE — 11000001 HC ACUTE MED/SURG PRIVATE ROOM

## 2025-02-02 PROCEDURE — S4991 NICOTINE PATCH NONLEGEND: HCPCS | Performed by: STUDENT IN AN ORGANIZED HEALTH CARE EDUCATION/TRAINING PROGRAM

## 2025-02-02 PROCEDURE — 63600175 PHARM REV CODE 636 W HCPCS: Mod: JZ,TB | Performed by: STUDENT IN AN ORGANIZED HEALTH CARE EDUCATION/TRAINING PROGRAM

## 2025-02-02 PROCEDURE — 25000003 PHARM REV CODE 250: Performed by: INTERNAL MEDICINE

## 2025-02-02 RX ADMIN — LISINOPRIL 10 MG: 10 TABLET ORAL at 09:02

## 2025-02-02 RX ADMIN — NICOTINE 1 PATCH: 7 PATCH, EXTENDED RELEASE TRANSDERMAL at 09:02

## 2025-02-02 RX ADMIN — MICONAZOLE NITRATE: 20 OINTMENT TOPICAL at 09:02

## 2025-02-02 RX ADMIN — NIFEDIPINE 30 MG: 30 TABLET, FILM COATED, EXTENDED RELEASE ORAL at 09:02

## 2025-02-02 RX ADMIN — GLUCAGON 1 MG: 1 INJECTION, POWDER, LYOPHILIZED, FOR SOLUTION INTRAMUSCULAR; INTRAVENOUS at 03:02

## 2025-02-02 RX ADMIN — ASPIRIN 81 MG: 81 TABLET, COATED ORAL at 09:02

## 2025-02-02 RX ADMIN — Medication 6 MG: at 09:02

## 2025-02-02 NOTE — SUBJECTIVE & OBJECTIVE
"Interval History: pleasantly demented. No distress or complaints on exam.    Review of Systems   Unable to perform ROS: Dementia     Objective:     Vital Signs (Most Recent):  Temp: 97.5 °F (36.4 °C) (02/02/25 0754)  Pulse: 88 (02/02/25 0754)  Resp: 17 (02/02/25 0754)  BP: (!) 145/88 (02/02/25 0754)  SpO2: 97 % (02/02/25 0754) Vital Signs (24h Range):  Temp:  [97.5 °F (36.4 °C)-98.6 °F (37 °C)] 97.5 °F (36.4 °C)  Pulse:  [81-99] 88  Resp:  [17-18] 17  SpO2:  [93 %-100 %] 97 %  BP: (117-145)/(69-88) 145/88     Weight: 54.9 kg (121 lb 0.5 oz)  Body mass index is 22.87 kg/m².    Intake/Output Summary (Last 24 hours) at 2/2/2025 1027  Last data filed at 2/2/2025 0654  Gross per 24 hour   Intake --   Output 800 ml   Net -800 ml      Physical Exam  Constitutional:       Appearance: Normal appearance.   HENT:      Head: Normocephalic.   Cardiovascular:      Rate and Rhythm: Normal rate.      Pulses: Normal pulses.   Pulmonary:      Effort: Pulmonary effort is normal. No respiratory distress.   Abdominal:      General: Abdomen is flat. Bowel sounds are normal. There is no distension.   Neurological:      General: No focal deficit present.      Mental Status: She is alert. Mental status is at baseline. She is disoriented.         Computed MELD 3.0 unavailable. One or more values for this score either were not found within the given timeframe or did not fit some other criterion.  Computed MELD-Na unavailable. One or more values for this score either were not found within the given timeframe or did not fit some other criterion.      Significant Labs:  CBC:  No results for input(s): "WBC", "HGB", "HCT", "PLT" in the last 48 hours.  CMP:  No results for input(s): "NA", "K", "CL", "CO2", "GLU", "BUN", "CREATININE", "CALCIUM", "PROT", "ALBUMIN", "BILITOT", "ALKPHOS", "AST", "ALT", "ANIONGAP", "EGFRNONAA" in the last 48 hours.    Invalid input(s): "ESTGFAFRICA"  PTINR:  No results for input(s): "INR" in the last 48 " hours.    Significant Procedures:   Dobutamine Stress Test with Color Flow: No results found for this or any previous visit.    None

## 2025-02-02 NOTE — PLAN OF CARE
Problem: Adult Inpatient Plan of Care  Goal: Plan of Care Review  Outcome: Progressing  Goal: Patient-Specific Goal (Individualized)  Outcome: Progressing  Goal: Absence of Hospital-Acquired Illness or Injury  Outcome: Progressing  Goal: Optimal Comfort and Wellbeing  Outcome: Progressing  Goal: Readiness for Transition of Care  Outcome: Progressing     Problem: Diabetes Comorbidity  Goal: Blood Glucose Level Within Targeted Range  Outcome: Progressing     Problem: Skin Injury Risk Increased  Goal: Skin Health and Integrity  Outcome: Progressing     Problem: Confusion Acute  Goal: Optimal Cognitive Function  Outcome: Progressing     Problem: Wound  Goal: Optimal Coping  Outcome: Progressing  Goal: Optimal Functional Ability  Outcome: Progressing  Goal: Absence of Infection Signs and Symptoms  Outcome: Progressing  Goal: Improved Oral Intake  Outcome: Progressing  Goal: Optimal Pain Control and Function  Outcome: Progressing  Goal: Skin Health and Integrity  Outcome: Progressing  Goal: Optimal Wound Healing  Outcome: Progressing     Problem: Infection  Goal: Absence of Infection Signs and Symptoms  Outcome: Progressing     Problem: Coping Ineffective  Goal: Effective Coping  Outcome: Progressing     Problem: Fall Injury Risk  Goal: Absence of Fall and Fall-Related Injury  Outcome: Progressing     Problem: Confusion Chronic  Goal: Optimal Cognitive Function  Outcome: Progressing     Problem: Palliative Care  Goal: Enhanced Quality of Life  Outcome: Progressing

## 2025-02-02 NOTE — PROGRESS NOTES
[facility] - Internal Medicine [facility] Medicine  Progress Note    Patient Name: Bhavna Lim  MRN: [id number]  Patient Class: IP- Inpatient   Admission Date: 12/26/2024  Length of Stay: 35 days  Attending Physician: Sukhwinder Levin DO  Primary Care Provider: Suzi Green MD        Subjective     Principal Problem:Palliative care encounter        HPI:  Mrs. Bhavna Lim is a 79 year old F with a history of HTN and HLD who presents to the ED for weakness and fatigue. She has been having weight loss and poor po intake over the last few months. She has been seen by GI and evaluated with EGD and Colonoscopy as well as MRCP which showed cholelithiasis with gallbladder distention and intrahepatic and extrahepatic biliary dilation. No significant abnormalities on EGD and coloscopy. She was referred to surgery who recommended NM study and possible EUS with GI. She has been unable to tolerate any solid foods. She sometimes takes a little bit of soup and has tried protein shakes. She feels full immediately. She does endorse some pain in her epigastric region ever since having her EGD performed. She occasionally has vomiting. Denies regurgitation of food. She continues to have worsening weakness and falls at home. She had two falls yesterday due to weakness. She denies significant dizziness. She is unable to walk due to her weakness and sister is concerned this is related to her poor nutrition.      In the ED VSS. Labs notable for Potassium of 3.0 which was repleted in the ED. No focal signs concerning for stroke. She was admitted to medicine for further management.      Overview/Hospital Course:  While on the floor PT/OT were consulted and recommended SNF. Nutrition consulted. Discussed with GI outpatient workup and no indication for further inpatient workup. She continued to be altered while in the hospital. CT spine/head ordered for concern of fall showing ischemic changes and signficant stenosis  and narrowing. She developed worsening hand weakness and numbness/tinglign with abnormal coordiation. Some of this was prior to admission but weakness significantly worsened. MRI ordered and showed old infarcts. She continued to refuse food and did not have abdominal complaints just would not eat. Started on IVF due to hypoglycemia. Acute encephalopathy workup initiated and negative aside from low folate. UA ordered but not performed. At this point if nutrition becomes suboptimal will have to consider other means of nutrition. Son with concern of worsening memory and possible dementia wanting placement in [place]. Started on supplements for poor nutrition including thiamine and folate to see if this will help with mental status. Family had extensive discussion with provider care team and decided to pursue NH with hospice services and sister (lives locally) would pursue mPOA. She has had intermitted problems with hypoglycemia and electrolyte disturbances.     Plan is NH with hospice services. She has been waiting for NH for > 1 week.     Patient remains stable for discharge with hospice services     Interval History: pleasantly demented. No distress or complaints on exam.    Review of Systems   Unable to perform ROS: Dementia     Objective:     Vital Signs (Most Recent):  Temp: 97.5 °F (36.4 °C) (02/02/25 0754)  Pulse: 88 (02/02/25 0754)  Resp: 17 (02/02/25 0754)  BP: (!) 145/88 (02/02/25 0754)  SpO2: 97 % (02/02/25 0754) Vital Signs (24h Range):  Temp:  [97.5 °F (36.4 °C)-98.6 °F (37 °C)] 97.5 °F (36.4 °C)  Pulse:  [81-99] 88  Resp:  [17-18] 17  SpO2:  [93 %-100 %] 97 %  BP: (117-145)/(69-88) 145/88     Weight: 54.9 kg (121 lb 0.5 oz)  Body mass index is 22.87 kg/m².    Intake/Output Summary (Last 24 hours) at 2/2/2025 1027  Last data filed at 2/2/2025 0654  Gross per 24 hour   Intake --   Output 800 ml   Net -800 ml      Physical Exam  Constitutional:       Appearance: Normal appearance.   HENT:      Head:  "Normocephalic.   Cardiovascular:      Rate and Rhythm: Normal rate.      Pulses: Normal pulses.   Pulmonary:      Effort: Pulmonary effort is normal. No respiratory distress.   Abdominal:      General: Abdomen is flat. Bowel sounds are normal. There is no distension.   Neurological:      General: No focal deficit present.      Mental Status: She is alert. Mental status is at baseline. She is disoriented.         Computed MELD 3.0 unavailable. One or more values for this score either were not found within the given timeframe or did not fit some other criterion.  Computed MELD-Na unavailable. One or more values for this score either were not found within the given timeframe or did not fit some other criterion.      Significant Labs:  CBC:  No results for input(s): "WBC", "HGB", "HCT", "PLT" in the last 48 hours.  CMP:  No results for input(s): "NA", "K", "CL", "CO2", "GLU", "BUN", "CREATININE", "CALCIUM", "PROT", "ALBUMIN", "BILITOT", "ALKPHOS", "AST", "ALT", "ANIONGAP", "EGFRNONAA" in the last 48 hours.    Invalid input(s): "ESTGFAFRICA"  PTINR:  No results for input(s): "INR" in the last 48 hours.    Significant Procedures:   Dobutamine Stress Test with Color Flow: No results found for this or any previous visit.    None    Assessment and Plan     * Palliative care encounter  Plan is NH with Hospice  - patient medically stable on comfort measures. Pending placement at accepting facility    Comfort measures only status        Hypophosphatemia  Patient's most recent phosphorus results are listed below.   Recent Labs     01/28/25  0800   PHOS 2.8       Plan  No longer monitoring, outside patients goals of care      Hypomagnesemia  Patient has Abnormal Magnesium: hypomagnesemia. Will continue to monitor electrolytes closely. Will replace the affected electrolytes and repeat labs to be done after interventions completed. The patient's magnesium results have been reviewed and are listed below.           Anemia  Anemia is " "likely due to Iron deficiency. Most recent hemoglobin and hematocrit are listed below.  No results for input(s): "HGB", "HCT" in the last 72 hours.    Plan  - Monitor serial CBC: Daily  - Transfuse PRBC if patient becomes hemodynamically unstable, symptomatic or H/H drops below 7/21.  - Patient has not received any PRBC transfusions to date  - Patient's anemia is currently stable      Swelling of right upper extremity  CT UE with Nonspecific subcutaneous edema throughout the distal arm and forearm, possibly sterile or infectious in nature.  No fluid collection.  No osteomyelitis.     Continue to monitor.     Hyperbilirubinemia  - elevation in bilirubin with concomitant elevations in LFTs  - RUQ U/S significant for biliary sludge, mild/moderate intrahepatic/extrahepatic biliary ductla dilation. Unchanged compare to previous MRCP.         Pseudomonas urinary tract infection  - urinalysis concerning for UTI and found to have pseudomonas. Now s/p levaquin on 1/20.   - Blood cultures: NGTD       Cognitive impairment  History of progressive confusion and forgetfulness prior to admission. CT head: Sequela of chronic microvascular ischemic change. Remote lacunar type infarct right thalamus;   -HIV/RPR negative. Folate low, repleting. B12 high. Thiamine low and repleting. 2/2 to dementia with poor nutritional intake; TSH wnl on 1/1/2025  - exhausted reversible causes of poor mentation and with imaging, appears to be age-related decline accelerated by some degree of vascular dementia. Likely some component of acute delirum due to hospital stay/UTI in setting of advanced dementia.     Plan:   - discussed pitfalls of G-tube placement. No plan for placement at this time.   - plan for discharge to Nursing Home with hospice services   -Haldol added PRN for agitation. Discussed risk of death and worsening condition with pt's son and sister.     Hypoglycemia  -Intermittent hypoglycemia over hospital stay. Likely related to poor PO " "intake.   -previously needed amp of D50  -monitor BG closely  -Can resume D5NS if needed  -Encourage PO intake         Numbness and tingling in both hands  -noted by patient before admission with abnormal finger to nose and weakness   -CT head and spine reviewed. There is significant stenosis/narrowing of C6 and C7 which could be contributing to symptoms. Findings appear chronic and not acute. MRI ordered and notable for cervical stenosis and lacunar infarcts. Limited visiblity due to movement   -CT head notable for remote lacunar infarcts, hx of stroke in 2005 currently on ASA   - PT/OT discontinued in light of impending NH with hospice initiation         Severe protein-calorie malnutrition  Nutrition consulted. Most recent weight and BMI monitored-     Measurements:  Wt Readings from Last 1 Encounters:   12/27/24 54.9 kg (121 lb 0.5 oz)   Body mass index is 22.87 kg/m².    Patient has been screened and assessed by RD.    Malnutrition Type:  Context: chronic illness  Level: severe    Malnutrition Characteristic Summary:  Weight Loss (Malnutrition): greater than 5% in 1 month  Energy Intake (Malnutrition): less than 75% for greater than or equal to 1 month  Subcutaneous Fat (Malnutrition): severe depletion  Muscle Mass (Malnutrition): severe depletion    Interventions/Recommendations (treatment strategy):  Please prioritize patient's comfort and preferences over strict nutritional goals at this time. Offer small, frequent meals, texture modifications, and respect the patient's desire to eat or not eat. Also ensure proper hydration with small sips of fluids periodically.    - encourage PO intake    Hypokalemia  Patient's most recent potassium results are listed below.   No results for input(s): "K" in the last 72 hours.    Plan  - no longer monitoring, outside patients goals of care    Debility  Patient with Acute debility due to age-related physical debility and other reduced mobility. The patient's latest [facility] " (Activity Measure for Post Acute Care) Score is listed below.    AM-PAC Score - How much help does the patient need for each activity listed  Basic Mobility Total Score: 7  Turning over in bed (including adjusting bedclothes, sheets and blankets)?: A lot  Sitting down on and standing up from a chair with arms (e.g., wheelchair, bedside commode, etc.): Unable  Moving from lying on back to sitting on the side of the bed?: Unable  Moving to and from a bed to a chair (including a wheelchair)?: Unable  Need to walk in hospital room?: Unable  Climbing 3-5 steps with a railing?: Unable    Plan  - Progressive mobility protocol initated  - PT/OT consulted  - Fall precautions in place  - Plan for NH with hospice services -pending insurance(Medicaid/Medicare)   - Palliative care following  - Pt is unable to be safely discharged to home at this time due to lack of continuous home care.          Diabetes mellitus  Sliding scale held, accuchecks for hypoglycemia     Patient's FSGs are controlled on current medication regimen.  Last A1c reviewed-   Lab Results   Component Value Date    HGBA1C 4.6 01/20/2025     Most recent fingerstick glucose reviewed-   Recent Labs   Lab 01/30/25  1146 01/30/25  1637 01/30/25  1933 01/31/25  0823   POCTGLUCOSE 86 76 123* 72       Current correctional scale   hold on correction scale give A1C is 5.5  Antihyperglycemics (From admission, onward)      None          Hold Oral hypoglycemics while patient is in the hospital; holding all given low intake  No need for further BG checks, has been stable for > 1 week. Is eating and drinking okay. Hold all DM medications    Unintentional weight loss  Nutrition consulted. Most recent weight and BMI monitored-     Measurements:  Wt Readings from Last 1 Encounters:   12/27/24 54.9 kg (121 lb 0.5 oz)   Body mass index is 22.87 kg/m².    RD consultation   Extensive outpatient evaluation recommended by GI and General surgery   Will add supplementation to meals;  soft bite-sized per rec's  No plan for feeding tube at this time as family is able to convince pt to eat intermittently.       Please prioritize patient's comfort and preferences over strict nutritional goals at this time. Offer small, frequent meals, texture modifications, and respect the patient's desire to eat or not eat. Also ensure proper hydration with small sips of fluids periodically.      Hyperlipidemia  Stopping ASA and statin to reduce number of pills and preventing longer term effects no longer aligns with goals of care      Benign essential hypertension  Patient's blood pressure range in the last 24 hours was: BP  Min: 117/73  Max: 145/88.The patient's inpatient anti-hypertensive regimen is listed below:  Current Antihypertensives       Plan  - BP is controlled, no changes needed to their regimen  - will continue home regimen  Stopping nifedipine and lisinopril to reduce pill burden. They were at low dose and BP has remained acceptable. Okay for her BP to run a little higher       VTE Risk Mitigation (From admission, onward)           Ordered     IP VTE HIGH RISK PATIENT  Once         12/26/24 1547                    Discharge Planning   RITA: 2/5/2025     Code Status: DNR   Medical Readiness for Discharge Date: 1/20/2025  Discharge Plan A: New Nursing Home placement - care home care facility ([facility] with hospice service)   Discharge Delays: (!) Post-Acute Set-up                    Sukhwinder Levin DO  Department of Hospital Medicine   [facility] - Internal Medicine Telemetry

## 2025-02-02 NOTE — ASSESSMENT & PLAN NOTE
Stopping ASA and statin to reduce number of pills and preventing longer term effects no longer aligns with goals of care

## 2025-02-02 NOTE — ASSESSMENT & PLAN NOTE
Patient's blood pressure range in the last 24 hours was: BP  Min: 117/73  Max: 145/88.The patient's inpatient anti-hypertensive regimen is listed below:  Current Antihypertensives       Plan  - BP is controlled, no changes needed to their regimen  - will continue home regimen  Stopping nifedipine and lisinopril to reduce pill burden. They were at low dose and BP has remained acceptable. Okay for her BP to run a little higher

## 2025-02-03 LAB — POCT GLUCOSE: 73 MG/DL (ref 70–110)

## 2025-02-03 PROCEDURE — 25000003 PHARM REV CODE 250: Performed by: INTERNAL MEDICINE

## 2025-02-03 PROCEDURE — S4991 NICOTINE PATCH NONLEGEND: HCPCS | Performed by: STUDENT IN AN ORGANIZED HEALTH CARE EDUCATION/TRAINING PROGRAM

## 2025-02-03 PROCEDURE — 25000003 PHARM REV CODE 250: Performed by: STUDENT IN AN ORGANIZED HEALTH CARE EDUCATION/TRAINING PROGRAM

## 2025-02-03 PROCEDURE — 11000001 HC ACUTE MED/SURG PRIVATE ROOM

## 2025-02-03 RX ADMIN — MICONAZOLE NITRATE: 20 OINTMENT TOPICAL at 09:02

## 2025-02-03 RX ADMIN — NICOTINE 1 PATCH: 7 PATCH, EXTENDED RELEASE TRANSDERMAL at 09:02

## 2025-02-03 RX ADMIN — Medication 6 MG: at 10:02

## 2025-02-03 NOTE — PROGRESS NOTES
CM spoke to Maria Del Rosario [facility] [place].  At the end of last week, they spoke to sister Karla and requested the entire policy life insurance document with declaration page from family. Financial review dependent on these docs.  Will inform team.

## 2025-02-03 NOTE — PLAN OF CARE
02/03/25 0907   Discharge Reassessment   Assessment Type Discharge Planning Reassessment   Did the patient's condition or plan change since previous assessment? No   Discharge Plan A New Nursing Home placement - half-way care facility  ([facility])   Why the patient remains in the hospital Placement issues   Post-Acute Status   Post-Acute Placement Status Pending post-acute provider review/more information requested  (Financials under review?)   Discharge Delays (!) Post-Acute Set-up       CM phoned [facility] for update.  BOBBY left for [facility] requesting a call back.

## 2025-02-03 NOTE — PROGRESS NOTES
[facility] - Internal Medicine [facility] Medicine  Progress Note    Patient Name: Bhavna Lim  MRN: [id number]  Patient Class: IP- Inpatient   Admission Date: 12/26/2024  Length of Stay: 36 days  Attending Physician: Sukhwinder Levin DO  Primary Care Provider: Suzi Green MD        Subjective     Principal Problem:Palliative care encounter        HPI:  Mrs. Bhavna Lim is a 79 year old F with a history of HTN and HLD who presents to the ED for weakness and fatigue. She has been having weight loss and poor po intake over the last few months. She has been seen by GI and evaluated with EGD and Colonoscopy as well as MRCP which showed cholelithiasis with gallbladder distention and intrahepatic and extrahepatic biliary dilation. No significant abnormalities on EGD and coloscopy. She was referred to surgery who recommended NM study and possible EUS with GI. She has been unable to tolerate any solid foods. She sometimes takes a little bit of soup and has tried protein shakes. She feels full immediately. She does endorse some pain in her epigastric region ever since having her EGD performed. She occasionally has vomiting. Denies regurgitation of food. She continues to have worsening weakness and falls at home. She had two falls yesterday due to weakness. She denies significant dizziness. She is unable to walk due to her weakness and sister is concerned this is related to her poor nutrition.      In the ED VSS. Labs notable for Potassium of 3.0 which was repleted in the ED. No focal signs concerning for stroke. She was admitted to medicine for further management.      Overview/Hospital Course:  While on the floor PT/OT were consulted and recommended SNF. Nutrition consulted. Discussed with GI outpatient workup and no indication for further inpatient workup. She continued to be altered while in the hospital. CT spine/head ordered for concern of fall showing ischemic changes and signficant stenosis  and narrowing. She developed worsening hand weakness and numbness/tinglign with abnormal coordiation. Some of this was prior to admission but weakness significantly worsened. MRI ordered and showed old infarcts. She continued to refuse food and did not have abdominal complaints just would not eat. Started on IVF due to hypoglycemia. Acute encephalopathy workup initiated and negative aside from low folate. UA ordered but not performed. At this point if nutrition becomes suboptimal will have to consider other means of nutrition. Son with concern of worsening memory and possible dementia wanting placement in [place]. Started on supplements for poor nutrition including thiamine and folate to see if this will help with mental status. Family had extensive discussion with provider care team and decided to pursue NH with hospice services and sister (lives locally) would pursue mPOA. She has had intermitted problems with hypoglycemia and electrolyte disturbances.     Plan is NH with hospice services. She has been waiting for NH for > 1 week.     Patient remains stable for discharge with hospice services     Interval History: NAEO, resting in bed. Pleasantly confused, calm and cooperative    Review of Systems   Constitutional:  Negative for fever.   Respiratory:  Negative for cough and shortness of breath.    Cardiovascular:  Negative for chest pain.   Gastrointestinal:  Negative for abdominal pain.     Objective:     Vital Signs (Most Recent):  Temp: 98.6 °F (37 °C) (02/03/25 0741)  Pulse: 104 (02/03/25 0741)  Resp: 18 (02/03/25 0741)  BP: 122/74 (02/03/25 0741)  SpO2: (!) 92 % (02/03/25 0741) Vital Signs (24h Range):  Temp:  [97.5 °F (36.4 °C)-98.6 °F (37 °C)] 98.6 °F (37 °C)  Pulse:  [] 104  Resp:  [17-18] 18  SpO2:  [92 %-99 %] 92 %  BP: (105-142)/(65-88) 122/74     Weight: 54.9 kg (121 lb 0.5 oz)  Body mass index is 22.87 kg/m².    Intake/Output Summary (Last 24 hours) at 2/3/2025 1103  Last data filed at 2/3/2025  "0614  Gross per 24 hour   Intake 240 ml   Output 1550 ml   Net -1310 ml      Physical Exam  Constitutional:       Appearance: Normal appearance.   HENT:      Head: Normocephalic and atraumatic.   Cardiovascular:      Rate and Rhythm: Normal rate.      Pulses: Normal pulses.      Heart sounds: No murmur heard.  Pulmonary:      Effort: Pulmonary effort is normal. No respiratory distress.      Breath sounds: No wheezing.   Abdominal:      General: Abdomen is flat. There is no distension.   Skin:     General: Skin is warm and dry.   Neurological:      Mental Status: She is alert.         Computed MELD 3.0 unavailable. One or more values for this score either were not found within the given timeframe or did not fit some other criterion.  Computed MELD-Na unavailable. One or more values for this score either were not found within the given timeframe or did not fit some other criterion.      Significant Labs:  CBC:  No results for input(s): "WBC", "HGB", "HCT", "PLT" in the last 48 hours.  CMP:  No results for input(s): "NA", "K", "CL", "CO2", "GLU", "BUN", "CREATININE", "CALCIUM", "PROT", "ALBUMIN", "BILITOT", "ALKPHOS", "AST", "ALT", "ANIONGAP", "EGFRNONAA" in the last 48 hours.    Invalid input(s): "ESTGFAFRICA"  PTINR:  No results for input(s): "INR" in the last 48 hours.    Significant Procedures:   Dobutamine Stress Test with Color Flow: No results found for this or any previous visit.    None    Assessment and Plan     * Palliative care encounter  Plan is NH with Hospice  - patient medically stable on comfort measures. Pending placement at accepting facility    Comfort measures only status        Hypophosphatemia  Patient's most recent phosphorus results are listed below.   Recent Labs     01/28/25  0800   PHOS 2.8       Plan  No longer monitoring, outside patients goals of care      Hypomagnesemia  Patient has Abnormal Magnesium: hypomagnesemia. Will continue to monitor electrolytes closely. Will replace the " "affected electrolytes and repeat labs to be done after interventions completed. The patient's magnesium results have been reviewed and are listed below.           Anemia  Anemia is likely due to Iron deficiency. Most recent hemoglobin and hematocrit are listed below.  No results for input(s): "HGB", "HCT" in the last 72 hours.    Plan  - Monitor serial CBC: Daily  - Transfuse PRBC if patient becomes hemodynamically unstable, symptomatic or H/H drops below 7/21.  - Patient has not received any PRBC transfusions to date  - Patient's anemia is currently stable      Swelling of right upper extremity  CT UE with Nonspecific subcutaneous edema throughout the distal arm and forearm, possibly sterile or infectious in nature.  No fluid collection.  No osteomyelitis.     Continue to monitor.     Hyperbilirubinemia  - elevation in bilirubin with concomitant elevations in LFTs  - RUQ U/S significant for biliary sludge, mild/moderate intrahepatic/extrahepatic biliary ductla dilation. Unchanged compare to previous MRCP.         Pseudomonas urinary tract infection  - urinalysis concerning for UTI and found to have pseudomonas. Now s/p levaquin on 1/20.   - Blood cultures: NGTD       Cognitive impairment  History of progressive confusion and forgetfulness prior to admission. CT head: Sequela of chronic microvascular ischemic change. Remote lacunar type infarct right thalamus;   -HIV/RPR negative. Folate low, repleting. B12 high. Thiamine low and repleting. 2/2 to dementia with poor nutritional intake; TSH wnl on 1/1/2025  - exhausted reversible causes of poor mentation and with imaging, appears to be age-related decline accelerated by some degree of vascular dementia. Likely some component of acute delirum due to hospital stay/UTI in setting of advanced dementia.     Plan:   - discussed pitfalls of G-tube placement. No plan for placement at this time.   - plan for discharge to Nursing Home with hospice services   -Haldol added PRN " "for agitation. Discussed risk of death and worsening condition with pt's son and sister.     Hypoglycemia  -Intermittent hypoglycemia over hospital stay. Likely related to poor PO intake.   -previously needed amp of D50  -monitor BG closely  -Can resume D5NS if needed  -Encourage PO intake         Numbness and tingling in both hands  -noted by patient before admission with abnormal finger to nose and weakness   -CT head and spine reviewed. There is significant stenosis/narrowing of C6 and C7 which could be contributing to symptoms. Findings appear chronic and not acute. MRI ordered and notable for cervical stenosis and lacunar infarcts. Limited visiblity due to movement   -CT head notable for remote lacunar infarcts, hx of stroke in 2005 currently on ASA   - PT/OT discontinued in light of impending NH with hospice initiation         Severe protein-calorie malnutrition  Nutrition consulted. Most recent weight and BMI monitored-     Measurements:  Wt Readings from Last 1 Encounters:   12/27/24 54.9 kg (121 lb 0.5 oz)   Body mass index is 22.87 kg/m².    Patient has been screened and assessed by RD.    Malnutrition Type:  Context: chronic illness  Level: severe    Malnutrition Characteristic Summary:  Weight Loss (Malnutrition): greater than 5% in 1 month  Energy Intake (Malnutrition): less than 75% for greater than or equal to 1 month  Subcutaneous Fat (Malnutrition): severe depletion  Muscle Mass (Malnutrition): severe depletion    Interventions/Recommendations (treatment strategy):  Please prioritize patient's comfort and preferences over strict nutritional goals at this time. Offer small, frequent meals, texture modifications, and respect the patient's desire to eat or not eat. Also ensure proper hydration with small sips of fluids periodically.    - encourage PO intake    Hypokalemia  Patient's most recent potassium results are listed below.   No results for input(s): "K" in the last 72 hours.    Plan  - no longer " monitoring, outside patients goals of care    Debility  Patient with Acute debility due to age-related physical debility and other reduced mobility. The patient's latest AMPAC (Activity Measure for Post Acute Care) Score is listed below.    AM-PAC Score - How much help does the patient need for each activity listed  Basic Mobility Total Score: 7  Turning over in bed (including adjusting bedclothes, sheets and blankets)?: A lot  Sitting down on and standing up from a chair with arms (e.g., wheelchair, bedside commode, etc.): Unable  Moving from lying on back to sitting on the side of the bed?: Unable  Moving to and from a bed to a chair (including a wheelchair)?: Unable  Need to walk in hospital room?: Unable  Climbing 3-5 steps with a railing?: Unable    Plan  - Progressive mobility protocol initated  - PT/OT consulted  - Fall precautions in place  - Plan for NH with hospice services -pending insurance(Medicaid/Medicare)   - Palliative care following  - Pt is unable to be safely discharged to home at this time due to lack of continuous home care.          Diabetes mellitus  Sliding scale held, accuchecks for hypoglycemia     Patient's FSGs are controlled on current medication regimen.  Last A1c reviewed-   Lab Results   Component Value Date    HGBA1C 4.6 01/20/2025     Most recent fingerstick glucose reviewed-   Recent Labs   Lab 01/30/25  1146 01/30/25  1637 01/30/25  1933 01/31/25  0823   POCTGLUCOSE 86 76 123* 72       Current correctional scale   hold on correction scale give A1C is 5.5  Antihyperglycemics (From admission, onward)      None          Hold Oral hypoglycemics while patient is in the hospital; holding all given low intake  No need for further BG checks, has been stable for > 1 week. Is eating and drinking okay. Hold all DM medications    Unintentional weight loss  Nutrition consulted. Most recent weight and BMI monitored-     Measurements:  Wt Readings from Last 1 Encounters:   12/27/24 54.9 kg (121  lb 0.5 oz)   Body mass index is 22.87 kg/m².    RD consultation   Extensive outpatient evaluation recommended by GI and General surgery   Will add supplementation to meals; soft bite-sized per rec's  No plan for feeding tube at this time as family is able to convince pt to eat intermittently.       Please prioritize patient's comfort and preferences over strict nutritional goals at this time. Offer small, frequent meals, texture modifications, and respect the patient's desire to eat or not eat. Also ensure proper hydration with small sips of fluids periodically.      Hyperlipidemia  Stopping ASA and statin to reduce number of pills and preventing longer term effects no longer aligns with goals of care      Benign essential hypertension  Patient's blood pressure range in the last 24 hours was: BP  Min: 117/73  Max: 145/88.The patient's inpatient anti-hypertensive regimen is listed below:  Current Antihypertensives       Plan  - BP is controlled, no changes needed to their regimen  - will continue home regimen  Stopping nifedipine and lisinopril to reduce pill burden. They were at low dose and BP has remained acceptable. Okay for her BP to run a little higher       VTE Risk Mitigation (From admission, onward)           Ordered     IP VTE HIGH RISK PATIENT  Once         12/26/24 1547                    Discharge Planning   RITA: 2/5/2025     Code Status: DNR   Medical Readiness for Discharge Date: 1/20/2025  Discharge Plan A: New Nursing Home placement - residential care facility ([facility])   Discharge Delays: (!) Post-Acute Set-up        Patient medically stable for discharge         Sukhwinder Levin DO  Department of Hospital Medicine   [facility] - [facility] Medicine Telemetry

## 2025-02-03 NOTE — SUBJECTIVE & OBJECTIVE
"Interval History: NAEO, resting in bed. Pleasantly confused, calm and cooperative    Review of Systems   Constitutional:  Negative for fever.   Respiratory:  Negative for cough and shortness of breath.    Cardiovascular:  Negative for chest pain.   Gastrointestinal:  Negative for abdominal pain.     Objective:     Vital Signs (Most Recent):  Temp: 98.6 °F (37 °C) (02/03/25 0741)  Pulse: 104 (02/03/25 0741)  Resp: 18 (02/03/25 0741)  BP: 122/74 (02/03/25 0741)  SpO2: (!) 92 % (02/03/25 0741) Vital Signs (24h Range):  Temp:  [97.5 °F (36.4 °C)-98.6 °F (37 °C)] 98.6 °F (37 °C)  Pulse:  [] 104  Resp:  [17-18] 18  SpO2:  [92 %-99 %] 92 %  BP: (105-142)/(65-88) 122/74     Weight: 54.9 kg (121 lb 0.5 oz)  Body mass index is 22.87 kg/m².    Intake/Output Summary (Last 24 hours) at 2/3/2025 1103  Last data filed at 2/3/2025 0614  Gross per 24 hour   Intake 240 ml   Output 1550 ml   Net -1310 ml      Physical Exam  Constitutional:       Appearance: Normal appearance.   HENT:      Head: Normocephalic and atraumatic.   Cardiovascular:      Rate and Rhythm: Normal rate.      Pulses: Normal pulses.      Heart sounds: No murmur heard.  Pulmonary:      Effort: Pulmonary effort is normal. No respiratory distress.      Breath sounds: No wheezing.   Abdominal:      General: Abdomen is flat. There is no distension.   Skin:     General: Skin is warm and dry.   Neurological:      Mental Status: She is alert.         Computed MELD 3.0 unavailable. One or more values for this score either were not found within the given timeframe or did not fit some other criterion.  Computed MELD-Na unavailable. One or more values for this score either were not found within the given timeframe or did not fit some other criterion.      Significant Labs:  CBC:  No results for input(s): "WBC", "HGB", "HCT", "PLT" in the last 48 hours.  CMP:  No results for input(s): "NA", "K", "CL", "CO2", "GLU", "BUN", "CREATININE", "CALCIUM", "PROT", "ALBUMIN", " ""BILITOT", "ALKPHOS", "AST", "ALT", "ANIONGAP", "EGFRNONAA" in the last 48 hours.    Invalid input(s): "ESTGFAFRICA"  PTINR:  No results for input(s): "INR" in the last 48 hours.    Significant Procedures:   Dobutamine Stress Test with Color Flow: No results found for this or any previous visit.    None  "

## 2025-02-04 PROCEDURE — 25000003 PHARM REV CODE 250: Performed by: STUDENT IN AN ORGANIZED HEALTH CARE EDUCATION/TRAINING PROGRAM

## 2025-02-04 PROCEDURE — 11000001 HC ACUTE MED/SURG PRIVATE ROOM

## 2025-02-04 PROCEDURE — S4991 NICOTINE PATCH NONLEGEND: HCPCS | Performed by: STUDENT IN AN ORGANIZED HEALTH CARE EDUCATION/TRAINING PROGRAM

## 2025-02-04 PROCEDURE — 25000003 PHARM REV CODE 250: Performed by: INTERNAL MEDICINE

## 2025-02-04 RX ADMIN — NICOTINE 1 PATCH: 7 PATCH, EXTENDED RELEASE TRANSDERMAL at 10:02

## 2025-02-04 RX ADMIN — Medication 6 MG: at 09:02

## 2025-02-04 RX ADMIN — MICONAZOLE NITRATE: 20 OINTMENT TOPICAL at 09:02

## 2025-02-04 RX ADMIN — MICONAZOLE NITRATE: 20 OINTMENT TOPICAL at 10:02

## 2025-02-04 NOTE — PROGRESS NOTES
[facility] - Internal Medicine [facility] Medicine  Progress Note    Patient Name: Bhavna Lim  MRN: [id number]  Patient Class: IP- Inpatient   Admission Date: 12/26/2024  Length of Stay: 37 days  Attending Physician: Sukhwinder Levin DO  Primary Care Provider: Suzi Green MD        Subjective     Principal Problem:Palliative care encounter        HPI:  Mrs. Bhavna Lim is a 79 year old F with a history of HTN and HLD who presents to the ED for weakness and fatigue. She has been having weight loss and poor po intake over the last few months. She has been seen by GI and evaluated with EGD and Colonoscopy as well as MRCP which showed cholelithiasis with gallbladder distention and intrahepatic and extrahepatic biliary dilation. No significant abnormalities on EGD and coloscopy. She was referred to surgery who recommended NM study and possible EUS with GI. She has been unable to tolerate any solid foods. She sometimes takes a little bit of soup and has tried protein shakes. She feels full immediately. She does endorse some pain in her epigastric region ever since having her EGD performed. She occasionally has vomiting. Denies regurgitation of food. She continues to have worsening weakness and falls at home. She had two falls yesterday due to weakness. She denies significant dizziness. She is unable to walk due to her weakness and sister is concerned this is related to her poor nutrition.      In the ED VSS. Labs notable for Potassium of 3.0 which was repleted in the ED. No focal signs concerning for stroke. She was admitted to medicine for further management.      Overview/Hospital Course:  While on the floor PT/OT were consulted and recommended SNF. Nutrition consulted. Discussed with GI outpatient workup and no indication for further inpatient workup. She continued to be altered while in the hospital. CT spine/head ordered for concern of fall showing ischemic changes and signficant stenosis  and narrowing. She developed worsening hand weakness and numbness/tinglign with abnormal coordiation. Some of this was prior to admission but weakness significantly worsened. MRI ordered and showed old infarcts. She continued to refuse food and did not have abdominal complaints just would not eat. Started on IVF due to hypoglycemia. Acute encephalopathy workup initiated and negative aside from low folate. UA ordered but not performed. At this point if nutrition becomes suboptimal will have to consider other means of nutrition. Son with concern of worsening memory and possible dementia wanting placement in [place]. Started on supplements for poor nutrition including thiamine and folate to see if this will help with mental status. Family had extensive discussion with provider care team and decided to pursue NH with hospice services and sister (lives locally) would pursue mPOA. She has had intermitted problems with hypoglycemia and electrolyte disturbances.     Plan is NH with hospice services. She has been waiting for NH for > 1 week.     Patient remains stable for discharge with hospice services     Interval History: NAEO, calm and cooperative in bed. Pleasantly demented on exam. Awaiting placement    Review of Systems   Unable to perform ROS: Dementia     Objective:     Vital Signs (Most Recent):  Temp: 97.7 °F (36.5 °C) (02/04/25 0749)  Pulse: 81 (02/04/25 0749)  Resp: 17 (02/04/25 0749)  BP: (!) 149/84 (02/04/25 0749)  SpO2: 95 % (02/04/25 0749) Vital Signs (24h Range):  Temp:  [97.5 °F (36.4 °C)-98.5 °F (36.9 °C)] 97.7 °F (36.5 °C)  Pulse:  [81-97] 81  Resp:  [17-18] 17  SpO2:  [94 %-96 %] 95 %  BP: (134-156)/(78-90) 149/84     Weight: 54.9 kg (121 lb 0.5 oz)  Body mass index is 22.87 kg/m².    Intake/Output Summary (Last 24 hours) at 2/4/2025 1055  Last data filed at 2/4/2025 1032  Gross per 24 hour   Intake 440 ml   Output 1450 ml   Net -1010 ml      Physical Exam  HENT:      Head: Normocephalic and  "atraumatic.   Cardiovascular:      Rate and Rhythm: Normal rate.      Pulses: Normal pulses.      Heart sounds: No murmur heard.  Pulmonary:      Effort: Pulmonary effort is normal. No respiratory distress.   Abdominal:      General: Abdomen is flat. Bowel sounds are normal. There is no distension.   Skin:     General: Skin is warm and dry.   Neurological:      Mental Status: She is alert.         Computed MELD 3.0 unavailable. One or more values for this score either were not found within the given timeframe or did not fit some other criterion.  Computed MELD-Na unavailable. One or more values for this score either were not found within the given timeframe or did not fit some other criterion.      Significant Labs:  CBC:  No results for input(s): "WBC", "HGB", "HCT", "PLT" in the last 48 hours.  CMP:  No results for input(s): "NA", "K", "CL", "CO2", "GLU", "BUN", "CREATININE", "CALCIUM", "PROT", "ALBUMIN", "BILITOT", "ALKPHOS", "AST", "ALT", "ANIONGAP", "EGFRNONAA" in the last 48 hours.    Invalid input(s): "ESTGFAFRICA"  PTINR:  No results for input(s): "INR" in the last 48 hours.    Significant Procedures:   Dobutamine Stress Test with Color Flow: No results found for this or any previous visit.    None    Assessment and Plan     * Palliative care encounter  Plan is NH with Hospice  - patient medically stable on comfort measures. Pending placement at accepting facility    Comfort measures only status        Hypophosphatemia  Patient's most recent phosphorus results are listed below.   Recent Labs     01/28/25  0800   PHOS 2.8       Plan  No longer monitoring, outside patients goals of care      Hypomagnesemia  Patient has Abnormal Magnesium: hypomagnesemia. Will continue to monitor electrolytes closely. Will replace the affected electrolytes and repeat labs to be done after interventions completed. The patient's magnesium results have been reviewed and are listed below.           Anemia  Anemia is likely due to " "Iron deficiency. Most recent hemoglobin and hematocrit are listed below.  No results for input(s): "HGB", "HCT" in the last 72 hours.    Plan  - Monitor serial CBC: Daily  - Transfuse PRBC if patient becomes hemodynamically unstable, symptomatic or H/H drops below 7/21.  - Patient has not received any PRBC transfusions to date  - Patient's anemia is currently stable      Swelling of right upper extremity  CT UE with Nonspecific subcutaneous edema throughout the distal arm and forearm, possibly sterile or infectious in nature.  No fluid collection.  No osteomyelitis.     Continue to monitor.     Hyperbilirubinemia  - elevation in bilirubin with concomitant elevations in LFTs  - RUQ U/S significant for biliary sludge, mild/moderate intrahepatic/extrahepatic biliary ductla dilation. Unchanged compare to previous MRCP.         Pseudomonas urinary tract infection  - urinalysis concerning for UTI and found to have pseudomonas. Now s/p levaquin on 1/20.   - Blood cultures: NGTD       Cognitive impairment  History of progressive confusion and forgetfulness prior to admission. CT head: Sequela of chronic microvascular ischemic change. Remote lacunar type infarct right thalamus;   -HIV/RPR negative. Folate low, repleting. B12 high. Thiamine low and repleting. 2/2 to dementia with poor nutritional intake; TSH wnl on 1/1/2025  - exhausted reversible causes of poor mentation and with imaging, appears to be age-related decline accelerated by some degree of vascular dementia. Likely some component of acute delirum due to hospital stay/UTI in setting of advanced dementia.     Plan:   - discussed pitfalls of G-tube placement. No plan for placement at this time.   - plan for discharge to Nursing Home with hospice services   -Haldol added PRN for agitation. Discussed risk of death and worsening condition with pt's son and sister.     Hypoglycemia  -Intermittent hypoglycemia over hospital stay. Likely related to poor PO intake. " "  -previously needed amp of D50  -monitor BG closely  -Can resume D5NS if needed  -Encourage PO intake         Numbness and tingling in both hands  -noted by patient before admission with abnormal finger to nose and weakness   -CT head and spine reviewed. There is significant stenosis/narrowing of C6 and C7 which could be contributing to symptoms. Findings appear chronic and not acute. MRI ordered and notable for cervical stenosis and lacunar infarcts. Limited visiblity due to movement   -CT head notable for remote lacunar infarcts, hx of stroke in 2005 currently on ASA   - PT/OT discontinued in light of impending NH with hospice initiation         Severe protein-calorie malnutrition  Nutrition consulted. Most recent weight and BMI monitored-     Measurements:  Wt Readings from Last 1 Encounters:   12/27/24 54.9 kg (121 lb 0.5 oz)   Body mass index is 22.87 kg/m².    Patient has been screened and assessed by RD.    Malnutrition Type:  Context: chronic illness  Level: severe    Malnutrition Characteristic Summary:  Weight Loss (Malnutrition): greater than 5% in 1 month  Energy Intake (Malnutrition): less than 75% for greater than or equal to 1 month  Subcutaneous Fat (Malnutrition): severe depletion  Muscle Mass (Malnutrition): severe depletion    Interventions/Recommendations (treatment strategy):  Please prioritize patient's comfort and preferences over strict nutritional goals at this time. Offer small, frequent meals, texture modifications, and respect the patient's desire to eat or not eat. Also ensure proper hydration with small sips of fluids periodically.    - encourage PO intake    Hypokalemia  Patient's most recent potassium results are listed below.   No results for input(s): "K" in the last 72 hours.    Plan  - no longer monitoring, outside patients goals of care    Debility  Patient with Acute debility due to age-related physical debility and other reduced mobility. The patient's latest [facility] (Activity " "Measure for Post Acute Care) Score is listed below.    AM-PAC Score - How much help does the patient need for each activity listed  Basic Mobility Total Score: 7  Turning over in bed (including adjusting bedclothes, sheets and blankets)?: A lot  Sitting down on and standing up from a chair with arms (e.g., wheelchair, bedside commode, etc.): Unable  Moving from lying on back to sitting on the side of the bed?: Unable  Moving to and from a bed to a chair (including a wheelchair)?: Unable  Need to walk in hospital room?: Unable  Climbing 3-5 steps with a railing?: Unable    Plan  - Progressive mobility protocol initated  - PT/OT consulted  - Fall precautions in place  - Plan for NH with hospice services -pending insurance(Medicaid/Medicare)   - Palliative care following  - Pt is unable to be safely discharged to home at this time due to lack of continuous home care.          Diabetes mellitus  Sliding scale held, accuchecks for hypoglycemia     Patient's FSGs are controlled on current medication regimen.  Last A1c reviewed-   Lab Results   Component Value Date    HGBA1C 4.6 01/20/2025     Most recent fingerstick glucose reviewed-   No results for input(s): "POCTGLUCOSE" in the last 24 hours.    Current correctional scale   hold on correction scale give A1C is 5.5  Antihyperglycemics (From admission, onward)      None          Hold Oral hypoglycemics while patient is in the hospital; holding all given low intake  No need for further BG checks, has been stable for > 1 week. Is eating and drinking okay. Hold all DM medications    Unintentional weight loss  Nutrition consulted. Most recent weight and BMI monitored-     Measurements:  Wt Readings from Last 1 Encounters:   12/27/24 54.9 kg (121 lb 0.5 oz)   Body mass index is 22.87 kg/m².    RD consultation   Extensive outpatient evaluation recommended by GI and General surgery   Will add supplementation to meals; soft bite-sized per rec's  No plan for feeding tube at this " time as family is able to convince pt to eat intermittently.       Please prioritize patient's comfort and preferences over strict nutritional goals at this time. Offer small, frequent meals, texture modifications, and respect the patient's desire to eat or not eat. Also ensure proper hydration with small sips of fluids periodically.      Hyperlipidemia  Stopping ASA and statin to reduce number of pills and preventing longer term effects no longer aligns with goals of care      Benign essential hypertension  Patient's blood pressure range in the last 24 hours was: BP  Min: 117/73  Max: 145/88.The patient's inpatient anti-hypertensive regimen is listed below:  Current Antihypertensives       Plan  - BP is controlled, no changes needed to their regimen  - will continue home regimen  Stopping nifedipine and lisinopril to reduce pill burden. They were at low dose and BP has remained acceptable. Okay for her BP to run a little higher       VTE Risk Mitigation (From admission, onward)           Ordered     IP VTE HIGH RISK PATIENT  Once         12/26/24 1547                    Discharge Planning   RITA: 2/5/2025     Code Status: DNR   Medical Readiness for Discharge Date: 1/20/2025  Discharge Plan A: New Nursing Home placement - [facility] care facility ([place])   Discharge Delays: (!) Post-Acute Set-up      Patient medically stable for discharge. Awaiting placement    Sukhwinder Levin DO  Department of Hospital Medicine   [facility] - Internal Medicine Telemetry

## 2025-02-04 NOTE — SUBJECTIVE & OBJECTIVE
"Interval History: NAEO, calm and cooperative in bed. Pleasantly demented on exam. Awaiting placement    Review of Systems   Unable to perform ROS: Dementia     Objective:     Vital Signs (Most Recent):  Temp: 97.7 °F (36.5 °C) (02/04/25 0749)  Pulse: 81 (02/04/25 0749)  Resp: 17 (02/04/25 0749)  BP: (!) 149/84 (02/04/25 0749)  SpO2: 95 % (02/04/25 0749) Vital Signs (24h Range):  Temp:  [97.5 °F (36.4 °C)-98.5 °F (36.9 °C)] 97.7 °F (36.5 °C)  Pulse:  [81-97] 81  Resp:  [17-18] 17  SpO2:  [94 %-96 %] 95 %  BP: (134-156)/(78-90) 149/84     Weight: 54.9 kg (121 lb 0.5 oz)  Body mass index is 22.87 kg/m².    Intake/Output Summary (Last 24 hours) at 2/4/2025 1055  Last data filed at 2/4/2025 1032  Gross per 24 hour   Intake 440 ml   Output 1450 ml   Net -1010 ml      Physical Exam  HENT:      Head: Normocephalic and atraumatic.   Cardiovascular:      Rate and Rhythm: Normal rate.      Pulses: Normal pulses.      Heart sounds: No murmur heard.  Pulmonary:      Effort: Pulmonary effort is normal. No respiratory distress.   Abdominal:      General: Abdomen is flat. Bowel sounds are normal. There is no distension.   Skin:     General: Skin is warm and dry.   Neurological:      Mental Status: She is alert.         Computed MELD 3.0 unavailable. One or more values for this score either were not found within the given timeframe or did not fit some other criterion.  Computed MELD-Na unavailable. One or more values for this score either were not found within the given timeframe or did not fit some other criterion.      Significant Labs:  CBC:  No results for input(s): "WBC", "HGB", "HCT", "PLT" in the last 48 hours.  CMP:  No results for input(s): "NA", "K", "CL", "CO2", "GLU", "BUN", "CREATININE", "CALCIUM", "PROT", "ALBUMIN", "BILITOT", "ALKPHOS", "AST", "ALT", "ANIONGAP", "EGFRNONAA" in the last 48 hours.    Invalid input(s): "ESTGFAFRICA"  PTINR:  No results for input(s): "INR" in the last 48 hours.    Significant Procedures: "   Dobutamine Stress Test with Color Flow: No results found for this or any previous visit.    None

## 2025-02-04 NOTE — ASSESSMENT & PLAN NOTE
"Sliding scale held, accuchecks for hypoglycemia     Patient's FSGs are controlled on current medication regimen.  Last A1c reviewed-   Lab Results   Component Value Date    HGBA1C 4.6 01/20/2025     Most recent fingerstick glucose reviewed-   No results for input(s): "POCTGLUCOSE" in the last 24 hours.    Current correctional scale   hold on correction scale give A1C is 5.5  Antihyperglycemics (From admission, onward)    None        Hold Oral hypoglycemics while patient is in the hospital; holding all given low intake  No need for further BG checks, has been stable for > 1 week. Is eating and drinking okay. Hold all DM medications  "

## 2025-02-05 PROCEDURE — 25000003 PHARM REV CODE 250: Performed by: STUDENT IN AN ORGANIZED HEALTH CARE EDUCATION/TRAINING PROGRAM

## 2025-02-05 PROCEDURE — 11000001 HC ACUTE MED/SURG PRIVATE ROOM

## 2025-02-05 PROCEDURE — S4991 NICOTINE PATCH NONLEGEND: HCPCS | Performed by: STUDENT IN AN ORGANIZED HEALTH CARE EDUCATION/TRAINING PROGRAM

## 2025-02-05 PROCEDURE — 25000003 PHARM REV CODE 250: Performed by: INTERNAL MEDICINE

## 2025-02-05 RX ADMIN — MICONAZOLE NITRATE: 20 OINTMENT TOPICAL at 09:02

## 2025-02-05 RX ADMIN — NICOTINE 1 PATCH: 7 PATCH, EXTENDED RELEASE TRANSDERMAL at 09:02

## 2025-02-05 RX ADMIN — Medication 6 MG: at 09:02

## 2025-02-05 NOTE — PLAN OF CARE
02/05/25 0914   Discharge Reassessment   Assessment Type Discharge Planning Reassessment   Did the patient's condition or plan change since previous assessment? No   Discharge Plan discussed with: Sibling   Name(s) and Number(s) Karla Barrera   Communicated RITA with patient/caregiver No   Discharge Plan A New Nursing Home placement - [facility] care facility   Discharge Plan B Home with family   Why the patient remains in the hospital Placement issues   Post-Acute Status   Post-Acute Placement Status Pending post-acute provider review/more information requested   Discharge Delays (!) Post-Acute Set-up  (Pt under financial review)     CM spoke to sister Karla who stated that facility has all needed paperwork.   She has had difficulty reaching staff for updates.  She stated that she will call again today but will visit facility if she doesn't reach anyone.

## 2025-02-05 NOTE — PROGRESS NOTES
[facility] - Internal Medicine [facility] Medicine  Progress Note    Patient Name: Bhavna Lim  MRN: [id number]  Patient Class: IP- Inpatient   Admission Date: 12/26/2024  Length of Stay: 38 days  Attending Physician: Yarely Walters MD  Primary Care Provider: Suzi Green MD        Subjective     Principal Problem:Palliative care encounter        HPI:  Mrs. Bhavna Lim is a 79 year old F with a history of HTN and HLD who presents to the ED for weakness and fatigue. She has been having weight loss and poor po intake over the last few months. She has been seen by GI and evaluated with EGD and Colonoscopy as well as MRCP which showed cholelithiasis with gallbladder distention and intrahepatic and extrahepatic biliary dilation. No significant abnormalities on EGD and coloscopy. She was referred to surgery who recommended NM study and possible EUS with GI. She has been unable to tolerate any solid foods. She sometimes takes a little bit of soup and has tried protein shakes. She feels full immediately. She does endorse some pain in her epigastric region ever since having her EGD performed. She occasionally has vomiting. Denies regurgitation of food. She continues to have worsening weakness and falls at home. She had two falls yesterday due to weakness. She denies significant dizziness. She is unable to walk due to her weakness and sister is concerned this is related to her poor nutrition.      In the ED VSS. Labs notable for Potassium of 3.0 which was repleted in the ED. No focal signs concerning for stroke. She was admitted to medicine for further management.      Overview/Hospital Course:  While on the floor PT/OT were consulted and recommended SNF. Nutrition consulted. Discussed with GI outpatient workup and no indication for further inpatient workup. She continued to be altered while in the hospital. CT spine/head ordered for concern of fall showing ischemic changes and signficant stenosis  and narrowing. She developed worsening hand weakness and numbness/tinglign with abnormal coordiation. Some of this was prior to admission but weakness significantly worsened. MRI ordered and showed old infarcts. She continued to refuse food and did not have abdominal complaints just would not eat. Started on IVF due to hypoglycemia. Acute encephalopathy workup initiated and negative aside from low folate. UA ordered but not performed. At this point if nutrition becomes suboptimal will have to consider other means of nutrition. Son with concern of worsening memory and possible dementia wanting placement in [place]. Started on supplements for poor nutrition including thiamine and folate to see if this will help with mental status. Family had extensive discussion with provider care team and decided to pursue NH with hospice services and sister (lives locally) would pursue mPOA. She has had intermitted problems with hypoglycemia and electrolyte disturbances.     Plan is NH with hospice services. She has been waiting for NH for > 1 week.     Patient remains stable for discharge with hospice services     Interval History: Pt seen and examined this morning on rounds. [facility]. Pending placement. Care plan reviewed. Otherwise, doing well and with no further complaints at this time.    Objective:     Vital Signs (Most Recent):  Temp: 98.2 °F (36.8 °C) (02/05/25 1128)  Pulse: 94 (02/05/25 1128)  Resp: 18 (02/05/25 1128)  BP: 136/87 (02/05/25 1128)  SpO2: 95 % (02/05/25 1128) Vital Signs (24h Range):  Temp:  [97.6 °F (36.4 °C)-98.2 °F (36.8 °C)] 98.2 °F (36.8 °C)  Pulse:  [75-94] 94  Resp:  [17-18] 18  SpO2:  [95 %-98 %] 95 %  BP: (136-157)/(69-92) 136/87     Weight: 54.9 kg (121 lb 0.5 oz)  Body mass index is 22.87 kg/m².    Intake/Output Summary (Last 24 hours) at 2/5/2025 2041  Last data filed at 2/5/2025 0633  Gross per 24 hour   Intake --   Output 1200 ml   Net -1200 ml      Physical Exam  Constitutional:       General: She  is not in acute distress.     Appearance: Normal appearance. She is not ill-appearing.   HENT:      Head: Normocephalic and atraumatic.      Nose: Nose normal.      Mouth/Throat:      Mouth: Mucous membranes are moist.   Eyes:      Extraocular Movements: Extraocular movements intact.      Pupils: Pupils are equal, round, and reactive to light.   Cardiovascular:      Rate and Rhythm: Normal rate and regular rhythm.      Heart sounds: No murmur heard.     No gallop.   Pulmonary:      Effort: Pulmonary effort is normal.      Breath sounds: Normal breath sounds. No wheezing or rales.   Abdominal:      General: Abdomen is flat. Bowel sounds are normal. There is no distension.      Palpations: Abdomen is soft.      Tenderness: There is no abdominal tenderness.   Musculoskeletal:         General: No swelling or tenderness. Normal range of motion.      Cervical back: Normal range of motion and neck supple.      Right lower leg: No edema.      Left lower leg: No edema.   Skin:     General: Skin is warm and dry.      Capillary Refill: Capillary refill takes less than 2 seconds.   Neurological:      Comments: Sleeping, baseline dementia    Psychiatric:         Mood and Affect: Mood normal.           Significant Labs:  -labs reviewed within 24 hours     Significant Procedures:   Dobutamine Stress Test with Color Flow: No results found for this or any previous visit.      Assessment and Plan     * Palliative care encounter  Plan is NH with Hospice  - patient medically stable on comfort measures. Pending placement at accepting facility    Unintentional weight loss  Nutrition consulted. Most recent weight and BMI monitored-     Measurements:  Wt Readings from Last 1 Encounters:   12/27/24 54.9 kg (121 lb 0.5 oz)   Body mass index is 22.87 kg/m².    RD consultation   Extensive outpatient evaluation recommended by GI and General surgery   Will add supplementation to meals; soft bite-sized per rec's  No plan for feeding tube at this  "time as family is able to convince pt to eat intermittently.       Please prioritize patient's comfort and preferences over strict nutritional goals at this time. Offer small, frequent meals, texture modifications, and respect the patient's desire to eat or not eat. Also ensure proper hydration with small sips of fluids periodically.      Hypoglycemia  -Intermittent hypoglycemia over hospital stay. Likely related to poor PO intake.   -previously needed amp of D50  -monitor BG closely  -Can resume D5NS if needed  -Encourage PO intake         Debility  Patient with Acute debility due to age-related physical debility and other reduced mobility. The patient's latest AMPAC (Activity Measure for Post Acute Care) Score is listed below.    AM-PAC Score - How much help does the patient need for each activity listed  Basic Mobility Total Score: 7  Turning over in bed (including adjusting bedclothes, sheets and blankets)?: A lot  Sitting down on and standing up from a chair with arms (e.g., wheelchair, bedside commode, etc.): Unable  Moving from lying on back to sitting on the side of the bed?: Unable  Moving to and from a bed to a chair (including a wheelchair)?: Unable  Need to walk in hospital room?: Unable  Climbing 3-5 steps with a railing?: Unable    Plan  - Progressive mobility protocol initated  - PT/OT consulted  - Fall precautions in place  - Plan for NH with hospice services -pending insurance(Medicaid/Medicare)   - Palliative care following  - Pt is unable to be safely discharged to home at this time due to lack of continuous home care.          Diabetes mellitus  Sliding scale held, accuchecks for hypoglycemia     Patient's FSGs are controlled on current medication regimen.  Last A1c reviewed-   Lab Results   Component Value Date    HGBA1C 4.6 01/20/2025     Most recent fingerstick glucose reviewed-   No results for input(s): "POCTGLUCOSE" in the last 24 hours.    Current correctional scale   hold on correction " "scale give A1C is 5.5  Antihyperglycemics (From admission, onward)      None          Hold Oral hypoglycemics while patient is in the hospital; holding all given low intake  No need for further BG checks, has been stable for > 1 week. Is eating and drinking okay. Hold all DM medications    Benign essential hypertension  Patient's blood pressure range in the last 24 hours was: BP  Min: 117/73  Max: 145/88.The patient's inpatient anti-hypertensive regimen is listed below:  Current Antihypertensives       Plan  - BP is controlled, no changes needed to their regimen  - will continue home regimen  Stopping nifedipine and lisinopril to reduce pill burden. They were at low dose and BP has remained acceptable. Okay for her BP to run a little higher     Hyperlipidemia  Stopping ASA and statin to reduce number of pills and preventing longer term effects no longer aligns with goals of care      Comfort measures only status        Hypophosphatemia  Patient's most recent phosphorus results are listed below.   Recent Labs     01/28/25  0800   PHOS 2.8       Plan  No longer monitoring, outside patients goals of care      Hypomagnesemia  Patient has Abnormal Magnesium: hypomagnesemia. Will continue to monitor electrolytes closely. Will replace the affected electrolytes and repeat labs to be done after interventions completed. The patient's magnesium results have been reviewed and are listed below.           Anemia  Anemia is likely due to Iron deficiency. Most recent hemoglobin and hematocrit are listed below.  No results for input(s): "HGB", "HCT" in the last 72 hours.    Plan  - Monitor serial CBC: Daily  - Transfuse PRBC if patient becomes hemodynamically unstable, symptomatic or H/H drops below 7/21.  - Patient has not received any PRBC transfusions to date  - Patient's anemia is currently stable      Swelling of right upper extremity  CT UE with Nonspecific subcutaneous edema throughout the distal arm and forearm, possibly " sterile or infectious in nature.  No fluid collection.  No osteomyelitis.     Continue to monitor.     Hyperbilirubinemia  - elevation in bilirubin with concomitant elevations in LFTs  - RUQ U/S significant for biliary sludge, mild/moderate intrahepatic/extrahepatic biliary ductla dilation. Unchanged compare to previous MRCP.         Pseudomonas urinary tract infection  - urinalysis concerning for UTI and found to have pseudomonas. Now s/p levaquin on 1/20.   - Blood cultures: NGTD       Cognitive impairment  History of progressive confusion and forgetfulness prior to admission. CT head: Sequela of chronic microvascular ischemic change. Remote lacunar type infarct right thalamus;   -HIV/RPR negative. Folate low, repleting. B12 high. Thiamine low and repleting. 2/2 to dementia with poor nutritional intake; TSH wnl on 1/1/2025  - exhausted reversible causes of poor mentation and with imaging, appears to be age-related decline accelerated by some degree of vascular dementia. Likely some component of acute delirum due to hospital stay/UTI in setting of advanced dementia.     Plan:   - discussed pitfalls of G-tube placement. No plan for placement at this time.   - plan for discharge to Nursing Home with hospice services   -Haldol added PRN for agitation. Discussed risk of death and worsening condition with pt's son and sister.     Numbness and tingling in both hands  -noted by patient before admission with abnormal finger to nose and weakness   -CT head and spine reviewed. There is significant stenosis/narrowing of C6 and C7 which could be contributing to symptoms. Findings appear chronic and not acute. MRI ordered and notable for cervical stenosis and lacunar infarcts. Limited visiblity due to movement   -CT head notable for remote lacunar infarcts, hx of stroke in 2005 currently on ASA   - PT/OT discontinued in light of impending NH with hospice initiation         Severe protein-calorie malnutrition  Nutrition  "consulted. Most recent weight and BMI monitored-     Measurements:  Wt Readings from Last 1 Encounters:   12/27/24 54.9 kg (121 lb 0.5 oz)   Body mass index is 22.87 kg/m².    Patient has been screened and assessed by RD.    Malnutrition Type:  Context: chronic illness  Level: severe    Malnutrition Characteristic Summary:  Weight Loss (Malnutrition): greater than 5% in 1 month  Energy Intake (Malnutrition): less than 75% for greater than or equal to 1 month  Subcutaneous Fat (Malnutrition): severe depletion  Muscle Mass (Malnutrition): severe depletion    Interventions/Recommendations (treatment strategy):  Please prioritize patient's comfort and preferences over strict nutritional goals at this time. Offer small, frequent meals, texture modifications, and respect the patient's desire to eat or not eat. Also ensure proper hydration with small sips of fluids periodically.    - encourage PO intake    Hypokalemia  Patient's most recent potassium results are listed below.   No results for input(s): "K" in the last 72 hours.    Plan  - no longer monitoring, outside patients goals of care      VTE Risk Mitigation (From admission, onward)           Ordered     IP VTE HIGH RISK PATIENT  Once         12/26/24 1547                    Discharge Planning   RITA: 2/7/2025     Code Status: DNR   Medical Readiness for Discharge Date: 1/20/2025  Discharge Plan A: New Nursing Home placement - senior care care facility   Discharge Delays: (!) Post-Acute Set-up (Pt under financial review)                    Yarely Walters MD  Department of Hospital Medicine   [facility] - Internal Medicine Telemetry    "

## 2025-02-05 NOTE — PLAN OF CARE
Recommendations  Please prioritize patient's comfort and preferences over strict nutritional goals at this time. Offer small, frequent meals, texture modifications, and respect the patient's desire to eat or not eat. Also ensure proper hydration with small sips of fluids periodically.

## 2025-02-05 NOTE — SUBJECTIVE & OBJECTIVE
Interval History: Pt seen and examined this morning on raya [facility]. Pending placement. Care plan reviewed. Otherwise, doing well and with no further complaints at this time.    Objective:     Vital Signs (Most Recent):  Temp: 98.2 °F (36.8 °C) (02/05/25 1128)  Pulse: 94 (02/05/25 1128)  Resp: 18 (02/05/25 1128)  BP: 136/87 (02/05/25 1128)  SpO2: 95 % (02/05/25 1128) Vital Signs (24h Range):  Temp:  [97.6 °F (36.4 °C)-98.2 °F (36.8 °C)] 98.2 °F (36.8 °C)  Pulse:  [75-94] 94  Resp:  [17-18] 18  SpO2:  [95 %-98 %] 95 %  BP: (136-157)/(69-92) 136/87     Weight: 54.9 kg (121 lb 0.5 oz)  Body mass index is 22.87 kg/m².    Intake/Output Summary (Last 24 hours) at 2/5/2025 1239  Last data filed at 2/5/2025 0633  Gross per 24 hour   Intake --   Output 1200 ml   Net -1200 ml      Physical Exam  Constitutional:       General: She is not in acute distress.     Appearance: Normal appearance. She is not ill-appearing.   HENT:      Head: Normocephalic and atraumatic.      Nose: Nose normal.      Mouth/Throat:      Mouth: Mucous membranes are moist.   Eyes:      Extraocular Movements: Extraocular movements intact.      Pupils: Pupils are equal, round, and reactive to light.   Cardiovascular:      Rate and Rhythm: Normal rate and regular rhythm.      Heart sounds: No murmur heard.     No gallop.   Pulmonary:      Effort: Pulmonary effort is normal.      Breath sounds: Normal breath sounds. No wheezing or rales.   Abdominal:      General: Abdomen is flat. Bowel sounds are normal. There is no distension.      Palpations: Abdomen is soft.      Tenderness: There is no abdominal tenderness.   Musculoskeletal:         General: No swelling or tenderness. Normal range of motion.      Cervical back: Normal range of motion and neck supple.      Right lower leg: No edema.      Left lower leg: No edema.   Skin:     General: Skin is warm and dry.      Capillary Refill: Capillary refill takes less than 2 seconds.   Neurological:      Comments:  Sleeping, baseline dementia    Psychiatric:         Mood and Affect: Mood normal.           Significant Labs:  -labs reviewed within 24 hours     Significant Procedures:   Dobutamine Stress Test with Color Flow: No results found for this or any previous visit.

## 2025-02-05 NOTE — PLAN OF CARE
"Plan of Care Note    Dx: Hypokalemia [E87.6]  Weakness [R53.1]  Chest pain [R07.9]    Shift Events: No significant events    Goals of Care: comfort care    Neuro: AOself    Vital Signs: BP (!) 146/86   Pulse 94   Temp 97.6 °F (36.4 °C) (Oral)   Resp 17   Ht 5' 1" (1.549 m)   Wt 54.9 kg (121 lb 0.5 oz)   SpO2 96%   Breastfeeding No   BMI 22.87 kg/m²     Respiratory: RA    Diet: Diet Soft & Bite Sized (IDDSI Level 6)  Dietary nutrition supplements Boost Plus Nutritional Drink - Any flavor    Is patient tolerating current diet? yes    GTTS: NA    Urine Output/Bowel Movement:     Intake/Output Summary (Last 24 hours) at 2/4/2025 1801  Last data filed at 2/4/2025 1032  Gross per 24 hour   Intake 220 ml   Output 1700 ml   Net -1480 ml          Drains/Tubes/Tube Feeds (include total output/shift):   Output by Drain (mL) 02/02/25 0701 - 02/02/25 1900 02/02/25 1901 - 02/03/25 0700 02/03/25 0701 - 02/03/25 1900 02/03/25 1901 - 02/04/25 0700 02/04/25 0701 - 02/04/25 1801   Requested LDAs do not have output data documented.          Lines:       Accuchecks:NA    Skin: Intact    Fall Risk Score: See flowsheets    Activity level? See flowsheets    Any scheduled procedures? NA    Any safety concerns? Falls    Other: NA   "

## 2025-02-06 PROCEDURE — 11000001 HC ACUTE MED/SURG PRIVATE ROOM

## 2025-02-06 PROCEDURE — S4991 NICOTINE PATCH NONLEGEND: HCPCS | Performed by: STUDENT IN AN ORGANIZED HEALTH CARE EDUCATION/TRAINING PROGRAM

## 2025-02-06 PROCEDURE — 25000003 PHARM REV CODE 250: Performed by: INTERNAL MEDICINE

## 2025-02-06 PROCEDURE — 25000003 PHARM REV CODE 250: Performed by: STUDENT IN AN ORGANIZED HEALTH CARE EDUCATION/TRAINING PROGRAM

## 2025-02-06 RX ORDER — ONDANSETRON 4 MG/1
4 TABLET, ORALLY DISINTEGRATING ORAL EVERY 6 HOURS PRN
Start: 2025-02-06

## 2025-02-06 RX ORDER — ACETAMINOPHEN 325 MG/1
650 TABLET ORAL EVERY 8 HOURS PRN
Start: 2025-02-06

## 2025-02-06 RX ORDER — TALC
6 POWDER (GRAM) TOPICAL NIGHTLY
Start: 2025-02-06

## 2025-02-06 RX ADMIN — Medication 6 MG: at 09:02

## 2025-02-06 RX ADMIN — MICONAZOLE NITRATE: 20 OINTMENT TOPICAL at 08:02

## 2025-02-06 RX ADMIN — ACETAMINOPHEN 650 MG: 325 TABLET ORAL at 11:02

## 2025-02-06 RX ADMIN — MICONAZOLE NITRATE: 20 OINTMENT TOPICAL at 09:02

## 2025-02-06 RX ADMIN — NICOTINE 1 PATCH: 7 PATCH, EXTENDED RELEASE TRANSDERMAL at 08:02

## 2025-02-06 NOTE — PROGRESS NOTES
"ALONDRA spoke to pt's sister Karla who gave permission for this writer to speak to brother Jarad Son regarding pt's case.  She stated that he is the SW in the family and she has turned the matter over to him to assist.  This writer spoke to Mr Son via phone [phone] regarding progress of the case.  He stated that he visited [facility] and spoke to [facility].  He stated that additional documents were requested to help determine whether pt will qualify for [facility].  He also stated that it was explained to him that pt may hold a policy which can be used to pay for her stay there should the application for [facility] fail.  Mr. Son stated that he will remain hopeful that pt will qualify for [facility] because she worked hard for the benefit however he verbalized understanding that there needs to be a plan "B" should pt not qualify.  Plan to speak again tomorrow.      "

## 2025-02-06 NOTE — ASSESSMENT & PLAN NOTE
Sliding scale held, accuchecks for hypoglycemia     Hold Oral hypoglycemics while patient is in the hospital; holding all given low intake  No need for further BG checks, has been stable for > 1 week. Is eating and drinking okay. Hold all DM medications

## 2025-02-06 NOTE — PROGRESS NOTES
CM received call from Jarad Son [phone] who stated that he is one of pt's brothers.  He stated that he is a Licensed Social Worker and use to live with pt.  He voiced concern regarding the delay in placement.  This writer informed caller that his call will be discussed with Ms. Acosta.  Caller verbalized understanding.     This writer phoned [facility] to speak for update however neither kristie, Maria Del Rosario or business  are in at this time.  Business  expected in later.  Message left requesting a call back.

## 2025-02-06 NOTE — PROGRESS NOTES
[facility] - Internal Medicine [facility] Medicine  Progress Note    Patient Name: Bhavna Lim  MRN: [id number]  Patient Class: IP- Inpatient   Admission Date: 12/26/2024  Length of Stay: 39 days  Attending Physician: Yarely Walters MD  Primary Care Provider: Suzi Green MD        Subjective     Principal Problem:Palliative care encounter        HPI:  Mrs. Bhavna Lim is a 79 year old F with a history of HTN and HLD who presents to the ED for weakness and fatigue. She has been having weight loss and poor po intake over the last few months. She has been seen by GI and evaluated with EGD and Colonoscopy as well as MRCP which showed cholelithiasis with gallbladder distention and intrahepatic and extrahepatic biliary dilation. No significant abnormalities on EGD and coloscopy. She was referred to surgery who recommended NM study and possible EUS with GI. She has been unable to tolerate any solid foods. She sometimes takes a little bit of soup and has tried protein shakes. She feels full immediately. She does endorse some pain in her epigastric region ever since having her EGD performed. She occasionally has vomiting. Denies regurgitation of food. She continues to have worsening weakness and falls at home. She had two falls yesterday due to weakness. She denies significant dizziness. She is unable to walk due to her weakness and sister is concerned this is related to her poor nutrition.      In the ED VSS. Labs notable for Potassium of 3.0 which was repleted in the ED. No focal signs concerning for stroke. She was admitted to medicine for further management.      Overview/Hospital Course:  While on the floor PT/OT were consulted and recommended SNF. Nutrition consulted. Discussed with GI outpatient workup and no indication for further inpatient workup. She continued to be altered while in the hospital. CT spine/head ordered for concern of fall showing ischemic changes and signficant stenosis  and narrowing. She developed worsening hand weakness and numbness/tinglign with abnormal coordiation. Some of this was prior to admission but weakness significantly worsened. MRI ordered and showed old infarcts. She continued to refuse food and did not have abdominal complaints just would not eat. Started on IVF due to hypoglycemia. Acute encephalopathy workup initiated and negative aside from low folate. UA ordered but not performed. At this point if nutrition becomes suboptimal will have to consider other means of nutrition. Son with concern of worsening memory and possible dementia wanting placement in [place]. Started on supplements for poor nutrition including thiamine and folate to see if this will help with mental status. Family had extensive discussion with provider care team and decided to pursue NH with hospice services and sister (lives locally) would pursue mPOA. She has had intermitted problems with hypoglycemia and electrolyte disturbances.     Plan is NH with hospice services. She has been waiting for NH for > 1 week.     Patient remains stable for discharge with hospice services     Interval History: Pt seen and examined this morning on rounds. [facility]. Pending placement. Sister at bedside and frustrated with her care. No update on placement. Care plan reviewed. Otherwise, doing well and with no further complaints at this time.        Objective:     Vital Signs (Most Recent):  Temp: 98.6 °F (37 °C) (02/06/25 1134)  Pulse: 97 (02/06/25 1134)  Resp: 18 (02/06/25 1134)  BP: 109/67 (02/06/25 1134)  SpO2: (!) 94 % (02/06/25 1134) Vital Signs (24h Range):  Temp:  [97.3 °F (36.3 °C)-98.6 °F (37 °C)] 98.6 °F (37 °C)  Pulse:  [] 97  Resp:  [17-18] 18  SpO2:  [94 %-98 %] 94 %  BP: (109-150)/(67-83) 109/67     Weight: 54.9 kg (121 lb 0.5 oz)  Body mass index is 22.87 kg/m².    Intake/Output Summary (Last 24 hours) at 2/6/2025 1312  Last data filed at 2/6/2025 0800  Gross per 24 hour   Intake 720 ml    Output 700 ml   Net 20 ml      Physical Exam  Constitutional:       General: She is not in acute distress.     Appearance: Normal appearance. She is not ill-appearing.   HENT:      Head: Normocephalic and atraumatic.      Nose: Nose normal.      Mouth/Throat:      Mouth: Mucous membranes are moist.   Eyes:      Extraocular Movements: Extraocular movements intact.      Pupils: Pupils are equal, round, and reactive to light.   Cardiovascular:      Rate and Rhythm: Normal rate and regular rhythm.      Heart sounds: No murmur heard.     No gallop.   Pulmonary:      Effort: Pulmonary effort is normal.      Breath sounds: Normal breath sounds. No wheezing or rales.   Abdominal:      General: Abdomen is flat. Bowel sounds are normal. There is no distension.      Palpations: Abdomen is soft.      Tenderness: There is no abdominal tenderness.   Musculoskeletal:         General: No swelling or tenderness. Normal range of motion.      Cervical back: Normal range of motion and neck supple.      Right lower leg: No edema.      Left lower leg: No edema.   Skin:     General: Skin is warm and dry.      Capillary Refill: Capillary refill takes less than 2 seconds.   Neurological:      Comments: Pleasantly demented   Psychiatric:         Mood and Affect: Mood normal.         Computed MELD 3.0 unavailable. One or more values for this score either were not found within the given timeframe or did not fit some other criterion.  Computed MELD-Na unavailable. One or more values for this score either were not found within the given timeframe or did not fit some other criterion.        Assessment and Plan     * Palliative care encounter  Plan is NH with Hospice  - patient medically stable on comfort measures. Pending placement at accepting facility    Unintentional weight loss  Nutrition consulted. Most recent weight and BMI monitored-     Measurements:  Wt Readings from Last 1 Encounters:   12/27/24 54.9 kg (121 lb 0.5 oz)   Body mass  index is 22.87 kg/m².    RD consultation   Extensive outpatient evaluation recommended by GI and General surgery   Will add supplementation to meals; soft bite-sized per rec's  No plan for feeding tube at this time as family is able to convince pt to eat intermittently.       Please prioritize patient's comfort and preferences over strict nutritional goals at this time. Offer small, frequent meals, texture modifications, and respect the patient's desire to eat or not eat. Also ensure proper hydration with small sips of fluids periodically.      Hypoglycemia  -Intermittent hypoglycemia over hospital stay. Likely related to poor PO intake.   -Encourage PO intake         Debility  Patient with Acute debility due to age-related physical debility and other reduced mobility. The patient's latest AMPAC (Activity Measure for Post Acute Care) Score is listed below.    AM-PAC Score - How much help does the patient need for each activity listed  Basic Mobility Total Score: 7  Turning over in bed (including adjusting bedclothes, sheets and blankets)?: A lot  Sitting down on and standing up from a chair with arms (e.g., wheelchair, bedside commode, etc.): Unable  Moving from lying on back to sitting on the side of the bed?: Unable  Moving to and from a bed to a chair (including a wheelchair)?: Unable  Need to walk in hospital room?: Unable  Climbing 3-5 steps with a railing?: Unable    Plan  - Progressive mobility protocol initated  - PT/OT consulted  - Fall precautions in place  - Plan for NH with hospice services -pending insurance(Medicaid/Medicare)   - Palliative care following  - Pt is unable to be safely discharged to home at this time due to lack of continuous home care.          Diabetes mellitus  Sliding scale held, accuchecks for hypoglycemia     Hold Oral hypoglycemics while patient is in the hospital; holding all given low intake  No need for further BG checks, has been stable for > 1 week. Is eating and drinking  "okay. Hold all DM medications    Benign essential hypertension  Patient's blood pressure range in the last 24 hours was: BP  Min: 109/67  Max: 150/80.The patient's inpatient anti-hypertensive regimen is listed below:  Current Antihypertensives       Plan  - BP is controlled, no changes needed to their regimen  Stopping nifedipine and lisinopril to reduce pill burden. They were at low dose and BP has remained acceptable. Okay for her BP to run a little higher     Hyperlipidemia  Stopping ASA and statin to reduce number of pills and preventing longer term effects no longer aligns with goals of care      Comfort measures only status        Hypophosphatemia  Patient's most recent phosphorus results are listed below.   Recent Labs     01/28/25  0800   PHOS 2.8       Plan  No longer monitoring, outside patients goals of care      Hypomagnesemia  No longer monitoring             Anemia  Anemia is likely due to Iron deficiency. Most recent hemoglobin and hematocrit are listed below.  No results for input(s): "HGB", "HCT" in the last 72 hours.    Plan  -will hold on monitoring       Swelling of right upper extremity  CT UE with Nonspecific subcutaneous edema throughout the distal arm and forearm, possibly sterile or infectious in nature.  No fluid collection.  No osteomyelitis.     Continue to monitor.     Hyperbilirubinemia  - elevation in bilirubin with concomitant elevations in LFTs  - RUQ U/S significant for biliary sludge, mild/moderate intrahepatic/extrahepatic biliary ductla dilation. Unchanged compare to previous MRCP.         Pseudomonas urinary tract infection  - urinalysis concerning for UTI and found to have pseudomonas. Now s/p levaquin on 1/20.   - Blood cultures: NGTD       Cognitive impairment  History of progressive confusion and forgetfulness prior to admission. CT head: Sequela of chronic microvascular ischemic change. Remote lacunar type infarct right thalamus;   -HIV/RPR negative. Folate low, repleting. " B12 high. Thiamine low and repleting. 2/2 to dementia with poor nutritional intake; TSH wnl on 1/1/2025  - exhausted reversible causes of poor mentation and with imaging, appears to be age-related decline accelerated by some degree of vascular dementia. Likely some component of acute delirum due to hospital stay/UTI in setting of advanced dementia.     Plan:   - discussed pitfalls of G-tube placement. No plan for placement at this time.   - plan for discharge to Nursing Home with hospice services   -Haldol added PRN for agitation. Discussed risk of death and worsening condition with pt's son and sister.     Numbness and tingling in both hands  -noted by patient before admission with abnormal finger to nose and weakness   -CT head and spine reviewed. There is significant stenosis/narrowing of C6 and C7 which could be contributing to symptoms. Findings appear chronic and not acute. MRI ordered and notable for cervical stenosis and lacunar infarcts. Limited visiblity due to movement   -CT head notable for remote lacunar infarcts, hx of stroke in 2005 currently on ASA   - PT/OT discontinued in light of impending NH with hospice initiation         Severe protein-calorie malnutrition  Nutrition consulted. Most recent weight and BMI monitored-     Measurements:  Wt Readings from Last 1 Encounters:   12/27/24 54.9 kg (121 lb 0.5 oz)   Body mass index is 22.87 kg/m².    Patient has been screened and assessed by RD.    Malnutrition Type:  Context: chronic illness  Level: severe    Malnutrition Characteristic Summary:  Weight Loss (Malnutrition): greater than 5% in 1 month  Energy Intake (Malnutrition): less than 75% for greater than or equal to 1 month  Subcutaneous Fat (Malnutrition): severe depletion  Muscle Mass (Malnutrition): severe depletion    Interventions/Recommendations (treatment strategy):  Please prioritize patient's comfort and preferences over strict nutritional goals at this time. Offer small, frequent meals,  "texture modifications, and respect the patient's desire to eat or not eat. Also ensure proper hydration with small sips of fluids periodically.    - encourage PO intake    Hypokalemia  Patient's most recent potassium results are listed below.   No results for input(s): "K" in the last 72 hours.    Plan  - no longer monitoring, outside patients goals of care      VTE Risk Mitigation (From admission, onward)           Ordered     IP VTE HIGH RISK PATIENT  Once         12/26/24 1547                    Discharge Planning   RITA: 2/7/2025     Code Status: DNR   Medical Readiness for Discharge Date: 1/20/2025  Discharge Plan A: New Nursing Home placement - assisted care facility   Discharge Delays: (!) Post-Acute Set-up (Pt under financial review)                    Yarely Walters MD  Department of Hospital Medicine   [facility] - Internal Medicine Telemetry    "

## 2025-02-06 NOTE — ASSESSMENT & PLAN NOTE
"Anemia is likely due to Iron deficiency. Most recent hemoglobin and hematocrit are listed below.  No results for input(s): "HGB", "HCT" in the last 72 hours.    Plan  -will hold on monitoring     "

## 2025-02-06 NOTE — SUBJECTIVE & OBJECTIVE
Interval History: Pt seen and examined this morning on rounds. [facility]. Pending placement. Sister at bedside and frustrated with her care. No update on placement. Care plan reviewed. Otherwise, doing well and with no further complaints at this time.        Objective:     Vital Signs (Most Recent):  Temp: 98.6 °F (37 °C) (02/06/25 1134)  Pulse: 97 (02/06/25 1134)  Resp: 18 (02/06/25 1134)  BP: 109/67 (02/06/25 1134)  SpO2: (!) 94 % (02/06/25 1134) Vital Signs (24h Range):  Temp:  [97.3 °F (36.3 °C)-98.6 °F (37 °C)] 98.6 °F (37 °C)  Pulse:  [] 97  Resp:  [17-18] 18  SpO2:  [94 %-98 %] 94 %  BP: (109-150)/(67-83) 109/67     Weight: 54.9 kg (121 lb 0.5 oz)  Body mass index is 22.87 kg/m².    Intake/Output Summary (Last 24 hours) at 2/6/2025 1312  Last data filed at 2/6/2025 0800  Gross per 24 hour   Intake 720 ml   Output 700 ml   Net 20 ml      Physical Exam  Constitutional:       General: She is not in acute distress.     Appearance: Normal appearance. She is not ill-appearing.   HENT:      Head: Normocephalic and atraumatic.      Nose: Nose normal.      Mouth/Throat:      Mouth: Mucous membranes are moist.   Eyes:      Extraocular Movements: Extraocular movements intact.      Pupils: Pupils are equal, round, and reactive to light.   Cardiovascular:      Rate and Rhythm: Normal rate and regular rhythm.      Heart sounds: No murmur heard.     No gallop.   Pulmonary:      Effort: Pulmonary effort is normal.      Breath sounds: Normal breath sounds. No wheezing or rales.   Abdominal:      General: Abdomen is flat. Bowel sounds are normal. There is no distension.      Palpations: Abdomen is soft.      Tenderness: There is no abdominal tenderness.   Musculoskeletal:         General: No swelling or tenderness. Normal range of motion.      Cervical back: Normal range of motion and neck supple.      Right lower leg: No edema.      Left lower leg: No edema.   Skin:     General: Skin is warm and dry.      Capillary Refill:  Take ibuprofen or tylenol every 4-6 hours as needed.  Can alternate every 3   Call burn center at 8 am tomorrow morning to schedule an appointment for tomorrow   Return to the ER if you develop intense pain in your hand, numbness, tingling weakness, purulent discharge, red streaking from hand, fevers Capillary refill takes less than 2 seconds.   Neurological:      Comments: Pleasantly demented   Psychiatric:         Mood and Affect: Mood normal.         Computed MELD 3.0 unavailable. One or more values for this score either were not found within the given timeframe or did not fit some other criterion.  Computed MELD-Na unavailable. One or more values for this score either were not found within the given timeframe or did not fit some other criterion.

## 2025-02-06 NOTE — ASSESSMENT & PLAN NOTE
-Intermittent hypoglycemia over hospital stay. Likely related to poor PO intake.   -Encourage PO intake

## 2025-02-06 NOTE — ASSESSMENT & PLAN NOTE
Patient's blood pressure range in the last 24 hours was: BP  Min: 109/67  Max: 150/80.The patient's inpatient anti-hypertensive regimen is listed below:  Current Antihypertensives       Plan  - BP is controlled, no changes needed to their regimen  Stopping nifedipine and lisinopril to reduce pill burden. They were at low dose and BP has remained acceptable. Okay for her BP to run a little higher

## 2025-02-07 PROCEDURE — 11000001 HC ACUTE MED/SURG PRIVATE ROOM

## 2025-02-07 PROCEDURE — 25000003 PHARM REV CODE 250: Performed by: STUDENT IN AN ORGANIZED HEALTH CARE EDUCATION/TRAINING PROGRAM

## 2025-02-07 PROCEDURE — 25000003 PHARM REV CODE 250: Performed by: INTERNAL MEDICINE

## 2025-02-07 PROCEDURE — S4991 NICOTINE PATCH NONLEGEND: HCPCS | Performed by: STUDENT IN AN ORGANIZED HEALTH CARE EDUCATION/TRAINING PROGRAM

## 2025-02-07 RX ADMIN — Medication 6 MG: at 08:02

## 2025-02-07 RX ADMIN — MICONAZOLE NITRATE: 20 OINTMENT TOPICAL at 10:02

## 2025-02-07 RX ADMIN — MICONAZOLE NITRATE: 20 OINTMENT TOPICAL at 09:02

## 2025-02-07 RX ADMIN — NICOTINE 1 PATCH: 7 PATCH, EXTENDED RELEASE TRANSDERMAL at 10:02

## 2025-02-07 NOTE — PROGRESS NOTES
[facility] - Internal Medicine [facility] Medicine  Progress Note    Patient Name: Bhavna Lim  MRN: [id number]  Patient Class: IP- Inpatient   Admission Date: 12/26/2024  Length of Stay: 40 days  Attending Physician: Yarely Walters MD  Primary Care Provider: Suzi Green MD        Subjective     Principal Problem:Palliative care encounter        HPI:  Mrs. Bhavna Lim is a 79 year old F with a history of HTN and HLD who presents to the ED for weakness and fatigue. She has been having weight loss and poor po intake over the last few months. She has been seen by GI and evaluated with EGD and Colonoscopy as well as MRCP which showed cholelithiasis with gallbladder distention and intrahepatic and extrahepatic biliary dilation. No significant abnormalities on EGD and coloscopy. She was referred to surgery who recommended NM study and possible EUS with GI. She has been unable to tolerate any solid foods. She sometimes takes a little bit of soup and has tried protein shakes. She feels full immediately. She does endorse some pain in her epigastric region ever since having her EGD performed. She occasionally has vomiting. Denies regurgitation of food. She continues to have worsening weakness and falls at home. She had two falls yesterday due to weakness. She denies significant dizziness. She is unable to walk due to her weakness and sister is concerned this is related to her poor nutrition.      In the ED VSS. Labs notable for Potassium of 3.0 which was repleted in the ED. No focal signs concerning for stroke. She was admitted to medicine for further management.      Overview/Hospital Course:  While on the floor PT/OT were consulted and recommended SNF. Nutrition consulted. Discussed with GI outpatient workup and no indication for further inpatient workup. She continued to be altered while in the hospital. CT spine/head ordered for concern of fall showing ischemic changes and signficant stenosis  and narrowing. She developed worsening hand weakness and numbness/tinglign with abnormal coordiation. Some of this was prior to admission but weakness significantly worsened. MRI ordered and showed old infarcts. She continued to refuse food and did not have abdominal complaints just would not eat. Started on IVF due to hypoglycemia. Acute encephalopathy workup initiated and negative aside from low folate. UA ordered but not performed. At this point if nutrition becomes suboptimal will have to consider other means of nutrition. Son with concern of worsening memory and possible dementia wanting placement in [place]. Started on supplements for poor nutrition including thiamine and folate to see if this will help with mental status. Family had extensive discussion with provider care team and decided to pursue NH with hospice services and sister (lives locally) would pursue mPOA. She has had intermitted problems with hypoglycemia and electrolyte disturbances.     Plan is NH with hospice services. She has been waiting for NH for > 1 week.     Patient remains stable for discharge with hospice services     Interval History: NAEO. Pending placement.       Objective:     Vital Signs (Most Recent):  Temp: 98.3 °F (36.8 °C) (02/07/25 1115)  Pulse: 88 (02/07/25 1115)  Resp: 18 (02/07/25 1115)  BP: (!) 133/97 (02/07/25 1115)  SpO2: 97 % (02/07/25 0801) Vital Signs (24h Range):  Temp:  [97.7 °F (36.5 °C)-98.4 °F (36.9 °C)] 98.3 °F (36.8 °C)  Pulse:  [] 88  Resp:  [18] 18  SpO2:  [95 %-97 %] 97 %  BP: (130-140)/(59-97) 133/97     Weight: 54.9 kg (121 lb 0.5 oz)  Body mass index is 22.87 kg/m².    Intake/Output Summary (Last 24 hours) at 2/7/2025 1136  Last data filed at 2/7/2025 1012  Gross per 24 hour   Intake 240 ml   Output 560 ml   Net -320 ml      Physical Exam  Constitutional:       General: She is not in acute distress.     Appearance: Normal appearance. She is not ill-appearing.   HENT:      Head: Normocephalic and  atraumatic.      Nose: Nose normal.      Mouth/Throat:      Mouth: Mucous membranes are moist.   Eyes:      Extraocular Movements: Extraocular movements intact.      Pupils: Pupils are equal, round, and reactive to light.   Cardiovascular:      Rate and Rhythm: Normal rate and regular rhythm.      Heart sounds: No murmur heard.     No gallop.   Pulmonary:      Effort: Pulmonary effort is normal.      Breath sounds: Normal breath sounds. No wheezing or rales.   Abdominal:      General: Abdomen is flat. Bowel sounds are normal. There is no distension.      Palpations: Abdomen is soft.      Tenderness: There is no abdominal tenderness.   Musculoskeletal:         General: No swelling or tenderness. Normal range of motion.      Cervical back: Normal range of motion and neck supple.      Right lower leg: No edema.      Left lower leg: No edema.   Skin:     General: Skin is warm and dry.      Capillary Refill: Capillary refill takes less than 2 seconds.   Neurological:      Comments: Pleasantly demented   Psychiatric:         Mood and Affect: Mood normal.         Computed MELD 3.0 unavailable. One or more values for this score either were not found within the given timeframe or did not fit some other criterion.  Computed MELD-Na unavailable. One or more values for this score either were not found within the given timeframe or did not fit some other criterion.          Assessment and Plan     * Palliative care encounter  Plan is NH with Hospice  - patient medically stable on comfort measures. Pending placement at accepting facility    Unintentional weight loss  Nutrition consulted. Most recent weight and BMI monitored-     Measurements:  Wt Readings from Last 1 Encounters:   12/27/24 54.9 kg (121 lb 0.5 oz)   Body mass index is 22.87 kg/m².    RD consultation   Extensive outpatient evaluation recommended by GI and General surgery   Will add supplementation to meals; soft bite-sized per rec's  No plan for feeding tube at  this time as family is able to convince pt to eat intermittently.       Please prioritize patient's comfort and preferences over strict nutritional goals at this time. Offer small, frequent meals, texture modifications, and respect the patient's desire to eat or not eat. Also ensure proper hydration with small sips of fluids periodically.      Hypoglycemia  -Intermittent hypoglycemia over hospital stay. Likely related to poor PO intake.   -Encourage PO intake         Debility  Patient with Acute debility due to age-related physical debility and other reduced mobility. The patient's latest AMPAC (Activity Measure for Post Acute Care) Score is listed below.    AM-PAC Score - How much help does the patient need for each activity listed  Basic Mobility Total Score: 7  Turning over in bed (including adjusting bedclothes, sheets and blankets)?: A lot  Sitting down on and standing up from a chair with arms (e.g., wheelchair, bedside commode, etc.): Unable  Moving from lying on back to sitting on the side of the bed?: Unable  Moving to and from a bed to a chair (including a wheelchair)?: Unable  Need to walk in hospital room?: Unable  Climbing 3-5 steps with a railing?: Unable    Plan  - Progressive mobility protocol initated  - PT/OT consulted  - Fall precautions in place  - Plan for NH with hospice services -pending insurance(Medicaid/Medicare)   - Palliative care following  - Pt is unable to be safely discharged to home at this time due to lack of continuous home care.          Diabetes mellitus  Sliding scale held, accuchecks for hypoglycemia     Hold Oral hypoglycemics while patient is in the hospital; holding all given low intake  No need for further BG checks, has been stable for > 1 week. Is eating and drinking okay. Hold all DM medications    Benign essential hypertension  Patient's blood pressure range in the last 24 hours was: BP  Min: 109/67  Max: 150/80.The patient's inpatient anti-hypertensive regimen is  "listed below:  Current Antihypertensives       Plan  - BP is controlled, no changes needed to their regimen  Stopping nifedipine and lisinopril to reduce pill burden. They were at low dose and BP has remained acceptable. Okay for her BP to run a little higher     Hyperlipidemia  Stopping ASA and statin to reduce number of pills and preventing longer term effects no longer aligns with goals of care      Comfort measures only status        Hypophosphatemia  Patient's most recent phosphorus results are listed below.   Recent Labs     01/28/25  0800   PHOS 2.8       Plan  No longer monitoring, outside patients goals of care      Hypomagnesemia  No longer monitoring             Anemia  Anemia is likely due to Iron deficiency. Most recent hemoglobin and hematocrit are listed below.  No results for input(s): "HGB", "HCT" in the last 72 hours.    Plan  -will hold on monitoring       Swelling of right upper extremity  CT UE with Nonspecific subcutaneous edema throughout the distal arm and forearm, possibly sterile or infectious in nature.  No fluid collection.  No osteomyelitis.     Continue to monitor.     Hyperbilirubinemia  - elevation in bilirubin with concomitant elevations in LFTs  - RUQ U/S significant for biliary sludge, mild/moderate intrahepatic/extrahepatic biliary ductla dilation. Unchanged compare to previous MRCP.         Pseudomonas urinary tract infection  - urinalysis concerning for UTI and found to have pseudomonas. Now s/p levaquin on 1/20.   - Blood cultures: NGTD       Cognitive impairment  History of progressive confusion and forgetfulness prior to admission. CT head: Sequela of chronic microvascular ischemic change. Remote lacunar type infarct right thalamus;   -HIV/RPR negative. Folate low, repleting. B12 high. Thiamine low and repleting. 2/2 to dementia with poor nutritional intake; TSH wnl on 1/1/2025  - exhausted reversible causes of poor mentation and with imaging, appears to be age-related " decline accelerated by some degree of vascular dementia. Likely some component of acute delirum due to hospital stay/UTI in setting of advanced dementia.     Plan:   - discussed pitfalls of G-tube placement. No plan for placement at this time.   - plan for discharge to Nursing Home with hospice services   -Haldol added PRN for agitation. Discussed risk of death and worsening condition with pt's son and sister.     Numbness and tingling in both hands  -noted by patient before admission with abnormal finger to nose and weakness   -CT head and spine reviewed. There is significant stenosis/narrowing of C6 and C7 which could be contributing to symptoms. Findings appear chronic and not acute. MRI ordered and notable for cervical stenosis and lacunar infarcts. Limited visiblity due to movement   -CT head notable for remote lacunar infarcts, hx of stroke in 2005 currently on ASA   - PT/OT discontinued in light of impending NH with hospice initiation and likely little value given poor cooperation of patient         Severe protein-calorie malnutrition  Nutrition consulted. Most recent weight and BMI monitored-     Measurements:  Wt Readings from Last 1 Encounters:   12/27/24 54.9 kg (121 lb 0.5 oz)   Body mass index is 22.87 kg/m².    Patient has been screened and assessed by RD.    Malnutrition Type:  Context: chronic illness  Level: severe    Malnutrition Characteristic Summary:  Weight Loss (Malnutrition): greater than 5% in 1 month  Energy Intake (Malnutrition): less than 75% for greater than or equal to 1 month  Subcutaneous Fat (Malnutrition): severe depletion  Muscle Mass (Malnutrition): severe depletion    Interventions/Recommendations (treatment strategy):  Please prioritize patient's comfort and preferences over strict nutritional goals at this time. Offer small, frequent meals, texture modifications, and respect the patient's desire to eat or not eat. Also ensure proper hydration with small sips of fluids  "periodically.    - encourage PO intake    Hypokalemia  Patient's most recent potassium results are listed below.   No results for input(s): "K" in the last 72 hours.    Plan  - no longer monitoring, outside patients goals of care      VTE Risk Mitigation (From admission, onward)           Ordered     IP VTE HIGH RISK PATIENT  Once         12/26/24 1547                    Discharge Planning   RITA: 2/7/2025     Code Status: DNR   Medical Readiness for Discharge Date: 1/20/2025  Discharge Plan A: New Nursing Home placement - longterm care facility   Discharge Delays: (!) Post-Acute Set-up (Pt under financial review)                    Yarely Walters MD  Department of Hospital Medicine   [facility] - Internal Medicine Telemetry    "

## 2025-02-07 NOTE — SUBJECTIVE & OBJECTIVE
Interval History: NAEO. Pending placement.       Objective:     Vital Signs (Most Recent):  Temp: 98.3 °F (36.8 °C) (02/07/25 1115)  Pulse: 88 (02/07/25 1115)  Resp: 18 (02/07/25 1115)  BP: (!) 133/97 (02/07/25 1115)  SpO2: 97 % (02/07/25 0801) Vital Signs (24h Range):  Temp:  [97.7 °F (36.5 °C)-98.4 °F (36.9 °C)] 98.3 °F (36.8 °C)  Pulse:  [] 88  Resp:  [18] 18  SpO2:  [95 %-97 %] 97 %  BP: (130-140)/(59-97) 133/97     Weight: 54.9 kg (121 lb 0.5 oz)  Body mass index is 22.87 kg/m².    Intake/Output Summary (Last 24 hours) at 2/7/2025 1136  Last data filed at 2/7/2025 1012  Gross per 24 hour   Intake 240 ml   Output 560 ml   Net -320 ml      Physical Exam  Constitutional:       General: She is not in acute distress.     Appearance: Normal appearance. She is not ill-appearing.   HENT:      Head: Normocephalic and atraumatic.      Nose: Nose normal.      Mouth/Throat:      Mouth: Mucous membranes are moist.   Eyes:      Extraocular Movements: Extraocular movements intact.      Pupils: Pupils are equal, round, and reactive to light.   Cardiovascular:      Rate and Rhythm: Normal rate and regular rhythm.      Heart sounds: No murmur heard.     No gallop.   Pulmonary:      Effort: Pulmonary effort is normal.      Breath sounds: Normal breath sounds. No wheezing or rales.   Abdominal:      General: Abdomen is flat. Bowel sounds are normal. There is no distension.      Palpations: Abdomen is soft.      Tenderness: There is no abdominal tenderness.   Musculoskeletal:         General: No swelling or tenderness. Normal range of motion.      Cervical back: Normal range of motion and neck supple.      Right lower leg: No edema.      Left lower leg: No edema.   Skin:     General: Skin is warm and dry.      Capillary Refill: Capillary refill takes less than 2 seconds.   Neurological:      Comments: Pleasantly demented   Psychiatric:         Mood and Affect: Mood normal.         Computed MELD 3.0 unavailable. One or more  values for this score either were not found within the given timeframe or did not fit some other criterion.  Computed MELD-Na unavailable. One or more values for this score either were not found within the given timeframe or did not fit some other criterion.

## 2025-02-07 NOTE — ASSESSMENT & PLAN NOTE
-noted by patient before admission with abnormal finger to nose and weakness   -CT head and spine reviewed. There is significant stenosis/narrowing of C6 and C7 which could be contributing to symptoms. Findings appear chronic and not acute. MRI ordered and notable for cervical stenosis and lacunar infarcts. Limited visiblity due to movement   -CT head notable for remote lacunar infarcts, hx of stroke in 2005 currently on ASA   - PT/OT discontinued in light of impending NH with hospice initiation and likely little value given poor cooperation of patient

## 2025-02-07 NOTE — PROGRESS NOTES
ALONDRA received call from brother Jarad Son, [phone].   He stated that he was on the way to [place] to take financial documents.

## 2025-02-08 LAB — POCT GLUCOSE: 133 MG/DL (ref 70–110)

## 2025-02-08 PROCEDURE — 25000003 PHARM REV CODE 250: Performed by: INTERNAL MEDICINE

## 2025-02-08 PROCEDURE — 25000003 PHARM REV CODE 250: Performed by: STUDENT IN AN ORGANIZED HEALTH CARE EDUCATION/TRAINING PROGRAM

## 2025-02-08 PROCEDURE — 11000001 HC ACUTE MED/SURG PRIVATE ROOM

## 2025-02-08 PROCEDURE — S4991 NICOTINE PATCH NONLEGEND: HCPCS | Performed by: STUDENT IN AN ORGANIZED HEALTH CARE EDUCATION/TRAINING PROGRAM

## 2025-02-08 RX ADMIN — MICONAZOLE NITRATE: 20 OINTMENT TOPICAL at 09:02

## 2025-02-08 RX ADMIN — NICOTINE 1 PATCH: 7 PATCH, EXTENDED RELEASE TRANSDERMAL at 09:02

## 2025-02-08 RX ADMIN — Medication 6 MG: at 09:02

## 2025-02-08 NOTE — PROGRESS NOTES
[facility] - Internal Medicine [facility] Medicine  Progress Note    Patient Name: Bhavna Lim  MRN: [id number]  Patient Class: IP- Inpatient   Admission Date: 12/26/2024  Length of Stay: 41 days  Attending Physician: Yarely Walters MD  Primary Care Provider: Suzi Green MD        Subjective     Principal Problem:Palliative care encounter        HPI:  Mrs. Bhavna Lim is a 79 year old F with a history of HTN and HLD who presents to the ED for weakness and fatigue. She has been having weight loss and poor po intake over the last few months. She has been seen by GI and evaluated with EGD and Colonoscopy as well as MRCP which showed cholelithiasis with gallbladder distention and intrahepatic and extrahepatic biliary dilation. No significant abnormalities on EGD and coloscopy. She was referred to surgery who recommended NM study and possible EUS with GI. She has been unable to tolerate any solid foods. She sometimes takes a little bit of soup and has tried protein shakes. She feels full immediately. She does endorse some pain in her epigastric region ever since having her EGD performed. She occasionally has vomiting. Denies regurgitation of food. She continues to have worsening weakness and falls at home. She had two falls yesterday due to weakness. She denies significant dizziness. She is unable to walk due to her weakness and sister is concerned this is related to her poor nutrition.      In the ED VSS. Labs notable for Potassium of 3.0 which was repleted in the ED. No focal signs concerning for stroke. She was admitted to medicine for further management.      Overview/Hospital Course:  While on the floor PT/OT were consulted and recommended SNF. Nutrition consulted. Discussed with GI outpatient workup and no indication for further inpatient workup. She continued to be altered while in the hospital. CT spine/head ordered for concern of fall showing ischemic changes and signficant stenosis  and narrowing. She developed worsening hand weakness and numbness/tinglign with abnormal coordiation. Some of this was prior to admission but weakness significantly worsened. MRI ordered and showed old infarcts. She continued to refuse food and did not have abdominal complaints just would not eat. Started on IVF due to hypoglycemia. Acute encephalopathy workup initiated and negative aside from low folate. UA ordered but not performed. At this point if nutrition becomes suboptimal will have to consider other means of nutrition. Son with concern of worsening memory and possible dementia wanting placement in [place]. Started on supplements for poor nutrition including thiamine and folate to see if this will help with mental status. Family had extensive discussion with provider care team and decided to pursue NH with hospice services and sister (lives locally) would pursue mPOA. She has had intermitted problems with hypoglycemia and electrolyte disturbances.     Plan is NH with hospice services. She has been waiting for NH for > 1 week.     Patient remains stable for discharge with hospice services     Interval History: NAEO. Pending placement.       Objective:     Vital Signs (Most Recent):  Temp: 98.2 °F (36.8 °C) (02/08/25 0512)  Pulse: 100 (02/08/25 0512)  Resp: 18 (02/08/25 0512)  BP: 121/62 (02/08/25 0512)  SpO2: (!) 94 % (02/08/25 0512) Vital Signs (24h Range):  Temp:  [97.6 °F (36.4 °C)-98.9 °F (37.2 °C)] 98.2 °F (36.8 °C)  Pulse:  [] 100  Resp:  [18] 18  SpO2:  [94 %-98 %] 94 %  BP: (121-151)/(59-89) 121/62     Weight: 54.9 kg (121 lb 0.5 oz)  Body mass index is 22.87 kg/m².    Intake/Output Summary (Last 24 hours) at 2/8/2025 0649  Last data filed at 2/7/2025 1755  Gross per 24 hour   Intake 780 ml   Output 610 ml   Net 170 ml      Physical Exam  Constitutional:       General: She is not in acute distress.     Appearance: Normal appearance. She is not ill-appearing.   HENT:      Head: Normocephalic and  "atraumatic.      Nose: Nose normal.      Mouth/Throat:      Mouth: Mucous membranes are moist.   Eyes:      Extraocular Movements: Extraocular movements intact.      Pupils: Pupils are equal, round, and reactive to light.   Cardiovascular:      Rate and Rhythm: Normal rate and regular rhythm.      Heart sounds: No murmur heard.     No gallop.   Pulmonary:      Effort: Pulmonary effort is normal.      Breath sounds: Normal breath sounds. No wheezing or rales.   Abdominal:      General: Abdomen is flat. Bowel sounds are normal. There is no distension.      Palpations: Abdomen is soft.      Tenderness: There is no abdominal tenderness.   Musculoskeletal:         General: No swelling or tenderness. Normal range of motion.      Cervical back: Normal range of motion and neck supple.      Right lower leg: No edema.      Left lower leg: No edema.   Skin:     General: Skin is warm and dry.      Capillary Refill: Capillary refill takes less than 2 seconds.   Neurological:      Comments: Pleasantly demented   Psychiatric:         Mood and Affect: Mood normal.         Computed MELD 3.0 unavailable. One or more values for this score either were not found within the given timeframe or did not fit some other criterion.  Computed MELD-Na unavailable. One or more values for this score either were not found within the given timeframe or did not fit some other criterion.      Significant Labs:  CBC:  No results for input(s): "WBC", "HGB", "HCT", "PLT" in the last 48 hours.  CMP:  No results for input(s): "NA", "K", "CL", "CO2", "GLU", "BUN", "CREATININE", "CALCIUM", "PROT", "ALBUMIN", "BILITOT", "ALKPHOS", "AST", "ALT", "ANIONGAP", "EGFRNONAA" in the last 48 hours.    Invalid input(s): "ESTGFAFRICA"  PTINR:  No results for input(s): "INR" in the last 48 hours.        Assessment and Plan     * Palliative care encounter  Plan is NH with Hospice  - patient medically stable on comfort measures. Pending placement at [facility] " facility    Unintentional weight loss  Nutrition consulted. Most recent weight and BMI monitored-     Measurements:  Wt Readings from Last 1 Encounters:   12/27/24 54.9 kg (121 lb 0.5 oz)   Body mass index is 22.87 kg/m².    RD consultation   Extensive outpatient evaluation recommended by GI and General surgery   Will add supplementation to meals; soft bite-sized per rec's  No plan for feeding tube at this time as family is able to convince pt to eat intermittently.       Please prioritize patient's comfort and preferences over strict nutritional goals at this time. Offer small, frequent meals, texture modifications, and respect the patient's desire to eat or not eat. Also ensure proper hydration with small sips of fluids periodically.      Hypoglycemia  -Intermittent hypoglycemia over hospital stay. Likely related to poor PO intake.   -Encourage PO intake         Debility  Patient with Acute debility due to age-related physical debility and other reduced mobility. The patient's latest AMPAC (Activity Measure for Post Acute Care) Score is listed below.    AM-PAC Score - How much help does the patient need for each activity listed  Basic Mobility Total Score: 7  Turning over in bed (including adjusting bedclothes, sheets and blankets)?: A lot  Sitting down on and standing up from a chair with arms (e.g., wheelchair, bedside commode, etc.): Unable  Moving from lying on back to sitting on the side of the bed?: Unable  Moving to and from a bed to a chair (including a wheelchair)?: Unable  Need to walk in hospital room?: Unable  Climbing 3-5 steps with a railing?: Unable    Plan  - Progressive mobility protocol initated  - PT/OT consulted  - Fall precautions in place  - Plan for NH with hospice services -pending insurance(Medicaid/Medicare)   - Palliative care following  - Pt is unable to be safely discharged to home at this time due to lack of continuous home care.          Diabetes mellitus  Sliding scale held,  "accuchecks for hypoglycemia     Hold Oral hypoglycemics while patient is in the hospital; holding all given low intake  No need for further BG checks, has been stable for > 1 week. Is eating and drinking okay. Hold all DM medications    Benign essential hypertension  Patient's blood pressure range in the last 24 hours was: BP  Min: 109/67  Max: 150/80.The patient's inpatient anti-hypertensive regimen is listed below:  Current Antihypertensives       Plan  - BP is controlled, no changes needed to their regimen  Stopping nifedipine and lisinopril to reduce pill burden. They were at low dose and BP has remained acceptable. Okay for her BP to run a little higher     Hyperlipidemia  Stopping ASA and statin to reduce number of pills and preventing longer term effects no longer aligns with goals of care      Comfort measures only status        Hypophosphatemia  Patient's most recent phosphorus results are listed below.   Recent Labs     01/28/25  0800   PHOS 2.8       Plan  No longer monitoring, outside patients goals of care      Hypomagnesemia  No longer monitoring             Anemia  Anemia is likely due to Iron deficiency. Most recent hemoglobin and hematocrit are listed below.  No results for input(s): "HGB", "HCT" in the last 72 hours.    Plan  -will hold on monitoring       Swelling of right upper extremity  CT UE with Nonspecific subcutaneous edema throughout the distal arm and forearm, possibly sterile or infectious in nature.  No fluid collection.  No osteomyelitis.     Continue to monitor.     Hyperbilirubinemia  - elevation in bilirubin with concomitant elevations in LFTs  - RUQ U/S significant for biliary sludge, mild/moderate intrahepatic/extrahepatic biliary ductla dilation. Unchanged compare to previous MRCP.         Pseudomonas urinary tract infection  - urinalysis concerning for UTI and found to have pseudomonas. Now s/p levaquin on 1/20.   - Blood cultures: NGTD       Cognitive impairment  History of " progressive confusion and forgetfulness prior to admission. CT head: Sequela of chronic microvascular ischemic change. Remote lacunar type infarct right thalamus;   -HIV/RPR negative. Folate low, repleting. B12 high. Thiamine low and repleting. 2/2 to dementia with poor nutritional intake; TSH wnl on 1/1/2025  - exhausted reversible causes of poor mentation and with imaging, appears to be age-related decline accelerated by some degree of vascular dementia. Likely some component of acute delirum due to hospital stay/UTI in setting of advanced dementia.     Plan:   - discussed pitfalls of G-tube placement. No plan for placement at this time.   - plan for discharge to Nursing Home with hospice services   -Haldol added PRN for agitation. Discussed risk of death and worsening condition with pt's son and sister.     Numbness and tingling in both hands  -noted by patient before admission with abnormal finger to nose and weakness   -CT head and spine reviewed. There is significant stenosis/narrowing of C6 and C7 which could be contributing to symptoms. Findings appear chronic and not acute. MRI ordered and notable for cervical stenosis and lacunar infarcts. Limited visiblity due to movement   -CT head notable for remote lacunar infarcts, hx of stroke in 2005 currently on ASA   - PT/OT discontinued in light of impending NH with hospice initiation and likely little value given poor cooperation of patient         Severe protein-calorie malnutrition  Nutrition consulted. Most recent weight and BMI monitored-     Measurements:  Wt Readings from Last 1 Encounters:   12/27/24 54.9 kg (121 lb 0.5 oz)   Body mass index is 22.87 kg/m².    Patient has been screened and assessed by RD.    Malnutrition Type:  Context: chronic illness  Level: severe    Malnutrition Characteristic Summary:  Weight Loss (Malnutrition): greater than 5% in 1 month  Energy Intake (Malnutrition): less than 75% for greater than or equal to 1 month  Subcutaneous  "Fat (Malnutrition): severe depletion  Muscle Mass (Malnutrition): severe depletion    Interventions/Recommendations (treatment strategy):  Please prioritize patient's comfort and preferences over strict nutritional goals at this time. Offer small, frequent meals, texture modifications, and respect the patient's desire to eat or not eat. Also ensure proper hydration with small sips of fluids periodically.    - encourage PO intake    Hypokalemia  Patient's most recent potassium results are listed below.   No results for input(s): "K" in the last 72 hours.    Plan  - no longer monitoring, outside patients goals of care      VTE Risk Mitigation (From admission, onward)           Ordered     IP VTE HIGH RISK PATIENT  Once         12/26/24 1547                    Discharge Planning   RITA: 2/11/2025     Code Status: DNR   Medical Readiness for Discharge Date: 1/20/2025  Discharge Plan A: New Nursing Home placement - [facility] care facility   Discharge Delays: (!) Post-Acute Set-up (Pt under financial review)                    Yarely Walters MD  Department of Hospital Medicine   [facility] - Internal Medicine Telemetry    "

## 2025-02-08 NOTE — SUBJECTIVE & OBJECTIVE
Interval History: NAEO. Pending placement.       Objective:     Vital Signs (Most Recent):  Temp: 98.2 °F (36.8 °C) (02/08/25 0512)  Pulse: 100 (02/08/25 0512)  Resp: 18 (02/08/25 0512)  BP: 121/62 (02/08/25 0512)  SpO2: (!) 94 % (02/08/25 0512) Vital Signs (24h Range):  Temp:  [97.6 °F (36.4 °C)-98.9 °F (37.2 °C)] 98.2 °F (36.8 °C)  Pulse:  [] 100  Resp:  [18] 18  SpO2:  [94 %-98 %] 94 %  BP: (121-151)/(59-89) 121/62     Weight: 54.9 kg (121 lb 0.5 oz)  Body mass index is 22.87 kg/m².    Intake/Output Summary (Last 24 hours) at 2/8/2025 0649  Last data filed at 2/7/2025 1755  Gross per 24 hour   Intake 780 ml   Output 610 ml   Net 170 ml      Physical Exam  Constitutional:       General: She is not in acute distress.     Appearance: Normal appearance. She is not ill-appearing.   HENT:      Head: Normocephalic and atraumatic.      Nose: Nose normal.      Mouth/Throat:      Mouth: Mucous membranes are moist.   Eyes:      Extraocular Movements: Extraocular movements intact.      Pupils: Pupils are equal, round, and reactive to light.   Cardiovascular:      Rate and Rhythm: Normal rate and regular rhythm.      Heart sounds: No murmur heard.     No gallop.   Pulmonary:      Effort: Pulmonary effort is normal.      Breath sounds: Normal breath sounds. No wheezing or rales.   Abdominal:      General: Abdomen is flat. Bowel sounds are normal. There is no distension.      Palpations: Abdomen is soft.      Tenderness: There is no abdominal tenderness.   Musculoskeletal:         General: No swelling or tenderness. Normal range of motion.      Cervical back: Normal range of motion and neck supple.      Right lower leg: No edema.      Left lower leg: No edema.   Skin:     General: Skin is warm and dry.      Capillary Refill: Capillary refill takes less than 2 seconds.   Neurological:      Comments: Pleasantly demented   Psychiatric:         Mood and Affect: Mood normal.         Computed MELD 3.0 unavailable. One or more  "values for this score either were not found within the given timeframe or did not fit some other criterion.  Computed MELD-Na unavailable. One or more values for this score either were not found within the given timeframe or did not fit some other criterion.      Significant Labs:  CBC:  No results for input(s): "WBC", "HGB", "HCT", "PLT" in the last 48 hours.  CMP:  No results for input(s): "NA", "K", "CL", "CO2", "GLU", "BUN", "CREATININE", "CALCIUM", "PROT", "ALBUMIN", "BILITOT", "ALKPHOS", "AST", "ALT", "ANIONGAP", "EGFRNONAA" in the last 48 hours.    Invalid input(s): "ESTGFAFRICA"  PTINR:  No results for input(s): "INR" in the last 48 hours.      "

## 2025-02-09 PROCEDURE — S4991 NICOTINE PATCH NONLEGEND: HCPCS | Performed by: STUDENT IN AN ORGANIZED HEALTH CARE EDUCATION/TRAINING PROGRAM

## 2025-02-09 PROCEDURE — 25000003 PHARM REV CODE 250: Performed by: STUDENT IN AN ORGANIZED HEALTH CARE EDUCATION/TRAINING PROGRAM

## 2025-02-09 PROCEDURE — 11000001 HC ACUTE MED/SURG PRIVATE ROOM

## 2025-02-09 RX ADMIN — MICONAZOLE NITRATE: 20 OINTMENT TOPICAL at 09:02

## 2025-02-09 RX ADMIN — NICOTINE 1 PATCH: 7 PATCH, EXTENDED RELEASE TRANSDERMAL at 09:02

## 2025-02-09 NOTE — PROGRESS NOTES
[facility] - Internal Medicine [facility] Medicine  Progress Note    Patient Name: Bhavna Lim  MRN: [id number]  Patient Class: IP- Inpatient   Admission Date: 12/26/2024  Length of Stay: 42 days  Attending Physician: Yarely Walters MD  Primary Care Provider: Suzi Green MD        Subjective     Principal Problem:Palliative care encounter        HPI:  Mrs. Bhavna Lim is a 79 year old F with a history of HTN and HLD who presents to the ED for weakness and fatigue. She has been having weight loss and poor po intake over the last few months. She has been seen by GI and evaluated with EGD and Colonoscopy as well as MRCP which showed cholelithiasis with gallbladder distention and intrahepatic and extrahepatic biliary dilation. No significant abnormalities on EGD and coloscopy. She was referred to surgery who recommended NM study and possible EUS with GI. She has been unable to tolerate any solid foods. She sometimes takes a little bit of soup and has tried protein shakes. She feels full immediately. She does endorse some pain in her epigastric region ever since having her EGD performed. She occasionally has vomiting. Denies regurgitation of food. She continues to have worsening weakness and falls at home. She had two falls yesterday due to weakness. She denies significant dizziness. She is unable to walk due to her weakness and sister is concerned this is related to her poor nutrition.      In the ED VSS. Labs notable for Potassium of 3.0 which was repleted in the ED. No focal signs concerning for stroke. She was admitted to medicine for further management.      Overview/Hospital Course:  While on the floor PT/OT were consulted and recommended SNF. Nutrition consulted. Discussed with GI outpatient workup and no indication for further inpatient workup. She continued to be altered while in the hospital. CT spine/head ordered for concern of fall showing ischemic changes and signficant stenosis  and narrowing. She developed worsening hand weakness and numbness/tinglign with abnormal coordiation. Some of this was prior to admission but weakness significantly worsened. MRI ordered and showed old infarcts. She continued to refuse food and did not have abdominal complaints just would not eat. Started on IVF due to hypoglycemia. Acute encephalopathy workup initiated and negative aside from low folate. UA ordered but not performed. At this point if nutrition becomes suboptimal will have to consider other means of nutrition. Son with concern of worsening memory and possible dementia wanting placement in [place]. Started on supplements for poor nutrition including thiamine and folate to see if this will help with mental status. Family had extensive discussion with provider care team and decided to pursue NH with hospice services and sister (lives locally) would pursue mPOA. She has had intermitted problems with hypoglycemia and electrolyte disturbances.     Plan is NH with hospice services. She has been waiting for NH for > 1 week.     Patient remains stable for discharge with hospice services     Interval History: NAEO. Pending placement.       Objective:     Vital Signs (Most Recent):  Temp: 98 °F (36.7 °C) (02/09/25 1100)  Pulse: 77 (02/09/25 1100)  Resp: 18 (02/09/25 1100)  BP: (!) 153/88 (02/09/25 1100)  SpO2: 96 % (02/09/25 1100) Vital Signs (24h Range):  Temp:  [97.6 °F (36.4 °C)-98.7 °F (37.1 °C)] 98 °F (36.7 °C)  Pulse:  [77-88] 77  Resp:  [18] 18  SpO2:  [94 %-97 %] 96 %  BP: (117-153)/(72-88) 153/88     Weight: 54.9 kg (121 lb 0.5 oz)  Body mass index is 22.87 kg/m².    Intake/Output Summary (Last 24 hours) at 2/9/2025 1126  Last data filed at 2/9/2025 0643  Gross per 24 hour   Intake 350 ml   Output 550 ml   Net -200 ml      Physical Exam  Constitutional:       General: She is not in acute distress.     Appearance: Normal appearance. She is not ill-appearing.   HENT:      Head: Normocephalic and  "atraumatic.      Nose: Nose normal.      Mouth/Throat:      Mouth: Mucous membranes are moist.   Eyes:      Extraocular Movements: Extraocular movements intact.      Pupils: Pupils are equal, round, and reactive to light.   Cardiovascular:      Rate and Rhythm: Normal rate and regular rhythm.      Heart sounds: No murmur heard.     No gallop.   Pulmonary:      Effort: Pulmonary effort is normal.      Breath sounds: Normal breath sounds. No wheezing or rales.   Abdominal:      General: Abdomen is flat. Bowel sounds are normal. There is no distension.      Palpations: Abdomen is soft.      Tenderness: There is no abdominal tenderness.   Musculoskeletal:         General: No swelling or tenderness. Normal range of motion.      Cervical back: Normal range of motion and neck supple.      Right lower leg: No edema.      Left lower leg: No edema.   Skin:     General: Skin is warm and dry.      Capillary Refill: Capillary refill takes less than 2 seconds.   Neurological:      Comments: Pleasantly demented   Psychiatric:         Mood and Affect: Mood normal.         Computed MELD 3.0 unavailable. One or more values for this score either were not found within the given timeframe or did not fit some other criterion.  Computed MELD-Na unavailable. One or more values for this score either were not found within the given timeframe or did not fit some other criterion.      Significant Labs:  CBC:  No results for input(s): "WBC", "HGB", "HCT", "PLT" in the last 48 hours.  CMP:  No results for input(s): "NA", "K", "CL", "CO2", "GLU", "BUN", "CREATININE", "CALCIUM", "PROT", "ALBUMIN", "BILITOT", "ALKPHOS", "AST", "ALT", "ANIONGAP", "EGFRNONAA" in the last 48 hours.    Invalid input(s): "ESTGFAFRICA"  PTINR:  No results for input(s): "INR" in the last 48 hours.        Assessment and Plan     * Palliative care encounter  Plan is NH with Hospice  - patient medically stable on comfort measures. Pending [facility] at [facility] " facility    Unintentional weight loss  Nutrition consulted. Most recent weight and BMI monitored-     Measurements:  Wt Readings from Last 1 Encounters:   12/27/24 54.9 kg (121 lb 0.5 oz)   Body mass index is 22.87 kg/m².    RD consultation   Extensive outpatient evaluation recommended by GI and General surgery   Will add supplementation to meals; soft bite-sized per rec's  No plan for feeding tube at this time as family is able to convince pt to eat intermittently.       Please prioritize patient's comfort and preferences over strict nutritional goals at this time. Offer small, frequent meals, texture modifications, and respect the patient's desire to eat or not eat. Also ensure proper hydration with small sips of fluids periodically.      Hypoglycemia  -Intermittent hypoglycemia over hospital stay. Likely related to poor PO intake.   -Encourage PO intake         Debility  Patient with Acute debility due to age-related physical debility and other reduced mobility. The patient's latest AMPAC (Activity Measure for Post Acute Care) Score is listed below.    AM-PAC Score - How much help does the patient need for each activity listed  Basic Mobility Total Score: 7  Turning over in bed (including adjusting bedclothes, sheets and blankets)?: A lot  Sitting down on and standing up from a chair with arms (e.g., wheelchair, bedside commode, etc.): Unable  Moving from lying on back to sitting on the side of the bed?: Unable  Moving to and from a bed to a chair (including a wheelchair)?: Unable  Need to walk in hospital room?: Unable  Climbing 3-5 steps with a railing?: Unable    Plan  - Progressive mobility protocol initated  - PT/OT consulted  - Fall precautions in place  - Plan for NH with hospice services -pending insurance(Medicaid/Medicare)   - Palliative care following  - Pt is unable to be safely discharged to home at this time due to lack of continuous home care.          Diabetes mellitus  Sliding scale held,  "accuchecks for hypoglycemia     Hold Oral hypoglycemics while patient is in the hospital; holding all given low intake  No need for further BG checks, has been stable for > 1 week. Is eating and drinking okay. Hold all DM medications    Benign essential hypertension  Patient's blood pressure range in the last 24 hours was: BP  Min: 109/67  Max: 150/80.The patient's inpatient anti-hypertensive regimen is listed below:  Current Antihypertensives       Plan  - BP is controlled, no changes needed to their regimen  Stopping nifedipine and lisinopril to reduce pill burden. They were at low dose and BP has remained acceptable. Okay for her BP to run a little higher     Hyperlipidemia  Stopping ASA and statin to reduce number of pills and preventing longer term effects no longer aligns with goals of care      Comfort measures only status        Hypophosphatemia  Patient's most recent phosphorus results are listed below.   Recent Labs     01/28/25  0800   PHOS 2.8       Plan  No longer monitoring, outside patients goals of care      Hypomagnesemia  No longer monitoring             Anemia  Anemia is likely due to Iron deficiency. Most recent hemoglobin and hematocrit are listed below.  No results for input(s): "HGB", "HCT" in the last 72 hours.    Plan  -will hold on monitoring       Swelling of right upper extremity  CT UE with Nonspecific subcutaneous edema throughout the distal arm and forearm, possibly sterile or infectious in nature.  No fluid collection.  No osteomyelitis.     Continue to monitor.     Hyperbilirubinemia  - elevation in bilirubin with concomitant elevations in LFTs  - RUQ U/S significant for biliary sludge, mild/moderate intrahepatic/extrahepatic biliary ductla dilation. Unchanged compare to previous MRCP.         Pseudomonas urinary tract infection  - urinalysis concerning for UTI and found to have pseudomonas. Now s/p levaquin on 1/20.   - Blood cultures: NGTD       Cognitive impairment  History of " progressive confusion and forgetfulness prior to admission. CT head: Sequela of chronic microvascular ischemic change. Remote lacunar type infarct right thalamus;   -HIV/RPR negative. Folate low, repleting. B12 high. Thiamine low and repleting. 2/2 to dementia with poor nutritional intake; TSH wnl on 1/1/2025  - exhausted reversible causes of poor mentation and with imaging, appears to be age-related decline accelerated by some degree of vascular dementia. Likely some component of acute delirum due to hospital stay/UTI in setting of advanced dementia.     Plan:   - discussed pitfalls of G-tube placement. No plan for placement at this time.   - plan for discharge to Nursing Home with hospice services   -Haldol added PRN for agitation. Discussed risk of death and worsening condition with pt's son and sister.     Numbness and tingling in both hands  -noted by patient before admission with abnormal finger to nose and weakness   -CT head and spine reviewed. There is significant stenosis/narrowing of C6 and C7 which could be contributing to symptoms. Findings appear chronic and not acute. MRI ordered and notable for cervical stenosis and lacunar infarcts. Limited visiblity due to movement   -CT head notable for remote lacunar infarcts, hx of stroke in 2005 currently on ASA   - PT/OT discontinued in light of impending NH with hospice initiation and likely little value given poor cooperation of patient         Severe protein-calorie malnutrition  Nutrition consulted. Most recent weight and BMI monitored-     Measurements:  Wt Readings from Last 1 Encounters:   12/27/24 54.9 kg (121 lb 0.5 oz)   Body mass index is 22.87 kg/m².    Patient has been screened and assessed by RD.    Malnutrition Type:  Context: chronic illness  Level: severe    Malnutrition Characteristic Summary:  Weight Loss (Malnutrition): greater than 5% in 1 month  Energy Intake (Malnutrition): less than 75% for greater than or equal to 1 month  Subcutaneous  "Fat (Malnutrition): severe depletion  Muscle Mass (Malnutrition): severe depletion    Interventions/Recommendations (treatment strategy):  Please prioritize patient's comfort and preferences over strict nutritional goals at this time. Offer small, frequent meals, texture modifications, and respect the patient's desire to eat or not eat. Also ensure proper hydration with small sips of fluids periodically.    - encourage PO intake    Hypokalemia  Patient's most recent potassium results are listed below.   No results for input(s): "K" in the last 72 hours.    Plan  - no longer monitoring, outside patients goals of care      VTE Risk Mitigation (From admission, onward)           Ordered     IP VTE HIGH RISK PATIENT  Once         12/26/24 1547                    Discharge Planning   RITA: 2/11/2025     Code Status: DNR   Medical Readiness for Discharge Date: 1/20/2025  Discharge Plan A: New Nursing Home placement - [facility] care facility   Discharge Delays: (!) Post-Acute Set-up (Pt under financial review)                    Yarely Walters MD  Department of Hospital Medicine   [facility] - Internal Medicine Telemetry    "

## 2025-02-09 NOTE — SUBJECTIVE & OBJECTIVE
Interval History: NAEO. Pending placement.       Objective:     Vital Signs (Most Recent):  Temp: 98 °F (36.7 °C) (02/09/25 1100)  Pulse: 77 (02/09/25 1100)  Resp: 18 (02/09/25 1100)  BP: (!) 153/88 (02/09/25 1100)  SpO2: 96 % (02/09/25 1100) Vital Signs (24h Range):  Temp:  [97.6 °F (36.4 °C)-98.7 °F (37.1 °C)] 98 °F (36.7 °C)  Pulse:  [77-88] 77  Resp:  [18] 18  SpO2:  [94 %-97 %] 96 %  BP: (117-153)/(72-88) 153/88     Weight: 54.9 kg (121 lb 0.5 oz)  Body mass index is 22.87 kg/m².    Intake/Output Summary (Last 24 hours) at 2/9/2025 1126  Last data filed at 2/9/2025 0643  Gross per 24 hour   Intake 350 ml   Output 550 ml   Net -200 ml      Physical Exam  Constitutional:       General: She is not in acute distress.     Appearance: Normal appearance. She is not ill-appearing.   HENT:      Head: Normocephalic and atraumatic.      Nose: Nose normal.      Mouth/Throat:      Mouth: Mucous membranes are moist.   Eyes:      Extraocular Movements: Extraocular movements intact.      Pupils: Pupils are equal, round, and reactive to light.   Cardiovascular:      Rate and Rhythm: Normal rate and regular rhythm.      Heart sounds: No murmur heard.     No gallop.   Pulmonary:      Effort: Pulmonary effort is normal.      Breath sounds: Normal breath sounds. No wheezing or rales.   Abdominal:      General: Abdomen is flat. Bowel sounds are normal. There is no distension.      Palpations: Abdomen is soft.      Tenderness: There is no abdominal tenderness.   Musculoskeletal:         General: No swelling or tenderness. Normal range of motion.      Cervical back: Normal range of motion and neck supple.      Right lower leg: No edema.      Left lower leg: No edema.   Skin:     General: Skin is warm and dry.      Capillary Refill: Capillary refill takes less than 2 seconds.   Neurological:      Comments: Pleasantly demented   Psychiatric:         Mood and Affect: Mood normal.         Computed MELD 3.0 unavailable. One or more values  "for this score either were not found within the given timeframe or did not fit some other criterion.  Computed MELD-Na unavailable. One or more values for this score either were not found within the given timeframe or did not fit some other criterion.      Significant Labs:  CBC:  No results for input(s): "WBC", "HGB", "HCT", "PLT" in the last 48 hours.  CMP:  No results for input(s): "NA", "K", "CL", "CO2", "GLU", "BUN", "CREATININE", "CALCIUM", "PROT", "ALBUMIN", "BILITOT", "ALKPHOS", "AST", "ALT", "ANIONGAP", "EGFRNONAA" in the last 48 hours.    Invalid input(s): "ESTGFAFRICA"  PTINR:  No results for input(s): "INR" in the last 48 hours.      "

## 2025-02-09 NOTE — PLAN OF CARE
Problem: Adult Inpatient Plan of Care  Goal: Plan of Care Review  Outcome: Progressing  Goal: Patient-Specific Goal (Individualized)  Outcome: Progressing  Goal: Absence of Hospital-Acquired Illness or Injury  Outcome: Progressing  Goal: Optimal Comfort and Wellbeing  Outcome: Progressing  Goal: Readiness for Transition of Care  Outcome: Progressing     Problem: Diabetes Comorbidity  Goal: Blood Glucose Level Within Targeted Range  Outcome: Progressing     Problem: Skin Injury Risk Increased  Goal: Skin Health and Integrity  Outcome: Progressing   Patient VSS.NAD. Q2 rounding completed. Safety maintained. Will continue to monitor.

## 2025-02-10 LAB — POCT GLUCOSE: 88 MG/DL (ref 70–110)

## 2025-02-10 PROCEDURE — 25000003 PHARM REV CODE 250: Performed by: STUDENT IN AN ORGANIZED HEALTH CARE EDUCATION/TRAINING PROGRAM

## 2025-02-10 PROCEDURE — 11000001 HC ACUTE MED/SURG PRIVATE ROOM

## 2025-02-10 PROCEDURE — 25000003 PHARM REV CODE 250: Performed by: INTERNAL MEDICINE

## 2025-02-10 PROCEDURE — S4991 NICOTINE PATCH NONLEGEND: HCPCS | Performed by: STUDENT IN AN ORGANIZED HEALTH CARE EDUCATION/TRAINING PROGRAM

## 2025-02-10 RX ADMIN — NICOTINE 1 PATCH: 7 PATCH, EXTENDED RELEASE TRANSDERMAL at 09:02

## 2025-02-10 RX ADMIN — MICONAZOLE NITRATE: 20 OINTMENT TOPICAL at 09:02

## 2025-02-10 RX ADMIN — MICONAZOLE NITRATE: 20 OINTMENT TOPICAL at 08:02

## 2025-02-10 RX ADMIN — Medication 6 MG: at 09:02

## 2025-02-10 NOTE — PLAN OF CARE
NURSING HOME ORDERS    02/10/2025  [facility] - INTERNAL MEDICINE TELEMETRY  [address] [address]  Dept: [phone]  Loc: [phone]     Admit to Nursing Home:  [facility]     Diagnoses:  Active Hospital Problems    Diagnosis  POA    *Palliative care encounter [Z51.5]  Not Applicable    Unintentional weight loss [R63.4]  Yes     Priority: 2     Hypoglycemia [E16.2]  No     Priority: 3     Debility [R53.81]  Yes     Priority: 3     Diabetes mellitus [E11.9]  Yes     Priority: 4      dx update      Benign essential hypertension [I10]  Yes     Priority: 5      dx update      Hyperlipidemia [E78.5]  Yes     Priority: 6      Chronic     dx update      Comfort measures only status [Z51.5]  Not Applicable    Anemia [D64.9]  Yes    Hypomagnesemia [E83.42]  Yes    Hypophosphatemia [E83.39]  Yes    Swelling of right upper extremity [M79.89]  No    Hyperbilirubinemia [E80.6]  No    Pseudomonas urinary tract infection [N39.0, B96.5]  No    Cognitive impairment [R41.89]  Yes    Numbness and tingling in both hands [R20.0, R20.2]  Yes    Severe protein-calorie malnutrition [E43]  Yes    Hypokalemia [E87.6]  Yes      Resolved Hospital Problems    Diagnosis Date Resolved POA    Failure to thrive in adult [R62.7] 01/03/2025 Yes     Priority: 1 - High    Hypernatremia [E87.0] 01/18/2025 Unknown    Hyponatremia [E87.1] 01/18/2025 Yes       Patient is homebound due to:  Palliative care encounter    Allergies:Review of patient's allergies indicates:  No Known Allergies    Vitals:  Routine    Diet: soft diet    Activities:   Activity as tolerated    Goals of Care Treatment Preferences:  Code Status: DNR    Health care agent: Bruno aguirre)  Health care agent number: [phone]          What is most important right now is to focus on symptom/pain control, quality of life, even if it means sacrificing a little time.  Accordingly, we have decided that the best plan  to meet the patient's goals includes enrolling in hospice care.      Labs:  None     Nursing Precautions:  Aspiration , Fall, and Pressure ulcer prevention      Miscellaneous Care  Wound Care  Apply waffle overlay to mattress top.  Ensure waffle mattress overlay is inflated to the proper air amount - perform hand check to verify proper immersion.    Fitted sheet only on waffle -- not over waffle and mattress    Assure Q2H turn protocol continues to be implemented.     Apply Triad correctly:   Always cleanse wound before applying Triad. To remove Triad: Use pH-balanced wound cleanser to soften Triad Gently wipe without scrubbing For complete removal, repeat as needed. Gently spread Triad evenly over the area of application to the thickness of a dime.                  Diabetes Care:  N/A       Medications: Discontinue all previous medication orders, if any. See new list below.     Medication List        START taking these medications      acetaminophen 325 MG tablet  Commonly known as: TYLENOL  Take 2 tablets (650 mg total) by mouth every 8 (eight) hours as needed for Temperature greater than (or equal to 101 degree F).     melatonin 3 mg tablet  Commonly known as: MELATIN  Take 2 tablets (6 mg total) by mouth nightly.     ondansetron 4 MG Tbdl  Commonly known as: ZOFRAN-ODT  Take 1 tablet (4 mg total) by mouth every 6 (six) hours as needed (vomiting).            STOP taking these medications      aspirin 81 MG EC tablet  Commonly known as: ECOTRIN     atorvastatin 40 MG tablet  Commonly known as: LIPITOR     furosemide 40 MG tablet  Commonly known as: LASIX     lisinopriL 40 MG tablet  Commonly known as: PRINIVIL,ZESTRIL     NIFEdipine 60 MG Tbsr  Commonly known as: ADALAT CC     triamcinolone acetonide 0.1% 0.1 % cream  Commonly known as: KENALOG                Immunizations Administered as of 2/10/2025       Name Date Dose VIS Date Route Exp Date    COVID-19, MRNA, LN-S, PF (Moderna) 3/10/2021 0.5 mL --  Intramuscular --    Site: Left deltoid     : [employer]     Lot: [id number]     Comment: Adminis     COVID-19, MRNA, LN-S, PF (Moderna) 2/10/2021 0.5 mL -- Intramuscular --    Site: Left deltoid     : [employer]     Lot: [id number]     Comment: Adminis               _________________________________  Yarely Walters MD  02/10/2025

## 2025-02-10 NOTE — PLAN OF CARE
MICHELLE attempting to contact [facility] (p) [phone] Ute Peterson & Maria Del Rosario Rubi (c) [phone] for updates regarding pt's admit status. MICHELLE left voicemail message for a return phone call on Ute's voicemail. SW to escalate to Case Management Leadership.     15:19pm  MICHELLE spoke with Maria Del Rosario with [facility]. The facility is reviewing financial documents provided by family to assess for pt's Medicaid Eligibility. Updated clinical information sent via Epic referral system as requested for alf Placement with Hospice. RITA: 02/11/25.    Eden Dobbs LMSW

## 2025-02-10 NOTE — CARE UPDATE
Unit [facility] Care Support Interaction      I have reviewed the chart of Bhavna Lim who is hospitalized for Palliative care encounter. The patient is currently located in the following unit: IMTA       I have seen and examined the patient and provided the following support:     Skin - Integrity assessment and see media        Lois Goel PA-C  Unit Based [facility]

## 2025-02-10 NOTE — SUBJECTIVE & OBJECTIVE
Interval History: NAEO.        Objective:     Vital Signs (Most Recent):  Temp: 98.3 °F (36.8 °C) (02/1945)  Pulse: 101 (02/10/25 0435)  Resp: 18 (02/10/25 0435)  BP: 124/72 (02/10/25 0435)  SpO2: (!) 94 % (02/10/25 0435) Vital Signs (24h Range):  Temp:  [97.6 °F (36.4 °C)-98.3 °F (36.8 °C)] 98.3 °F (36.8 °C)  Pulse:  [] 101  Resp:  [18] 18  SpO2:  [93 %-96 %] 94 %  BP: (124-153)/(72-88) 124/72     Weight: 54.9 kg (121 lb 0.5 oz)  Body mass index is 22.87 kg/m².    Intake/Output Summary (Last 24 hours) at 2/10/2025 0682  Last data filed at 2/10/2025 0503  Gross per 24 hour   Intake 480 ml   Output 600 ml   Net -120 ml      Physical Exam  Constitutional:       General: She is not in acute distress.     Appearance: Normal appearance. She is not ill-appearing.   HENT:      Head: Normocephalic and atraumatic.      Nose: Nose normal.      Mouth/Throat:      Mouth: Mucous membranes are moist.   Eyes:      Extraocular Movements: Extraocular movements intact.      Pupils: Pupils are equal, round, and reactive to light.   Cardiovascular:      Rate and Rhythm: Normal rate and regular rhythm.      Heart sounds: No murmur heard.     No gallop.   Pulmonary:      Effort: Pulmonary effort is normal.      Breath sounds: Normal breath sounds. No wheezing or rales.   Abdominal:      General: Abdomen is flat. Bowel sounds are normal. There is no distension.      Palpations: Abdomen is soft.      Tenderness: There is no abdominal tenderness.   Musculoskeletal:         General: No swelling or tenderness. Normal range of motion.      Cervical back: Normal range of motion and neck supple.      Right lower leg: No edema.      Left lower leg: No edema.   Skin:     General: Skin is warm and dry.      Capillary Refill: Capillary refill takes less than 2 seconds.   Neurological:      Comments: Pleasantly demented   Psychiatric:         Mood and Affect: Mood normal.         Computed MELD 3.0 unavailable. One or more values for this  "score either were not found within the given timeframe or did not fit some other criterion.  Computed MELD-Na unavailable. One or more values for this score either were not found within the given timeframe or did not fit some other criterion.      Significant Labs:  CBC:  No results for input(s): "WBC", "HGB", "HCT", "PLT" in the last 48 hours.  CMP:  No results for input(s): "NA", "K", "CL", "CO2", "GLU", "BUN", "CREATININE", "CALCIUM", "PROT", "ALBUMIN", "BILITOT", "ALKPHOS", "AST", "ALT", "ANIONGAP", "EGFRNONAA" in the last 48 hours.    Invalid input(s): "ESTGFAFRICA"  PTINR:  No results for input(s): "INR" in the last 48 hours.      "

## 2025-02-10 NOTE — PROGRESS NOTES
[facility] - Internal Medicine [facility] Medicine  Progress Note    Patient Name: Bhavna Lim  MRN: [id number]  Patient Class: IP- Inpatient   Admission Date: 12/26/2024  Length of Stay: 43 days  Attending Physician: Yarely Walters MD  Primary Care Provider: Suzi Green MD        Subjective     Principal Problem:Palliative care encounter        HPI:  Mrs. Bhavna Lim is a 79 year old F with a history of HTN and HLD who presents to the ED for weakness and fatigue. She has been having weight loss and poor po intake over the last few months. She has been seen by GI and evaluated with EGD and Colonoscopy as well as MRCP which showed cholelithiasis with gallbladder distention and intrahepatic and extrahepatic biliary dilation. No significant abnormalities on EGD and coloscopy. She was referred to surgery who recommended NM study and possible EUS with GI. She has been unable to tolerate any solid foods. She sometimes takes a little bit of soup and has tried protein shakes. She feels full immediately. She does endorse some pain in her epigastric region ever since having her EGD performed. She occasionally has vomiting. Denies regurgitation of food. She continues to have worsening weakness and falls at home. She had two falls yesterday due to weakness. She denies significant dizziness. She is unable to walk due to her weakness and sister is concerned this is related to her poor nutrition.      In the ED VSS. Labs notable for Potassium of 3.0 which was repleted in the ED. No focal signs concerning for stroke. She was admitted to medicine for further management.      Overview/Hospital Course:  While on the floor PT/OT were consulted and recommended SNF. Nutrition consulted. Discussed with GI outpatient workup and no indication for further inpatient workup. She continued to be altered while in the hospital. CT spine/head ordered for concern of fall showing ischemic changes and signficant stenosis  and narrowing. She developed worsening hand weakness and numbness/tinglign with abnormal coordiation. Some of this was prior to admission but weakness significantly worsened. MRI ordered and showed old infarcts. She continued to refuse food and did not have abdominal complaints just would not eat. Started on IVF due to hypoglycemia. Acute encephalopathy workup initiated and negative aside from low folate. UA ordered but not performed. At this point if nutrition becomes suboptimal will have to consider other means of nutrition. Son with concern of worsening memory and possible dementia wanting placement in [place]. Started on supplements for poor nutrition including thiamine and folate to see if this will help with mental status. Family had extensive discussion with provider care team and decided to pursue NH with hospice services and sister (lives locally) would pursue mPOA. She has had intermitted problems with hypoglycemia and electrolyte disturbances.     Plan is NH with hospice services. She has been waiting for NH for > 1 week.     Patient remains stable for discharge with hospice services     Interval History: NAEO.        Objective:     Vital Signs (Most Recent):  Temp: 98.3 °F (36.8 °C) (02/1945)  Pulse: 101 (02/10/25 0435)  Resp: 18 (02/10/25 0435)  BP: 124/72 (02/10/25 0435)  SpO2: (!) 94 % (02/10/25 0435) Vital Signs (24h Range):  Temp:  [97.6 °F (36.4 °C)-98.3 °F (36.8 °C)] 98.3 °F (36.8 °C)  Pulse:  [] 101  Resp:  [18] 18  SpO2:  [93 %-96 %] 94 %  BP: (124-153)/(72-88) 124/72     Weight: 54.9 kg (121 lb 0.5 oz)  Body mass index is 22.87 kg/m².    Intake/Output Summary (Last 24 hours) at 2/10/2025 0635  Last data filed at 2/10/2025 0503  Gross per 24 hour   Intake 480 ml   Output 600 ml   Net -120 ml      Physical Exam  Constitutional:       General: She is not in acute distress.     Appearance: Normal appearance. She is not ill-appearing.   HENT:      Head: Normocephalic and atraumatic.      " Nose: Nose normal.      Mouth/Throat:      Mouth: Mucous membranes are moist.   Eyes:      Extraocular Movements: Extraocular movements intact.      Pupils: Pupils are equal, round, and reactive to light.   Cardiovascular:      Rate and Rhythm: Normal rate and regular rhythm.      Heart sounds: No murmur heard.     No gallop.   Pulmonary:      Effort: Pulmonary effort is normal.      Breath sounds: Normal breath sounds. No wheezing or rales.   Abdominal:      General: Abdomen is flat. Bowel sounds are normal. There is no distension.      Palpations: Abdomen is soft.      Tenderness: There is no abdominal tenderness.   Musculoskeletal:         General: No swelling or tenderness. Normal range of motion.      Cervical back: Normal range of motion and neck supple.      Right lower leg: No edema.      Left lower leg: No edema.   Skin:     General: Skin is warm and dry.      Capillary Refill: Capillary refill takes less than 2 seconds.   Neurological:      Comments: Pleasantly demented   Psychiatric:         Mood and Affect: Mood normal.         Computed MELD 3.0 unavailable. One or more values for this score either were not found within the given timeframe or did not fit some other criterion.  Computed MELD-Na unavailable. One or more values for this score either were not found within the given timeframe or did not fit some other criterion.      Significant Labs:  CBC:  No results for input(s): "WBC", "HGB", "HCT", "PLT" in the last 48 hours.  CMP:  No results for input(s): "NA", "K", "CL", "CO2", "GLU", "BUN", "CREATININE", "CALCIUM", "PROT", "ALBUMIN", "BILITOT", "ALKPHOS", "AST", "ALT", "ANIONGAP", "EGFRNONAA" in the last 48 hours.    Invalid input(s): "ESTGFAFRICA"  PTINR:  No results for input(s): "INR" in the last 48 hours.        Assessment and Plan     * Palliative care encounter  Plan is NH with Hospice  - patient medically stable on comfort measures. Pending placement at accepting facility    Unintentional " weight loss  Nutrition consulted. Most recent weight and BMI monitored-     Measurements:  Wt Readings from Last 1 Encounters:   12/27/24 54.9 kg (121 lb 0.5 oz)   Body mass index is 22.87 kg/m².    RD consultation   Extensive outpatient evaluation recommended by GI and General surgery   Will add supplementation to meals; soft bite-sized per rec's  No plan for feeding tube at this time as family is able to convince pt to eat intermittently.       Please prioritize patient's comfort and preferences over strict nutritional goals at this time. Offer small, frequent meals, texture modifications, and respect the patient's desire to eat or not eat. Also ensure proper hydration with small sips of fluids periodically.      Hypoglycemia  -Intermittent hypoglycemia over hospital stay. Likely related to poor PO intake.   -Encourage PO intake         Debility  Patient with Acute debility due to age-related physical debility and other reduced mobility. The patient's latest AMPAC (Activity Measure for Post Acute Care) Score is listed below.    AM-PAC Score - How much help does the patient need for each activity listed  Basic Mobility Total Score: 7  Turning over in bed (including adjusting bedclothes, sheets and blankets)?: A lot  Sitting down on and standing up from a chair with arms (e.g., wheelchair, bedside commode, etc.): Unable  Moving from lying on back to sitting on the side of the bed?: Unable  Moving to and from a bed to a chair (including a wheelchair)?: Unable  Need to walk in hospital room?: Unable  Climbing 3-5 steps with a railing?: Unable    Plan  - Progressive mobility protocol initated  - PT/OT consulted  - Fall precautions in place  - Plan for NH with hospice services -pending insurance(Medicaid/Medicare)   - Palliative care following  - Pt is unable to be safely discharged to home at this time due to lack of continuous home care.          Diabetes mellitus  Sliding scale held, accuchecks for hypoglycemia  "    Hold Oral hypoglycemics while patient is in the hospital; holding all given low intake  No need for further BG checks, has been stable for > 1 week. Is eating and drinking okay. Hold all DM medications    Benign essential hypertension  Patient's blood pressure range in the last 24 hours was: BP  Min: 109/67  Max: 150/80.The patient's inpatient anti-hypertensive regimen is listed below:  Current Antihypertensives       Plan  - BP is controlled, no changes needed to their regimen  Stopping nifedipine and lisinopril to reduce pill burden. They were at low dose and BP has remained acceptable. Okay for her BP to run a little higher     Hyperlipidemia  Stopping ASA and statin to reduce number of pills and preventing longer term effects no longer aligns with goals of care      Comfort measures only status        Hypophosphatemia  Patient's most recent phosphorus results are listed below.   Recent Labs     01/28/25  0800   PHOS 2.8       Plan  No longer monitoring, outside patients goals of care      Hypomagnesemia  No longer monitoring             Anemia  Anemia is likely due to Iron deficiency. Most recent hemoglobin and hematocrit are listed below.  No results for input(s): "HGB", "HCT" in the last 72 hours.    Plan  -will hold on monitoring       Swelling of right upper extremity  CT UE with Nonspecific subcutaneous edema throughout the distal arm and forearm, possibly sterile or infectious in nature.  No fluid collection.  No osteomyelitis.     Continue to monitor.     Hyperbilirubinemia  - elevation in bilirubin with concomitant elevations in LFTs  - RUQ U/S significant for biliary sludge, mild/moderate intrahepatic/extrahepatic biliary ductla dilation. Unchanged compare to previous MRCP.         Pseudomonas urinary tract infection  - urinalysis concerning for UTI and found to have pseudomonas. Now s/p levaquin on 1/20.   - Blood cultures: NGTD       Cognitive impairment  History of progressive confusion and " forgetfulness prior to admission. CT head: Sequela of chronic microvascular ischemic change. Remote lacunar type infarct right thalamus;   -HIV/RPR negative. Folate low, repleting. B12 high. Thiamine low and repleting. 2/2 to dementia with poor nutritional intake; TSH wnl on 1/1/2025  - exhausted reversible causes of poor mentation and with imaging, appears to be age-related decline accelerated by some degree of vascular dementia. Likely some component of acute delirum due to hospital stay/UTI in setting of advanced dementia.     Plan:   - discussed pitfalls of G-tube placement. No plan for placement at this time.   - plan for discharge to Nursing Home with hospice services   -Haldol added PRN for agitation. Discussed risk of death and worsening condition with pt's son and sister.     Numbness and tingling in both hands  -noted by patient before admission with abnormal finger to nose and weakness   -CT head and spine reviewed. There is significant stenosis/narrowing of C6 and C7 which could be contributing to symptoms. Findings appear chronic and not acute. MRI ordered and notable for cervical stenosis and lacunar infarcts. Limited visiblity due to movement   -CT head notable for remote lacunar infarcts, hx of stroke in 2005 currently on ASA   - PT/OT discontinued in light of impending NH with hospice initiation and likely little value given poor cooperation of patient         Severe protein-calorie malnutrition  Nutrition consulted. Most recent weight and BMI monitored-     Measurements:  Wt Readings from Last 1 Encounters:   12/27/24 54.9 kg (121 lb 0.5 oz)   Body mass index is 22.87 kg/m².    Patient has been screened and assessed by RD.    Malnutrition Type:  Context: chronic illness  Level: severe    Malnutrition Characteristic Summary:  Weight Loss (Malnutrition): greater than 5% in 1 month  Energy Intake (Malnutrition): less than 75% for greater than or equal to 1 month  Subcutaneous Fat (Malnutrition):  "severe depletion  Muscle Mass (Malnutrition): severe depletion    Interventions/Recommendations (treatment strategy):  Please prioritize patient's comfort and preferences over strict nutritional goals at this time. Offer small, frequent meals, texture modifications, and respect the patient's desire to eat or not eat. Also ensure proper hydration with small sips of fluids periodically.    - encourage PO intake    Hypokalemia  Patient's most recent potassium results are listed below.   No results for input(s): "K" in the last 72 hours.    Plan  - no longer monitoring, outside patients goals of care      VTE Risk Mitigation (From admission, onward)           Ordered     IP VTE HIGH RISK PATIENT  Once         12/26/24 1547                    Discharge Planning   RITA: 2/11/2025     Code Status: DNR   Medical Readiness for Discharge Date: 1/20/2025  Discharge Plan A: New Nursing Home placement - retirement care facility   Discharge Delays: (!) Post-Acute Set-up (Pt under financial review)                    Yarely Walters MD  Department of Hospital Medicine   [facility] - Internal Medicine Telemetry    "

## 2025-02-11 PROCEDURE — 11000001 HC ACUTE MED/SURG PRIVATE ROOM

## 2025-02-11 PROCEDURE — S4991 NICOTINE PATCH NONLEGEND: HCPCS | Performed by: STUDENT IN AN ORGANIZED HEALTH CARE EDUCATION/TRAINING PROGRAM

## 2025-02-11 PROCEDURE — 25000003 PHARM REV CODE 250: Performed by: STUDENT IN AN ORGANIZED HEALTH CARE EDUCATION/TRAINING PROGRAM

## 2025-02-11 PROCEDURE — 25000003 PHARM REV CODE 250: Performed by: INTERNAL MEDICINE

## 2025-02-11 RX ADMIN — NICOTINE 1 PATCH: 7 PATCH, EXTENDED RELEASE TRANSDERMAL at 10:02

## 2025-02-11 RX ADMIN — MICONAZOLE NITRATE: 20 OINTMENT TOPICAL at 10:02

## 2025-02-11 RX ADMIN — MICONAZOLE NITRATE: 20 OINTMENT TOPICAL at 09:02

## 2025-02-11 RX ADMIN — Medication 6 MG: at 09:02

## 2025-02-11 NOTE — PLAN OF CARE
02/11/25 1322   Post-Acute Status   Post-Acute Authorization Placement   Discharge Plan   Discharge Plan A New Nursing Home placement [facility]     Update on Placement:    1.[facility]- willing to accept patient   2.[facility]- willing to accept patient  [facility]. [facility]- Willing to accept patient  4. [facility]- wiling to accept patient  5. [facility]- willing to accept patient    SW will continue to follow patient.    Discharge Plan A and Plan B have been determined by review of patient's clinical status, future medical and therapeutic needs, and coverage/benefits for post-acute care in coordination with multidisciplinary team members.     2:09 PM   SW left two Voice Message to Ute at [facility] [phone] regarding pt and financials with family for shelter Placement with Hospice. . SW will continue to follow.     3:07 PM    SW left two Voice Message to Ute at [facility] [phone] regarding pt and financials with family for shelter Placement with Hospice. . SW will continue to follow.           Sofy Sarah, NILESH  - [facility]

## 2025-02-11 NOTE — PROGRESS NOTES
[facility]  Progress Note    Patient Name: Bhavna Lim  MRN: [id number]  Patient Class: IP- Inpatient   Admission Date: 12/26/2024  Length of Stay: 44 days  Attending Physician: Yarely Walters MD  Primary Care Provider: Suzi Green MD        Subjective     Principal Problem:Palliative care encounter        HPI:  Mrs. Bhavna Lim is a 79 year old F with a history of HTN and HLD who presents to the ED for weakness and fatigue. She has been having weight loss and poor po intake over the last few months. She has been seen by GI and evaluated with EGD and Colonoscopy as well as MRCP which showed cholelithiasis with gallbladder distention and intrahepatic and extrahepatic biliary dilation. No significant abnormalities on EGD and coloscopy. She was referred to surgery who recommended NM study and possible EUS with GI. She has been unable to tolerate any solid foods. She sometimes takes a little bit of soup and has tried protein shakes. She feels full immediately. She does endorse some pain in her epigastric region ever since having her EGD performed. She occasionally has vomiting. Denies regurgitation of food. She continues to have worsening weakness and falls at home. She had two falls yesterday due to weakness. She denies significant dizziness. She is unable to walk due to her weakness and sister is concerned this is related to her poor nutrition.      In the ED VSS. Labs notable for Potassium of 3.0 which was repleted in the ED. No focal signs concerning for stroke. She was admitted to medicine for further management.      Overview/Hospital Course:  While on the floor PT/OT were consulted and recommended SNF. Nutrition consulted. Discussed with GI outpatient workup and no indication for further inpatient workup. She continued to be altered while in the hospital. CT spine/head ordered for concern of fall showing ischemic changes and signficant stenosis  and narrowing. She developed worsening hand weakness and numbness/tinglign with abnormal coordiation. Some of this was prior to admission but weakness significantly worsened. MRI ordered and showed old infarcts. She continued to refuse food and did not have abdominal complaints just would not eat. Started on IVF due to hypoglycemia. Acute encephalopathy workup initiated and negative aside from low folate. UA ordered but not performed. At this point if nutrition becomes suboptimal will have to consider other means of nutrition. Son with concern of worsening memory and possible dementia wanting placement in [place]. Started on supplements for poor nutrition including thiamine and folate to see if this will help with mental status. Family had extensive discussion with provider care team and decided to pursue NH with hospice services and sister (lives locally) would pursue mPOA. She has had intermitted problems with hypoglycemia and electrolyte disturbances.     Plan is NH with hospice services. She has been waiting for NH for > 1 week.     Patient remains stable for discharge with hospice services     Interval History: NAEO. Pt is very confused this morning and moving around in the bed.       Objective:     Vital Signs (Most Recent):  Temp: 98.4 °F (36.9 °C) (02/11/25 0757)  Pulse: 104 (02/11/25 0757)  Resp: 17 (02/11/25 0757)  BP: (!) 143/72 (02/11/25 0757)  SpO2: 95 % (02/11/25 0757) Vital Signs (24h Range):  Temp:  [98.2 °F (36.8 °C)-98.4 °F (36.9 °C)] 98.4 °F (36.9 °C)  Pulse:  [] 104  Resp:  [17-18] 17  SpO2:  [95 %-98 %] 95 %  BP: (133-146)/(72-84) 143/72     Weight: 54.9 kg (121 lb 0.5 oz)  Body mass index is 22.87 kg/m².    Intake/Output Summary (Last 24 hours) at 2/11/2025 1009  Last data filed at 2/10/2025 1700  Gross per 24 hour   Intake 1090 ml   Output --   Net 1090 ml      Physical Exam  Constitutional:       General: She is not in acute distress.     Appearance: Normal appearance. She is not  "ill-appearing.   HENT:      Head: Normocephalic and atraumatic.      Nose: Nose normal.      Mouth/Throat:      Mouth: Mucous membranes are moist.   Eyes:      Extraocular Movements: Extraocular movements intact.      Pupils: Pupils are equal, round, and reactive to light.   Cardiovascular:      Rate and Rhythm: Normal rate and regular rhythm.      Heart sounds: No murmur heard.     No gallop.   Pulmonary:      Effort: Pulmonary effort is normal.      Breath sounds: Normal breath sounds. No wheezing or rales.   Abdominal:      General: Abdomen is flat. Bowel sounds are normal. There is no distension.      Palpations: Abdomen is soft.      Tenderness: There is no abdominal tenderness.   Musculoskeletal:         General: No swelling or tenderness. Normal range of motion.      Cervical back: Normal range of motion and neck supple.      Right lower leg: No edema.      Left lower leg: No edema.   Skin:     General: Skin is warm and dry.      Capillary Refill: Capillary refill takes less than 2 seconds.   Neurological:      Comments: Pleasantly demented   Psychiatric:         Mood and Affect: Mood normal.         Computed MELD 3.0 unavailable. One or more values for this score either were not found within the given timeframe or did not fit some other criterion.  Computed MELD-Na unavailable. One or more values for this score either were not found within the given timeframe or did not fit some other criterion.      Significant Labs:  CBC:  No results for input(s): "WBC", "HGB", "HCT", "PLT" in the last 48 hours.  CMP:  No results for input(s): "NA", "K", "CL", "CO2", "GLU", "BUN", "CREATININE", "CALCIUM", "PROT", "ALBUMIN", "BILITOT", "ALKPHOS", "AST", "ALT", "ANIONGAP", "EGFRNONAA" in the last 48 hours.    Invalid input(s): "ESTGFAFRICA"  PTINR:  No results for input(s): "INR" in the last 48 hours.        Assessment and Plan     * Palliative care encounter  Plan is NH with Hospice  - patient medically stable on comfort " measures. Pending placement at accepting facility    Unintentional weight loss  Nutrition consulted. Most recent weight and BMI monitored-     Measurements:  Wt Readings from Last 1 Encounters:   02/10/25 54.9 kg (121 lb 0.5 oz)   Body mass index is 22.87 kg/m².    RD consultation   Extensive outpatient evaluation recommended by GI and General surgery   Will add supplementation to meals; soft bite-sized per rec's  No plan for feeding tube at this time as family is able to convince pt to eat intermittently.       Please prioritize patient's comfort and preferences over strict nutritional goals at this time. Offer small, frequent meals, texture modifications, and respect the patient's desire to eat or not eat. Also ensure proper hydration with small sips of fluids periodically.      Hypoglycemia  -Intermittent hypoglycemia over hospital stay. Likely related to poor PO intake.   -Encourage PO intake         Debility  Patient with Acute debility due to age-related physical debility and other reduced mobility. The patient's latest AMPAC (Activity Measure for Post Acute Care) Score is listed below.    AM-PAC Score - How much help does the patient need for each activity listed  Basic Mobility Total Score: 7  Turning over in bed (including adjusting bedclothes, sheets and blankets)?: A lot  Sitting down on and standing up from a chair with arms (e.g., wheelchair, bedside commode, etc.): Unable  Moving from lying on back to sitting on the side of the bed?: Unable  Moving to and from a bed to a chair (including a wheelchair)?: Unable  Need to walk in hospital room?: Unable  Climbing 3-5 steps with a railing?: Unable    Plan  - Progressive mobility protocol initated  - PT/OT consulted  - Fall precautions in place  - Plan for NH with hospice services -pending insurance(Medicaid/Medicare)   - Palliative care following  - Pt is unable to be safely discharged to home at this time due to lack of continuous home care.     "      Diabetes mellitus  Sliding scale held, accuchecks for hypoglycemia     Hold Oral hypoglycemics while patient is in the hospital; holding all given low intake  No need for further BG checks, has been stable for > 1 week. Is eating and drinking okay. Hold all DM medications    Benign essential hypertension  Patient's blood pressure range in the last 24 hours was: BP  Min: 109/67  Max: 150/80.The patient's inpatient anti-hypertensive regimen is listed below:  Current Antihypertensives       Plan  - BP is controlled, no changes needed to their regimen  Stopping nifedipine and lisinopril to reduce pill burden. They were at low dose and BP has remained acceptable. Okay for her BP to run a little higher     Hyperlipidemia  Stopping ASA and statin to reduce number of pills and preventing longer term effects no longer aligns with goals of care      Comfort measures only status        Hypophosphatemia  Patient's most recent phosphorus results are listed below.   No results for input(s): "PHOS" in the last 72 hours.    Plan  No longer monitoring, outside patients goals of care      Hypomagnesemia  No longer monitoring             Anemia  Anemia is likely due to Iron deficiency. Most recent hemoglobin and hematocrit are listed below.  No results for input(s): "HGB", "HCT" in the last 72 hours.    Plan  -will hold on monitoring       Swelling of right upper extremity  CT UE with Nonspecific subcutaneous edema throughout the distal arm and forearm, possibly sterile or infectious in nature.  No fluid collection.  No osteomyelitis.     Continue to monitor.     Hyperbilirubinemia  - elevation in bilirubin with concomitant elevations in LFTs  - RUQ U/S significant for biliary sludge, mild/moderate intrahepatic/extrahepatic biliary ductla dilation. Unchanged compare to previous MRCP.         Pseudomonas urinary tract infection  - urinalysis concerning for UTI and found to have pseudomonas. Now s/p levaquin on 1/20.   - Blood " cultures: NGTD       Cognitive impairment  History of progressive confusion and forgetfulness prior to admission. CT head: Sequela of chronic microvascular ischemic change. Remote lacunar type infarct right thalamus;   -HIV/RPR negative. Folate low, repleting. B12 high. Thiamine low and repleting. 2/2 to dementia with poor nutritional intake; TSH wnl on 1/1/2025  - exhausted reversible causes of poor mentation and with imaging, appears to be age-related decline accelerated by some degree of vascular dementia. Likely some component of acute delirum due to hospital stay/UTI in setting of advanced dementia.     Plan:   - discussed pitfalls of G-tube placement. No plan for placement at this time.   - plan for discharge to Nursing Home with hospice services   -Haldol added PRN for agitation. Discussed risk of death and worsening condition with pt's son and sister.     Numbness and tingling in both hands  -noted by patient before admission with abnormal finger to nose and weakness   -CT head and spine reviewed. There is significant stenosis/narrowing of C6 and C7 which could be contributing to symptoms. Findings appear chronic and not acute. MRI ordered and notable for cervical stenosis and lacunar infarcts. Limited visiblity due to movement   -CT head notable for remote lacunar infarcts, hx of stroke in 2005 currently on ASA   - PT/OT discontinued in light of impending NH with hospice initiation and likely little value given poor cooperation of patient         Severe protein-calorie malnutrition  Nutrition consulted. Most recent weight and BMI monitored-     Measurements:  Wt Readings from Last 1 Encounters:   02/10/25 54.9 kg (121 lb 0.5 oz)   Body mass index is 22.87 kg/m².    Patient has been screened and assessed by RD.    Malnutrition Type:  Context: chronic illness  Level: severe    Malnutrition Characteristic Summary:  Weight Loss (Malnutrition): greater than 5% in 1 month  Energy Intake (Malnutrition): less than  "75% for greater than or equal to 1 month  Subcutaneous Fat (Malnutrition): severe depletion  Muscle Mass (Malnutrition): severe depletion    Interventions/Recommendations (treatment strategy):  Please prioritize patient's comfort and preferences over strict nutritional goals at this time. Offer small, frequent meals, texture modifications, and respect the patient's desire to eat or not eat. Also ensure proper hydration with small sips of fluids periodically.    - encourage PO intake, pt will likely need frequent reorientation and someone at bedside to help eat given confusion     Hypokalemia  Patient's most recent potassium results are listed below.   No results for input(s): "K" in the last 72 hours.    Plan  - no longer monitoring, outside patients goals of care      VTE Risk Mitigation (From admission, onward)           Ordered     IP VTE HIGH RISK PATIENT  Once         12/26/24 1547                    Discharge Planning   RITA: 2/13/2025     Code Status: DNR   Medical Readiness for Discharge Date: 1/20/2025  Discharge Plan A: New Nursing Home placement - [facility] care facility   Discharge Delays: (!) Post-Acute Set-up (Pt under financial review)                    Yarely Walters MD  Department of Hospital Medicine   [facility] - Internal Medicine Telemetry    "

## 2025-02-11 NOTE — SUBJECTIVE & OBJECTIVE
Interval History: NAEO. Pt is very confused this morning and moving around in the bed.       Objective:     Vital Signs (Most Recent):  Temp: 98.4 °F (36.9 °C) (02/11/25 0757)  Pulse: 104 (02/11/25 0757)  Resp: 17 (02/11/25 0757)  BP: (!) 143/72 (02/11/25 0757)  SpO2: 95 % (02/11/25 0757) Vital Signs (24h Range):  Temp:  [98.2 °F (36.8 °C)-98.4 °F (36.9 °C)] 98.4 °F (36.9 °C)  Pulse:  [] 104  Resp:  [17-18] 17  SpO2:  [95 %-98 %] 95 %  BP: (133-146)/(72-84) 143/72     Weight: 54.9 kg (121 lb 0.5 oz)  Body mass index is 22.87 kg/m².    Intake/Output Summary (Last 24 hours) at 2/11/2025 1009  Last data filed at 2/10/2025 1700  Gross per 24 hour   Intake 1090 ml   Output --   Net 1090 ml      Physical Exam  Constitutional:       General: She is not in acute distress.     Appearance: Normal appearance. She is not ill-appearing.   HENT:      Head: Normocephalic and atraumatic.      Nose: Nose normal.      Mouth/Throat:      Mouth: Mucous membranes are moist.   Eyes:      Extraocular Movements: Extraocular movements intact.      Pupils: Pupils are equal, round, and reactive to light.   Cardiovascular:      Rate and Rhythm: Normal rate and regular rhythm.      Heart sounds: No murmur heard.     No gallop.   Pulmonary:      Effort: Pulmonary effort is normal.      Breath sounds: Normal breath sounds. No wheezing or rales.   Abdominal:      General: Abdomen is flat. Bowel sounds are normal. There is no distension.      Palpations: Abdomen is soft.      Tenderness: There is no abdominal tenderness.   Musculoskeletal:         General: No swelling or tenderness. Normal range of motion.      Cervical back: Normal range of motion and neck supple.      Right lower leg: No edema.      Left lower leg: No edema.   Skin:     General: Skin is warm and dry.      Capillary Refill: Capillary refill takes less than 2 seconds.   Neurological:      Comments: Pleasantly demented   Psychiatric:         Mood and Affect: Mood normal.  "        Computed MELD 3.0 unavailable. One or more values for this score either were not found within the given timeframe or did not fit some other criterion.  Computed MELD-Na unavailable. One or more values for this score either were not found within the given timeframe or did not fit some other criterion.      Significant Labs:  CBC:  No results for input(s): "WBC", "HGB", "HCT", "PLT" in the last 48 hours.  CMP:  No results for input(s): "NA", "K", "CL", "CO2", "GLU", "BUN", "CREATININE", "CALCIUM", "PROT", "ALBUMIN", "BILITOT", "ALKPHOS", "AST", "ALT", "ANIONGAP", "EGFRNONAA" in the last 48 hours.    Invalid input(s): "ESTGFAFRICA"  PTINR:  No results for input(s): "INR" in the last 48 hours.      "

## 2025-02-11 NOTE — ASSESSMENT & PLAN NOTE
Nutrition consulted. Most recent weight and BMI monitored-     Measurements:  Wt Readings from Last 1 Encounters:   02/10/25 54.9 kg (121 lb 0.5 oz)   Body mass index is 22.87 kg/m².    Patient has been screened and assessed by RD.    Malnutrition Type:  Context: chronic illness  Level: severe    Malnutrition Characteristic Summary:  Weight Loss (Malnutrition): greater than 5% in 1 month  Energy Intake (Malnutrition): less than 75% for greater than or equal to 1 month  Subcutaneous Fat (Malnutrition): severe depletion  Muscle Mass (Malnutrition): severe depletion    Interventions/Recommendations (treatment strategy):  Please prioritize patient's comfort and preferences over strict nutritional goals at this time. Offer small, frequent meals, texture modifications, and respect the patient's desire to eat or not eat. Also ensure proper hydration with small sips of fluids periodically.    - encourage PO intake, pt will likely need frequent reorientation and someone at bedside to help eat given confusion

## 2025-02-11 NOTE — ASSESSMENT & PLAN NOTE
"Patient's most recent phosphorus results are listed below.   No results for input(s): "PHOS" in the last 72 hours.    Plan  No longer monitoring, outside patients goals of care    "

## 2025-02-11 NOTE — PLAN OF CARE
Problem: Adult Inpatient Plan of Care  Goal: Plan of Care Review  Outcome: Not Progressing  Goal: Optimal Comfort and Wellbeing  Outcome: Not Progressing     Problem: Skin Injury Risk Increased  Goal: Skin Health and Integrity  Outcome: Not Progressing     Problem: Confusion Acute  Goal: Optimal Cognitive Function  Outcome: Not Progressing     Problem: Fall Injury Risk  Goal: Absence of Fall and Fall-Related Injury  Outcome: Not Progressing     Problem: Palliative Care  Goal: Enhanced Quality of Life  Outcome: Not Progressing

## 2025-02-11 NOTE — ASSESSMENT & PLAN NOTE
Nutrition consulted. Most recent weight and BMI monitored-     Measurements:  Wt Readings from Last 1 Encounters:   02/10/25 54.9 kg (121 lb 0.5 oz)   Body mass index is 22.87 kg/m².    RD consultation   Extensive outpatient evaluation recommended by GI and General surgery   Will add supplementation to meals; soft bite-sized per rec's  No plan for feeding tube at this time as family is able to convince pt to eat intermittently.       Please prioritize patient's comfort and preferences over strict nutritional goals at this time. Offer small, frequent meals, texture modifications, and respect the patient's desire to eat or not eat. Also ensure proper hydration with small sips of fluids periodically.

## 2025-02-12 PROCEDURE — 11000001 HC ACUTE MED/SURG PRIVATE ROOM

## 2025-02-12 PROCEDURE — 25000003 PHARM REV CODE 250: Performed by: STUDENT IN AN ORGANIZED HEALTH CARE EDUCATION/TRAINING PROGRAM

## 2025-02-12 PROCEDURE — 86580 TB INTRADERMAL TEST: CPT | Performed by: STUDENT IN AN ORGANIZED HEALTH CARE EDUCATION/TRAINING PROGRAM

## 2025-02-12 PROCEDURE — S4991 NICOTINE PATCH NONLEGEND: HCPCS | Performed by: STUDENT IN AN ORGANIZED HEALTH CARE EDUCATION/TRAINING PROGRAM

## 2025-02-12 PROCEDURE — 25000003 PHARM REV CODE 250: Performed by: INTERNAL MEDICINE

## 2025-02-12 PROCEDURE — 30200315 PPD INTRADERMAL TEST REV CODE 302: Performed by: STUDENT IN AN ORGANIZED HEALTH CARE EDUCATION/TRAINING PROGRAM

## 2025-02-12 RX ADMIN — MICONAZOLE NITRATE: 20 OINTMENT TOPICAL at 09:02

## 2025-02-12 RX ADMIN — TUBERCULIN PURIFIED PROTEIN DERIVATIVE 5 UNITS: 5 INJECTION, SOLUTION INTRADERMAL at 04:02

## 2025-02-12 RX ADMIN — Medication 6 MG: at 08:02

## 2025-02-12 RX ADMIN — NICOTINE 1 PATCH: 7 PATCH, EXTENDED RELEASE TRANSDERMAL at 09:02

## 2025-02-12 NOTE — ASSESSMENT & PLAN NOTE
Patient with Acute debility due to age-related physical debility and other reduced mobility. The patient's latest AMPAC (Activity Measure for Post Acute Care) Score is listed below.    AM-PAC Score - How much help does the patient need for each activity listed  Basic Mobility Total Score: 7  Turning over in bed (including adjusting bedclothes, sheets and blankets)?: A lot  Sitting down on and standing up from a chair with arms (e.g., wheelchair, bedside commode, etc.): Unable  Moving from lying on back to sitting on the side of the bed?: Unable  Moving to and from a bed to a chair (including a wheelchair)?: Unable  Need to walk in hospital room?: Unable  Climbing 3-5 steps with a railing?: Unable    Plan  -Fall precautions in place, unable to tolerate PT given poor cognition   - Plan for NH with hospice services -pending insurance(Medicaid/Medicare)   - Palliative care following  - Pt is unable to be safely discharged to home at this time due to lack of continuous home care.

## 2025-02-12 NOTE — ASSESSMENT & PLAN NOTE
Plan is NH with Hospice  - patient medically stable on comfort measures. Pending placement at accepting facility  -awaiting final signatures from family for arielle    18-Sep-2019 07:44

## 2025-02-12 NOTE — PROGRESS NOTES
Received call back from Ute at [facility] acknowledging receipt of orders, 142, PASRR, MAR.  Ute stated that they will need an updated 142 that is effective within 30 days of admission. This writer spoke Baldemar of [facility] to request that proactive pickup for tonight be pushed to noon tomorrow.  CM phoned brother Jarad to make him aware of potential  time pending 142.  Attempted to contact the State office ([phone]) to obtain updated 142.  No answer at this time. Will attempt again in the a.m.

## 2025-02-12 NOTE — PROGRESS NOTES
1944 This writer spoke to brother Jarad Zuñiga [phone] regarding progress made.  He stated that his sister Karla was in route to if not already at facility to sign check needed for admission. Unable to reach [facility] after multiple phone calls for confirmation that financials and check from family received. Will inform team.

## 2025-02-12 NOTE — PROGRESS NOTES
[facility] - Internal Medicine [facility] Medicine  Progress Note    Patient Name: Bhavna Lim  MRN: [id number]  Patient Class: IP- Inpatient   Admission Date: 12/26/2024  Length of Stay: 45 days  Attending Physician: Yarely Walters MD  Primary Care Provider: Suzi Green MD        Subjective     Principal Problem:Palliative care encounter        HPI:  Mrs. Bhavna Lim is a 79 year old F with a history of HTN and HLD who presents to the ED for weakness and fatigue. She has been having weight loss and poor po intake over the last few months. She has been seen by GI and evaluated with EGD and Colonoscopy as well as MRCP which showed cholelithiasis with gallbladder distention and intrahepatic and extrahepatic biliary dilation. No significant abnormalities on EGD and coloscopy. She was referred to surgery who recommended NM study and possible EUS with GI. She has been unable to tolerate any solid foods. She sometimes takes a little bit of soup and has tried protein shakes. She feels full immediately. She does endorse some pain in her epigastric region ever since having her EGD performed. She occasionally has vomiting. Denies regurgitation of food. She continues to have worsening weakness and falls at home. She had two falls yesterday due to weakness. She denies significant dizziness. She is unable to walk due to her weakness and sister is concerned this is related to her poor nutrition.      In the ED VSS. Labs notable for Potassium of 3.0 which was repleted in the ED. No focal signs concerning for stroke. She was admitted to medicine for further management.      Overview/Hospital Course:  While on the floor PT/OT were consulted and recommended SNF. Nutrition consulted. Discussed with GI outpatient workup and no indication for further inpatient workup. She continued to be altered while in the hospital. CT spine/head ordered for concern of fall showing ischemic changes and signficant stenosis  and narrowing. She developed worsening hand weakness and numbness/tinglign with abnormal coordiation. Some of this was prior to admission but weakness significantly worsened. MRI ordered and showed old infarcts. She continued to refuse food and did not have abdominal complaints just would not eat. Started on IVF due to hypoglycemia. Acute encephalopathy workup initiated and negative aside from low folate. UA ordered but not performed. At this point if nutrition becomes suboptimal will have to consider other means of nutrition. Son with concern of worsening memory and possible dementia wanting placement in [place]. Started on supplements for poor nutrition including thiamine and folate to see if this will help with mental status. Family had extensive discussion with provider care team and decided to pursue NH with hospice services and sister (lives locally) would pursue mPOA. She has had intermitted problems with hypoglycemia and electrolyte disturbances.     Plan is NH with hospice services. She has been waiting for NH for > 1 week.     Patient remains stable for discharge with hospice services     Interval History: Pt seen and examined this morning on rounds. NAEON. Pleasant but confused. Care plan reviewed. Otherwise, doing well and with no further complaints at this time.        Objective:     Vital Signs (Most Recent):  Temp: 97.6 °F (36.4 °C) (02/12/25 0737)  Pulse: 72 (02/12/25 0737)  Resp: 18 (02/12/25 0737)  BP: 135/83 (02/12/25 0737)  SpO2: 95 % (02/12/25 0737) Vital Signs (24h Range):  Temp:  [97.6 °F (36.4 °C)-98 °F (36.7 °C)] 97.6 °F (36.4 °C)  Pulse:  [72-96] 72  Resp:  [18] 18  SpO2:  [94 %-98 %] 95 %  BP: (135-152)/(82-86) 135/83     Weight: 54.9 kg (121 lb 0.5 oz)  Body mass index is 22.87 kg/m².    Intake/Output Summary (Last 24 hours) at 2/12/2025 1010  Last data filed at 2/12/2025 0924  Gross per 24 hour   Intake 330 ml   Output 1300 ml   Net -970 ml      Physical Exam  Constitutional:        General: She is not in acute distress.     Appearance: Normal appearance. She is not ill-appearing.   HENT:      Head: Normocephalic and atraumatic.      Nose: Nose normal.      Mouth/Throat:      Mouth: Mucous membranes are moist.   Eyes:      Extraocular Movements: Extraocular movements intact.      Pupils: Pupils are equal, round, and reactive to light.   Cardiovascular:      Rate and Rhythm: Normal rate and regular rhythm.      Heart sounds: No murmur heard.     No gallop.   Pulmonary:      Effort: Pulmonary effort is normal.      Breath sounds: Normal breath sounds. No wheezing or rales.   Abdominal:      General: Abdomen is flat. Bowel sounds are normal. There is no distension.      Palpations: Abdomen is soft.      Tenderness: There is no abdominal tenderness.   Musculoskeletal:         General: No swelling or tenderness. Normal range of motion.      Cervical back: Normal range of motion and neck supple.      Right lower leg: No edema.      Left lower leg: No edema.   Skin:     General: Skin is warm and dry.      Capillary Refill: Capillary refill takes less than 2 seconds.   Neurological:      Comments: Pleasantly demented   Psychiatric:         Mood and Affect: Mood normal.         Computed MELD 3.0 unavailable. One or more values for this score either were not found within the given timeframe or did not fit some other criterion.  Computed MELD-Na unavailable. One or more values for this score either were not found within the given timeframe or did not fit some other criterion.      Assessment and Plan     * Palliative care encounter  Plan is NH with Hospice  - patient medically stable on comfort measures. Pending placement at accepting facility  -awaiting final signatures from family for arielle     Severe protein-calorie malnutrition  Nutrition consulted. Most recent weight and BMI monitored-     Measurements:  Wt Readings from Last 1 Encounters:   02/10/25 54.9 kg (121 lb 0.5 oz)   Body mass index is  22.87 kg/m².    Patient has been screened and assessed by RD.    Malnutrition Type:  Context: chronic illness  Level: severe    Malnutrition Characteristic Summary:  Weight Loss (Malnutrition): greater than 5% in 1 month  Energy Intake (Malnutrition): less than 75% for greater than or equal to 1 month  Subcutaneous Fat (Malnutrition): severe depletion  Muscle Mass (Malnutrition): severe depletion    Interventions/Recommendations (treatment strategy):  Please prioritize patient's comfort and preferences over strict nutritional goals at this time. Offer small, frequent meals, texture modifications, and respect the patient's desire to eat or not eat. Also ensure proper hydration with small sips of fluids periodically.    - encourage PO intake, pt will likely need frequent reorientation and someone at bedside to help eat given confusion     Unintentional weight loss  Nutrition consulted. Most recent weight and BMI monitored-     Measurements:  Wt Readings from Last 1 Encounters:   02/10/25 54.9 kg (121 lb 0.5 oz)   Body mass index is 22.87 kg/m².    RD consultation   Extensive outpatient evaluation recommended by GI and General surgery   Will add supplementation to meals; soft bite-sized per rec's  Liberalize diet for comfort   No plan for feeding tube at this time as family is able to convince pt to eat intermittently.       Please prioritize patient's comfort and preferences over strict nutritional goals at this time. Offer small, frequent meals, texture modifications, and respect the patient's desire to eat or not eat. Also ensure proper hydration with small sips of fluids periodically.      Hypoglycemia  -Intermittent hypoglycemia over hospital stay. Likely related to poor PO intake.   -Encourage PO intake         Debility  Patient with Acute debility due to age-related physical debility and other reduced mobility. The patient's latest AMPAC (Activity Measure for Post Acute Care) Score is listed below.    AM-PAC  "Score - How much help does the patient need for each activity listed  Basic Mobility Total Score: 7  Turning over in bed (including adjusting bedclothes, sheets and blankets)?: A lot  Sitting down on and standing up from a chair with arms (e.g., wheelchair, bedside commode, etc.): Unable  Moving from lying on back to sitting on the side of the bed?: Unable  Moving to and from a bed to a chair (including a wheelchair)?: Unable  Need to walk in hospital room?: Unable  Climbing 3-5 steps with a railing?: Unable    Plan  -Fall precautions in place, unable to tolerate PT given poor cognition   - Plan for NH with hospice services -pending insurance(Medicaid/Medicare)   - Palliative care following  - Pt is unable to be safely discharged to home at this time due to lack of continuous home care.          Diabetes mellitus  Sliding scale held, accuchecks for hypoglycemia     Hold Oral hypoglycemics while patient is in the hospital; holding all given low intake  No need for further BG checks, has been stable for > 1 week. Is eating and drinking okay. Hold all DM medications    Benign essential hypertension  Patient's blood pressure range in the last 24 hours was: BP  Min: 109/67  Max: 150/80.The patient's inpatient anti-hypertensive regimen is listed below:  Current Antihypertensives       Plan  - BP is controlled, no changes needed to their regimen  Stopping nifedipine and lisinopril to reduce pill burden. They were at low dose and BP has remained acceptable. Okay for her BP to run a little higher     Hyperlipidemia  Stopping ASA and statin to reduce number of pills and preventing longer term effects no longer aligns with goals of care      Comfort measures only status        Hypophosphatemia  Patient's most recent phosphorus results are listed below.   No results for input(s): "PHOS" in the last 72 hours.    Plan  No longer monitoring, outside patients goals of care      Hypomagnesemia  No longer monitoring         " "    Anemia  Anemia is likely due to Iron deficiency. Most recent hemoglobin and hematocrit are listed below.  No results for input(s): "HGB", "HCT" in the last 72 hours.    Plan  -will hold on monitoring       Swelling of right upper extremity  CT UE with Nonspecific subcutaneous edema throughout the distal arm and forearm, possibly sterile or infectious in nature.  No fluid collection.  No osteomyelitis.     Continue to monitor.     Hyperbilirubinemia  -no longer monitoring   - elevation in bilirubin with concomitant elevations in LFTs  - RUQ U/S significant for biliary sludge, mild/moderate intrahepatic/extrahepatic biliary ductla dilation. Unchanged compare to previous MRCP.         Pseudomonas urinary tract infection  -s/p Levaquin   - Blood cultures: NGTD       Cognitive impairment  History of progressive confusion and forgetfulness prior to admission. CT head: Sequela of chronic microvascular ischemic change. Remote lacunar type infarct right thalamus;   -HIV/RPR negative. Folate low, repleting. B12 high. Thiamine low and repleting. 2/2 to dementia with poor nutritional intake; TSH wnl on 1/1/2025  - exhausted reversible causes of poor mentation and with imaging, appears to be age-related decline accelerated by some degree of vascular dementia. Likely some component of acute delirum due to hospital stay/UTI in setting of advanced dementia.     Plan:   - discussed pitfalls of G-tube placement. No plan for placement at this time.   - plan for discharge to Nursing Home with hospice services   -Haldol added PRN for agitation. Discussed risk of death and worsening condition with pt's son and sister.     Numbness and tingling in both hands  -noted by patient before admission with abnormal finger to nose and weakness   -CT head and spine reviewed. There is significant stenosis/narrowing of C6 and C7 which could be contributing to symptoms. Findings appear chronic and not acute. MRI ordered and notable for cervical " "stenosis and lacunar infarcts. Limited visiblity due to movement   -CT head notable for remote lacunar infarcts, hx of stroke in 2005 currently on ASA   - PT/OT discontinued in light of impending NH with hospice initiation and likely little value given poor cooperation of patient         Hypokalemia  Patient's most recent potassium results are listed below.   No results for input(s): "K" in the last 72 hours.    Plan  - no longer monitoring, outside patients goals of care      VTE Risk Mitigation (From admission, onward)           Ordered     IP VTE HIGH RISK PATIENT  Once         12/26/24 1547                    Discharge Planning   RITA: 2/13/2025     Code Status: DNR   Medical Readiness for Discharge Date: 1/20/2025  Discharge Plan A: New Nursing Home placement - nursing home care facility   Discharge Delays: (!) Post-Acute Set-up (Pt under financial review)                    Yarely Walters MD  Department of Hospital Medicine   [facility] - Internal Medicine Telemetry    "

## 2025-02-12 NOTE — PROGRESS NOTES
Received call from Bijan requesting orders by 4/4:30.  Also requested 142, PASRR, Cxray, PPD placement and MAR x14 days.  Will inform team.

## 2025-02-12 NOTE — ASSESSMENT & PLAN NOTE
Nutrition consulted. Most recent weight and BMI monitored-     Measurements:  Wt Readings from Last 1 Encounters:   02/10/25 54.9 kg (121 lb 0.5 oz)   Body mass index is 22.87 kg/m².    RD consultation   Extensive outpatient evaluation recommended by GI and General surgery   Will add supplementation to meals; soft bite-sized per rec's  Liberalize diet for comfort   No plan for feeding tube at this time as family is able to convince pt to eat intermittently.       Please prioritize patient's comfort and preferences over strict nutritional goals at this time. Offer small, frequent meals, texture modifications, and respect the patient's desire to eat or not eat. Also ensure proper hydration with small sips of fluids periodically.

## 2025-02-12 NOTE — PLAN OF CARE
Problem: Adult Inpatient Plan of Care  Goal: Absence of Hospital-Acquired Illness or Injury  Outcome: Progressing  Goal: Optimal Comfort and Wellbeing  Outcome: Progressing     Problem: Diabetes Comorbidity  Goal: Blood Glucose Level Within Targeted Range  Outcome: Progressing     Problem: Skin Injury Risk Increased  Goal: Skin Health and Integrity  Outcome: Progressing     Problem: Wound  Goal: Improved Oral Intake  Outcome: Progressing  Goal: Optimal Pain Control and Function  Outcome: Progressing  Goal: Skin Health and Integrity  Outcome: Progressing  Goal: Optimal Wound Healing  Outcome: Progressing     Problem: Coping Ineffective  Goal: Effective Coping  Outcome: Progressing     Problem: Palliative Care  Goal: Enhanced Quality of Life  Outcome: Progressing     Problem: Adult Inpatient Plan of Care  Goal: Plan of Care Review  Outcome: Not Progressing  Goal: Patient-Specific Goal (Individualized)  Outcome: Not Progressing  Goal: Readiness for Transition of Care  Outcome: Not Progressing     Problem: Confusion Acute  Goal: Optimal Cognitive Function  Outcome: Not Progressing     Problem: Wound  Goal: Optimal Coping  Outcome: Not Progressing  Goal: Optimal Functional Ability  Outcome: Not Progressing  Goal: Absence of Infection Signs and Symptoms  Outcome: Not Progressing     Problem: Infection  Goal: Absence of Infection Signs and Symptoms  Outcome: Not Progressing     Problem: Fall Injury Risk  Goal: Absence of Fall and Fall-Related Injury  Outcome: Not Progressing     Problem: Confusion Chronic  Goal: Optimal Cognitive Function  Outcome: Not Progressing

## 2025-02-12 NOTE — ASSESSMENT & PLAN NOTE
-no longer monitoring   - elevation in bilirubin with concomitant elevations in LFTs  - RUQ U/S significant for biliary sludge, mild/moderate intrahepatic/extrahepatic biliary ductla dilation. Unchanged compare to previous MRCP.

## 2025-02-12 NOTE — SUBJECTIVE & OBJECTIVE
Interval History: Pt seen and examined this morning on rounds. NESTOR. Pleasant but confused. Care plan reviewed. Otherwise, doing well and with no further complaints at this time.        Objective:     Vital Signs (Most Recent):  Temp: 97.6 °F (36.4 °C) (02/12/25 0737)  Pulse: 72 (02/12/25 0737)  Resp: 18 (02/12/25 0737)  BP: 135/83 (02/12/25 0737)  SpO2: 95 % (02/12/25 0737) Vital Signs (24h Range):  Temp:  [97.6 °F (36.4 °C)-98 °F (36.7 °C)] 97.6 °F (36.4 °C)  Pulse:  [72-96] 72  Resp:  [18] 18  SpO2:  [94 %-98 %] 95 %  BP: (135-152)/(82-86) 135/83     Weight: 54.9 kg (121 lb 0.5 oz)  Body mass index is 22.87 kg/m².    Intake/Output Summary (Last 24 hours) at 2/12/2025 1010  Last data filed at 2/12/2025 0924  Gross per 24 hour   Intake 330 ml   Output 1300 ml   Net -970 ml      Physical Exam  Constitutional:       General: She is not in acute distress.     Appearance: Normal appearance. She is not ill-appearing.   HENT:      Head: Normocephalic and atraumatic.      Nose: Nose normal.      Mouth/Throat:      Mouth: Mucous membranes are moist.   Eyes:      Extraocular Movements: Extraocular movements intact.      Pupils: Pupils are equal, round, and reactive to light.   Cardiovascular:      Rate and Rhythm: Normal rate and regular rhythm.      Heart sounds: No murmur heard.     No gallop.   Pulmonary:      Effort: Pulmonary effort is normal.      Breath sounds: Normal breath sounds. No wheezing or rales.   Abdominal:      General: Abdomen is flat. Bowel sounds are normal. There is no distension.      Palpations: Abdomen is soft.      Tenderness: There is no abdominal tenderness.   Musculoskeletal:         General: No swelling or tenderness. Normal range of motion.      Cervical back: Normal range of motion and neck supple.      Right lower leg: No edema.      Left lower leg: No edema.   Skin:     General: Skin is warm and dry.      Capillary Refill: Capillary refill takes less than 2 seconds.   Neurological:       Comments: Pleasantly demented   Psychiatric:         Mood and Affect: Mood normal.         Computed MELD 3.0 unavailable. One or more values for this score either were not found within the given timeframe or did not fit some other criterion.  Computed MELD-Na unavailable. One or more values for this score either were not found within the given timeframe or did not fit some other criterion.

## 2025-02-13 VITALS
WEIGHT: 121.06 LBS | SYSTOLIC BLOOD PRESSURE: 148 MMHG | HEART RATE: 86 BPM | HEIGHT: 61 IN | TEMPERATURE: 98 F | DIASTOLIC BLOOD PRESSURE: 84 MMHG | OXYGEN SATURATION: 97 % | BODY MASS INDEX: 22.86 KG/M2 | RESPIRATION RATE: 18 BRPM

## 2025-02-13 PROBLEM — M79.89 SWELLING OF RIGHT UPPER EXTREMITY: Status: RESOLVED | Noted: 2025-01-16 | Resolved: 2025-02-13

## 2025-02-13 PROBLEM — N39.0 PSEUDOMONAS URINARY TRACT INFECTION: Status: RESOLVED | Noted: 2025-01-14 | Resolved: 2025-02-13

## 2025-02-13 PROBLEM — B96.5 PSEUDOMONAS URINARY TRACT INFECTION: Status: RESOLVED | Noted: 2025-01-14 | Resolved: 2025-02-13

## 2025-02-13 PROBLEM — E87.6 HYPOKALEMIA: Status: RESOLVED | Noted: 2024-12-26 | Resolved: 2025-02-13

## 2025-02-13 PROBLEM — E83.42 HYPOMAGNESEMIA: Status: RESOLVED | Noted: 2025-01-18 | Resolved: 2025-02-13

## 2025-02-13 PROBLEM — E80.6 HYPERBILIRUBINEMIA: Status: RESOLVED | Noted: 2025-01-15 | Resolved: 2025-02-13

## 2025-02-13 PROBLEM — D64.9 ANEMIA: Status: RESOLVED | Noted: 2025-01-18 | Resolved: 2025-02-13

## 2025-02-13 PROBLEM — E83.39 HYPOPHOSPHATEMIA: Status: RESOLVED | Noted: 2025-01-18 | Resolved: 2025-02-13

## 2025-02-13 PROCEDURE — 25000003 PHARM REV CODE 250: Performed by: STUDENT IN AN ORGANIZED HEALTH CARE EDUCATION/TRAINING PROGRAM

## 2025-02-13 PROCEDURE — S4991 NICOTINE PATCH NONLEGEND: HCPCS | Performed by: STUDENT IN AN ORGANIZED HEALTH CARE EDUCATION/TRAINING PROGRAM

## 2025-02-13 RX ADMIN — MICONAZOLE NITRATE: 20 OINTMENT TOPICAL at 09:02

## 2025-02-13 RX ADMIN — NICOTINE 1 PATCH: 7 PATCH, EXTENDED RELEASE TRANSDERMAL at 09:02

## 2025-02-13 NOTE — PROGRESS NOTES
Patient home meds that was in [facility]

## 2025-02-13 NOTE — NURSING
Report given to Nurse Shaver at [facility]. Patient transported via stretcher in stable condition.

## 2025-02-13 NOTE — PLAN OF CARE
NURSING HOME ORDERS    02/13/2025  [facility] - INTERNAL MEDICINE TELEMETRY  [address] [address]  Dept: [phone]  Loc: [phone]     [facility]     Diagnoses:  Active Hospital Problems    Diagnosis  POA    *Palliative care encounter [Z51.5]  Not Applicable     Priority: 1 - High    Comfort measures only status [Z51.5]  Not Applicable    Hypoglycemia [E16.2]  No    Cognitive impairment [R41.89]  Yes    Numbness and tingling in both hands [R20.0, R20.2]  Yes    Severe protein-calorie malnutrition [E43]  Yes    Debility [R53.81]  Yes    Unintentional weight loss [R63.4]  Yes    Hyperlipidemia [E78.5]  Yes     Chronic     dx update      Benign essential hypertension [I10]  Yes     dx update      Diabetes mellitus [E11.9]  Yes     dx update        Resolved Hospital Problems    Diagnosis Date Resolved POA    Anemia [D64.9] 02/13/2025 Yes    Hypernatremia [E87.0] 01/18/2025 Unknown    Hypomagnesemia [E83.42] 02/13/2025 Yes    Hyponatremia [E87.1] 01/18/2025 Yes    Hypophosphatemia [E83.39] 02/13/2025 Yes    Swelling of right upper extremity [M79.89] 02/13/2025 No    Hyperbilirubinemia [E80.6] 02/13/2025 No    Pseudomonas urinary tract infection [N39.0, B96.5] 02/13/2025 No    Failure to thrive in adult [R62.7] 01/03/2025 Yes    Hypokalemia [E87.6] 02/13/2025 Yes       Patient is homebound due to:  Palliative care encounter, debility    Allergies:Review of patient's allergies indicates:  No Known Allergies    Vitals:  Routine    Diet: soft diet    Activities:   Activity as tolerated    Goals of Care Treatment Preferences:  Code Status: DNR    Health care agent: Bruno Carina Smithrigoberto)  Health care agent number: [phone]          What is most important right now is to focus on symptom/pain control, quality of life, even if it means sacrificing a little time.  Accordingly, we have decided that the best  plan to meet the patient's goals includes enrolling in hospice care.      Labs:  None     Nursing Precautions:  Aspiration , Fall, and Pressure ulcer prevention      Miscellaneous Care  Wound Care  Apply waffle overlay to mattress top.  Ensure waffle mattress overlay is inflated to the proper air amount - perform hand check to verify proper immersion.    Fitted sheet only on waffle -- not over waffle and mattress    Assure Q2H turn protocol continues to be implemented.     Apply Triad correctly:   Always cleanse wound before applying Triad. To remove Triad: Use pH-balanced wound cleanser to soften Triad Gently wipe without scrubbing For complete removal, repeat as needed. Gently spread Triad evenly over the area of application to the thickness of a dime.                  Diabetes Care:  N/A       Medications: Discontinue all previous medication orders, if any. See new list below.     Medication List        START taking these medications      acetaminophen 325 MG tablet  Commonly known as: TYLENOL  Take 2 tablets (650 mg total) by mouth every 8 (eight) hours as needed for Temperature greater than (or equal to 101 degree F).     melatonin 3 mg tablet  Commonly known as: MELATIN  Take 2 tablets (6 mg total) by mouth nightly.     ondansetron 4 MG Tbdl  Commonly known as: ZOFRAN-ODT  Take 1 tablet (4 mg total) by mouth every 6 (six) hours as needed (vomiting).            STOP taking these medications      aspirin 81 MG EC tablet  Commonly known as: ECOTRIN     atorvastatin 40 MG tablet  Commonly known as: LIPITOR     furosemide 40 MG tablet  Commonly known as: LASIX     lisinopriL 40 MG tablet  Commonly known as: PRINIVIL,ZESTRIL     NIFEdipine 60 MG Tbsr  Commonly known as: ADALAT CC     triamcinolone acetonide 0.1% 0.1 % cream  Commonly known as: KENALOG                Immunizations Administered as of 2/13/2025       Name Date Dose VIS Date Route Exp Date    COVID-19, MRNA, LN-S, PF ([employer]) 3/10/2021 0.5 mL --  Intramuscular --    Site: Left deltoid     : [employer]     Lot: [id number]     Comment: Adminis     COVID-19, MRNA, LN-S, JUVENAL ([employer]) 2/10/2021 0.5 mL -- Intramuscular --    Site: Left deltoid     : [employer]     Lot: [id number]     Comment: Adminis               _________________________________  Sukhwinder Levin DO  02/13/2025

## 2025-02-13 NOTE — DISCHARGE SUMMARY
[facility] Swann - Internal Medicine [facility] Medicine  Discharge Summary      Patient Name: Bhavna Lim  MRN: [id number]  MITCHEL: [id number]  Patient Class: IP- Inpatient  Admission Date: 12/26/2024  Hospital Length of Stay: 46 days  Discharge Date and Time:  02/13/2025 3:46 PM  Attending Physician: Rosalie att. providers found   Discharging Provider: Sukhwinder Levin DO  Primary Care Provider: Suzi Green MD  [facility] Medicine Team: [facility] MED A Sukhwinder Levin DO  Primary Care Team: [facility] MED A    HPI:   Mrs. Bhavna Lim is a 79 year old F with a history of HTN and HLD who presents to the ED for weakness and fatigue. She has been having weight loss and poor po intake over the last few months. She has been seen by GI and evaluated with EGD and Colonoscopy as well as MRCP which showed cholelithiasis with gallbladder distention and intrahepatic and extrahepatic biliary dilation. No significant abnormalities on EGD and coloscopy. She was referred to surgery who recommended NM study and possible EUS with GI. She has been unable to tolerate any solid foods. She sometimes takes a little bit of soup and has tried protein shakes. She feels full immediately. She does endorse some pain in her epigastric region ever since having her EGD performed. She occasionally has vomiting. Denies regurgitation of food. She continues to have worsening weakness and falls at home. She had two falls yesterday due to weakness. She denies significant dizziness. She is unable to walk due to her weakness and sister is concerned this is related to her poor nutrition.      In the ED VSS. Labs notable for Potassium of 3.0 which was repleted in the ED. No focal signs concerning for stroke. She was admitted to medicine for further management.      * No surgery found *      Hospital Course:   While on the floor PT/OT were consulted and recommended SNF. Nutrition consulted. Discussed with GI outpatient workup and no indication for  further inpatient workup. She continued to be altered while in the hospital. CT spine/head ordered for concern of fall showing ischemic changes and signficant stenosis and narrowing. She developed worsening hand weakness and numbness/tinglign with abnormal coordiation. Some of this was prior to admission but weakness significantly worsened. MRI ordered and showed old infarcts. She continued to refuse food and did not have abdominal complaints just would not eat. Started on IVF due to hypoglycemia. Acute encephalopathy workup initiated and negative aside from low folate. UA ordered but not performed. At this point if nutrition becomes suboptimal will have to consider other means of nutrition. Son with concern of worsening memory and possible dementia wanting placement in [place]. Started on supplements for poor nutrition including thiamine and folate to see if this will help with mental status. Family had extensive discussion with provider care team and decided to pursue NH with hospice services and sister (lives locally) would pursue mPOA. She has had intermitted problems with hypoglycemia and electrolyte disturbances.     Plan is NH with hospice services. She has been waiting for NH for > 1 week.     Patient remains stable for discharge with hospice services. No aggressive interventions performed. Urine dark but given goals proceeded with increased oral intake as tolerated. Accepted to [facility] awaiting final signatures and finalized paperwork    - patient discharge to facility, to enrol in hospice services at that facility.    Patient discharge in stable condition to facility    PE  No acute distress  Resting in bed, pleasantly confused  Heart RRR  Lungs CTA     Goals of Care Treatment Preferences:  Code Status: DNR    Health care agent: Bruno Lim (son)  Health care agent number: [phone]          What is most important right now is to focus on symptom/pain control, quality of life, even if it means  sacrificing a little time.  Accordingly, we have decided that the best plan to meet the patient's goals includes enrolling in hospice care.      SDOH Screening:  The patient was screened for utility difficulties, food insecurity, transport difficulties, housing insecurity, and interpersonal safety and there were no concerns identified this admission.     Consults:   Consults (From admission, onward)          Status Ordering Provider     [facility] team [facility]  Once        Provider:  (Not yet assigned)    Completed ARASH, CASH     Inpatient consult to Midline team  Once        Provider:  (Not yet assigned)    Completed ARASH, CASH     Inpatient consult to Palliative Care  Once        Provider:  (Not yet assigned)    Completed FACIANE, SAJI     Inpatient consult to Midline team  Once        Provider:  (Not yet assigned)    Completed FACIANE, SAJI     Inpatient consult to Midline team  Once        Provider:  (Not yet assigned)    Completed FACIANE, SAJI     Inpatient consult to Palliative Care  Once        Provider:  (Not yet assigned)    Completed DONA HATFIELD     Inpatient consult to Gastroenterology  Once        Provider:  (Not yet assigned)    Completed DONA HATFIELD     Inpatient consult to PICC team [facility])  Once        Provider:  (Not yet assigned)    Completed REMEDIOS TALLEY     Inpatient consult to Registered Dietitian/Nutritionist  Once        Provider:  (Not yet assigned)    Completed REMEDIOS TALLEY            * Palliative care encounter  Plan is NH with Hospice  - patient medically stable on comfort measures. Pending placement at accepting facility  -awaiting final signatures from family for guzmán     Comfort measures only status        Cognitive impairment  History of progressive confusion and forgetfulness prior to admission. CT head: Sequela of chronic microvascular ischemic change. Remote lacunar type infarct right thalamus;   -HIV/RPR negative. Folate low,  repleting. B12 high. Thiamine low and repleting. 2/2 to dementia with poor nutritional intake; TSH wnl on 1/1/2025  - exhausted reversible causes of poor mentation and with imaging, appears to be age-related decline accelerated by some degree of vascular dementia. Likely some component of acute delirum due to hospital stay/UTI in setting of advanced dementia.     Plan:   - discussed pitfalls of G-tube placement. No plan for placement at this time.   - plan for discharge to Nursing Home with hospice services   -Haldol added PRN for agitation. Discussed risk of death and worsening condition with pt's son and sister.     Hypoglycemia  -Intermittent hypoglycemia over hospital stay. Likely related to poor PO intake.   -Encourage PO intake         Numbness and tingling in both hands  -noted by patient before admission with abnormal finger to nose and weakness   -CT head and spine reviewed. There is significant stenosis/narrowing of C6 and C7 which could be contributing to symptoms. Findings appear chronic and not acute. MRI ordered and notable for cervical stenosis and lacunar infarcts. Limited visiblity due to movement   -CT head notable for remote lacunar infarcts, hx of stroke in 2005 currently on ASA   - PT/OT discontinued in light of impending NH with hospice initiation and likely little value given poor cooperation of patient         Severe protein-calorie malnutrition  Nutrition consulted. Most recent weight and BMI monitored-     Measurements:  Wt Readings from Last 1 Encounters:   02/10/25 54.9 kg (121 lb 0.5 oz)   Body mass index is 22.87 kg/m².    Patient has been screened and assessed by RD.    Malnutrition Type:  Context: chronic illness  Level: severe    Malnutrition Characteristic Summary:  Weight Loss (Malnutrition): greater than 5% in 1 month  Energy Intake (Malnutrition): less than 75% for greater than or equal to 1 month  Subcutaneous Fat (Malnutrition): severe depletion  Muscle Mass (Malnutrition):  severe depletion    Interventions/Recommendations (treatment strategy):  Please prioritize patient's comfort and preferences over strict nutritional goals at this time. Offer small, frequent meals, texture modifications, and respect the patient's desire to eat or not eat. Also ensure proper hydration with small sips of fluids periodically.    - encourage PO intake, pt will likely need frequent reorientation and someone at bedside to help eat given confusion     Debility  Patient with Acute debility due to age-related physical debility and other reduced mobility. The patient's latest AMPAC (Activity Measure for Post Acute Care) Score is listed below.    AM-PAC Score - How much help does the patient need for each activity listed  Basic Mobility Total Score: 7  Turning over in bed (including adjusting bedclothes, sheets and blankets)?: A lot  Sitting down on and standing up from a chair with arms (e.g., wheelchair, bedside commode, etc.): Unable  Moving from lying on back to sitting on the side of the bed?: Unable  Moving to and from a bed to a chair (including a wheelchair)?: Unable  Need to walk in hospital room?: Unable  Climbing 3-5 steps with a railing?: Unable    Plan  -Fall precautions in place, unable to tolerate PT given poor cognition   - Plan for NH with hospice services -pending insurance(Medicaid/Medicare)   - Palliative care following  - Pt is unable to be safely discharged to home at this time due to lack of continuous home care.          Diabetes mellitus  Sliding scale held, accuchecks for hypoglycemia     Hold Oral hypoglycemics while patient is in the hospital; holding all given low intake  No need for further BG checks, has been stable for > 1 week. Is eating and drinking okay. Hold all DM medications    Unintentional weight loss  Nutrition consulted. Most recent weight and BMI monitored-     Measurements:  Wt Readings from Last 1 Encounters:   02/10/25 54.9 kg (121 lb 0.5 oz)   Body mass index is  22.87 kg/m².    RD consultation   Extensive outpatient evaluation recommended by GI and General surgery   Will add supplementation to meals; soft bite-sized per rec's  Liberalize diet for comfort   No plan for feeding tube at this time as family is able to convince pt to eat intermittently.       Please prioritize patient's comfort and preferences over strict nutritional goals at this time. Offer small, frequent meals, texture modifications, and respect the patient's desire to eat or not eat. Also ensure proper hydration with small sips of fluids periodically.      Hyperlipidemia  Stopping ASA and statin to reduce number of pills and preventing longer term effects no longer aligns with goals of care      Benign essential hypertension  Patient's blood pressure range in the last 24 hours was: BP  Min: 109/67  Max: 150/80.The patient's inpatient anti-hypertensive regimen is listed below:  Current Antihypertensives       Plan  - BP is controlled, no changes needed to their regimen  Stopping nifedipine and lisinopril to reduce pill burden. They were at low dose and BP has remained acceptable. Okay for her BP to run a little higher       Final Active Diagnoses:    Diagnosis Date Noted POA    PRINCIPAL PROBLEM:  Palliative care encounter [Z51.5] 01/06/2025 Not Applicable    Comfort measures only status [Z51.5] 01/21/2025 Not Applicable    Hypoglycemia [E16.2] 01/01/2025 No    Cognitive impairment [R41.89] 01/01/2025 Yes    Numbness and tingling in both hands [R20.0, R20.2] 12/30/2024 Yes    Severe protein-calorie malnutrition [E43] 12/27/2024 Yes    Debility [R53.81] 12/26/2024 Yes    Unintentional weight loss [R63.4] 10/31/2024 Yes    Hyperlipidemia [E78.5] 10/07/2012 Yes     Chronic    Benign essential hypertension [I10] 08/11/2010 Yes    Diabetes mellitus [E11.9] 11/19/2008 Yes      Problems Resolved During this Admission:    Diagnosis Date Noted Date Resolved POA    Anemia [D64.9] 01/18/2025 02/13/2025 Yes     Hypernatremia [E87.0] 01/18/2025 01/18/2025 Unknown    Hypomagnesemia [E83.42] 01/18/2025 02/13/2025 Yes    Hyponatremia [E87.1] 01/18/2025 01/18/2025 Yes    Hypophosphatemia [E83.39] 01/18/2025 02/13/2025 Yes    Swelling of right upper extremity [M79.89] 01/16/2025 02/13/2025 No    Hyperbilirubinemia [E80.6] 01/15/2025 02/13/2025 No    Pseudomonas urinary tract infection [N39.0, B96.5] 01/14/2025 02/13/2025 No    Failure to thrive in adult [R62.7] 12/26/2024 01/03/2025 Yes    Hypokalemia [E87.6] 12/26/2024 02/13/2025 Yes       Discharged Condition:  to be enrolled in hospice, stable condition at discharge    Disposition: Hospice/Medical Facility    Follow Up:   Contact information for after-discharge care       Destination       [facility] .    Service: Nursing Home  Contact information:  [address] [facility] [address]  [phone]                                 Patient Instructions:   No discharge procedures on file.    Significant Diagnostic Studies: N/A    Pending Diagnostic Studies:       Procedure Component Value Units Date/Time    Bilirubin, direct [[id number]] Collected: 01/15/25 0901    Order Status: Sent Lab Status: No result     Specimen: Blood     Comprehensive metabolic panel [[id number]] Collected: 01/15/25 0848    Order Status: Sent Lab Status: No result     Specimen: Blood            Medications:  Reconciled Home Medications:      Medication List        START taking these medications      acetaminophen 325 MG tablet  Commonly known as: TYLENOL  Take 2 tablets (650 mg total) by mouth every 8 (eight) hours as needed for Temperature greater than (or equal to 101 degree F).     melatonin 3 mg tablet  Commonly known as: MELATIN  Take 2 tablets (6 mg total) by mouth nightly.     ondansetron 4 MG Tbdl  Commonly known as: ZOFRAN-ODT  Take 1 tablet (4 mg total) by mouth every 6 (six) hours as needed (vomiting).            STOP taking these medications      aspirin 81 MG EC  tablet  Commonly known as: ECOTRIN     atorvastatin 40 MG tablet  Commonly known as: LIPITOR     furosemide 40 MG tablet  Commonly known as: LASIX     lisinopriL 40 MG tablet  Commonly known as: PRINIVIL,ZESTRIL     NIFEdipine 60 MG Tbsr  Commonly known as: ADALAT CC     triamcinolone acetonide 0.1% 0.1 % cream  Commonly known as: KENALOG              Indwelling Lines/Drains at time of discharge:   Lines/Drains/Airways       None                   Time spent on the discharge of patient: 48 minutes         Sukhwinder Levin DO  Department of Hospital Medicine  [facility] - Internal Medicine Telemetry

## 2025-02-13 NOTE — PROGRESS NOTES
Received call back from Maria Del Rosario [facility].  Number for report provided as [phone].  Room assigned is 7B.  This information provided to nurse.  Room number given to transport crew.      Brothbabatunde Abraham [phone] is aware that crew is here to transport pt to [place].

## 2025-02-13 NOTE — PROGRESS NOTES
ALONDRA spoke to josé miguel Alford of the [facility] to request guidance for updated 142.  This writer was informed that a new PASRR and LOCET must be complete. PASRR faxed This writer spoke to Kirsten of the [facility] to complete LOCET. Awaiting 142.     [phone] Received call from brother Jarad.  Informed him that we are awaiting 142 and that transport has been requested for noon.

## 2025-02-13 NOTE — PROGRESS NOTES
This phoned Ashia [email or web address] before speaking to Darcie in admissions to confirm receipt of needed information to include updated 142 and PASRR.   Message will be forwarded to Maria Del Rosario.  No word back from Maria Del Rosario as of this note.